# Patient Record
Sex: FEMALE | Race: WHITE | NOT HISPANIC OR LATINO | Employment: UNEMPLOYED | URBAN - METROPOLITAN AREA
[De-identification: names, ages, dates, MRNs, and addresses within clinical notes are randomized per-mention and may not be internally consistent; named-entity substitution may affect disease eponyms.]

---

## 2017-06-29 ENCOUNTER — ANESTHESIA (OUTPATIENT)
Dept: PERIOP | Facility: HOSPITAL | Age: 52
End: 2017-06-29

## 2017-06-29 ENCOUNTER — ANESTHESIA EVENT (OUTPATIENT)
Dept: PERIOP | Facility: HOSPITAL | Age: 52
End: 2017-06-29

## 2017-06-29 ENCOUNTER — HOSPITAL ENCOUNTER (OUTPATIENT)
Facility: HOSPITAL | Age: 52
Setting detail: HOSPITAL OUTPATIENT SURGERY
Discharge: NURSING, REHAB OR LONG-TERM CARE | End: 2017-06-29
Attending: SURGERY | Admitting: SURGERY

## 2017-06-29 ENCOUNTER — APPOINTMENT (OUTPATIENT)
Dept: GENERAL RADIOLOGY | Facility: HOSPITAL | Age: 52
End: 2017-06-29

## 2017-06-29 VITALS
OXYGEN SATURATION: 98 % | DIASTOLIC BLOOD PRESSURE: 59 MMHG | WEIGHT: 261 LBS | HEART RATE: 65 BPM | BODY MASS INDEX: 43.49 KG/M2 | TEMPERATURE: 97.9 F | HEIGHT: 65 IN | SYSTOLIC BLOOD PRESSURE: 143 MMHG | RESPIRATION RATE: 20 BRPM

## 2017-06-29 LAB
ALBUMIN SERPL-MCNC: 3.6 G/DL (ref 3.5–5.2)
ALBUMIN/GLOB SERPL: 0.9 G/DL
ALP SERPL-CCNC: 102 U/L (ref 39–117)
ALT SERPL W P-5'-P-CCNC: 11 U/L (ref 1–33)
ANION GAP SERPL CALCULATED.3IONS-SCNC: 20.6 MMOL/L
APTT PPP: 24.9 SECONDS (ref 22.7–35.4)
AST SERPL-CCNC: 18 U/L (ref 1–32)
BASOPHILS # BLD AUTO: 0.06 10*3/MM3 (ref 0–0.2)
BASOPHILS NFR BLD AUTO: 0.7 % (ref 0–1.5)
BILIRUB SERPL-MCNC: 0.2 MG/DL (ref 0.1–1.2)
BUN BLD-MCNC: 67 MG/DL (ref 6–20)
BUN/CREAT SERPL: 18 (ref 7–25)
CALCIUM SPEC-SCNC: 9.5 MG/DL (ref 8.6–10.5)
CHLORIDE SERPL-SCNC: 102 MMOL/L (ref 98–107)
CO2 SERPL-SCNC: 17.4 MMOL/L (ref 22–29)
CREAT BLD-MCNC: 3.72 MG/DL (ref 0.57–1)
DEPRECATED RDW RBC AUTO: 47.1 FL (ref 37–54)
EOSINOPHIL # BLD AUTO: 0.27 10*3/MM3 (ref 0–0.7)
EOSINOPHIL NFR BLD AUTO: 3.3 % (ref 0.3–6.2)
ERYTHROCYTE [DISTWIDTH] IN BLOOD BY AUTOMATED COUNT: 14.1 % (ref 11.7–13)
GFR SERPL CREATININE-BSD FRML MDRD: 13 ML/MIN/1.73
GLOBULIN UR ELPH-MCNC: 4.2 GM/DL
GLUCOSE BLD-MCNC: 219 MG/DL (ref 65–99)
GLUCOSE BLDC GLUCOMTR-MCNC: 216 MG/DL (ref 70–130)
GLUCOSE BLDC GLUCOMTR-MCNC: 256 MG/DL (ref 70–130)
HCG SERPL QL: NEGATIVE
HCT VFR BLD AUTO: 26.7 % (ref 35.6–45.5)
HGB BLD-MCNC: 9.2 G/DL (ref 11.9–15.5)
IMM GRANULOCYTES # BLD: 0.04 10*3/MM3 (ref 0–0.03)
IMM GRANULOCYTES NFR BLD: 0.5 % (ref 0–0.5)
INR PPP: 1.08 (ref 0.9–1.1)
LYMPHOCYTES # BLD AUTO: 2.25 10*3/MM3 (ref 0.9–4.8)
LYMPHOCYTES NFR BLD AUTO: 27.9 % (ref 19.6–45.3)
MCH RBC QN AUTO: 31.7 PG (ref 26.9–32)
MCHC RBC AUTO-ENTMCNC: 34.5 G/DL (ref 32.4–36.3)
MCV RBC AUTO: 92.1 FL (ref 80.5–98.2)
MONOCYTES # BLD AUTO: 0.78 10*3/MM3 (ref 0.2–1.2)
MONOCYTES NFR BLD AUTO: 9.7 % (ref 5–12)
NEUTROPHILS # BLD AUTO: 4.66 10*3/MM3 (ref 1.9–8.1)
NEUTROPHILS NFR BLD AUTO: 57.9 % (ref 42.7–76)
PLATELET # BLD AUTO: 313 10*3/MM3 (ref 140–500)
PMV BLD AUTO: 9.8 FL (ref 6–12)
POTASSIUM BLD-SCNC: 5.4 MMOL/L (ref 3.5–5.2)
PROT SERPL-MCNC: 7.8 G/DL (ref 6–8.5)
PROTHROMBIN TIME: 13.6 SECONDS (ref 11.7–14.2)
RBC # BLD AUTO: 2.9 10*6/MM3 (ref 3.9–5.2)
SODIUM BLD-SCNC: 140 MMOL/L (ref 136–145)
WBC NRBC COR # BLD: 8.06 10*3/MM3 (ref 4.5–10.7)

## 2017-06-29 PROCEDURE — 93010 ELECTROCARDIOGRAM REPORT: CPT | Performed by: INTERNAL MEDICINE

## 2017-06-29 PROCEDURE — 85730 THROMBOPLASTIN TIME PARTIAL: CPT | Performed by: SURGERY

## 2017-06-29 PROCEDURE — 71010 HC CHEST PA OR AP: CPT

## 2017-06-29 PROCEDURE — 84703 CHORIONIC GONADOTROPIN ASSAY: CPT | Performed by: SURGERY

## 2017-06-29 PROCEDURE — 85025 COMPLETE CBC W/AUTO DIFF WBC: CPT | Performed by: SURGERY

## 2017-06-29 PROCEDURE — 93005 ELECTROCARDIOGRAM TRACING: CPT | Performed by: SURGERY

## 2017-06-29 PROCEDURE — 82962 GLUCOSE BLOOD TEST: CPT

## 2017-06-29 PROCEDURE — 85610 PROTHROMBIN TIME: CPT | Performed by: SURGERY

## 2017-06-29 PROCEDURE — 80053 COMPREHEN METABOLIC PANEL: CPT | Performed by: SURGERY

## 2017-06-29 RX ORDER — ACETAMINOPHEN 325 MG/1
650 TABLET ORAL EVERY 4 HOURS PRN
COMMUNITY
End: 2018-01-31 | Stop reason: HOSPADM

## 2017-06-29 RX ORDER — MIDAZOLAM HYDROCHLORIDE 1 MG/ML
2 INJECTION INTRAMUSCULAR; INTRAVENOUS
Status: DISCONTINUED | OUTPATIENT
Start: 2017-06-29 | End: 2017-06-29 | Stop reason: HOSPADM

## 2017-06-29 RX ORDER — ESCITALOPRAM OXALATE 10 MG/1
10 TABLET ORAL DAILY
COMMUNITY

## 2017-06-29 RX ORDER — QUETIAPINE FUMARATE 25 MG/1
25 TABLET, FILM COATED ORAL NIGHTLY
COMMUNITY

## 2017-06-29 RX ORDER — GEMFIBROZIL 600 MG/1
600 TABLET, FILM COATED ORAL
COMMUNITY
End: 2018-01-31 | Stop reason: HOSPADM

## 2017-06-29 RX ORDER — MIDAZOLAM HYDROCHLORIDE 1 MG/ML
1 INJECTION INTRAMUSCULAR; INTRAVENOUS
Status: DISCONTINUED | OUTPATIENT
Start: 2017-06-29 | End: 2017-06-29 | Stop reason: HOSPADM

## 2017-06-29 RX ORDER — POLYETHYLENE GLYCOL 3350 17 G/17G
17 POWDER, FOR SOLUTION ORAL DAILY
COMMUNITY

## 2017-06-29 RX ORDER — BUMETANIDE 2 MG/1
4 TABLET ORAL 2 TIMES DAILY
COMMUNITY

## 2017-06-29 RX ORDER — FERROUS SULFATE 325(65) MG
325 TABLET ORAL
COMMUNITY

## 2017-06-29 RX ORDER — ONDANSETRON 4 MG/1
4 TABLET, FILM COATED ORAL EVERY 6 HOURS PRN
COMMUNITY
End: 2018-01-31 | Stop reason: HOSPADM

## 2017-06-29 RX ORDER — NICOTINE 21 MG/24HR
1 PATCH, TRANSDERMAL 24 HOURS TRANSDERMAL EVERY 24 HOURS
COMMUNITY
End: 2018-01-31 | Stop reason: HOSPADM

## 2017-06-29 RX ORDER — FENTANYL CITRATE 50 UG/ML
50 INJECTION, SOLUTION INTRAMUSCULAR; INTRAVENOUS
Status: DISCONTINUED | OUTPATIENT
Start: 2017-06-29 | End: 2017-06-29 | Stop reason: HOSPADM

## 2017-06-29 RX ORDER — PROMETHAZINE HYDROCHLORIDE 25 MG/1
25 TABLET ORAL EVERY 6 HOURS PRN
COMMUNITY
End: 2018-01-31 | Stop reason: HOSPADM

## 2017-06-29 RX ORDER — FAMOTIDINE 10 MG/ML
20 INJECTION, SOLUTION INTRAVENOUS ONCE
Status: DISCONTINUED | OUTPATIENT
Start: 2017-06-29 | End: 2017-06-29 | Stop reason: HOSPADM

## 2017-06-29 RX ORDER — LEVOTHYROXINE SODIUM 0.05 MG/1
50 TABLET ORAL
COMMUNITY
End: 2018-01-31 | Stop reason: HOSPADM

## 2017-06-29 RX ORDER — SODIUM CHLORIDE, SODIUM LACTATE, POTASSIUM CHLORIDE, CALCIUM CHLORIDE 600; 310; 30; 20 MG/100ML; MG/100ML; MG/100ML; MG/100ML
9 INJECTION, SOLUTION INTRAVENOUS CONTINUOUS
Status: DISCONTINUED | OUTPATIENT
Start: 2017-06-29 | End: 2017-06-29 | Stop reason: HOSPADM

## 2017-06-29 RX ORDER — SODIUM CHLORIDE 0.9 % (FLUSH) 0.9 %
1-10 SYRINGE (ML) INJECTION AS NEEDED
Status: DISCONTINUED | OUTPATIENT
Start: 2017-06-29 | End: 2017-06-29 | Stop reason: HOSPADM

## 2017-06-29 RX ORDER — ATORVASTATIN CALCIUM 20 MG/1
20 TABLET, FILM COATED ORAL DAILY
COMMUNITY
End: 2018-01-31 | Stop reason: HOSPADM

## 2017-06-29 RX ORDER — SILVER
1 GEL, EXTENDED RELEASE (ML) TOPICAL DAILY
COMMUNITY
End: 2018-03-21

## 2017-06-29 RX ORDER — CEFAZOLIN SODIUM 2 G/100ML
2 INJECTION, SOLUTION INTRAVENOUS ONCE
Status: DISCONTINUED | OUTPATIENT
Start: 2017-06-29 | End: 2017-06-29 | Stop reason: HOSPADM

## 2017-12-09 PROCEDURE — 85025 COMPLETE CBC W/AUTO DIFF WBC: CPT

## 2017-12-09 PROCEDURE — 83880 ASSAY OF NATRIURETIC PEPTIDE: CPT

## 2017-12-09 PROCEDURE — 80048 BASIC METABOLIC PNL TOTAL CA: CPT

## 2017-12-10 ENCOUNTER — LAB REQUISITION (OUTPATIENT)
Dept: LAB | Facility: HOSPITAL | Age: 52
End: 2017-12-10

## 2017-12-10 DIAGNOSIS — Z00.00 ROUTINE GENERAL MEDICAL EXAMINATION AT A HEALTH CARE FACILITY: ICD-10-CM

## 2017-12-10 LAB
ANION GAP SERPL CALCULATED.3IONS-SCNC: 18.8 MMOL/L
BASOPHILS # BLD AUTO: 0.03 10*3/MM3 (ref 0–0.2)
BASOPHILS NFR BLD AUTO: 0.5 % (ref 0–1.5)
BUN BLD-MCNC: 62 MG/DL (ref 6–20)
BUN/CREAT SERPL: 13.6 (ref 7–25)
CALCIUM SPEC-SCNC: 8.2 MG/DL (ref 8.6–10.5)
CHLORIDE SERPL-SCNC: 106 MMOL/L (ref 98–107)
CO2 SERPL-SCNC: 14.2 MMOL/L (ref 22–29)
CREAT BLD-MCNC: 4.56 MG/DL (ref 0.57–1)
DEPRECATED RDW RBC AUTO: 56 FL (ref 37–54)
EOSINOPHIL # BLD AUTO: 0.22 10*3/MM3 (ref 0–0.7)
EOSINOPHIL NFR BLD AUTO: 3.9 % (ref 0.3–6.2)
ERYTHROCYTE [DISTWIDTH] IN BLOOD BY AUTOMATED COUNT: 15.1 % (ref 11.7–13)
GFR SERPL CREATININE-BSD FRML MDRD: 10 ML/MIN/1.73
GLUCOSE BLD-MCNC: 233 MG/DL (ref 65–99)
HCT VFR BLD AUTO: 27.7 % (ref 35.6–45.5)
HGB BLD-MCNC: 8.4 G/DL (ref 11.9–15.5)
IMM GRANULOCYTES # BLD: 0 10*3/MM3 (ref 0–0.03)
IMM GRANULOCYTES NFR BLD: 0 % (ref 0–0.5)
LYMPHOCYTES # BLD AUTO: 1.05 10*3/MM3 (ref 0.9–4.8)
LYMPHOCYTES NFR BLD AUTO: 18.7 % (ref 19.6–45.3)
MCH RBC QN AUTO: 31.2 PG (ref 26.9–32)
MCHC RBC AUTO-ENTMCNC: 30.3 G/DL (ref 32.4–36.3)
MCV RBC AUTO: 103 FL (ref 80.5–98.2)
MONOCYTES # BLD AUTO: 0.42 10*3/MM3 (ref 0.2–1.2)
MONOCYTES NFR BLD AUTO: 7.5 % (ref 5–12)
NEUTROPHILS # BLD AUTO: 3.91 10*3/MM3 (ref 1.9–8.1)
NEUTROPHILS NFR BLD AUTO: 69.4 % (ref 42.7–76)
NT-PROBNP SERPL-MCNC: ABNORMAL PG/ML (ref 5–900)
PLATELET # BLD AUTO: 275 10*3/MM3 (ref 140–500)
PMV BLD AUTO: 10.3 FL (ref 6–12)
POTASSIUM BLD-SCNC: 4.4 MMOL/L (ref 3.5–5.2)
RBC # BLD AUTO: 2.69 10*6/MM3 (ref 3.9–5.2)
SODIUM BLD-SCNC: 139 MMOL/L (ref 136–145)
WBC NRBC COR # BLD: 5.63 10*3/MM3 (ref 4.5–10.7)

## 2018-01-10 ENCOUNTER — OUTSIDE FACILITY SERVICE (OUTPATIENT)
Dept: INTERNAL MEDICINE | Facility: CLINIC | Age: 53
End: 2018-01-10

## 2018-01-10 PROCEDURE — 99310 SBSQ NF CARE HIGH MDM 45: CPT | Performed by: NURSE PRACTITIONER

## 2018-01-23 ENCOUNTER — HOSPITAL ENCOUNTER (INPATIENT)
Facility: HOSPITAL | Age: 53
LOS: 8 days | Discharge: NURSING, REHAB OR LONG-TERM CARE | End: 2018-01-31
Attending: HOSPITALIST | Admitting: HOSPITALIST

## 2018-01-23 DIAGNOSIS — M62.81 MUSCLE WEAKNESS (GENERALIZED): Primary | ICD-10-CM

## 2018-01-23 LAB — GLUCOSE BLDC GLUCOMTR-MCNC: 150 MG/DL (ref 70–130)

## 2018-01-23 PROCEDURE — 94799 UNLISTED PULMONARY SVC/PX: CPT

## 2018-01-23 PROCEDURE — 63710000001 INSULIN ASPART PER 5 UNITS: Performed by: HOSPITALIST

## 2018-01-23 PROCEDURE — 82962 GLUCOSE BLOOD TEST: CPT

## 2018-01-23 RX ORDER — ONDANSETRON HYDROCHLORIDE 8 MG/1
8 TABLET, FILM COATED ORAL EVERY 6 HOURS PRN
Status: DISCONTINUED | OUTPATIENT
Start: 2018-01-23 | End: 2018-01-31

## 2018-01-23 RX ORDER — ACETAMINOPHEN 160 MG/5ML
650 SOLUTION ORAL EVERY 6 HOURS PRN
Status: DISCONTINUED | OUTPATIENT
Start: 2018-01-23 | End: 2018-01-31

## 2018-01-23 RX ORDER — FAMOTIDINE 20 MG/1
20 TABLET, FILM COATED ORAL DAILY
Status: DISCONTINUED | OUTPATIENT
Start: 2018-01-23 | End: 2018-01-24

## 2018-01-23 RX ORDER — ATORVASTATIN CALCIUM 20 MG/1
20 TABLET, FILM COATED ORAL NIGHTLY
Status: DISCONTINUED | OUTPATIENT
Start: 2018-01-23 | End: 2018-01-24

## 2018-01-23 RX ORDER — BUMETANIDE 2 MG/1
2 TABLET ORAL 3 TIMES DAILY
Status: DISCONTINUED | OUTPATIENT
Start: 2018-01-23 | End: 2018-01-29

## 2018-01-23 RX ORDER — ESCITALOPRAM OXALATE 10 MG/1
10 TABLET ORAL DAILY
Status: DISCONTINUED | OUTPATIENT
Start: 2018-01-23 | End: 2018-01-31 | Stop reason: HOSPADM

## 2018-01-23 RX ORDER — IPRATROPIUM BROMIDE AND ALBUTEROL SULFATE 2.5; .5 MG/3ML; MG/3ML
3 SOLUTION RESPIRATORY (INHALATION)
Status: DISCONTINUED | OUTPATIENT
Start: 2018-01-23 | End: 2018-01-31 | Stop reason: HOSPADM

## 2018-01-23 RX ORDER — FERROUS SULFATE 325(65) MG
325 TABLET ORAL
Status: DISCONTINUED | OUTPATIENT
Start: 2018-01-24 | End: 2018-01-29

## 2018-01-23 RX ORDER — HYDROCODONE BITARTRATE AND ACETAMINOPHEN 5; 325 MG/1; MG/1
1 TABLET ORAL EVERY 6 HOURS PRN
Status: DISCONTINUED | OUTPATIENT
Start: 2018-01-23 | End: 2018-01-31

## 2018-01-23 RX ORDER — ASPIRIN 81 MG/1
81 TABLET, CHEWABLE ORAL DAILY
Status: DISCONTINUED | OUTPATIENT
Start: 2018-01-23 | End: 2018-01-31 | Stop reason: HOSPADM

## 2018-01-23 RX ORDER — DEXTROSE MONOHYDRATE 25 G/50ML
25 INJECTION, SOLUTION INTRAVENOUS
Status: DISCONTINUED | OUTPATIENT
Start: 2018-01-23 | End: 2018-01-31

## 2018-01-23 RX ORDER — PROMETHAZINE HYDROCHLORIDE 25 MG/1
25 TABLET ORAL EVERY 6 HOURS PRN
Status: DISCONTINUED | OUTPATIENT
Start: 2018-01-23 | End: 2018-01-31

## 2018-01-23 RX ORDER — DIPHENOXYLATE HYDROCHLORIDE AND ATROPINE SULFATE 2.5; .025 MG/1; MG/1
1 TABLET ORAL DAILY
Status: DISCONTINUED | OUTPATIENT
Start: 2018-01-23 | End: 2018-01-31 | Stop reason: HOSPADM

## 2018-01-23 RX ORDER — NICOTINE POLACRILEX 4 MG
15 LOZENGE BUCCAL
Status: DISCONTINUED | OUTPATIENT
Start: 2018-01-23 | End: 2018-01-31

## 2018-01-23 RX ORDER — LEVOTHYROXINE SODIUM 0.07 MG/1
75 TABLET ORAL
Status: DISCONTINUED | OUTPATIENT
Start: 2018-01-24 | End: 2018-01-31 | Stop reason: HOSPADM

## 2018-01-23 RX ORDER — LANOLIN ALCOHOL/MO/W.PET/CERES
CREAM (GRAM) TOPICAL DAILY
Status: DISCONTINUED | OUTPATIENT
Start: 2018-01-23 | End: 2018-01-31 | Stop reason: HOSPADM

## 2018-01-23 RX ORDER — QUETIAPINE FUMARATE 25 MG/1
25 TABLET, FILM COATED ORAL DAILY
Status: DISCONTINUED | OUTPATIENT
Start: 2018-01-23 | End: 2018-01-31 | Stop reason: HOSPADM

## 2018-01-23 RX ADMIN — ATORVASTATIN CALCIUM 20 MG: 20 TABLET, FILM COATED ORAL at 20:57

## 2018-01-23 RX ADMIN — INSULIN ASPART 2 UNITS: 100 INJECTION, SOLUTION INTRAVENOUS; SUBCUTANEOUS at 21:05

## 2018-01-23 RX ADMIN — QUETIAPINE FUMARATE 25 MG: 25 TABLET, FILM COATED ORAL at 20:57

## 2018-01-23 RX ADMIN — ESCITALOPRAM 10 MG: 10 TABLET, FILM COATED ORAL at 20:57

## 2018-01-23 RX ADMIN — METOPROLOL TARTRATE 25 MG: 25 TABLET ORAL at 20:57

## 2018-01-23 RX ADMIN — BUMETANIDE 2 MG: 2 TABLET ORAL at 20:57

## 2018-01-23 RX ADMIN — Medication: at 22:27

## 2018-01-23 RX ADMIN — IPRATROPIUM BROMIDE AND ALBUTEROL SULFATE 3 ML: .5; 3 SOLUTION RESPIRATORY (INHALATION) at 20:31

## 2018-01-23 RX ADMIN — CALCIUM ACETATE 667 MG: 667 CAPSULE ORAL at 20:57

## 2018-01-23 RX ADMIN — FAMOTIDINE 20 MG: 20 TABLET, FILM COATED ORAL at 20:57

## 2018-01-23 RX ADMIN — Medication 1 TABLET: at 20:57

## 2018-01-24 ENCOUNTER — ANESTHESIA (OUTPATIENT)
Dept: PERIOP | Facility: HOSPITAL | Age: 53
End: 2018-01-24

## 2018-01-24 ENCOUNTER — ANESTHESIA EVENT (OUTPATIENT)
Dept: PERIOP | Facility: HOSPITAL | Age: 53
End: 2018-01-24

## 2018-01-24 ENCOUNTER — APPOINTMENT (OUTPATIENT)
Dept: GENERAL RADIOLOGY | Facility: HOSPITAL | Age: 53
End: 2018-01-24

## 2018-01-24 ENCOUNTER — APPOINTMENT (OUTPATIENT)
Dept: GENERAL RADIOLOGY | Facility: HOSPITAL | Age: 53
End: 2018-01-24
Attending: SURGERY

## 2018-01-24 PROBLEM — N18.6 END STAGE RENAL DISEASE (HCC): Status: ACTIVE | Noted: 2018-01-24

## 2018-01-24 PROBLEM — N18.6 END STAGE KIDNEY DISEASE (HCC): Status: ACTIVE | Noted: 2018-01-24

## 2018-01-24 LAB
ALBUMIN SERPL-MCNC: 2.8 G/DL (ref 3.5–5.2)
ANION GAP SERPL CALCULATED.3IONS-SCNC: 19.5 MMOL/L
BASOPHILS # BLD AUTO: 0.03 10*3/MM3 (ref 0–0.2)
BASOPHILS NFR BLD AUTO: 0.5 % (ref 0–1.5)
BUN BLD-MCNC: 71 MG/DL (ref 6–20)
BUN/CREAT SERPL: 14.6 (ref 7–25)
CALCIUM SPEC-SCNC: 8.7 MG/DL (ref 8.6–10.5)
CHLORIDE SERPL-SCNC: 107 MMOL/L (ref 98–107)
CO2 SERPL-SCNC: 15.5 MMOL/L (ref 22–29)
CREAT BLD-MCNC: 4.86 MG/DL (ref 0.57–1)
DEPRECATED RDW RBC AUTO: 53.7 FL (ref 37–54)
EOSINOPHIL # BLD AUTO: 0.19 10*3/MM3 (ref 0–0.7)
EOSINOPHIL NFR BLD AUTO: 3 % (ref 0.3–6.2)
ERYTHROCYTE [DISTWIDTH] IN BLOOD BY AUTOMATED COUNT: 14.8 % (ref 11.7–13)
GFR SERPL CREATININE-BSD FRML MDRD: 9 ML/MIN/1.73
GLUCOSE BLD-MCNC: 105 MG/DL (ref 65–99)
GLUCOSE BLDC GLUCOMTR-MCNC: 111 MG/DL (ref 70–130)
GLUCOSE BLDC GLUCOMTR-MCNC: 116 MG/DL (ref 70–130)
GLUCOSE BLDC GLUCOMTR-MCNC: 139 MG/DL (ref 70–130)
HCT VFR BLD AUTO: 30.5 % (ref 35.6–45.5)
HGB BLD-MCNC: 9.2 G/DL (ref 11.9–15.5)
IMM GRANULOCYTES # BLD: 0 10*3/MM3 (ref 0–0.03)
IMM GRANULOCYTES NFR BLD: 0 % (ref 0–0.5)
LYMPHOCYTES # BLD AUTO: 2.02 10*3/MM3 (ref 0.9–4.8)
LYMPHOCYTES NFR BLD AUTO: 31.8 % (ref 19.6–45.3)
MCH RBC QN AUTO: 30.2 PG (ref 26.9–32)
MCHC RBC AUTO-ENTMCNC: 30.2 G/DL (ref 32.4–36.3)
MCV RBC AUTO: 100 FL (ref 80.5–98.2)
MONOCYTES # BLD AUTO: 0.5 10*3/MM3 (ref 0.2–1.2)
MONOCYTES NFR BLD AUTO: 7.9 % (ref 5–12)
NEUTROPHILS # BLD AUTO: 3.61 10*3/MM3 (ref 1.9–8.1)
NEUTROPHILS NFR BLD AUTO: 56.8 % (ref 42.7–76)
PHOSPHATE SERPL-MCNC: 6.3 MG/DL (ref 2.5–4.5)
PLATELET # BLD AUTO: 273 10*3/MM3 (ref 140–500)
PMV BLD AUTO: 9.7 FL (ref 6–12)
POTASSIUM BLD-SCNC: 5 MMOL/L (ref 3.5–5.2)
RBC # BLD AUTO: 3.05 10*6/MM3 (ref 3.9–5.2)
SODIUM BLD-SCNC: 142 MMOL/L (ref 136–145)
WBC NRBC COR # BLD: 6.35 10*3/MM3 (ref 4.5–10.7)

## 2018-01-24 PROCEDURE — 25010000002 MIDAZOLAM PER 1 MG: Performed by: ANESTHESIOLOGY

## 2018-01-24 PROCEDURE — 02HV33Z INSERTION OF INFUSION DEVICE INTO SUPERIOR VENA CAVA, PERCUTANEOUS APPROACH: ICD-10-PCS | Performed by: SURGERY

## 2018-01-24 PROCEDURE — 94799 UNLISTED PULMONARY SVC/PX: CPT

## 2018-01-24 PROCEDURE — C1750 CATH, HEMODIALYSIS,LONG-TERM: HCPCS | Performed by: SURGERY

## 2018-01-24 PROCEDURE — 77001 FLUOROGUIDE FOR VEIN DEVICE: CPT

## 2018-01-24 PROCEDURE — 25010000002 PROPOFOL 10 MG/ML EMULSION: Performed by: NURSE ANESTHETIST, CERTIFIED REGISTERED

## 2018-01-24 PROCEDURE — 82962 GLUCOSE BLOOD TEST: CPT

## 2018-01-24 PROCEDURE — 25010000002 HEPARIN (PORCINE) PER 1000 UNITS: Performed by: SURGERY

## 2018-01-24 PROCEDURE — 85025 COMPLETE CBC W/AUTO DIFF WBC: CPT | Performed by: INTERNAL MEDICINE

## 2018-01-24 PROCEDURE — 71045 X-RAY EXAM CHEST 1 VIEW: CPT

## 2018-01-24 PROCEDURE — 80069 RENAL FUNCTION PANEL: CPT | Performed by: INTERNAL MEDICINE

## 2018-01-24 PROCEDURE — B548ZZA ULTRASONOGRAPHY OF SUPERIOR VENA CAVA, GUIDANCE: ICD-10-PCS | Performed by: SURGERY

## 2018-01-24 RX ORDER — SODIUM CHLORIDE, SODIUM LACTATE, POTASSIUM CHLORIDE, CALCIUM CHLORIDE 600; 310; 30; 20 MG/100ML; MG/100ML; MG/100ML; MG/100ML
9 INJECTION, SOLUTION INTRAVENOUS CONTINUOUS
Status: DISCONTINUED | OUTPATIENT
Start: 2018-01-24 | End: 2018-01-24

## 2018-01-24 RX ORDER — PROMETHAZINE HYDROCHLORIDE 25 MG/ML
12.5 INJECTION, SOLUTION INTRAMUSCULAR; INTRAVENOUS ONCE AS NEEDED
Status: DISCONTINUED | OUTPATIENT
Start: 2018-01-24 | End: 2018-01-24 | Stop reason: HOSPADM

## 2018-01-24 RX ORDER — HYDRALAZINE HYDROCHLORIDE 20 MG/ML
5 INJECTION INTRAMUSCULAR; INTRAVENOUS
Status: DISCONTINUED | OUTPATIENT
Start: 2018-01-24 | End: 2018-01-24 | Stop reason: HOSPADM

## 2018-01-24 RX ORDER — NALOXONE HCL 0.4 MG/ML
0.2 VIAL (ML) INJECTION AS NEEDED
Status: DISCONTINUED | OUTPATIENT
Start: 2018-01-24 | End: 2018-01-24 | Stop reason: HOSPADM

## 2018-01-24 RX ORDER — CLINDAMYCIN PHOSPHATE 600 MG/50ML
INJECTION INTRAVENOUS AS NEEDED
Status: DISCONTINUED | OUTPATIENT
Start: 2018-01-24 | End: 2018-01-24 | Stop reason: SURG

## 2018-01-24 RX ORDER — HEPARIN SODIUM 1000 [USP'U]/ML
INJECTION, SOLUTION INTRAVENOUS; SUBCUTANEOUS AS NEEDED
Status: DISCONTINUED | OUTPATIENT
Start: 2018-01-24 | End: 2018-01-24 | Stop reason: HOSPADM

## 2018-01-24 RX ORDER — ATORVASTATIN CALCIUM 10 MG/1
10 TABLET, FILM COATED ORAL NIGHTLY
Status: DISCONTINUED | OUTPATIENT
Start: 2018-01-24 | End: 2018-01-31 | Stop reason: HOSPADM

## 2018-01-24 RX ORDER — DIPHENHYDRAMINE HYDROCHLORIDE 50 MG/ML
12.5 INJECTION INTRAMUSCULAR; INTRAVENOUS
Status: DISCONTINUED | OUTPATIENT
Start: 2018-01-24 | End: 2018-01-24 | Stop reason: HOSPADM

## 2018-01-24 RX ORDER — LIDOCAINE HYDROCHLORIDE 20 MG/ML
INJECTION, SOLUTION INFILTRATION; PERINEURAL AS NEEDED
Status: DISCONTINUED | OUTPATIENT
Start: 2018-01-24 | End: 2018-01-24 | Stop reason: SURG

## 2018-01-24 RX ORDER — PROMETHAZINE HYDROCHLORIDE 25 MG/1
12.5 TABLET ORAL ONCE AS NEEDED
Status: DISCONTINUED | OUTPATIENT
Start: 2018-01-24 | End: 2018-01-24 | Stop reason: HOSPADM

## 2018-01-24 RX ORDER — PROMETHAZINE HYDROCHLORIDE 25 MG/1
25 SUPPOSITORY RECTAL ONCE AS NEEDED
Status: DISCONTINUED | OUTPATIENT
Start: 2018-01-24 | End: 2018-01-24 | Stop reason: HOSPADM

## 2018-01-24 RX ORDER — EPHEDRINE SULFATE 50 MG/ML
5 INJECTION, SOLUTION INTRAVENOUS ONCE AS NEEDED
Status: DISCONTINUED | OUTPATIENT
Start: 2018-01-24 | End: 2018-01-24 | Stop reason: HOSPADM

## 2018-01-24 RX ORDER — PROPOFOL 10 MG/ML
VIAL (ML) INTRAVENOUS AS NEEDED
Status: DISCONTINUED | OUTPATIENT
Start: 2018-01-24 | End: 2018-01-24 | Stop reason: SURG

## 2018-01-24 RX ORDER — FAMOTIDINE 10 MG/ML
20 INJECTION, SOLUTION INTRAVENOUS ONCE
Status: DISCONTINUED | OUTPATIENT
Start: 2018-01-24 | End: 2018-01-24

## 2018-01-24 RX ORDER — PROPOFOL 10 MG/ML
VIAL (ML) INTRAVENOUS CONTINUOUS PRN
Status: DISCONTINUED | OUTPATIENT
Start: 2018-01-24 | End: 2018-01-24 | Stop reason: SURG

## 2018-01-24 RX ORDER — LIDOCAINE HYDROCHLORIDE 10 MG/ML
0.5 INJECTION, SOLUTION EPIDURAL; INFILTRATION; INTRACAUDAL; PERINEURAL ONCE AS NEEDED
Status: DISCONTINUED | OUTPATIENT
Start: 2018-01-24 | End: 2018-01-24 | Stop reason: HOSPADM

## 2018-01-24 RX ORDER — CLINDAMYCIN PHOSPHATE 600 MG/50ML
600 INJECTION INTRAVENOUS ONCE
Status: DISCONTINUED | OUTPATIENT
Start: 2018-01-24 | End: 2018-01-24

## 2018-01-24 RX ORDER — FENTANYL CITRATE 50 UG/ML
50 INJECTION, SOLUTION INTRAMUSCULAR; INTRAVENOUS
Status: DISCONTINUED | OUTPATIENT
Start: 2018-01-24 | End: 2018-01-24 | Stop reason: HOSPADM

## 2018-01-24 RX ORDER — PROMETHAZINE HYDROCHLORIDE 25 MG/1
25 TABLET ORAL ONCE AS NEEDED
Status: DISCONTINUED | OUTPATIENT
Start: 2018-01-24 | End: 2018-01-24 | Stop reason: HOSPADM

## 2018-01-24 RX ORDER — PANTOPRAZOLE SODIUM 40 MG/1
40 TABLET, DELAYED RELEASE ORAL
Status: DISCONTINUED | OUTPATIENT
Start: 2018-01-25 | End: 2018-01-31 | Stop reason: HOSPADM

## 2018-01-24 RX ORDER — ONDANSETRON 2 MG/ML
4 INJECTION INTRAMUSCULAR; INTRAVENOUS ONCE AS NEEDED
Status: DISCONTINUED | OUTPATIENT
Start: 2018-01-24 | End: 2018-01-24 | Stop reason: HOSPADM

## 2018-01-24 RX ORDER — FLUMAZENIL 0.1 MG/ML
0.2 INJECTION INTRAVENOUS AS NEEDED
Status: DISCONTINUED | OUTPATIENT
Start: 2018-01-24 | End: 2018-01-24 | Stop reason: HOSPADM

## 2018-01-24 RX ORDER — OXYCODONE HYDROCHLORIDE AND ACETAMINOPHEN 5; 325 MG/1; MG/1
2 TABLET ORAL EVERY 6 HOURS PRN
Status: DISCONTINUED | OUTPATIENT
Start: 2018-01-24 | End: 2018-01-31

## 2018-01-24 RX ORDER — MIDAZOLAM HYDROCHLORIDE 1 MG/ML
1 INJECTION INTRAMUSCULAR; INTRAVENOUS
Status: DISCONTINUED | OUTPATIENT
Start: 2018-01-24 | End: 2018-01-24 | Stop reason: HOSPADM

## 2018-01-24 RX ORDER — HYDROMORPHONE HCL 110MG/55ML
0.5 PATIENT CONTROLLED ANALGESIA SYRINGE INTRAVENOUS
Status: DISCONTINUED | OUTPATIENT
Start: 2018-01-24 | End: 2018-01-24 | Stop reason: HOSPADM

## 2018-01-24 RX ORDER — FAMOTIDINE 10 MG/ML
20 INJECTION, SOLUTION INTRAVENOUS ONCE
Status: COMPLETED | OUTPATIENT
Start: 2018-01-24 | End: 2018-01-24

## 2018-01-24 RX ORDER — LABETALOL HYDROCHLORIDE 5 MG/ML
5 INJECTION, SOLUTION INTRAVENOUS
Status: DISCONTINUED | OUTPATIENT
Start: 2018-01-24 | End: 2018-01-24 | Stop reason: HOSPADM

## 2018-01-24 RX ORDER — SODIUM CHLORIDE 0.9 % (FLUSH) 0.9 %
1-10 SYRINGE (ML) INJECTION AS NEEDED
Status: DISCONTINUED | OUTPATIENT
Start: 2018-01-24 | End: 2018-01-24 | Stop reason: HOSPADM

## 2018-01-24 RX ORDER — MIDAZOLAM HYDROCHLORIDE 1 MG/ML
2 INJECTION INTRAMUSCULAR; INTRAVENOUS
Status: DISCONTINUED | OUTPATIENT
Start: 2018-01-24 | End: 2018-01-24 | Stop reason: HOSPADM

## 2018-01-24 RX ORDER — SODIUM CHLORIDE 9 MG/ML
INJECTION, SOLUTION INTRAVENOUS CONTINUOUS PRN
Status: DISCONTINUED | OUTPATIENT
Start: 2018-01-24 | End: 2018-01-24 | Stop reason: SURG

## 2018-01-24 RX ADMIN — IPRATROPIUM BROMIDE AND ALBUTEROL SULFATE 3 ML: .5; 3 SOLUTION RESPIRATORY (INHALATION) at 08:17

## 2018-01-24 RX ADMIN — BUMETANIDE 2 MG: 2 TABLET ORAL at 20:54

## 2018-01-24 RX ADMIN — METOPROLOL TARTRATE 25 MG: 25 TABLET ORAL at 20:54

## 2018-01-24 RX ADMIN — IPRATROPIUM BROMIDE AND ALBUTEROL SULFATE 3 ML: .5; 3 SOLUTION RESPIRATORY (INHALATION) at 20:09

## 2018-01-24 RX ADMIN — ESCITALOPRAM 10 MG: 10 TABLET, FILM COATED ORAL at 08:15

## 2018-01-24 RX ADMIN — CLINDAMYCIN PHOSPHATE 600 MG: 12 INJECTION, SOLUTION INTRAVENOUS at 12:52

## 2018-01-24 RX ADMIN — METOPROLOL TARTRATE 25 MG: 25 TABLET ORAL at 08:14

## 2018-01-24 RX ADMIN — FERROUS SULFATE TAB 325 MG (65 MG ELEMENTAL FE) 325 MG: 325 (65 FE) TAB at 08:15

## 2018-01-24 RX ADMIN — PROPOFOL 50 MG: 10 INJECTION, EMULSION INTRAVENOUS at 12:51

## 2018-01-24 RX ADMIN — FAMOTIDINE 20 MG: 10 INJECTION INTRAVENOUS at 10:20

## 2018-01-24 RX ADMIN — FAMOTIDINE 20 MG: 20 TABLET, FILM COATED ORAL at 08:14

## 2018-01-24 RX ADMIN — ASPIRIN 81 MG: 81 TABLET, CHEWABLE ORAL at 08:15

## 2018-01-24 RX ADMIN — PROPOFOL 75 MCG/KG/MIN: 10 INJECTION, EMULSION INTRAVENOUS at 12:51

## 2018-01-24 RX ADMIN — Medication 1 TABLET: at 08:14

## 2018-01-24 RX ADMIN — QUETIAPINE FUMARATE 25 MG: 25 TABLET, FILM COATED ORAL at 08:14

## 2018-01-24 RX ADMIN — SODIUM CHLORIDE: 9 INJECTION, SOLUTION INTRAVENOUS at 12:33

## 2018-01-24 RX ADMIN — LIDOCAINE HYDROCHLORIDE 60 MG: 20 INJECTION, SOLUTION INFILTRATION; PERINEURAL at 12:51

## 2018-01-24 RX ADMIN — ATORVASTATIN CALCIUM 10 MG: 10 TABLET, FILM COATED ORAL at 20:54

## 2018-01-24 RX ADMIN — BUMETANIDE 2 MG: 2 TABLET ORAL at 08:15

## 2018-01-24 RX ADMIN — CALCIUM ACETATE 667 MG: 667 CAPSULE ORAL at 08:15

## 2018-01-24 NOTE — ANESTHESIA PREPROCEDURE EVALUATION
Anesthesia Evaluation     Patient summary reviewed and Nursing notes reviewed          Airway   Mallampati: II  TM distance: <3 FB  Neck ROM: full  possible difficult intubation  Dental    (+) poor dentition and edentulous    Comment: Edent upper/poor dent lower (none loose)    Pulmonary    (+) a smoker Former, rales,     PE comment: Course rales bilaterally probably due to volume overload/needs dialysis  Cardiovascular - normal exam    (+) hypertension, CAD, cardiac stents more than 12 months ago PVD,       Neuro/Psych  (+) numbness, psychiatric history Depression,       ROS Comment: Idiopathic neuropathy  GI/Hepatic/Renal/Endo    (+) obesity, morbid obesity, renal disease ESRD, diabetes mellitus type 2 using insulin,     Musculoskeletal (-) negative ROS    Abdominal  - normal exam  (+) obese,    Substance History - negative use     OB/GYN negative ob/gyn ROS         Other                                              Anesthesia Plan    ASA 3     MAC and general   (Light GA with LMA vs MAC with IV propofol drip)  intravenous induction   Anesthetic plan and risks discussed with patient.

## 2018-01-24 NOTE — ANESTHESIA POSTPROCEDURE EVALUATION
Patient: Melia Almeida    Procedure Summary     Date Anesthesia Start Anesthesia Stop Room / Location    01/24/18 3957 1610  [facility] / [facility]       Procedure Diagnosis Surgeon Provider    TUNNEL DIALYSIS CATHETER INSERTION (N/A ) ESRD (end stage renal disease) on dialysis MD Myles Fermin MD          Anesthesia Type: MAC  Last vitals  BP   139/77 (01/24/18 1530)   Temp   36.8 °C (98.2 °F) (01/24/18 1530)   Pulse   67 (01/24/18 1530)   Resp   16 (01/24/18 1530)     SpO2   99 % (01/24/18 1530)     Post Anesthesia Care and Evaluation    Patient location during evaluation: PACU  Patient participation: complete - patient participated  Level of consciousness: awake and alert  Pain management: adequate  Airway patency: patent  Anesthetic complications: No anesthetic complications    Cardiovascular status: acceptable  Respiratory status: acceptable  Hydration status: acceptable    Comments: ---------------------------               01/24/18 1530         ---------------------------   BP:          139/77         Pulse:         67           Resp:          16           Temp:   36.8 °C (98.2 °F)   SpO2:          99%         ---------------------------

## 2018-01-25 ENCOUNTER — APPOINTMENT (OUTPATIENT)
Dept: CARDIOLOGY | Facility: HOSPITAL | Age: 53
End: 2018-01-25
Attending: SURGERY

## 2018-01-25 LAB
ALBUMIN SERPL-MCNC: 2.7 G/DL (ref 3.5–5.2)
ALBUMIN/GLOB SERPL: 0.7 G/DL
ALP SERPL-CCNC: 68 U/L (ref 39–117)
ALT SERPL W P-5'-P-CCNC: 8 U/L (ref 1–33)
ANION GAP SERPL CALCULATED.3IONS-SCNC: 17.6 MMOL/L
AST SERPL-CCNC: 9 U/L (ref 1–32)
BASOPHILS # BLD AUTO: 0.03 10*3/MM3 (ref 0–0.2)
BASOPHILS NFR BLD AUTO: 0.5 % (ref 0–1.5)
BH CV ECHO MEAS - ACS: 1.6 CM
BH CV ECHO MEAS - AO MEAN PG (FULL): 2 MMHG
BH CV ECHO MEAS - AO MEAN PG: 5 MMHG
BH CV ECHO MEAS - AO ROOT AREA (BSA CORRECTED): 1.3
BH CV ECHO MEAS - AO ROOT AREA: 6.6 CM^2
BH CV ECHO MEAS - AO ROOT DIAM: 2.9 CM
BH CV ECHO MEAS - AO V2 MAX: 143 CM/SEC
BH CV ECHO MEAS - AO V2 MEAN: 101 CM/SEC
BH CV ECHO MEAS - AO V2 VTI: 25.8 CM
BH CV ECHO MEAS - ASC AORTA: 2.9 CM
BH CV ECHO MEAS - AVA(I,A): 2.8 CM^2
BH CV ECHO MEAS - AVA(I,D): 2.8 CM^2
BH CV ECHO MEAS - BSA(HAYCOCK): 2.4 M^2
BH CV ECHO MEAS - BSA: 2.2 M^2
BH CV ECHO MEAS - BZI_BMI: 44.8 KILOGRAMS/M^2
BH CV ECHO MEAS - BZI_METRIC_HEIGHT: 165 CM
BH CV ECHO MEAS - BZI_METRIC_WEIGHT: 122 KG
BH CV ECHO MEAS - CONTRAST EF (2CH): 35.5 ML/M^2
BH CV ECHO MEAS - CONTRAST EF 4CH: 34.7 ML/M^2
BH CV ECHO MEAS - EDV(CUBED): 227 ML
BH CV ECHO MEAS - EDV(MOD-SP2): 200 ML
BH CV ECHO MEAS - EDV(MOD-SP4): 245 ML
BH CV ECHO MEAS - EDV(TEICH): 186.9 ML
BH CV ECHO MEAS - EF(CUBED): 34.4 %
BH CV ECHO MEAS - EF(MOD-SP2): 35.5 %
BH CV ECHO MEAS - EF(MOD-SP4): 34.7 %
BH CV ECHO MEAS - EF(TEICH): 27.6 %
BH CV ECHO MEAS - ESV(CUBED): 148.9 ML
BH CV ECHO MEAS - ESV(MOD-SP2): 129 ML
BH CV ECHO MEAS - ESV(MOD-SP4): 160 ML
BH CV ECHO MEAS - ESV(TEICH): 135.3 ML
BH CV ECHO MEAS - FS: 13.1 %
BH CV ECHO MEAS - IVS/LVPW: 1
BH CV ECHO MEAS - IVSD: 1 CM
BH CV ECHO MEAS - LAT PEAK E' VEL: 5 CM/SEC
BH CV ECHO MEAS - LV DIASTOLIC VOL/BSA (35-75): 109.3 ML/M^2
BH CV ECHO MEAS - LV MASS(C)D: 253.9 GRAMS
BH CV ECHO MEAS - LV MASS(C)DI: 113.2 GRAMS/M^2
BH CV ECHO MEAS - LV MEAN PG: 3 MMHG
BH CV ECHO MEAS - LV SYSTOLIC VOL/BSA (12-30): 71.3 ML/M^2
BH CV ECHO MEAS - LV V1 MAX: 106 CM/SEC
BH CV ECHO MEAS - LV V1 MEAN: 77.5 CM/SEC
BH CV ECHO MEAS - LV V1 VTI: 20.8 CM
BH CV ECHO MEAS - LVIDD: 6.1 CM
BH CV ECHO MEAS - LVIDS: 5.3 CM
BH CV ECHO MEAS - LVLD AP2: 9.2 CM
BH CV ECHO MEAS - LVLD AP4: 10.3 CM
BH CV ECHO MEAS - LVLS AP2: 8.8 CM
BH CV ECHO MEAS - LVLS AP4: 9.1 CM
BH CV ECHO MEAS - LVOT AREA (M): 3.5 CM^2
BH CV ECHO MEAS - LVOT AREA: 3.5 CM^2
BH CV ECHO MEAS - LVOT DIAM: 2.1 CM
BH CV ECHO MEAS - LVPWD: 1 CM
BH CV ECHO MEAS - MED PEAK E' VEL: 4 CM/SEC
BH CV ECHO MEAS - MV A DUR: 116 SEC
BH CV ECHO MEAS - MV A MAX VEL: 47.9 CM/SEC
BH CV ECHO MEAS - MV DEC SLOPE: 646 CM/SEC^2
BH CV ECHO MEAS - MV DEC TIME: 123 SEC
BH CV ECHO MEAS - MV E MAX VEL: 85.4 CM/SEC
BH CV ECHO MEAS - MV E/A: 1.8
BH CV ECHO MEAS - MV MEAN PG: 3 MMHG
BH CV ECHO MEAS - MV P1/2T MAX VEL: 125 CM/SEC
BH CV ECHO MEAS - MV P1/2T: 56.7 MSEC
BH CV ECHO MEAS - MV V2 MEAN: 75.9 CM/SEC
BH CV ECHO MEAS - MV V2 VTI: 23 CM
BH CV ECHO MEAS - MVA P1/2T LCG: 1.8 CM^2
BH CV ECHO MEAS - MVA(P1/2T): 3.9 CM^2
BH CV ECHO MEAS - MVA(VTI): 3.1 CM^2
BH CV ECHO MEAS - PA ACC SLOPE: 53.2 CM/SEC^2
BH CV ECHO MEAS - PA ACC TIME: 0.1 SEC
BH CV ECHO MEAS - PA MAX PG: 4.8 MMHG
BH CV ECHO MEAS - PA PR(ACCEL): 33.1 MMHG
BH CV ECHO MEAS - PA V2 MAX: 110 CM/SEC
BH CV ECHO MEAS - PULM DIAS VEL: 93.3 CM/SEC
BH CV ECHO MEAS - PULM S/D: 0.49
BH CV ECHO MEAS - PULM SYS VEL: 45.5 CM/SEC
BH CV ECHO MEAS - QP/QS: 0.88
BH CV ECHO MEAS - RAP SYSTOLE: 8 MMHG
BH CV ECHO MEAS - RV MEAN PG: 1 MMHG
BH CV ECHO MEAS - RV V1 MEAN: 53.8 CM/SEC
BH CV ECHO MEAS - RV V1 VTI: 14 CM
BH CV ECHO MEAS - RVOT AREA: 4.5 CM^2
BH CV ECHO MEAS - RVOT DIAM: 2.4 CM
BH CV ECHO MEAS - RVSP: 65.8 MMHG
BH CV ECHO MEAS - SI(AO): 76 ML/M^2
BH CV ECHO MEAS - SI(CUBED): 34.8 ML/M^2
BH CV ECHO MEAS - SI(LVOT): 32.1 ML/M^2
BH CV ECHO MEAS - SI(MOD-SP2): 31.7 ML/M^2
BH CV ECHO MEAS - SI(MOD-SP4): 37.9 ML/M^2
BH CV ECHO MEAS - SI(TEICH): 23 ML/M^2
BH CV ECHO MEAS - SV(AO): 170.4 ML
BH CV ECHO MEAS - SV(CUBED): 78.1 ML
BH CV ECHO MEAS - SV(LVOT): 72 ML
BH CV ECHO MEAS - SV(MOD-SP2): 71 ML
BH CV ECHO MEAS - SV(MOD-SP4): 85 ML
BH CV ECHO MEAS - SV(RVOT): 63.3 ML
BH CV ECHO MEAS - SV(TEICH): 51.6 ML
BH CV ECHO MEAS - TAPSE (>1.6): 0.9 CM2
BH CV ECHO MEAS - TR MAX VEL: 380 CM/SEC
BH CV VAS BP RIGHT ARM: NORMAL MMHG
BH CV XLRA - RV BASE: 4.4 CM
BH CV XLRA - TDI S': 57 CM/SEC
BILIRUB SERPL-MCNC: 0.2 MG/DL (ref 0.1–1.2)
BUN BLD-MCNC: 76 MG/DL (ref 6–20)
BUN/CREAT SERPL: 14.6 (ref 7–25)
CALCIUM SPEC-SCNC: 8.7 MG/DL (ref 8.6–10.5)
CHLORIDE SERPL-SCNC: 105 MMOL/L (ref 98–107)
CHOLEST SERPL-MCNC: 117 MG/DL (ref 0–200)
CO2 SERPL-SCNC: 14.4 MMOL/L (ref 22–29)
CREAT BLD-MCNC: 5.19 MG/DL (ref 0.57–1)
DEPRECATED RDW RBC AUTO: 55.1 FL (ref 37–54)
E/E' RATIO: 17.5
EOSINOPHIL # BLD AUTO: 0.09 10*3/MM3 (ref 0–0.7)
EOSINOPHIL NFR BLD AUTO: 1.6 % (ref 0.3–6.2)
ERYTHROCYTE [DISTWIDTH] IN BLOOD BY AUTOMATED COUNT: 14.9 % (ref 11.7–13)
GFR SERPL CREATININE-BSD FRML MDRD: 9 ML/MIN/1.73
GLOBULIN UR ELPH-MCNC: 4.1 GM/DL
GLUCOSE BLD-MCNC: 108 MG/DL (ref 65–99)
GLUCOSE BLDC GLUCOMTR-MCNC: 124 MG/DL (ref 70–130)
GLUCOSE BLDC GLUCOMTR-MCNC: 196 MG/DL (ref 70–130)
HBA1C MFR BLD: 6.76 % (ref 4.8–5.6)
HBV SURFACE AG SERPL QL IA: NORMAL
HCT VFR BLD AUTO: 31.9 % (ref 35.6–45.5)
HDLC SERPL-MCNC: 27 MG/DL (ref 40–60)
HGB BLD-MCNC: 9.5 G/DL (ref 11.9–15.5)
IMM GRANULOCYTES # BLD: 0 10*3/MM3 (ref 0–0.03)
IMM GRANULOCYTES NFR BLD: 0 % (ref 0–0.5)
LDLC SERPL CALC-MCNC: 64 MG/DL (ref 0–100)
LDLC/HDLC SERPL: 2.37 {RATIO}
LEFT ATRIUM VOLUME INDEX: 34.4 ML/M2
LV EF 2D ECHO EST: 34 %
LYMPHOCYTES # BLD AUTO: 1.42 10*3/MM3 (ref 0.9–4.8)
LYMPHOCYTES NFR BLD AUTO: 25 % (ref 19.6–45.3)
MAXIMAL PREDICTED HEART RATE: 166 BPM
MCH RBC QN AUTO: 30.5 PG (ref 26.9–32)
MCHC RBC AUTO-ENTMCNC: 29.8 G/DL (ref 32.4–36.3)
MCV RBC AUTO: 102.6 FL (ref 80.5–98.2)
MONOCYTES # BLD AUTO: 0.56 10*3/MM3 (ref 0.2–1.2)
MONOCYTES NFR BLD AUTO: 9.9 % (ref 5–12)
NEUTROPHILS # BLD AUTO: 3.57 10*3/MM3 (ref 1.9–8.1)
NEUTROPHILS NFR BLD AUTO: 63 % (ref 42.7–76)
NT-PROBNP SERPL-MCNC: ABNORMAL PG/ML (ref 5–900)
PLATELET # BLD AUTO: 262 10*3/MM3 (ref 140–500)
PMV BLD AUTO: 9.9 FL (ref 6–12)
POTASSIUM BLD-SCNC: 5.4 MMOL/L (ref 3.5–5.2)
PROT SERPL-MCNC: 6.8 G/DL (ref 6–8.5)
RBC # BLD AUTO: 3.11 10*6/MM3 (ref 3.9–5.2)
SODIUM BLD-SCNC: 137 MMOL/L (ref 136–145)
STRESS TARGET HR: 141 BPM
TRIGL SERPL-MCNC: 130 MG/DL (ref 0–150)
TSH SERPL DL<=0.05 MIU/L-ACNC: 3.73 MIU/ML (ref 0.27–4.2)
VLDLC SERPL-MCNC: 26 MG/DL (ref 5–40)
WBC NRBC COR # BLD: 5.67 10*3/MM3 (ref 4.5–10.7)

## 2018-01-25 PROCEDURE — 0399T ADULT TRANSTHORACIC ECHO COMPLETE W/ CONT IF NECESSARY PER PROTOCOL: CPT | Performed by: INTERNAL MEDICINE

## 2018-01-25 PROCEDURE — 5A1D70Z PERFORMANCE OF URINARY FILTRATION, INTERMITTENT, LESS THAN 6 HOURS PER DAY: ICD-10-PCS | Performed by: INTERNAL MEDICINE

## 2018-01-25 PROCEDURE — 93306 TTE W/DOPPLER COMPLETE: CPT

## 2018-01-25 PROCEDURE — 0399T HC MYOCARDL STRAIN IMAG QUAN ASSMT PER SESS: CPT

## 2018-01-25 PROCEDURE — 84443 ASSAY THYROID STIM HORMONE: CPT | Performed by: HOSPITALIST

## 2018-01-25 PROCEDURE — 82962 GLUCOSE BLOOD TEST: CPT

## 2018-01-25 PROCEDURE — 63710000001 INSULIN ASPART PER 5 UNITS: Performed by: HOSPITALIST

## 2018-01-25 PROCEDURE — 93306 TTE W/DOPPLER COMPLETE: CPT | Performed by: INTERNAL MEDICINE

## 2018-01-25 PROCEDURE — 25010000002 HEPARIN (PORCINE) PER 1000 UNITS: Performed by: INTERNAL MEDICINE

## 2018-01-25 PROCEDURE — 83036 HEMOGLOBIN GLYCOSYLATED A1C: CPT | Performed by: HOSPITALIST

## 2018-01-25 PROCEDURE — 94799 UNLISTED PULMONARY SVC/PX: CPT

## 2018-01-25 PROCEDURE — 93010 ELECTROCARDIOGRAM REPORT: CPT | Performed by: INTERNAL MEDICINE

## 2018-01-25 PROCEDURE — 85025 COMPLETE CBC W/AUTO DIFF WBC: CPT | Performed by: HOSPITALIST

## 2018-01-25 PROCEDURE — 80053 COMPREHEN METABOLIC PANEL: CPT | Performed by: HOSPITALIST

## 2018-01-25 PROCEDURE — 80061 LIPID PANEL: CPT | Performed by: HOSPITALIST

## 2018-01-25 PROCEDURE — 87340 HEPATITIS B SURFACE AG IA: CPT | Performed by: INTERNAL MEDICINE

## 2018-01-25 PROCEDURE — 25010000002 PERFLUTREN (DEFINITY) 8.476 MG IN SODIUM CHLORIDE 0.9 % 10 ML INJECTION: Performed by: HOSPITALIST

## 2018-01-25 PROCEDURE — 83880 ASSAY OF NATRIURETIC PEPTIDE: CPT | Performed by: HOSPITALIST

## 2018-01-25 PROCEDURE — 93005 ELECTROCARDIOGRAM TRACING: CPT | Performed by: HOSPITALIST

## 2018-01-25 RX ORDER — HEPARIN SODIUM 1000 [USP'U]/ML
3000 INJECTION, SOLUTION INTRAVENOUS; SUBCUTANEOUS ONCE
Status: COMPLETED | OUTPATIENT
Start: 2018-01-25 | End: 2018-01-25

## 2018-01-25 RX ORDER — SODIUM BICARBONATE 650 MG/1
650 TABLET ORAL 3 TIMES DAILY
Status: DISCONTINUED | OUTPATIENT
Start: 2018-01-25 | End: 2018-01-29

## 2018-01-25 RX ADMIN — PANTOPRAZOLE SODIUM 40 MG: 40 TABLET, DELAYED RELEASE ORAL at 06:09

## 2018-01-25 RX ADMIN — CALCIUM ACETATE 667 MG: 667 CAPSULE ORAL at 11:56

## 2018-01-25 RX ADMIN — QUETIAPINE FUMARATE 25 MG: 25 TABLET, FILM COATED ORAL at 08:57

## 2018-01-25 RX ADMIN — Medication 1 TABLET: at 08:58

## 2018-01-25 RX ADMIN — IPRATROPIUM BROMIDE AND ALBUTEROL SULFATE 3 ML: .5; 3 SOLUTION RESPIRATORY (INHALATION) at 15:48

## 2018-01-25 RX ADMIN — SODIUM BICARBONATE 650 MG: 650 TABLET ORAL at 21:17

## 2018-01-25 RX ADMIN — INSULIN ASPART 2 UNITS: 100 INJECTION, SOLUTION INTRAVENOUS; SUBCUTANEOUS at 11:56

## 2018-01-25 RX ADMIN — FERROUS SULFATE TAB 325 MG (65 MG ELEMENTAL FE) 325 MG: 325 (65 FE) TAB at 08:57

## 2018-01-25 RX ADMIN — BUMETANIDE 2 MG: 2 TABLET ORAL at 08:58

## 2018-01-25 RX ADMIN — IPRATROPIUM BROMIDE AND ALBUTEROL SULFATE 3 ML: .5; 3 SOLUTION RESPIRATORY (INHALATION) at 12:23

## 2018-01-25 RX ADMIN — BUMETANIDE 2 MG: 2 TABLET ORAL at 21:17

## 2018-01-25 RX ADMIN — METOPROLOL TARTRATE 25 MG: 25 TABLET ORAL at 08:58

## 2018-01-25 RX ADMIN — LEVOTHYROXINE SODIUM 75 MCG: 75 TABLET ORAL at 06:09

## 2018-01-25 RX ADMIN — ESCITALOPRAM 10 MG: 10 TABLET, FILM COATED ORAL at 08:58

## 2018-01-25 RX ADMIN — CALCIUM ACETATE 667 MG: 667 CAPSULE ORAL at 08:58

## 2018-01-25 RX ADMIN — METOPROLOL TARTRATE 25 MG: 25 TABLET ORAL at 21:18

## 2018-01-25 RX ADMIN — Medication: at 09:08

## 2018-01-25 RX ADMIN — ATORVASTATIN CALCIUM 10 MG: 10 TABLET, FILM COATED ORAL at 21:17

## 2018-01-25 RX ADMIN — ASPIRIN 81 MG: 81 TABLET, CHEWABLE ORAL at 08:58

## 2018-01-25 RX ADMIN — PERFLUTREN 3 ML: 6.52 INJECTION, SUSPENSION INTRAVENOUS at 08:00

## 2018-01-25 RX ADMIN — HEPARIN SODIUM 3000 UNITS: 1000 INJECTION, SOLUTION INTRAVENOUS; SUBCUTANEOUS at 19:25

## 2018-01-26 LAB
ALBUMIN SERPL-MCNC: 2.6 G/DL (ref 3.5–5.2)
ALBUMIN/GLOB SERPL: 0.6 G/DL
ALP SERPL-CCNC: 66 U/L (ref 39–117)
ALT SERPL W P-5'-P-CCNC: 9 U/L (ref 1–33)
ANION GAP SERPL CALCULATED.3IONS-SCNC: 14.5 MMOL/L
AST SERPL-CCNC: 9 U/L (ref 1–32)
BACTERIA UR QL AUTO: ABNORMAL /HPF
BASOPHILS # BLD AUTO: 0.03 10*3/MM3 (ref 0–0.2)
BASOPHILS NFR BLD AUTO: 0.6 % (ref 0–1.5)
BILIRUB SERPL-MCNC: 0.2 MG/DL (ref 0.1–1.2)
BILIRUB UR QL STRIP: NEGATIVE
BUN BLD-MCNC: 38 MG/DL (ref 6–20)
BUN/CREAT SERPL: 11 (ref 7–25)
CALCIUM SPEC-SCNC: 8.5 MG/DL (ref 8.6–10.5)
CHLORIDE SERPL-SCNC: 103 MMOL/L (ref 98–107)
CLARITY UR: ABNORMAL
CO2 SERPL-SCNC: 22.5 MMOL/L (ref 22–29)
COLOR UR: YELLOW
CREAT BLD-MCNC: 3.46 MG/DL (ref 0.57–1)
DEPRECATED RDW RBC AUTO: 53.1 FL (ref 37–54)
EOSINOPHIL # BLD AUTO: 0.18 10*3/MM3 (ref 0–0.7)
EOSINOPHIL NFR BLD AUTO: 3.5 % (ref 0.3–6.2)
ERYTHROCYTE [DISTWIDTH] IN BLOOD BY AUTOMATED COUNT: 14.7 % (ref 11.7–13)
FERRITIN SERPL-MCNC: 128.8 NG/ML (ref 13–150)
GFR SERPL CREATININE-BSD FRML MDRD: 14 ML/MIN/1.73
GLOBULIN UR ELPH-MCNC: 4.2 GM/DL
GLUCOSE BLD-MCNC: 117 MG/DL (ref 65–99)
GLUCOSE BLDC GLUCOMTR-MCNC: 114 MG/DL (ref 70–130)
GLUCOSE BLDC GLUCOMTR-MCNC: 129 MG/DL (ref 70–130)
GLUCOSE BLDC GLUCOMTR-MCNC: 175 MG/DL (ref 70–130)
GLUCOSE UR STRIP-MCNC: ABNORMAL MG/DL
HCT VFR BLD AUTO: 30.4 % (ref 35.6–45.5)
HGB BLD-MCNC: 9.4 G/DL (ref 11.9–15.5)
HGB UR QL STRIP.AUTO: ABNORMAL
HYALINE CASTS UR QL AUTO: ABNORMAL /LPF
IMM GRANULOCYTES # BLD: 0 10*3/MM3 (ref 0–0.03)
IMM GRANULOCYTES NFR BLD: 0 % (ref 0–0.5)
IRON 24H UR-MRATE: 28 MCG/DL (ref 37–145)
IRON SATN MFR SERPL: 10 % (ref 20–50)
KETONES UR QL STRIP: NEGATIVE
LEUKOCYTE ESTERASE UR QL STRIP.AUTO: ABNORMAL
LYMPHOCYTES # BLD AUTO: 1.3 10*3/MM3 (ref 0.9–4.8)
LYMPHOCYTES NFR BLD AUTO: 25 % (ref 19.6–45.3)
MCH RBC QN AUTO: 30.7 PG (ref 26.9–32)
MCHC RBC AUTO-ENTMCNC: 30.9 G/DL (ref 32.4–36.3)
MCV RBC AUTO: 99.3 FL (ref 80.5–98.2)
MONOCYTES # BLD AUTO: 0.67 10*3/MM3 (ref 0.2–1.2)
MONOCYTES NFR BLD AUTO: 12.9 % (ref 5–12)
NEUTROPHILS # BLD AUTO: 3.03 10*3/MM3 (ref 1.9–8.1)
NEUTROPHILS NFR BLD AUTO: 58 % (ref 42.7–76)
NITRITE UR QL STRIP: NEGATIVE
PH UR STRIP.AUTO: 6.5 [PH] (ref 5–8)
PLATELET # BLD AUTO: 243 10*3/MM3 (ref 140–500)
PMV BLD AUTO: 9.8 FL (ref 6–12)
POTASSIUM BLD-SCNC: 4 MMOL/L (ref 3.5–5.2)
PROT SERPL-MCNC: 6.8 G/DL (ref 6–8.5)
PROT UR QL STRIP: ABNORMAL
RBC # BLD AUTO: 3.06 10*6/MM3 (ref 3.9–5.2)
RBC # UR: ABNORMAL /HPF
REF LAB TEST METHOD: ABNORMAL
SODIUM BLD-SCNC: 140 MMOL/L (ref 136–145)
SP GR UR STRIP: 1.01 (ref 1–1.03)
SQUAMOUS #/AREA URNS HPF: ABNORMAL /HPF
TIBC SERPL-MCNC: 271 MCG/DL (ref 298–536)
TRANSFERRIN SERPL-MCNC: 182 MG/DL (ref 200–360)
UROBILINOGEN UR QL STRIP: ABNORMAL
WBC NRBC COR # BLD: 5.21 10*3/MM3 (ref 4.5–10.7)
WBC UR QL AUTO: ABNORMAL /HPF

## 2018-01-26 PROCEDURE — 25010000002 IRON SUCROSE PER 1 MG: Performed by: INTERNAL MEDICINE

## 2018-01-26 PROCEDURE — 84466 ASSAY OF TRANSFERRIN: CPT | Performed by: INTERNAL MEDICINE

## 2018-01-26 PROCEDURE — 80053 COMPREHEN METABOLIC PANEL: CPT | Performed by: INTERNAL MEDICINE

## 2018-01-26 PROCEDURE — 82962 GLUCOSE BLOOD TEST: CPT

## 2018-01-26 PROCEDURE — 94799 UNLISTED PULMONARY SVC/PX: CPT

## 2018-01-26 PROCEDURE — 82728 ASSAY OF FERRITIN: CPT | Performed by: INTERNAL MEDICINE

## 2018-01-26 PROCEDURE — 85025 COMPLETE CBC W/AUTO DIFF WBC: CPT | Performed by: INTERNAL MEDICINE

## 2018-01-26 PROCEDURE — 97110 THERAPEUTIC EXERCISES: CPT

## 2018-01-26 PROCEDURE — 81001 URINALYSIS AUTO W/SCOPE: CPT | Performed by: INTERNAL MEDICINE

## 2018-01-26 PROCEDURE — 25010000002 HEPARIN (PORCINE) PER 1000 UNITS: Performed by: INTERNAL MEDICINE

## 2018-01-26 PROCEDURE — 5A1D70Z PERFORMANCE OF URINARY FILTRATION, INTERMITTENT, LESS THAN 6 HOURS PER DAY: ICD-10-PCS | Performed by: INTERNAL MEDICINE

## 2018-01-26 PROCEDURE — 63710000001 INSULIN ASPART PER 5 UNITS: Performed by: HOSPITALIST

## 2018-01-26 PROCEDURE — 97162 PT EVAL MOD COMPLEX 30 MIN: CPT

## 2018-01-26 PROCEDURE — 83540 ASSAY OF IRON: CPT | Performed by: INTERNAL MEDICINE

## 2018-01-26 RX ORDER — ALBUMIN (HUMAN) 12.5 G/50ML
12.5 SOLUTION INTRAVENOUS AS NEEDED
Status: ACTIVE | OUTPATIENT
Start: 2018-01-26 | End: 2018-01-27

## 2018-01-26 RX ORDER — HEPARIN SODIUM 1000 [USP'U]/ML
3800 INJECTION, SOLUTION INTRAVENOUS; SUBCUTANEOUS ONCE
Status: COMPLETED | OUTPATIENT
Start: 2018-01-26 | End: 2018-01-26

## 2018-01-26 RX ADMIN — FERROUS SULFATE TAB 325 MG (65 MG ELEMENTAL FE) 325 MG: 325 (65 FE) TAB at 15:02

## 2018-01-26 RX ADMIN — ESCITALOPRAM 10 MG: 10 TABLET, FILM COATED ORAL at 15:03

## 2018-01-26 RX ADMIN — HEPARIN SODIUM 3800 UNITS: 1000 INJECTION, SOLUTION INTRAVENOUS; SUBCUTANEOUS at 13:23

## 2018-01-26 RX ADMIN — ASPIRIN 81 MG: 81 TABLET, CHEWABLE ORAL at 15:02

## 2018-01-26 RX ADMIN — IRON SUCROSE 200 MG: 20 INJECTION, SOLUTION INTRAVENOUS at 23:28

## 2018-01-26 RX ADMIN — IPRATROPIUM BROMIDE AND ALBUTEROL SULFATE 3 ML: .5; 3 SOLUTION RESPIRATORY (INHALATION) at 08:14

## 2018-01-26 RX ADMIN — SODIUM BICARBONATE 650 MG: 650 TABLET ORAL at 17:05

## 2018-01-26 RX ADMIN — LEVOTHYROXINE SODIUM 75 MCG: 75 TABLET ORAL at 06:42

## 2018-01-26 RX ADMIN — METOPROLOL TARTRATE 25 MG: 25 TABLET ORAL at 22:05

## 2018-01-26 RX ADMIN — CALCIUM ACETATE 667 MG: 667 CAPSULE ORAL at 14:56

## 2018-01-26 RX ADMIN — IPRATROPIUM BROMIDE AND ALBUTEROL SULFATE 3 ML: .5; 3 SOLUTION RESPIRATORY (INHALATION) at 15:59

## 2018-01-26 RX ADMIN — Medication: at 15:04

## 2018-01-26 RX ADMIN — CALCIUM ACETATE 667 MG: 667 CAPSULE ORAL at 17:07

## 2018-01-26 RX ADMIN — METOPROLOL TARTRATE 25 MG: 25 TABLET ORAL at 15:02

## 2018-01-26 RX ADMIN — SODIUM BICARBONATE 650 MG: 650 TABLET ORAL at 22:05

## 2018-01-26 RX ADMIN — BUMETANIDE 2 MG: 2 TABLET ORAL at 17:05

## 2018-01-26 RX ADMIN — BUMETANIDE 2 MG: 2 TABLET ORAL at 22:05

## 2018-01-26 RX ADMIN — PANTOPRAZOLE SODIUM 40 MG: 40 TABLET, DELAYED RELEASE ORAL at 06:42

## 2018-01-26 RX ADMIN — Medication: at 06:42

## 2018-01-26 RX ADMIN — INSULIN ASPART 2 UNITS: 100 INJECTION, SOLUTION INTRAVENOUS; SUBCUTANEOUS at 17:07

## 2018-01-26 RX ADMIN — QUETIAPINE FUMARATE 25 MG: 25 TABLET, FILM COATED ORAL at 15:02

## 2018-01-26 RX ADMIN — ATORVASTATIN CALCIUM 10 MG: 10 TABLET, FILM COATED ORAL at 22:05

## 2018-01-27 LAB
ANION GAP SERPL CALCULATED.3IONS-SCNC: 14.5 MMOL/L
BUN BLD-MCNC: 23 MG/DL (ref 6–20)
BUN/CREAT SERPL: 7.5 (ref 7–25)
CALCIUM SPEC-SCNC: 8.4 MG/DL (ref 8.6–10.5)
CHLORIDE SERPL-SCNC: 93 MMOL/L (ref 98–107)
CO2 SERPL-SCNC: 24.5 MMOL/L (ref 22–29)
CREAT BLD-MCNC: 3.05 MG/DL (ref 0.57–1)
GFR SERPL CREATININE-BSD FRML MDRD: 16 ML/MIN/1.73
GLUCOSE BLD-MCNC: 97 MG/DL (ref 65–99)
GLUCOSE BLDC GLUCOMTR-MCNC: 104 MG/DL (ref 70–130)
GLUCOSE BLDC GLUCOMTR-MCNC: 130 MG/DL (ref 70–130)
GLUCOSE BLDC GLUCOMTR-MCNC: 164 MG/DL (ref 70–130)
GLUCOSE BLDC GLUCOMTR-MCNC: 191 MG/DL (ref 70–130)
POTASSIUM BLD-SCNC: 3.5 MMOL/L (ref 3.5–5.2)
SODIUM BLD-SCNC: 132 MMOL/L (ref 136–145)

## 2018-01-27 PROCEDURE — 80048 BASIC METABOLIC PNL TOTAL CA: CPT | Performed by: HOSPITALIST

## 2018-01-27 PROCEDURE — 94799 UNLISTED PULMONARY SVC/PX: CPT

## 2018-01-27 PROCEDURE — 82962 GLUCOSE BLOOD TEST: CPT

## 2018-01-27 PROCEDURE — 63710000001 INSULIN ASPART PER 5 UNITS: Performed by: HOSPITALIST

## 2018-01-27 RX ADMIN — Medication 1 APPLICATION: at 08:57

## 2018-01-27 RX ADMIN — IPRATROPIUM BROMIDE AND ALBUTEROL SULFATE 3 ML: .5; 3 SOLUTION RESPIRATORY (INHALATION) at 17:02

## 2018-01-27 RX ADMIN — ASPIRIN 81 MG: 81 TABLET, CHEWABLE ORAL at 08:56

## 2018-01-27 RX ADMIN — SODIUM BICARBONATE 650 MG: 650 TABLET ORAL at 16:53

## 2018-01-27 RX ADMIN — SODIUM BICARBONATE 650 MG: 650 TABLET ORAL at 20:33

## 2018-01-27 RX ADMIN — IPRATROPIUM BROMIDE AND ALBUTEROL SULFATE 3 ML: .5; 3 SOLUTION RESPIRATORY (INHALATION) at 12:30

## 2018-01-27 RX ADMIN — BUMETANIDE 2 MG: 2 TABLET ORAL at 20:33

## 2018-01-27 RX ADMIN — BUMETANIDE 2 MG: 2 TABLET ORAL at 16:53

## 2018-01-27 RX ADMIN — INSULIN ASPART 2 UNITS: 100 INJECTION, SOLUTION INTRAVENOUS; SUBCUTANEOUS at 17:43

## 2018-01-27 RX ADMIN — METOPROLOL TARTRATE 25 MG: 25 TABLET ORAL at 08:56

## 2018-01-27 RX ADMIN — IPRATROPIUM BROMIDE AND ALBUTEROL SULFATE 3 ML: .5; 3 SOLUTION RESPIRATORY (INHALATION) at 00:21

## 2018-01-27 RX ADMIN — CALCIUM ACETATE 667 MG: 667 CAPSULE ORAL at 08:56

## 2018-01-27 RX ADMIN — ESCITALOPRAM 10 MG: 10 TABLET, FILM COATED ORAL at 08:56

## 2018-01-27 RX ADMIN — CALCIUM ACETATE 667 MG: 667 CAPSULE ORAL at 17:43

## 2018-01-27 RX ADMIN — IPRATROPIUM BROMIDE AND ALBUTEROL SULFATE 3 ML: .5; 3 SOLUTION RESPIRATORY (INHALATION) at 08:19

## 2018-01-27 RX ADMIN — FERROUS SULFATE TAB 325 MG (65 MG ELEMENTAL FE) 325 MG: 325 (65 FE) TAB at 08:56

## 2018-01-27 RX ADMIN — IPRATROPIUM BROMIDE AND ALBUTEROL SULFATE 3 ML: .5; 3 SOLUTION RESPIRATORY (INHALATION) at 19:46

## 2018-01-27 RX ADMIN — PANTOPRAZOLE SODIUM 40 MG: 40 TABLET, DELAYED RELEASE ORAL at 06:20

## 2018-01-27 RX ADMIN — SODIUM BICARBONATE 650 MG: 650 TABLET ORAL at 08:56

## 2018-01-27 RX ADMIN — METOPROLOL TARTRATE 25 MG: 25 TABLET ORAL at 20:33

## 2018-01-27 RX ADMIN — Medication 1 APPLICATION: at 08:58

## 2018-01-27 RX ADMIN — ATORVASTATIN CALCIUM 10 MG: 10 TABLET, FILM COATED ORAL at 20:34

## 2018-01-27 RX ADMIN — Medication 1 TABLET: at 08:56

## 2018-01-27 RX ADMIN — QUETIAPINE FUMARATE 25 MG: 25 TABLET, FILM COATED ORAL at 08:56

## 2018-01-27 RX ADMIN — BUMETANIDE 2 MG: 2 TABLET ORAL at 08:56

## 2018-01-27 RX ADMIN — LEVOTHYROXINE SODIUM 75 MCG: 75 TABLET ORAL at 06:20

## 2018-01-28 LAB
ANION GAP SERPL CALCULATED.3IONS-SCNC: 15.8 MMOL/L
BUN BLD-MCNC: 31 MG/DL (ref 6–20)
BUN/CREAT SERPL: 7.2 (ref 7–25)
CALCIUM SPEC-SCNC: 8.6 MG/DL (ref 8.6–10.5)
CHLORIDE SERPL-SCNC: 95 MMOL/L (ref 98–107)
CO2 SERPL-SCNC: 24.2 MMOL/L (ref 22–29)
CREAT BLD-MCNC: 4.32 MG/DL (ref 0.57–1)
GFR SERPL CREATININE-BSD FRML MDRD: 11 ML/MIN/1.73
GLUCOSE BLD-MCNC: 131 MG/DL (ref 65–99)
GLUCOSE BLDC GLUCOMTR-MCNC: 139 MG/DL (ref 70–130)
GLUCOSE BLDC GLUCOMTR-MCNC: 151 MG/DL (ref 70–130)
GLUCOSE BLDC GLUCOMTR-MCNC: 166 MG/DL (ref 70–130)
POTASSIUM BLD-SCNC: 3.3 MMOL/L (ref 3.5–5.2)
SODIUM BLD-SCNC: 135 MMOL/L (ref 136–145)

## 2018-01-28 PROCEDURE — 63710000001 INSULIN ASPART PER 5 UNITS: Performed by: HOSPITALIST

## 2018-01-28 PROCEDURE — 97110 THERAPEUTIC EXERCISES: CPT

## 2018-01-28 PROCEDURE — 25010000002 HEPARIN (PORCINE) PER 1000 UNITS: Performed by: INTERNAL MEDICINE

## 2018-01-28 PROCEDURE — 94799 UNLISTED PULMONARY SVC/PX: CPT

## 2018-01-28 PROCEDURE — 80048 BASIC METABOLIC PNL TOTAL CA: CPT | Performed by: INTERNAL MEDICINE

## 2018-01-28 PROCEDURE — 5A1D70Z PERFORMANCE OF URINARY FILTRATION, INTERMITTENT, LESS THAN 6 HOURS PER DAY: ICD-10-PCS | Performed by: INTERNAL MEDICINE

## 2018-01-28 PROCEDURE — 25010000002 IRON SUCROSE PER 1 MG: Performed by: INTERNAL MEDICINE

## 2018-01-28 PROCEDURE — 82962 GLUCOSE BLOOD TEST: CPT

## 2018-01-28 RX ORDER — HEPARIN SODIUM 1000 [USP'U]/ML
3800 INJECTION, SOLUTION INTRAVENOUS; SUBCUTANEOUS AS NEEDED
Status: DISCONTINUED | OUTPATIENT
Start: 2018-01-28 | End: 2018-01-31

## 2018-01-28 RX ADMIN — SODIUM BICARBONATE 650 MG: 650 TABLET ORAL at 21:45

## 2018-01-28 RX ADMIN — BUMETANIDE 2 MG: 2 TABLET ORAL at 08:38

## 2018-01-28 RX ADMIN — BUMETANIDE 2 MG: 2 TABLET ORAL at 16:40

## 2018-01-28 RX ADMIN — Medication 1 APPLICATION: at 08:39

## 2018-01-28 RX ADMIN — METOPROLOL TARTRATE 25 MG: 25 TABLET ORAL at 21:44

## 2018-01-28 RX ADMIN — CALCIUM ACETATE 667 MG: 667 CAPSULE ORAL at 17:35

## 2018-01-28 RX ADMIN — Medication 1 APPLICATION: at 08:38

## 2018-01-28 RX ADMIN — INSULIN ASPART 2 UNITS: 100 INJECTION, SOLUTION INTRAVENOUS; SUBCUTANEOUS at 17:36

## 2018-01-28 RX ADMIN — Medication 1 TABLET: at 08:38

## 2018-01-28 RX ADMIN — IPRATROPIUM BROMIDE AND ALBUTEROL SULFATE 3 ML: .5; 3 SOLUTION RESPIRATORY (INHALATION) at 08:28

## 2018-01-28 RX ADMIN — ESCITALOPRAM 10 MG: 10 TABLET, FILM COATED ORAL at 08:38

## 2018-01-28 RX ADMIN — SODIUM BICARBONATE 650 MG: 650 TABLET ORAL at 08:38

## 2018-01-28 RX ADMIN — IPRATROPIUM BROMIDE AND ALBUTEROL SULFATE 3 ML: .5; 3 SOLUTION RESPIRATORY (INHALATION) at 15:53

## 2018-01-28 RX ADMIN — BUMETANIDE 2 MG: 2 TABLET ORAL at 21:44

## 2018-01-28 RX ADMIN — ATORVASTATIN CALCIUM 10 MG: 10 TABLET, FILM COATED ORAL at 21:44

## 2018-01-28 RX ADMIN — CALCIUM ACETATE 667 MG: 667 CAPSULE ORAL at 08:38

## 2018-01-28 RX ADMIN — ASPIRIN 81 MG: 81 TABLET, CHEWABLE ORAL at 08:38

## 2018-01-28 RX ADMIN — FERROUS SULFATE TAB 325 MG (65 MG ELEMENTAL FE) 325 MG: 325 (65 FE) TAB at 08:38

## 2018-01-28 RX ADMIN — SODIUM BICARBONATE 650 MG: 650 TABLET ORAL at 16:40

## 2018-01-28 RX ADMIN — IPRATROPIUM BROMIDE AND ALBUTEROL SULFATE 3 ML: .5; 3 SOLUTION RESPIRATORY (INHALATION) at 21:09

## 2018-01-28 RX ADMIN — QUETIAPINE FUMARATE 25 MG: 25 TABLET, FILM COATED ORAL at 08:38

## 2018-01-28 RX ADMIN — METOPROLOL TARTRATE 25 MG: 25 TABLET ORAL at 08:38

## 2018-01-28 RX ADMIN — PANTOPRAZOLE SODIUM 40 MG: 40 TABLET, DELAYED RELEASE ORAL at 06:12

## 2018-01-28 RX ADMIN — HEPARIN SODIUM 3800 UNITS: 1000 INJECTION, SOLUTION INTRAVENOUS; SUBCUTANEOUS at 13:16

## 2018-01-28 RX ADMIN — LEVOTHYROXINE SODIUM 75 MCG: 75 TABLET ORAL at 06:12

## 2018-01-28 RX ADMIN — IRON SUCROSE 200 MG: 20 INJECTION, SOLUTION INTRAVENOUS at 21:51

## 2018-01-29 ENCOUNTER — ANESTHESIA (OUTPATIENT)
Dept: PERIOP | Facility: HOSPITAL | Age: 53
End: 2018-01-29

## 2018-01-29 ENCOUNTER — ANESTHESIA EVENT (OUTPATIENT)
Dept: PERIOP | Facility: HOSPITAL | Age: 53
End: 2018-01-29

## 2018-01-29 LAB
ANION GAP SERPL CALCULATED.3IONS-SCNC: 14.4 MMOL/L
BUN BLD-MCNC: 24 MG/DL (ref 6–20)
BUN/CREAT SERPL: 6.8 (ref 7–25)
CALCIUM SPEC-SCNC: 8.8 MG/DL (ref 8.6–10.5)
CHLORIDE SERPL-SCNC: 96 MMOL/L (ref 98–107)
CO2 SERPL-SCNC: 26.6 MMOL/L (ref 22–29)
CREAT BLD-MCNC: 3.55 MG/DL (ref 0.57–1)
GFR SERPL CREATININE-BSD FRML MDRD: 13 ML/MIN/1.73
GLUCOSE BLD-MCNC: 140 MG/DL (ref 65–99)
GLUCOSE BLDC GLUCOMTR-MCNC: 110 MG/DL (ref 70–130)
GLUCOSE BLDC GLUCOMTR-MCNC: 132 MG/DL (ref 70–130)
GLUCOSE BLDC GLUCOMTR-MCNC: 142 MG/DL (ref 70–130)
GLUCOSE BLDC GLUCOMTR-MCNC: 231 MG/DL (ref 70–130)
POTASSIUM BLD-SCNC: 3.4 MMOL/L (ref 3.5–5.2)
SODIUM BLD-SCNC: 137 MMOL/L (ref 136–145)

## 2018-01-29 PROCEDURE — 25010000002 PHENYLEPHRINE PER 1 ML: Performed by: NURSE ANESTHETIST, CERTIFIED REGISTERED

## 2018-01-29 PROCEDURE — 80048 BASIC METABOLIC PNL TOTAL CA: CPT | Performed by: INTERNAL MEDICINE

## 2018-01-29 PROCEDURE — 25010000002 NEOSTIGMINE PER 0.5 MG: Performed by: NURSE ANESTHETIST, CERTIFIED REGISTERED

## 2018-01-29 PROCEDURE — 25010000002 ONDANSETRON PER 1 MG: Performed by: NURSE ANESTHETIST, CERTIFIED REGISTERED

## 2018-01-29 PROCEDURE — 03150ZD BYPASS RIGHT AXILLARY ARTERY TO UPPER ARM VEIN, OPEN APPROACH: ICD-10-PCS | Performed by: SURGERY

## 2018-01-29 PROCEDURE — 25010000002 MIDAZOLAM PER 1 MG: Performed by: ANESTHESIOLOGY

## 2018-01-29 PROCEDURE — 63710000001 INSULIN ASPART PER 5 UNITS: Performed by: HOSPITALIST

## 2018-01-29 PROCEDURE — 25010000002 PROPOFOL 10 MG/ML EMULSION: Performed by: NURSE ANESTHETIST, CERTIFIED REGISTERED

## 2018-01-29 PROCEDURE — 25010000002 VANCOMYCIN PER 500 MG: Performed by: SURGERY

## 2018-01-29 PROCEDURE — 94799 UNLISTED PULMONARY SVC/PX: CPT

## 2018-01-29 PROCEDURE — 25010000002 HEPARIN (PORCINE) PER 1000 UNITS: Performed by: SURGERY

## 2018-01-29 PROCEDURE — C1768 GRAFT, VASCULAR: HCPCS | Performed by: SURGERY

## 2018-01-29 PROCEDURE — 25010000002 FENTANYL CITRATE (PF) 100 MCG/2ML SOLUTION: Performed by: NURSE ANESTHETIST, CERTIFIED REGISTERED

## 2018-01-29 PROCEDURE — 25010000002 PROTAMINE SULFATE PER 10 MG: Performed by: NURSE ANESTHETIST, CERTIFIED REGISTERED

## 2018-01-29 PROCEDURE — 82962 GLUCOSE BLOOD TEST: CPT

## 2018-01-29 DEVICE — GRFT VASC PROPAT HEP IR 4/7 38 45CM: Type: IMPLANTABLE DEVICE | Site: ARM | Status: FUNCTIONAL

## 2018-01-29 RX ORDER — NALOXONE HCL 0.4 MG/ML
0.2 VIAL (ML) INJECTION AS NEEDED
Status: DISCONTINUED | OUTPATIENT
Start: 2018-01-29 | End: 2018-01-29 | Stop reason: HOSPADM

## 2018-01-29 RX ORDER — HYDROMORPHONE HCL 110MG/55ML
0.5 PATIENT CONTROLLED ANALGESIA SYRINGE INTRAVENOUS
Status: DISCONTINUED | OUTPATIENT
Start: 2018-01-29 | End: 2018-01-29 | Stop reason: HOSPADM

## 2018-01-29 RX ORDER — MAGNESIUM HYDROXIDE 1200 MG/15ML
LIQUID ORAL AS NEEDED
Status: DISCONTINUED | OUTPATIENT
Start: 2018-01-29 | End: 2018-01-29 | Stop reason: HOSPADM

## 2018-01-29 RX ORDER — PROTAMINE SULFATE 10 MG/ML
INJECTION, SOLUTION INTRAVENOUS AS NEEDED
Status: DISCONTINUED | OUTPATIENT
Start: 2018-01-29 | End: 2018-01-29 | Stop reason: SURG

## 2018-01-29 RX ORDER — LABETALOL HYDROCHLORIDE 5 MG/ML
5 INJECTION, SOLUTION INTRAVENOUS
Status: DISCONTINUED | OUTPATIENT
Start: 2018-01-29 | End: 2018-01-29 | Stop reason: HOSPADM

## 2018-01-29 RX ORDER — MIDAZOLAM HYDROCHLORIDE 1 MG/ML
1 INJECTION INTRAMUSCULAR; INTRAVENOUS
Status: DISCONTINUED | OUTPATIENT
Start: 2018-01-29 | End: 2018-01-29

## 2018-01-29 RX ORDER — HYDRALAZINE HYDROCHLORIDE 20 MG/ML
5 INJECTION INTRAMUSCULAR; INTRAVENOUS
Status: DISCONTINUED | OUTPATIENT
Start: 2018-01-29 | End: 2018-01-29 | Stop reason: HOSPADM

## 2018-01-29 RX ORDER — HYDROCODONE BITARTRATE AND ACETAMINOPHEN 5; 325 MG/1; MG/1
1 TABLET ORAL EVERY 4 HOURS PRN
Status: DISCONTINUED | OUTPATIENT
Start: 2018-01-29 | End: 2018-01-31

## 2018-01-29 RX ORDER — LIDOCAINE HYDROCHLORIDE 40 MG/ML
SOLUTION TOPICAL AS NEEDED
Status: DISCONTINUED | OUTPATIENT
Start: 2018-01-29 | End: 2018-01-29 | Stop reason: SURG

## 2018-01-29 RX ORDER — PROMETHAZINE HYDROCHLORIDE 25 MG/ML
12.5 INJECTION, SOLUTION INTRAMUSCULAR; INTRAVENOUS ONCE AS NEEDED
Status: DISCONTINUED | OUTPATIENT
Start: 2018-01-29 | End: 2018-01-29 | Stop reason: HOSPADM

## 2018-01-29 RX ORDER — FENTANYL CITRATE 50 UG/ML
50 INJECTION, SOLUTION INTRAMUSCULAR; INTRAVENOUS
Status: DISCONTINUED | OUTPATIENT
Start: 2018-01-29 | End: 2018-01-29 | Stop reason: HOSPADM

## 2018-01-29 RX ORDER — ROCURONIUM BROMIDE 10 MG/ML
INJECTION, SOLUTION INTRAVENOUS AS NEEDED
Status: DISCONTINUED | OUTPATIENT
Start: 2018-01-29 | End: 2018-01-29 | Stop reason: SURG

## 2018-01-29 RX ORDER — FENTANYL CITRATE 50 UG/ML
50 INJECTION, SOLUTION INTRAMUSCULAR; INTRAVENOUS
Status: DISCONTINUED | OUTPATIENT
Start: 2018-01-29 | End: 2018-01-29

## 2018-01-29 RX ORDER — DIPHENHYDRAMINE HYDROCHLORIDE 50 MG/ML
12.5 INJECTION INTRAMUSCULAR; INTRAVENOUS
Status: DISCONTINUED | OUTPATIENT
Start: 2018-01-29 | End: 2018-01-29 | Stop reason: HOSPADM

## 2018-01-29 RX ORDER — PROPOFOL 10 MG/ML
VIAL (ML) INTRAVENOUS AS NEEDED
Status: DISCONTINUED | OUTPATIENT
Start: 2018-01-29 | End: 2018-01-29 | Stop reason: SURG

## 2018-01-29 RX ORDER — VANCOMYCIN HYDROCHLORIDE 1 G/200ML
1000 INJECTION, SOLUTION INTRAVENOUS ONCE
Status: COMPLETED | OUTPATIENT
Start: 2018-01-29 | End: 2018-01-29

## 2018-01-29 RX ORDER — SODIUM CHLORIDE 9 MG/ML
9 INJECTION, SOLUTION INTRAVENOUS CONTINUOUS PRN
Status: DISCONTINUED | OUTPATIENT
Start: 2018-01-29 | End: 2018-01-31

## 2018-01-29 RX ORDER — PROMETHAZINE HYDROCHLORIDE 25 MG/1
25 TABLET ORAL ONCE AS NEEDED
Status: DISCONTINUED | OUTPATIENT
Start: 2018-01-29 | End: 2018-01-29 | Stop reason: HOSPADM

## 2018-01-29 RX ORDER — LIDOCAINE HYDROCHLORIDE 10 MG/ML
0.5 INJECTION, SOLUTION EPIDURAL; INFILTRATION; INTRACAUDAL; PERINEURAL ONCE AS NEEDED
Status: DISCONTINUED | OUTPATIENT
Start: 2018-01-29 | End: 2018-01-29

## 2018-01-29 RX ORDER — FAMOTIDINE 10 MG/ML
20 INJECTION, SOLUTION INTRAVENOUS ONCE
Status: COMPLETED | OUTPATIENT
Start: 2018-01-29 | End: 2018-01-29

## 2018-01-29 RX ORDER — GLYCOPYRROLATE 0.2 MG/ML
INJECTION INTRAMUSCULAR; INTRAVENOUS AS NEEDED
Status: DISCONTINUED | OUTPATIENT
Start: 2018-01-29 | End: 2018-01-29 | Stop reason: SURG

## 2018-01-29 RX ORDER — PROMETHAZINE HYDROCHLORIDE 25 MG/1
25 SUPPOSITORY RECTAL ONCE AS NEEDED
Status: DISCONTINUED | OUTPATIENT
Start: 2018-01-29 | End: 2018-01-29 | Stop reason: HOSPADM

## 2018-01-29 RX ORDER — LIDOCAINE HYDROCHLORIDE 20 MG/ML
INJECTION, SOLUTION INFILTRATION; PERINEURAL AS NEEDED
Status: DISCONTINUED | OUTPATIENT
Start: 2018-01-29 | End: 2018-01-29 | Stop reason: SURG

## 2018-01-29 RX ORDER — MIDAZOLAM HYDROCHLORIDE 1 MG/ML
2 INJECTION INTRAMUSCULAR; INTRAVENOUS
Status: DISCONTINUED | OUTPATIENT
Start: 2018-01-29 | End: 2018-01-29

## 2018-01-29 RX ORDER — SODIUM CHLORIDE 0.9 % (FLUSH) 0.9 %
1-10 SYRINGE (ML) INJECTION AS NEEDED
Status: DISCONTINUED | OUTPATIENT
Start: 2018-01-29 | End: 2018-01-29

## 2018-01-29 RX ORDER — ONDANSETRON 2 MG/ML
4 INJECTION INTRAMUSCULAR; INTRAVENOUS ONCE AS NEEDED
Status: DISCONTINUED | OUTPATIENT
Start: 2018-01-29 | End: 2018-01-29 | Stop reason: HOSPADM

## 2018-01-29 RX ORDER — EPHEDRINE SULFATE 50 MG/ML
5 INJECTION, SOLUTION INTRAVENOUS ONCE AS NEEDED
Status: DISCONTINUED | OUTPATIENT
Start: 2018-01-29 | End: 2018-01-29 | Stop reason: HOSPADM

## 2018-01-29 RX ORDER — FENTANYL CITRATE 50 UG/ML
INJECTION, SOLUTION INTRAMUSCULAR; INTRAVENOUS AS NEEDED
Status: DISCONTINUED | OUTPATIENT
Start: 2018-01-29 | End: 2018-01-29 | Stop reason: SURG

## 2018-01-29 RX ORDER — ONDANSETRON 2 MG/ML
INJECTION INTRAMUSCULAR; INTRAVENOUS AS NEEDED
Status: DISCONTINUED | OUTPATIENT
Start: 2018-01-29 | End: 2018-01-29 | Stop reason: SURG

## 2018-01-29 RX ORDER — BUMETANIDE 2 MG/1
2 TABLET ORAL
Status: DISCONTINUED | OUTPATIENT
Start: 2018-01-30 | End: 2018-01-31 | Stop reason: HOSPADM

## 2018-01-29 RX ORDER — FLUMAZENIL 0.1 MG/ML
0.2 INJECTION INTRAVENOUS AS NEEDED
Status: DISCONTINUED | OUTPATIENT
Start: 2018-01-29 | End: 2018-01-29 | Stop reason: HOSPADM

## 2018-01-29 RX ADMIN — FENTANYL CITRATE 50 MCG: 50 INJECTION INTRAMUSCULAR; INTRAVENOUS at 11:24

## 2018-01-29 RX ADMIN — BUMETANIDE 2 MG: 2 TABLET ORAL at 08:17

## 2018-01-29 RX ADMIN — PHENYLEPHRINE HYDROCHLORIDE 100 MCG: 10 INJECTION INTRAVENOUS at 11:16

## 2018-01-29 RX ADMIN — SODIUM BICARBONATE 650 MG: 650 TABLET ORAL at 16:36

## 2018-01-29 RX ADMIN — IPRATROPIUM BROMIDE AND ALBUTEROL SULFATE 3 ML: .5; 3 SOLUTION RESPIRATORY (INHALATION) at 08:10

## 2018-01-29 RX ADMIN — Medication: at 08:24

## 2018-01-29 RX ADMIN — ATORVASTATIN CALCIUM 10 MG: 10 TABLET, FILM COATED ORAL at 20:00

## 2018-01-29 RX ADMIN — LIDOCAINE HYDROCHLORIDE 1 EACH: 40 SPRAY LARYNGEAL; TRANSTRACHEAL at 10:33

## 2018-01-29 RX ADMIN — MIDAZOLAM 1 MG: 1 INJECTION INTRAMUSCULAR; INTRAVENOUS at 09:39

## 2018-01-29 RX ADMIN — GLYCOPYRROLATE 0.4 MG: 0.2 INJECTION INTRAMUSCULAR; INTRAVENOUS at 12:10

## 2018-01-29 RX ADMIN — SODIUM CHLORIDE 9 ML/HR: 9 INJECTION, SOLUTION INTRAVENOUS at 09:39

## 2018-01-29 RX ADMIN — PROPOFOL 200 MG: 10 INJECTION, EMULSION INTRAVENOUS at 10:28

## 2018-01-29 RX ADMIN — LIDOCAINE HYDROCHLORIDE 60 MG: 20 INJECTION, SOLUTION INFILTRATION; PERINEURAL at 10:28

## 2018-01-29 RX ADMIN — LEVOTHYROXINE SODIUM 75 MCG: 75 TABLET ORAL at 06:11

## 2018-01-29 RX ADMIN — Medication: at 08:18

## 2018-01-29 RX ADMIN — IPRATROPIUM BROMIDE AND ALBUTEROL SULFATE 3 ML: .5; 3 SOLUTION RESPIRATORY (INHALATION) at 21:43

## 2018-01-29 RX ADMIN — ROCURONIUM BROMIDE 40 MG: 10 INJECTION INTRAVENOUS at 10:28

## 2018-01-29 RX ADMIN — METOPROLOL TARTRATE 25 MG: 25 TABLET ORAL at 08:17

## 2018-01-29 RX ADMIN — QUETIAPINE FUMARATE 25 MG: 25 TABLET, FILM COATED ORAL at 08:17

## 2018-01-29 RX ADMIN — VANCOMYCIN HYDROCHLORIDE 1000 MG: 1 INJECTION, SOLUTION INTRAVENOUS at 09:39

## 2018-01-29 RX ADMIN — NEOSTIGMINE METHYLSULFATE 2 MG: 1 INJECTION INTRAMUSCULAR; INTRAVENOUS; SUBCUTANEOUS at 12:10

## 2018-01-29 RX ADMIN — SODIUM BICARBONATE 650 MG: 650 TABLET ORAL at 08:17

## 2018-01-29 RX ADMIN — FERROUS SULFATE TAB 325 MG (65 MG ELEMENTAL FE) 325 MG: 325 (65 FE) TAB at 08:17

## 2018-01-29 RX ADMIN — CALCIUM ACETATE 1334 MG: 667 CAPSULE ORAL at 17:24

## 2018-01-29 RX ADMIN — PHENYLEPHRINE HYDROCHLORIDE 200 MCG: 10 INJECTION INTRAVENOUS at 11:21

## 2018-01-29 RX ADMIN — BUMETANIDE 2 MG: 2 TABLET ORAL at 16:36

## 2018-01-29 RX ADMIN — PHENYLEPHRINE HYDROCHLORIDE 200 MCG: 10 INJECTION INTRAVENOUS at 11:46

## 2018-01-29 RX ADMIN — FAMOTIDINE 20 MG: 10 INJECTION, SOLUTION INTRAVENOUS at 09:39

## 2018-01-29 RX ADMIN — ONDANSETRON 4 MG: 2 INJECTION INTRAMUSCULAR; INTRAVENOUS at 12:03

## 2018-01-29 RX ADMIN — PHENYLEPHRINE HYDROCHLORIDE 100 MCG: 10 INJECTION INTRAVENOUS at 11:57

## 2018-01-29 RX ADMIN — CALCIUM ACETATE 667 MG: 667 CAPSULE ORAL at 08:19

## 2018-01-29 RX ADMIN — ESCITALOPRAM 10 MG: 10 TABLET, FILM COATED ORAL at 08:17

## 2018-01-29 RX ADMIN — PHENYLEPHRINE HYDROCHLORIDE 100 MCG: 10 INJECTION INTRAVENOUS at 10:48

## 2018-01-29 RX ADMIN — INSULIN ASPART 4 UNITS: 100 INJECTION, SOLUTION INTRAVENOUS; SUBCUTANEOUS at 21:50

## 2018-01-29 RX ADMIN — SODIUM CHLORIDE: 9 INJECTION, SOLUTION INTRAVENOUS at 11:23

## 2018-01-29 RX ADMIN — NEOSTIGMINE METHYLSULFATE 3 MG: 1 INJECTION INTRAMUSCULAR; INTRAVENOUS; SUBCUTANEOUS at 12:08

## 2018-01-29 RX ADMIN — Medication 1 TABLET: at 08:17

## 2018-01-29 RX ADMIN — PANTOPRAZOLE SODIUM 40 MG: 40 TABLET, DELAYED RELEASE ORAL at 06:11

## 2018-01-29 RX ADMIN — HYDROCODONE BITARTRATE AND ACETAMINOPHEN 1 TABLET: 5; 325 TABLET ORAL at 20:00

## 2018-01-29 RX ADMIN — PROTAMINE SULFATE 20 MG: 10 INJECTION, SOLUTION INTRAVENOUS at 11:48

## 2018-01-29 RX ADMIN — GLYCOPYRROLATE 0.4 MG: 0.2 INJECTION INTRAMUSCULAR; INTRAVENOUS at 12:08

## 2018-01-29 NOTE — ANESTHESIA PROCEDURE NOTES
Airway  Urgency: elective    Airway not difficult    General Information and Staff    Patient location during procedure: OR  Anesthesiologist: CARMEN ENG  CRNA: CAROLANN RUVALCABA    Indications and Patient Condition  Indications for airway management: airway protection    Preoxygenated: yes  MILS maintained throughout  Mask difficulty assessment: 2 - vent by mask + OA or adjuvant +/- NMBA    Final Airway Details  Final airway type: endotracheal airway      Successful airway: ETT  Cuffed: yes   Successful intubation technique: direct laryngoscopy  Endotracheal tube insertion site: oral  Blade: Arevalo  Blade size: #2  ETT size: 7.0 mm  Cormack-Lehane Classification: grade I - full view of glottis  Placement verified by: chest auscultation and capnometry   Cuff volume (mL): 8  Measured from: lips  ETT to lips (cm): 21  Number of attempts at approach: 1    Additional Comments  Pt preoxygenated, SIVI, bag mask vent, ATETI, dentition as before (very poor dentition)

## 2018-01-29 NOTE — ANESTHESIA PREPROCEDURE EVALUATION
Anesthesia Evaluation     Patient summary reviewed and Nursing notes reviewed   no history of anesthetic complications:  NPO Solid Status: > 8 hours  NPO Liquid Status: > 2 hours     Airway   Mallampati: II  TM distance: >3 FB  Neck ROM: full  Dental    (+) poor dentition    Pulmonary - normal exam   (+) a smoker Current,   Cardiovascular - normal exam    (+) hypertension, CAD, cardiac stents PVD,       Neuro/Psych  (+) numbness, psychiatric history Depression,     GI/Hepatic/Renal/Endo    (+) morbid obesity, renal disease ESRD and CRI, diabetes mellitus,     Musculoskeletal     Abdominal    Substance History      OB/GYN          Other                                              Anesthesia Plan    ASA 4     general     Anesthetic plan and risks discussed with patient.

## 2018-01-29 NOTE — ANESTHESIA POSTPROCEDURE EVALUATION
Patient: Melia Almeida    Procedure Summary     Date Anesthesia Start Anesthesia Stop Room / Location    01/29/18 1018 1223  [facility] / [facility]       Procedure Diagnosis Surgeon Provider    UPPER EXTREMITY ARTERIOVENOUS SHUNT GRAFT (Right Head) No diagnosis on file. MD Delia Alvarez Jr., MD          Anesthesia Type: general  Last vitals  BP   102/61 (01/29/18 1350)   Temp   36.5 °C (97.7 °F) (01/29/18 1220)   Pulse   70 (01/29/18 1350)   Resp   16 (01/29/18 1350)     SpO2   95 % (01/29/18 1350)     Post Anesthesia Care and Evaluation    Patient location during evaluation: PACU  Patient participation: complete - patient participated  Level of consciousness: awake  Pain score: 1  Pain management: adequate  Airway patency: patent  Anesthetic complications: No anesthetic complications  PONV Status: none  Cardiovascular status: acceptable  Respiratory status: acceptable  Hydration status: acceptable

## 2018-01-30 LAB
ALBUMIN SERPL-MCNC: 3 G/DL (ref 3.5–5.2)
ANION GAP SERPL CALCULATED.3IONS-SCNC: 17.8 MMOL/L
BASOPHILS # BLD AUTO: 0.03 10*3/MM3 (ref 0–0.2)
BASOPHILS NFR BLD AUTO: 0.3 % (ref 0–1.5)
BUN BLD-MCNC: 32 MG/DL (ref 6–20)
BUN/CREAT SERPL: 7.2 (ref 7–25)
CALCIUM SPEC-SCNC: 8.4 MG/DL (ref 8.6–10.5)
CHLORIDE SERPL-SCNC: 94 MMOL/L (ref 98–107)
CO2 SERPL-SCNC: 23.2 MMOL/L (ref 22–29)
CREAT BLD-MCNC: 4.47 MG/DL (ref 0.57–1)
DEPRECATED RDW RBC AUTO: 54 FL (ref 37–54)
EOSINOPHIL # BLD AUTO: 0.22 10*3/MM3 (ref 0–0.7)
EOSINOPHIL NFR BLD AUTO: 2.4 % (ref 0.3–6.2)
ERYTHROCYTE [DISTWIDTH] IN BLOOD BY AUTOMATED COUNT: 14.7 % (ref 11.7–13)
GFR SERPL CREATININE-BSD FRML MDRD: 10 ML/MIN/1.73
GLUCOSE BLD-MCNC: 152 MG/DL (ref 65–99)
GLUCOSE BLDC GLUCOMTR-MCNC: 157 MG/DL (ref 70–130)
GLUCOSE BLDC GLUCOMTR-MCNC: 158 MG/DL (ref 70–130)
GLUCOSE BLDC GLUCOMTR-MCNC: 160 MG/DL (ref 70–130)
GLUCOSE BLDC GLUCOMTR-MCNC: 172 MG/DL (ref 70–130)
HCT VFR BLD AUTO: 35.1 % (ref 35.6–45.5)
HGB BLD-MCNC: 10.5 G/DL (ref 11.9–15.5)
IMM GRANULOCYTES # BLD: 0.05 10*3/MM3 (ref 0–0.03)
IMM GRANULOCYTES NFR BLD: 0.6 % (ref 0–0.5)
LYMPHOCYTES # BLD AUTO: 1.63 10*3/MM3 (ref 0.9–4.8)
LYMPHOCYTES NFR BLD AUTO: 18.1 % (ref 19.6–45.3)
MAGNESIUM SERPL-MCNC: 1.9 MG/DL (ref 1.6–2.6)
MCH RBC QN AUTO: 30.2 PG (ref 26.9–32)
MCHC RBC AUTO-ENTMCNC: 29.9 G/DL (ref 32.4–36.3)
MCV RBC AUTO: 100.9 FL (ref 80.5–98.2)
MONOCYTES # BLD AUTO: 0.84 10*3/MM3 (ref 0.2–1.2)
MONOCYTES NFR BLD AUTO: 9.3 % (ref 5–12)
NEUTROPHILS # BLD AUTO: 6.24 10*3/MM3 (ref 1.9–8.1)
NEUTROPHILS NFR BLD AUTO: 69.3 % (ref 42.7–76)
PHOSPHATE SERPL-MCNC: 4.3 MG/DL (ref 2.5–4.5)
PLATELET # BLD AUTO: 259 10*3/MM3 (ref 140–500)
PMV BLD AUTO: 10.1 FL (ref 6–12)
POTASSIUM BLD-SCNC: 3.7 MMOL/L (ref 3.5–5.2)
RBC # BLD AUTO: 3.48 10*6/MM3 (ref 3.9–5.2)
SODIUM BLD-SCNC: 135 MMOL/L (ref 136–145)
WBC NRBC COR # BLD: 9.01 10*3/MM3 (ref 4.5–10.7)

## 2018-01-30 PROCEDURE — 97110 THERAPEUTIC EXERCISES: CPT

## 2018-01-30 PROCEDURE — 85025 COMPLETE CBC W/AUTO DIFF WBC: CPT | Performed by: HOSPITALIST

## 2018-01-30 PROCEDURE — 63710000001 INSULIN ASPART PER 5 UNITS: Performed by: HOSPITALIST

## 2018-01-30 PROCEDURE — 94799 UNLISTED PULMONARY SVC/PX: CPT

## 2018-01-30 PROCEDURE — 25010000002 HEPARIN (PORCINE) PER 1000 UNITS: Performed by: INTERNAL MEDICINE

## 2018-01-30 PROCEDURE — 82962 GLUCOSE BLOOD TEST: CPT

## 2018-01-30 PROCEDURE — 5A1D70Z PERFORMANCE OF URINARY FILTRATION, INTERMITTENT, LESS THAN 6 HOURS PER DAY: ICD-10-PCS | Performed by: INTERNAL MEDICINE

## 2018-01-30 PROCEDURE — 25010000002 IRON SUCROSE PER 1 MG: Performed by: INTERNAL MEDICINE

## 2018-01-30 PROCEDURE — 80069 RENAL FUNCTION PANEL: CPT | Performed by: INTERNAL MEDICINE

## 2018-01-30 PROCEDURE — 83735 ASSAY OF MAGNESIUM: CPT | Performed by: INTERNAL MEDICINE

## 2018-01-30 RX ORDER — HEPARIN SODIUM 1000 [USP'U]/ML
3800 INJECTION, SOLUTION INTRAVENOUS; SUBCUTANEOUS ONCE
Status: COMPLETED | OUTPATIENT
Start: 2018-01-30 | End: 2018-01-30

## 2018-01-30 RX ORDER — ALBUMIN (HUMAN) 12.5 G/50ML
12.5 SOLUTION INTRAVENOUS AS NEEDED
Status: ACTIVE | OUTPATIENT
Start: 2018-01-30 | End: 2018-01-30

## 2018-01-30 RX ADMIN — ASPIRIN 81 MG: 81 TABLET, CHEWABLE ORAL at 13:22

## 2018-01-30 RX ADMIN — IPRATROPIUM BROMIDE AND ALBUTEROL SULFATE 3 ML: .5; 3 SOLUTION RESPIRATORY (INHALATION) at 08:04

## 2018-01-30 RX ADMIN — Medication: at 13:23

## 2018-01-30 RX ADMIN — ESCITALOPRAM 10 MG: 10 TABLET, FILM COATED ORAL at 13:22

## 2018-01-30 RX ADMIN — IRON SUCROSE 100 MG: 20 INJECTION, SOLUTION INTRAVENOUS at 10:19

## 2018-01-30 RX ADMIN — IPRATROPIUM BROMIDE AND ALBUTEROL SULFATE 3 ML: .5; 3 SOLUTION RESPIRATORY (INHALATION) at 16:03

## 2018-01-30 RX ADMIN — HYDROCODONE BITARTRATE AND ACETAMINOPHEN 1 TABLET: 5; 325 TABLET ORAL at 01:02

## 2018-01-30 RX ADMIN — LEVOTHYROXINE SODIUM 75 MCG: 75 TABLET ORAL at 07:41

## 2018-01-30 RX ADMIN — CALCIUM ACETATE 1334 MG: 667 CAPSULE ORAL at 17:24

## 2018-01-30 RX ADMIN — Medication 1 TABLET: at 13:22

## 2018-01-30 RX ADMIN — METOPROLOL TARTRATE 25 MG: 25 TABLET ORAL at 13:22

## 2018-01-30 RX ADMIN — INSULIN ASPART 2 UNITS: 100 INJECTION, SOLUTION INTRAVENOUS; SUBCUTANEOUS at 17:24

## 2018-01-30 RX ADMIN — IPRATROPIUM BROMIDE AND ALBUTEROL SULFATE 3 ML: .5; 3 SOLUTION RESPIRATORY (INHALATION) at 20:18

## 2018-01-30 RX ADMIN — QUETIAPINE FUMARATE 25 MG: 25 TABLET, FILM COATED ORAL at 13:22

## 2018-01-30 RX ADMIN — INSULIN ASPART 2 UNITS: 100 INJECTION, SOLUTION INTRAVENOUS; SUBCUTANEOUS at 21:18

## 2018-01-30 RX ADMIN — BUMETANIDE 2 MG: 2 TABLET ORAL at 17:24

## 2018-01-30 RX ADMIN — CALCIUM ACETATE 1334 MG: 667 CAPSULE ORAL at 13:22

## 2018-01-30 RX ADMIN — ATORVASTATIN CALCIUM 10 MG: 10 TABLET, FILM COATED ORAL at 21:18

## 2018-01-30 RX ADMIN — BUMETANIDE 2 MG: 2 TABLET ORAL at 13:22

## 2018-01-30 RX ADMIN — HYDROCODONE BITARTRATE AND ACETAMINOPHEN 1 TABLET: 5; 325 TABLET ORAL at 10:17

## 2018-01-30 RX ADMIN — INSULIN ASPART 2 UNITS: 100 INJECTION, SOLUTION INTRAVENOUS; SUBCUTANEOUS at 13:28

## 2018-01-30 RX ADMIN — HEPARIN SODIUM 3800 UNITS: 1000 INJECTION, SOLUTION INTRAVENOUS; SUBCUTANEOUS at 11:40

## 2018-01-31 ENCOUNTER — APPOINTMENT (OUTPATIENT)
Dept: CARDIOLOGY | Facility: HOSPITAL | Age: 53
End: 2018-01-31
Attending: SURGERY

## 2018-01-31 VITALS
TEMPERATURE: 98.2 F | OXYGEN SATURATION: 93 % | WEIGHT: 245.37 LBS | SYSTOLIC BLOOD PRESSURE: 104 MMHG | BODY MASS INDEX: 40.88 KG/M2 | RESPIRATION RATE: 18 BRPM | HEART RATE: 68 BPM | HEIGHT: 65 IN | DIASTOLIC BLOOD PRESSURE: 57 MMHG

## 2018-01-31 LAB
ANION GAP SERPL CALCULATED.3IONS-SCNC: 14.4 MMOL/L
BH CV DIALYSIS ACCESS ARTERIAL ANASTOMOSIS DIAMETER PROXIMAL: 0.38 CM
BH CV DIALYSIS ACCESS ARTERIAL ANASTOMOSIS FLOW VOLUME PROXIMAL: 1234 ML/MIN
BH CV DIALYSIS ACCESS ARTERIAL ANASTOMOSIS VELOCITY PROXIMAL: 440 CM/SEC
BH CV DIALYSIS ACCESS GRAFT DIAMETER DISTAL: 0.62 CM
BH CV DIALYSIS ACCESS GRAFT DIAMETER MID: 0.57 CM
BH CV DIALYSIS ACCESS GRAFT DIAMETER PROXIMAL: 0.54 CM
BH CV DIALYSIS ACCESS GRAFT FLOW VOLUME DISTAL: 587 ML/MIN
BH CV DIALYSIS ACCESS GRAFT FLOW VOLUME MID DISTAL: 755 ML/MIN
BH CV DIALYSIS ACCESS GRAFT FLOW VOLUME MID: 863 ML/MIN
BH CV DIALYSIS ACCESS GRAFT FLOW VOLUME PROXIMAL MID: 1017 ML/MIN
BH CV DIALYSIS ACCESS GRAFT FLOW VOLUME PROXIMAL: 2209 ML/MIN
BH CV DIALYSIS ACCESS GRAFT VELOCITY DISTAL: 66 CM/SEC
BH CV DIALYSIS ACCESS GRAFT VELOCITY MID DISTAL: 80.1 CM/SEC
BH CV DIALYSIS ACCESS GRAFT VELOCITY MID: 91.5 CM/SEC
BH CV DIALYSIS ACCESS GRAFT VELOCITY PROXIMAL MID: 370 CM/SEC
BH CV DIALYSIS ACCESS GRAFT VELOCITY PROXIMAL: 219 CM/SEC
BH CV DIALYSIS ACCESS INFLOW DIAMETER PRE INFLOW: 0.25 CM
BH CV DIALYSIS ACCESS INFLOW FLOW VOLUME PRE INFLOW: 585 ML/MIN
BH CV DIALYSIS ACCESS INFLOW VELOCITY PRE INFLOW: 216 CM/SEC
BH CV DIALYSIS ACCESS OUTFLOW DIAMETER DISTAL: 0.66 CM
BH CV DIALYSIS ACCESS OUTFLOW VELOCITY DISTAL: 207 CM/SEC
BH CV DIALYSIS ACCESS VENOUS ANASTOMOSIS DIAMETER PROXIMAL: 0.28 CM
BH CV DIALYSIS ACCESS VENOUS ANASTOMOSIS VELOCITY PROXIMAL: 171 CM/SEC
BUN BLD-MCNC: 22 MG/DL (ref 6–20)
BUN/CREAT SERPL: 5.5 (ref 7–25)
CALCIUM SPEC-SCNC: 8.5 MG/DL (ref 8.6–10.5)
CHLORIDE SERPL-SCNC: 96 MMOL/L (ref 98–107)
CO2 SERPL-SCNC: 26.6 MMOL/L (ref 22–29)
CREAT BLD-MCNC: 3.97 MG/DL (ref 0.57–1)
GFR SERPL CREATININE-BSD FRML MDRD: 12 ML/MIN/1.73
GLUCOSE BLD-MCNC: 156 MG/DL (ref 65–99)
GLUCOSE BLDC GLUCOMTR-MCNC: 131 MG/DL (ref 70–130)
GLUCOSE BLDC GLUCOMTR-MCNC: 147 MG/DL (ref 70–130)
GLUCOSE BLDC GLUCOMTR-MCNC: 148 MG/DL (ref 70–130)
POTASSIUM BLD-SCNC: 3.8 MMOL/L (ref 3.5–5.2)
SODIUM BLD-SCNC: 137 MMOL/L (ref 136–145)

## 2018-01-31 PROCEDURE — 94799 UNLISTED PULMONARY SVC/PX: CPT

## 2018-01-31 PROCEDURE — 80048 BASIC METABOLIC PNL TOTAL CA: CPT | Performed by: HOSPITALIST

## 2018-01-31 PROCEDURE — 82962 GLUCOSE BLOOD TEST: CPT

## 2018-01-31 PROCEDURE — 93990 DOPPLER FLOW TESTING: CPT

## 2018-01-31 RX ORDER — LEVOTHYROXINE SODIUM 0.07 MG/1
75 TABLET ORAL DAILY
Qty: 30 TABLET | Refills: 0 | Status: SHIPPED | OUTPATIENT
Start: 2018-01-31 | End: 2018-03-02

## 2018-01-31 RX ORDER — ATORVASTATIN CALCIUM 10 MG/1
10 TABLET, FILM COATED ORAL NIGHTLY
Qty: 30 TABLET | Refills: 0 | Status: SHIPPED | OUTPATIENT
Start: 2018-01-31 | End: 2018-03-02

## 2018-01-31 RX ORDER — PANTOPRAZOLE SODIUM 40 MG/1
40 TABLET, DELAYED RELEASE ORAL DAILY
Qty: 30 TABLET | Refills: 0 | Status: SHIPPED | OUTPATIENT
Start: 2018-01-31 | End: 2018-03-02

## 2018-01-31 RX ADMIN — CALCIUM ACETATE 1334 MG: 667 CAPSULE ORAL at 18:23

## 2018-01-31 RX ADMIN — METOPROLOL TARTRATE 25 MG: 25 TABLET ORAL at 08:19

## 2018-01-31 RX ADMIN — ASPIRIN 81 MG: 81 TABLET, CHEWABLE ORAL at 08:20

## 2018-01-31 RX ADMIN — ESCITALOPRAM 10 MG: 10 TABLET, FILM COATED ORAL at 08:19

## 2018-01-31 RX ADMIN — BUMETANIDE 2 MG: 2 TABLET ORAL at 18:23

## 2018-01-31 RX ADMIN — CALCIUM ACETATE 1334 MG: 667 CAPSULE ORAL at 08:20

## 2018-01-31 RX ADMIN — HYDROCODONE BITARTRATE AND ACETAMINOPHEN 1 TABLET: 5; 325 TABLET ORAL at 08:19

## 2018-01-31 RX ADMIN — IPRATROPIUM BROMIDE AND ALBUTEROL SULFATE 3 ML: .5; 3 SOLUTION RESPIRATORY (INHALATION) at 11:02

## 2018-01-31 RX ADMIN — Medication: at 08:21

## 2018-01-31 RX ADMIN — LEVOTHYROXINE SODIUM 75 MCG: 75 TABLET ORAL at 08:20

## 2018-01-31 RX ADMIN — QUETIAPINE FUMARATE 25 MG: 25 TABLET, FILM COATED ORAL at 08:19

## 2018-01-31 RX ADMIN — CALCIUM ACETATE 1334 MG: 667 CAPSULE ORAL at 11:56

## 2018-01-31 RX ADMIN — PANTOPRAZOLE SODIUM 40 MG: 40 TABLET, DELAYED RELEASE ORAL at 08:30

## 2018-01-31 RX ADMIN — IPRATROPIUM BROMIDE AND ALBUTEROL SULFATE 3 ML: .5; 3 SOLUTION RESPIRATORY (INHALATION) at 06:50

## 2018-01-31 RX ADMIN — Medication 1 TABLET: at 08:19

## 2018-01-31 RX ADMIN — BUMETANIDE 2 MG: 2 TABLET ORAL at 08:19

## 2018-02-01 NOTE — PROGRESS NOTES
Case Management Discharge Note    Final Note: Discharged  1/31/18 to [facility]-  IC level    Discharge Placement     Facility/Agency Request Status Selected? Address Phone Number Fax Number    [facility]    [facility] [address] [facility] [address] [phone]        Ambulance: [facility]    Discharge Codes: 04  Discharged/transferred to intermediate care facility (ICF)

## 2018-03-21 ENCOUNTER — APPOINTMENT (OUTPATIENT)
Dept: GENERAL RADIOLOGY | Facility: HOSPITAL | Age: 53
End: 2018-03-21

## 2018-03-21 ENCOUNTER — APPOINTMENT (OUTPATIENT)
Dept: CARDIOLOGY | Facility: HOSPITAL | Age: 53
End: 2018-03-21

## 2018-03-21 ENCOUNTER — HOSPITAL ENCOUNTER (INPATIENT)
Facility: HOSPITAL | Age: 53
LOS: 4 days | Discharge: NURSING, REHAB OR LONG-TERM CARE | End: 2018-03-25
Attending: EMERGENCY MEDICINE | Admitting: INTERNAL MEDICINE

## 2018-03-21 DIAGNOSIS — N18.6 ESRD (END STAGE RENAL DISEASE) (HCC): ICD-10-CM

## 2018-03-21 DIAGNOSIS — T82.898A PROBLEM WITH DIALYSIS ACCESS, INITIAL ENCOUNTER (HCC): Primary | ICD-10-CM

## 2018-03-21 PROBLEM — Z99.2 DEPENDENCE ON RENAL DIALYSIS (HCC): Status: ACTIVE | Noted: 2018-03-21

## 2018-03-21 LAB
ALBUMIN SERPL-MCNC: 3.6 G/DL (ref 3.5–5.2)
ALBUMIN/GLOB SERPL: 0.9 G/DL
ALP SERPL-CCNC: 102 U/L (ref 39–117)
ALT SERPL W P-5'-P-CCNC: 11 U/L (ref 1–33)
ANION GAP SERPL CALCULATED.3IONS-SCNC: 17.6 MMOL/L
AST SERPL-CCNC: 14 U/L (ref 1–32)
BASOPHILS # BLD AUTO: 0.04 10*3/MM3 (ref 0–0.2)
BASOPHILS NFR BLD AUTO: 0.4 % (ref 0–1.5)
BH CV DIALYSIS ACCESS ARTERIAL ANASTOMOSIS DIAMETER PROXIMAL: 0.38 CM
BH CV DIALYSIS ACCESS ARTERIAL ANASTOMOSIS VELOCITY PROXIMAL: 206 CM/SEC
BH CV DIALYSIS ACCESS GRAFT DIAMETER DISTAL: 0.56 CM
BH CV DIALYSIS ACCESS GRAFT DIAMETER MID DISTAL: 0.52 CM
BH CV DIALYSIS ACCESS GRAFT DIAMETER MID: 0.54 CM
BH CV DIALYSIS ACCESS GRAFT DIAMETER PROXIMAL MID: 0.58 CM
BH CV DIALYSIS ACCESS GRAFT DIAMETER PROXIMAL: 0.43 CM
BH CV DIALYSIS ACCESS GRAFT FLOW VOLUME DISTAL: 1407 ML/MIN
BH CV DIALYSIS ACCESS GRAFT FLOW VOLUME MID DISTAL: 485 ML/MIN
BH CV DIALYSIS ACCESS GRAFT FLOW VOLUME MID: 718 ML/MIN
BH CV DIALYSIS ACCESS GRAFT FLOW VOLUME PROXIMAL MID: 604 ML/MIN
BH CV DIALYSIS ACCESS GRAFT FLOW VOLUME PROXIMAL: 664 ML/MIN
BH CV DIALYSIS ACCESS GRAFT VELOCITY DISTAL: 149 CM/SEC
BH CV DIALYSIS ACCESS GRAFT VELOCITY MID DISTAL: 122 CM/SEC
BH CV DIALYSIS ACCESS GRAFT VELOCITY MID: 145 CM/SEC
BH CV DIALYSIS ACCESS GRAFT VELOCITY PROXIMAL MID: 162 CM/SEC
BH CV DIALYSIS ACCESS GRAFT VELOCITY PROXIMAL: 334 CM/SEC
BH CV DIALYSIS ACCESS INFLOW DIAMETER PRE INFLOW: 0.41 CM
BH CV DIALYSIS ACCESS INFLOW DIAMETER PROXIMAL: 0.62 CM
BH CV DIALYSIS ACCESS INFLOW FLOW VOLUME PRE INFLOW: 672 ML/MIN
BH CV DIALYSIS ACCESS INFLOW VELOCITY PRE INFLOW: 263 CM/SEC
BH CV DIALYSIS ACCESS INFLOW VELOCITY PROXIMAL: 115 CM/SEC
BH CV DIALYSIS ACCESS VENOUS ANASTOMOSIS DIAMETER PROXIMAL: 0.57 CM
BH CV DIALYSIS ACCESS VENOUS ANASTOMOSIS VELOCITY PROXIMAL: 217 CM/SEC
BILIRUB SERPL-MCNC: 0.2 MG/DL (ref 0.1–1.2)
BUN BLD-MCNC: 48 MG/DL (ref 6–20)
BUN/CREAT SERPL: 10.8 (ref 7–25)
CALCIUM SPEC-SCNC: 9.3 MG/DL (ref 8.6–10.5)
CHLORIDE SERPL-SCNC: 95 MMOL/L (ref 98–107)
CO2 SERPL-SCNC: 22.4 MMOL/L (ref 22–29)
CREAT BLD-MCNC: 4.46 MG/DL (ref 0.57–1)
D-LACTATE SERPL-SCNC: 1.2 MMOL/L (ref 0.5–2)
DEPRECATED RDW RBC AUTO: 53.2 FL (ref 37–54)
EOSINOPHIL # BLD AUTO: 0.29 10*3/MM3 (ref 0–0.7)
EOSINOPHIL NFR BLD AUTO: 3.1 % (ref 0.3–6.2)
ERYTHROCYTE [DISTWIDTH] IN BLOOD BY AUTOMATED COUNT: 14.7 % (ref 11.7–13)
GFR SERPL CREATININE-BSD FRML MDRD: 10 ML/MIN/1.73
GLOBULIN UR ELPH-MCNC: 4 GM/DL
GLUCOSE BLD-MCNC: 135 MG/DL (ref 65–99)
HCT VFR BLD AUTO: 35.3 % (ref 35.6–45.5)
HGB BLD-MCNC: 10.9 G/DL (ref 11.9–15.5)
IMM GRANULOCYTES # BLD: 0.03 10*3/MM3 (ref 0–0.03)
IMM GRANULOCYTES NFR BLD: 0.3 % (ref 0–0.5)
LYMPHOCYTES # BLD AUTO: 2.7 10*3/MM3 (ref 0.9–4.8)
LYMPHOCYTES NFR BLD AUTO: 28.9 % (ref 19.6–45.3)
MCH RBC QN AUTO: 30.4 PG (ref 26.9–32)
MCHC RBC AUTO-ENTMCNC: 30.9 G/DL (ref 32.4–36.3)
MCV RBC AUTO: 98.3 FL (ref 80.5–98.2)
MONOCYTES # BLD AUTO: 0.88 10*3/MM3 (ref 0.2–1.2)
MONOCYTES NFR BLD AUTO: 9.4 % (ref 5–12)
NEUTROPHILS # BLD AUTO: 5.4 10*3/MM3 (ref 1.9–8.1)
NEUTROPHILS NFR BLD AUTO: 57.9 % (ref 42.7–76)
PLATELET # BLD AUTO: 268 10*3/MM3 (ref 140–500)
PMV BLD AUTO: 9.5 FL (ref 6–12)
POTASSIUM BLD-SCNC: 4.8 MMOL/L (ref 3.5–5.2)
PROT SERPL-MCNC: 7.6 G/DL (ref 6–8.5)
RBC # BLD AUTO: 3.59 10*6/MM3 (ref 3.9–5.2)
SODIUM BLD-SCNC: 135 MMOL/L (ref 136–145)
WBC NRBC COR # BLD: 9.34 10*3/MM3 (ref 4.5–10.7)

## 2018-03-21 PROCEDURE — 80053 COMPREHEN METABOLIC PANEL: CPT | Performed by: NURSE PRACTITIONER

## 2018-03-21 PROCEDURE — 71045 X-RAY EXAM CHEST 1 VIEW: CPT

## 2018-03-21 PROCEDURE — 99285 EMERGENCY DEPT VISIT HI MDM: CPT

## 2018-03-21 PROCEDURE — 82962 GLUCOSE BLOOD TEST: CPT

## 2018-03-21 PROCEDURE — 85025 COMPLETE CBC W/AUTO DIFF WBC: CPT | Performed by: NURSE PRACTITIONER

## 2018-03-21 PROCEDURE — 93990 DOPPLER FLOW TESTING: CPT

## 2018-03-21 PROCEDURE — 83605 ASSAY OF LACTIC ACID: CPT | Performed by: NURSE PRACTITIONER

## 2018-03-21 RX ORDER — ACETAMINOPHEN 325 MG/1
650 TABLET ORAL EVERY 4 HOURS PRN
COMMUNITY

## 2018-03-21 RX ORDER — ATORVASTATIN CALCIUM 10 MG/1
10 TABLET, FILM COATED ORAL NIGHTLY
COMMUNITY

## 2018-03-21 RX ORDER — GINSENG 100 MG
CAPSULE ORAL EVERY 8 HOURS SCHEDULED
Status: DISCONTINUED | OUTPATIENT
Start: 2018-03-21 | End: 2018-03-25 | Stop reason: HOSPADM

## 2018-03-21 RX ORDER — LEVOTHYROXINE SODIUM 0.07 MG/1
75 TABLET ORAL DAILY
COMMUNITY

## 2018-03-21 RX ORDER — PANTOPRAZOLE SODIUM 40 MG/1
40 TABLET, DELAYED RELEASE ORAL DAILY
Status: ON HOLD | COMMUNITY
End: 2019-01-01

## 2018-03-21 RX ORDER — MULTIPLE VITAMINS W/ MINERALS TAB 9MG-400MCG
1 TAB ORAL DAILY
COMMUNITY

## 2018-03-21 RX ORDER — SODIUM CHLORIDE 0.9 % (FLUSH) 0.9 %
10 SYRINGE (ML) INJECTION AS NEEDED
Status: DISCONTINUED | OUTPATIENT
Start: 2018-03-21 | End: 2018-03-25 | Stop reason: HOSPADM

## 2018-03-21 RX ADMIN — BACITRACIN: 500 OINTMENT TOPICAL at 20:44

## 2018-03-21 NOTE — PROGRESS NOTES
Clinical Pharmacy Services: Medication History    Melia Almeida is a 52 y.o. female presenting to [facility] for   Chief Complaint   Patient presents with   • Arm Pain       She  has a past medical history of Adult failure to thrive; Chronic kidney disease (CKD), stage V; Coronary artery disease; Diabetes mellitus; Disease of thyroid gland; Hypertension; Idiopathic neuropathy; Major depressive disorder, recurrent episode, severe, with psychotic behavior; and Peripheral vascular disease, unspecified.    Allergies as of 03/21/2018 - Reviewed 03/21/2018   Allergen Reaction Noted   • Ampicillin  06/29/2017   • Morphine Swelling 06/24/2015       Medication information was obtained from: Nursing home  Pharmacy and Phone Number:     Prior to Admission Medications     Prescriptions Last Dose Informant Patient Reported? Taking?    acetaminophen (TYLENOL) 325 MG tablet  Nursing Home Yes Yes    Take 650 mg by mouth Every 4 (Four) Hours As Needed for Mild Pain .    aspirin 81 MG tablet  Nursing Home Yes Yes    Take 81 mg by mouth Daily.    atorvastatin (LIPITOR) 10 MG tablet  Nursing Home Yes Yes    Take 10 mg by mouth Every Night.    bumetanide (BUMEX) 2 MG tablet  Nursing Home Yes Yes    Take 4 mg by mouth 2 (Two) Times a Day.    calcium acetate (PHOSLO) 667 MG capsule  Nursing Home Yes Yes    Take 1,334 mg by mouth 3 (Three) Times a Day With Meals.    Carboxymethylcellulose Sodium (REFRESH TEARS OP)  Nursing Home Yes Yes    Administer 1 drop to both eyes 2 (Two) Times a Day.    Dimethicone 1.5 % cream  Nursing Home Yes Yes    Apply 1 application topically Daily. To right planter foot    epoetin khadar (EPOGEN,PROCRIT) 79676 UNIT/ML injection  Nursing Home Yes Yes    Inject 20,000 Units under the skin Every 14 (Fourteen) Days. Every 2 weeks on wednesdays    escitalopram (LEXAPRO) 10 MG tablet  Nursing Home Yes Yes    Take 10 mg by mouth Daily.    ferrous sulfate 325 (65 FE) MG tablet  Nursing Home Yes Yes    Take  325 mg by mouth Daily With Breakfast.    insulin aspart (novoLOG FLEXPEN) 100 UNIT/ML solution pen-injector sc pen  Nursing Home Yes Yes    Inject  under the skin 3 (Three) Times a Day With Meals. Per sliding scale. 150-199=2, 200-249=4, 250-299=6, 300-349=8, 350-399=10, 400 or above, call MD    levothyroxine (SYNTHROID, LEVOTHROID) 75 MCG tablet  Nursing Home Yes Yes    Take 75 mcg by mouth Daily.    metoprolol tartrate (LOPRESSOR) 25 MG tablet  Nursing Home Yes Yes    Take 25 mg by mouth 2 (Two) Times a Day.    Multiple Vitamins-Minerals (MULTIVITAMIN WITH MINERALS) tablet tablet  Nursing Home Yes Yes    Take 1 tablet by mouth Daily.    pantoprazole (PROTONIX) 40 MG EC tablet  Nursing Home Yes Yes    Take 40 mg by mouth Daily.    polyethylene glycol (MIRALAX) packet  Nursing Home Yes Yes    Take 17 g by mouth Daily.    QUEtiapine (SEROquel) 25 MG tablet  Nursing Home Yes Yes    Take 25 mg by mouth Every Night.            Medication notes: None    This medication list is complete to the best of my knowledge as of 3/21/2018    Please call if questions.    Romina Aldrich, Medication History Technician  3/21/2018 7:43 PM

## 2018-03-21 NOTE — ED NOTES
LAURA Michelle at bedside for eval. Pt has bruising and what appear to be blisters to R upper arm where fistula was placed. No thrill noted to palpation of fistula. Pt reports that the site did not look like this on Monday when she had dialysis. Reassurance given; call light in reach. Pts breathing even and unlabored. Pt appears in NAD at this time.         Radha Duque RN  03/21/18 0520

## 2018-03-21 NOTE — ED PROVIDER NOTES
EMERGENCY DEPARTMENT ENCOUNTER    CHIEF COMPLAINT  Chief Complaint: upper extremity problem  History given by: patient  History limited by: N/A  Room Number: 41/41  PMD: Taiwo Shankar MD   Vascular surgeon- Dr Jackson  Nephrologist- Dr CIELO Sanchez      HPI:  Pt is a 52 y.o. female who is a resident at [facility]. She states that she has ESRD, underwent RUE AV fistula placement in 1/2018, and undergoes hemodialysis on Mondays, Wednesdays, and Fridays. She presents with RUE problem. She reports that she was last dialyzed 2 days ago. Yesterday, she noticed that she had pain, swelling, bruising, redness, and drainage to her right upper arm at the site of her AV fistula. She denies sensory loss to RUE, motor loss to RUE, N/V/D, fevers, chills, abd pain, chest pain, and dyspnea. She reports that when she went to dialysis center today, staff did not perform dialysis and referred her to ED for further evaluation. She notes that she is on 81mg ASA and no other blood thinner. Past Medical History of ESRD, diabetes, left BKA, PVD, HTN, COPD, and chronic anemia.     Duration: started yesterday  Timing: constant  Location: right upper arm at site of AV fistula  Radiation: none  Quality: pain  Intensity/Severity: moderate  Progression: worsening  Associated Symptoms: to right upper arm at site of AV fistula- pain, swelling, bruising, redness, drainage  Aggravating Factors: none mentioned  Alleviating Factors: none mentioned  Previous Episodes: none  Treatment before arrival: Pt reports that when she went to dialysis center today, staff did not perform dialysis and referred her to ED for further evaluation.    PAST MEDICAL HISTORY  Active Ambulatory Problems     Diagnosis Date Noted   • End stage renal disease 01/24/2018   • End stage kidney disease 01/24/2018     Resolved Ambulatory Problems     Diagnosis Date Noted   • No Resolved Ambulatory Problems     Past Medical History:   Diagnosis Date   • Adult failure to  thrive    • Chronic kidney disease (CKD), stage V    • Coronary artery disease    • Diabetes mellitus    • Disease of thyroid gland    • Hypertension    • Idiopathic neuropathy    • Major depressive disorder, recurrent episode, severe, with psychotic behavior    • Peripheral vascular disease, unspecified        PAST SURGICAL HISTORY  Past Surgical History:   Procedure Laterality Date   • ARTERIOVENOUS FISTULA/SHUNT SURGERY Right 1/29/2018    Procedure: UPPER EXTREMITY ARTERIOVENOUS SHUNT GRAFT;  Surgeon: Ambar Dobson Jr., MD;  Location: [facility];  Service:    • BELOW KNEE LEG AMPUTATION Left 2008   • INSERTION HEMODIALYSIS CATHETER N/A 1/24/2018    Procedure: TUNNEL DIALYSIS CATHETER INSERTION;  Surgeon: Moncho Jackson MD;  Location: [facility] OR;  Service:        FAMILY HISTORY  History reviewed. No pertinent family history.    SOCIAL HISTORY  Social History     Social History   • Marital status: Single     Spouse name: N/A   • Number of children: N/A   • Years of education: N/A     Occupational History   • Not on file.     Social History Main Topics   • Smoking status: Former Smoker   • Smokeless tobacco: Not on file   • Alcohol use No   • Drug use: No   • Sexual activity: Defer     Other Topics Concern   • Not on file     Social History Narrative   • No narrative on file         ALLERGIES  Ampicillin and Morphine    REVIEW OF SYSTEMS  Review of Systems   Constitutional: Negative for chills and fever.   HENT: Negative for sore throat.    Respiratory: Negative for cough and shortness of breath.    Cardiovascular: Negative for chest pain.   Gastrointestinal: Negative for abdominal pain, diarrhea, nausea and vomiting.   Genitourinary: Negative for flank pain.   Musculoskeletal: Negative for back pain.        To right upper arm at site of AV fistula- pain, swelling, drainage   Skin: Positive for color change (to right upper arm at site of AV fistula- bruising, redness). Negative for rash.    Neurological: Negative for dizziness, weakness and numbness.   Psychiatric/Behavioral: The patient is not nervous/anxious.        PHYSICAL EXAM  ED Triage Vitals [03/21/18 1523]   Temp Heart Rate Resp BP SpO2   97.9 °F (36.6 °C) 77 15 138/82 95 % WNL       Physical Exam   Constitutional: She is oriented to person, place, and time. No distress.   Chronically ill appearing, appears older than stated age   HENT:   Head: Normocephalic.   Mouth/Throat: Mucous membranes are normal.   Eyes: No scleral icterus.   Neck: Normal range of motion.   Cardiovascular: Normal rate, regular rhythm and normal heart sounds.    Pulses:       Radial pulses are 2+ on the right side, and 2+ on the left side.   Pulmonary/Chest: Effort normal and breath sounds normal. No respiratory distress.   Abdominal: Soft. There is no tenderness. There is no rebound and no guarding.   Musculoskeletal:   Posterior aspect of right upper arm is bruised, mildly erythematous, swollen, hard, and tender with blistering. No active drainage. No palpable thrill to AV fistula in right upper arm. Left BKA.   Neurological: She is alert and oriented to person, place, and time. She has normal sensation.   Skin: Skin is warm and dry.   Psychiatric: Mood and affect normal.   Nursing note and vitals reviewed.      LAB RESULTS  Recent Results (from the past 24 hour(s))   Duplex Hemodialysis Access CAR    Collection Time: 03/21/18  6:28 PM   Result Value Ref Range    Inflow Velocity Pre Inflow 263.00 cm/sec    Inflow Velocity Proximal 115.00 cm/sec    Inflow Diameter Pre Inflow 0.41 cm    Inflow Diameter Proximal 0.62 cm    Inflow Flow Volume Pre Inflow 672.00 mL/min    Arterial Anastomosis Velocity Proximal 206.00 cm/sec    Arterial Anastomosis Diameter Proximal 0.38 cm    Graft Velocity Proximal Mid 162.00 cm/sec    Graft Velocity Proximal 334.00 cm/sec    Graft Velocity Mid 145.00 cm/sec    Graft Velocity Distal 149.00 cm/sec    Graft Velocity Mid Distal 122.00 cm/sec     Graft Diameter Proximal Mid 0.58 cm    Graft Diameter Proximal 0.43 cm    Graft Diameter Mid 0.54 cm    Graft Diameter Distal 0.56 cm    Graft Diameter Mid Distal 0.52 cm    Graft Flow Volume Proximal Mid 604.00 mL/min    Graft Flow Volume Proximal 664.00 mL/min    Graft Flow Volume Mid 718.00 mL/min    Graft Flow Volume Distal 1,407.00 mL/min    Graft Flow Volume Mid Distal 485.00 mL/min    Venous Anastomosis Velocity Proximal 217.00 cm/sec    Venous Anastomosis Diameter Proximal 0.57 cm   Comprehensive Metabolic Panel    Collection Time: 03/21/18  6:45 PM   Result Value Ref Range    Glucose 135 (H) 65 - 99 mg/dL    BUN 48 (H) 6 - 20 mg/dL    Creatinine 4.46 (H) 0.57 - 1.00 mg/dL    Sodium 135 (L) 136 - 145 mmol/L    Potassium 4.8 3.5 - 5.2 mmol/L    Chloride 95 (L) 98 - 107 mmol/L    CO2 22.4 22.0 - 29.0 mmol/L    Calcium 9.3 8.6 - 10.5 mg/dL    Total Protein 7.6 6.0 - 8.5 g/dL    Albumin 3.60 3.50 - 5.20 g/dL    ALT (SGPT) 11 1 - 33 U/L    AST (SGOT) 14 1 - 32 U/L    Alkaline Phosphatase 102 39 - 117 U/L    Total Bilirubin 0.2 0.1 - 1.2 mg/dL    eGFR Non African Amer 10 (L) >60 mL/min/1.73    Globulin 4.0 gm/dL    A/G Ratio 0.9 g/dL    BUN/Creatinine Ratio 10.8 7.0 - 25.0    Anion Gap 17.6 mmol/L   Lactic Acid, Plasma    Collection Time: 03/21/18  6:45 PM   Result Value Ref Range    Lactate 1.2 0.5 - 2.0 mmol/L   CBC Auto Differential    Collection Time: 03/21/18  6:45 PM   Result Value Ref Range    WBC 9.34 4.50 - 10.70 10*3/mm3    RBC 3.59 (L) 3.90 - 5.20 10*6/mm3    Hemoglobin 10.9 (L) 11.9 - 15.5 g/dL    Hematocrit 35.3 (L) 35.6 - 45.5 %    MCV 98.3 (H) 80.5 - 98.2 fL    MCH 30.4 26.9 - 32.0 pg    MCHC 30.9 (L) 32.4 - 36.3 g/dL    RDW 14.7 (H) 11.7 - 13.0 %    RDW-SD 53.2 37.0 - 54.0 fl    MPV 9.5 6.0 - 12.0 fL    Platelets 268 140 - 500 10*3/mm3    Neutrophil % 57.9 42.7 - 76.0 %    Lymphocyte % 28.9 19.6 - 45.3 %    Monocyte % 9.4 5.0 - 12.0 %    Eosinophil % 3.1 0.3 - 6.2 %    Basophil % 0.4 0.0 - 1.5 %     Immature Grans % 0.3 0.0 - 0.5 %    Neutrophils, Absolute 5.40 1.90 - 8.10 10*3/mm3    Lymphocytes, Absolute 2.70 0.90 - 4.80 10*3/mm3    Monocytes, Absolute 0.88 0.20 - 1.20 10*3/mm3    Eosinophils, Absolute 0.29 0.00 - 0.70 10*3/mm3    Basophils, Absolute 0.04 0.00 - 0.20 10*3/mm3    Immature Grans, Absolute 0.03 0.00 - 0.03 10*3/mm3       RUE duplex (hemodialysis access)- significant infiltration and is currently unusable for hemodialysis (per Dr Phillips)     I ordered the above labs and reviewed the results      RADIOLOGY  XR Chest 1 View (Final result) Result time: 03/21/18 19:11:31   Procedure changed from XR Chest 2 View      Final result by Kareem Crow MD (03/21/18 19:11:31)   Narrative:   PORTABLE CHEST 3/21/2018 AT 7:04 PM     CLINICAL HISTORY: Hypertension. Right arm bruising at catheter site.  Renal failure.     The lungs are well-expanded and appear free of infiltrates. There are no  pleural effusions. The heart is moderately enlarged. The pulmonary  vasculature appears within normal limits.     IMPRESSIONS: Cardiomegaly. No evidence of acute disease within the  chest.     This report was finalized on 3/21/2018 7:11 PM by Dr. Kareem Crow MD.       I ordered the above noted radiological studies and reviewed the images on the PACS system.          MEDICAL RECORD REVIEW  Pt was admitted from 1/23/18 to 1/31/18 for ESRD, hemodialysis catheter placement, and RUE AV fistula placement.    Hospital course note from 1/23/18 to 1/31/18 admission-   52-year-old white female with very complex past medical history including chronic kidney disease stage V admitted through nephrology office to start hemodialysis.  Patient admitted her home medication continued.  Patient received supportive care.  Patient started on hemodialysis after hemodialysis catheter placed.  Patient improved after hemodialysis.  Patient was followed by nephrology.  As patient probably chronic hemodialysis patient AV fistula placed  prior to discharge to nursing home.  Patient other medications also adjusted during hospitalization.  Now patient is medically surgically stable and discharged back to rehabilitation and will continue hemodialysis 3 times a week.         PROGRESS AND CONSULTS  1541- Reviewed pt's history and workup with Dr. Tong.  At bedside evaluation, they agree with the plan of care.  1548- Ordered CXR, blood work, lactic acid, and RUE duplex (hemodialysis access) for further evaluation.   1735- Discussed case with Dr Phillips (on call vascular surgeon) for Dr Jackson (vascular surgeon).   1841- Dr Phillips has seen and evaluated pt in ED. He believes that pt's RUE AV fistula has suffered infiltration and is nonfunctional for dialysis. He recommends that pt be admitted to PMD. Dr Phillips plans to take pt to OR tomorrow for potential tunnel dialysis catheter placement.   1845- Rechecked pt. She is resting comfortably and is in no acute distress. Informed pt that we are still awaiting her lab results but that there is concern that her RUE AV fistula has been infiltrated and is nonfunctional for dialysis. Discussed pt admission for further care, vascular surgery evaluation, nephrology evaluation, and observation. Pt verbalizes understanding and agreement with plan.   1907- Sent call out to Dr Shankar (PMD) for admission.   1915- Discussed case with Dr Shankar (PMD)  Reviewed history, exam, results and treatments.  Discussed concerns and plan of care. Dr Shankar accepts pt to be admitted to telemetry.  1921- BUN is 48, creatinine is 4.46, and potassium is normal at 4.8. Sent call out to Dr CIELO Sanchez (nephrologist) for consult.   1935- Discussed case with Dr Colindres (nephrologist)  Reviewed history, exam, results and treatments.  Discussed concerns and plan of care. Dr Colindres will consult.         ADMISSION    Discussed treatment plan and reason for admission with pt/family and admitting physician.  Pt/family voiced understanding of the  "plan for admission for further testing/treatment as needed.      DIAGNOSIS  Final diagnoses:   Problem with dialysis access, initial encounter   ESRD (end stage renal disease)         COURSE & MEDICAL DECISION MAKING  Pertinent Labs and Imaging studies that were ordered and reviewed are noted above.  Results were reviewed/discussed with the patient and they were also made aware of online assess.   Pt also made aware that some labs, such as cultures, will not be resulted during ER visit and follow up with PMD is necessary.     MEDICATIONS GIVEN IN ER  Medications   sodium chloride 0.9 % flush 10 mL (not administered)   bacitracin 500 UNIT/GM ointment (not administered)       /70 (BP Location: Left arm, Patient Position: Lying)   Pulse 76   Temp 97.9 °F (36.6 °C) (Tympanic)   Resp 18   Ht 165.1 cm (65\")   Wt 113 kg (250 lb)   SpO2 98%   BMI 41.60 kg/m²       I personally reviewed the past medical history, past surgical history, social history, family history, current medications and allergies as they appear in this chart.  The scribe's note accurately reflects the work and decisions made by me.     Documentation assistance provided by treva Sena for PATY Liz on 3/21/2018 at 7:24 PM. Information recorded by the scribe was done at my direction and has been verified and validated by me.       Liu Sena  03/21/18 1935       LAURA Stephens  03/21/18 1951    "

## 2018-03-21 NOTE — CONSULTS
Date of Admission:  3/21/2018   LOS: 0 days   Patient Care Team:  Taiwo Shankar MD as PCP - General (Internal Medicine)  Chief Complaint   Patient presents with   • Arm Pain     Subjective     Inpatient Vascular Surgery Consult  Consult performed by: NANO ROSEN  Consult ordered by: PEDRO MCKINLEY        Pt is a 52 y.o. female who is a resident at [facility]. She states that she has ESRD, underwent RUE AV fistula placement in 1/2018, and undergoes hemodialysis on Mondays, Wednesdays, and Fridays. She presents with RUE problem. She reports that she was last dialyzed 2 days ago. Since yesterday, she has had pain, swelling, bruising, redness, and drainage to her right upper arm at the site of her AV fistula. She denies sensory loss to RUE, motor loss to RUE, N/V/D, fevers, chills, abd pain, chest pain, and dyspnea. She reports that when she went to dialysis center today, staff did not perform dialysis and referred her to ED for further evaluation. She is not on any blood thinners. Past Medical History of ESRD, diabetes, left BKA, PVD, HTN, COPD, and chronic anemia.      Duration: started yesterday  Timing: constant  Location: right upper arm ate site of AV fistula  Radiation: none  Quality: pain  Intensity/Severity: moderate  Progression: worsening  Associated Symptoms: to right upper arm at site of AV fistula- pain, swelling, bruising, redness, drainage  Aggravating Factors: none mentioned  Alleviating Factors: none mentioned  Previous Episodes: none  Treatment before arrival: Pt reports that when she went to dialysis center today, staff did not perform dialysis and referred her to ED for further evaluation.      Review of Systems   All other systems reviewed and are negative.      Past Medical History:   Diagnosis Date   • Adult failure to thrive    • Chronic kidney disease (CKD), stage V    • Coronary artery disease    • Diabetes mellitus    • Disease of thyroid gland     hypothyroidism   •  "Hypertension    • Idiopathic neuropathy    • Major depressive disorder, recurrent episode, severe, with psychotic behavior    • Peripheral vascular disease, unspecified      Past Surgical History:   Procedure Laterality Date   • ARTERIOVENOUS FISTULA/SHUNT SURGERY Right 1/29/2018    Procedure: UPPER EXTREMITY ARTERIOVENOUS SHUNT GRAFT;  Surgeon: Ambar Dobson Jr., MD;  Location: [facility];  Service:    • BELOW KNEE LEG AMPUTATION Left 2008   • INSERTION HEMODIALYSIS CATHETER N/A 1/24/2018    Procedure: TUNNEL DIALYSIS CATHETER INSERTION;  Surgeon: Moncho Jackson MD;  Location: [facility];  Service:      History reviewed. No pertinent family history.    Social History   Substance Use Topics   • Smoking status: Former Smoker   • Smokeless tobacco: Not on file   • Alcohol use No       (Not in a hospital admission)        •  Insert peripheral IV **AND** sodium chloride  Ampicillin and Morphine    Objective     Physical Exam:  Physical Exam   Constitutional: She is oriented to person, place, and time. She appears well-developed and well-nourished.   HENT:   Head: Normocephalic and atraumatic.   Eyes: Pupils are equal, round, and reactive to light. No scleral icterus.   Neck: Normal range of motion. Neck supple.   Cardiovascular: Normal rate and regular rhythm.    Large infiltration to the right upper extremity loop dialysis graft.   Pulmonary/Chest: Breath sounds normal.   Abdominal: Soft. Bowel sounds are normal.   Musculoskeletal: Normal range of motion. She exhibits edema and tenderness.   Neurological: She is alert and oriented to person, place, and time.   Skin: Skin is warm and dry.   Psychiatric: She has a normal mood and affect. Her behavior is normal.   Vitals reviewed.      Vital Signs and Labs:  Vital Signs   Patient Vitals for the past 24 hrs:   BP Temp Temp src Pulse Resp SpO2 Height Weight   03/21/18 1523 138/82 97.9 °F (36.6 °C) Tympanic 77 15 95 % 165.1 cm (65\") 113 kg (250 lb) "     I/O:  No intake/output data recorded.    CBC      BMP     HbA1C   Lab Results   Component Value Date    HGBA1C 6.76 (H) 01/25/2018       Active Hospital Problems (** Indicates Principal Problem)    Diagnosis Date Noted   • Dependence on renal dialysis [Z99.2] 03/21/2018      Resolved Hospital Problems    Diagnosis Date Noted Date Resolved   No resolved problems to display.     Problem Points:  4:  Patient has a new problem, with additional work-up planned  Total problem points:4 or more    Data Points:  1:  I have reviewed or order clinical lab test  1:  I have reviewed or order radiology test (except heart catheterization or echo)  2:  I have personally and independently review of image, tracing, or specimen  Total data points:4 or more  Large hematoma at the dialysis access site.      Risk Points:  Moderate:  Minor surgery with risk factors, major elective surgery without risk factors, perscription medications, or IVF with additives    MDM requires 2/3 (Problem points, Data points and Risk)  MDM Prob point Data point Risk   SF 1 1 Minimal   Low 2 2 Low   Mod 3 3 Moderate   High 4 4 High     Code requires 3/3 (MDM, History and Exam)  Code MDM History Exam Time   64917 SF/Low Detailed Detailed 30   04316 Mod Comprehensive Comprehensive 50   03489 High Comprehensive Comprehensive 70     Detailed history:  4 elements HPI or status of 3 chronic problems; 2-9 system ROS  Comprehensive:  4 elements HPI or status of 3 chronic problems;  10 system ROS    Detailed Exam:  12 findings from any organ system  Comprehensive Exam:  2 findings from each of 9 systems.   73703    Assessment/Plan     Active Problems:    Dependence on renal dialysis            52 y.o. female with a loop right arm axillary vein and axillary artery dialysis access.  This appears to have suffered a significant infiltration and is currently unusable for hemodialysis.  It will require prolonged rest prior to attempted recannulization.  There is some  overlying blisters as well.  This will simply require local wound care with bacitracin ointment.  I recommended the patient she become an inpatient under the medical service and plan a tunneled dialysis catheter placed tomorrow first thing in the morning for dialysis.  She understands the risks, benefits, and alternatives to this treatment strategy.    I discussed the patients findings and my recommendations with patient, family, nursing staff and Emergency Department provider..    Juwan Phillips MD  03/21/18  6:53 PM    Please call my office with any question: [phone]

## 2018-03-21 NOTE — ED PROVIDER NOTES
The KIM and I have discussed this patient's history, physical exam, and treatment plan. I have reviewed the documentation and personally had a face to face interaction with the patient  I affirm the documentation and agree with the treatment and plan.  The following describes my personal findings.    The patient presents complaining of R arm swelling around her AV fistula s/p dialysis 2 days ago.     Limited physical exam:  Patient is nontoxic appearing  Back/extremities: RUE dialysis access with no palpable thrill, large area of ecchymosis, induration, and tenderness to palpation with mild bullous changes to the skin in several small areas with serous drainage over upper anterior arm.    Discussed with Dr Cabrera at bedside who requests to admit to medicine with plan for tunnel catheter.    vascular study of the pt's RUE with low flows and hematoma.     Documentation assistance provided by treva Anderson for Dr. Tong.  Information recorded by the scribrosa was done at my direction and has been verified and validated by me.           Tao Anderson  03/21/18 6232       Sushma Tong MD  03/22/18 5000

## 2018-03-21 NOTE — ED NOTES
Call ahead received from [facility]. Pt has a RUE dialysis shunt that has been having increasing redness, swelling, and drainage since last week. Pt was scheduled for dialysis today with [facility] but was sent here for evaluation instead as requested by Dr. Shankar (PCP).      Halina Steiner RN  03/21/18 5269

## 2018-03-22 ENCOUNTER — APPOINTMENT (OUTPATIENT)
Dept: GENERAL RADIOLOGY | Facility: HOSPITAL | Age: 53
End: 2018-03-22

## 2018-03-22 ENCOUNTER — APPOINTMENT (OUTPATIENT)
Dept: GENERAL RADIOLOGY | Facility: HOSPITAL | Age: 53
End: 2018-03-22
Attending: SURGERY

## 2018-03-22 ENCOUNTER — ANESTHESIA EVENT (OUTPATIENT)
Dept: PERIOP | Facility: HOSPITAL | Age: 53
End: 2018-03-22

## 2018-03-22 ENCOUNTER — ANESTHESIA (OUTPATIENT)
Dept: PERIOP | Facility: HOSPITAL | Age: 53
End: 2018-03-22

## 2018-03-22 PROBLEM — E11.9 TYPE 2 DIABETES MELLITUS (HCC): Status: ACTIVE | Noted: 2018-03-22

## 2018-03-22 LAB
ANION GAP SERPL CALCULATED.3IONS-SCNC: 15.5 MMOL/L
BUN BLD-MCNC: 53 MG/DL (ref 6–20)
BUN/CREAT SERPL: 11.5 (ref 7–25)
CALCIUM SPEC-SCNC: 8.9 MG/DL (ref 8.6–10.5)
CHLORIDE SERPL-SCNC: 100 MMOL/L (ref 98–107)
CO2 SERPL-SCNC: 22.5 MMOL/L (ref 22–29)
CREAT BLD-MCNC: 4.62 MG/DL (ref 0.57–1)
DEPRECATED RDW RBC AUTO: 53.1 FL (ref 37–54)
ERYTHROCYTE [DISTWIDTH] IN BLOOD BY AUTOMATED COUNT: 14.9 % (ref 11.7–13)
GFR SERPL CREATININE-BSD FRML MDRD: 10 ML/MIN/1.73
GLUCOSE BLD-MCNC: 148 MG/DL (ref 65–99)
GLUCOSE BLDC GLUCOMTR-MCNC: 136 MG/DL (ref 70–130)
GLUCOSE BLDC GLUCOMTR-MCNC: 166 MG/DL (ref 70–130)
GLUCOSE BLDC GLUCOMTR-MCNC: 198 MG/DL (ref 70–130)
GLUCOSE BLDC GLUCOMTR-MCNC: 201 MG/DL (ref 70–130)
GLUCOSE BLDC GLUCOMTR-MCNC: 205 MG/DL (ref 70–130)
HCT VFR BLD AUTO: 29 % (ref 35.6–45.5)
HGB BLD-MCNC: 9.1 G/DL (ref 11.9–15.5)
MCH RBC QN AUTO: 30.7 PG (ref 26.9–32)
MCHC RBC AUTO-ENTMCNC: 31.4 G/DL (ref 32.4–36.3)
MCV RBC AUTO: 98 FL (ref 80.5–98.2)
PLATELET # BLD AUTO: 249 10*3/MM3 (ref 140–500)
PMV BLD AUTO: 9.8 FL (ref 6–12)
POTASSIUM BLD-SCNC: 4.9 MMOL/L (ref 3.5–5.2)
RBC # BLD AUTO: 2.96 10*6/MM3 (ref 3.9–5.2)
SODIUM BLD-SCNC: 138 MMOL/L (ref 136–145)
WBC NRBC COR # BLD: 7.1 10*3/MM3 (ref 4.5–10.7)

## 2018-03-22 PROCEDURE — 71045 X-RAY EXAM CHEST 1 VIEW: CPT

## 2018-03-22 PROCEDURE — 77001 FLUOROGUIDE FOR VEIN DEVICE: CPT

## 2018-03-22 PROCEDURE — B548ZZA ULTRASONOGRAPHY OF SUPERIOR VENA CAVA, GUIDANCE: ICD-10-PCS | Performed by: SURGERY

## 2018-03-22 PROCEDURE — C1894 INTRO/SHEATH, NON-LASER: HCPCS | Performed by: SURGERY

## 2018-03-22 PROCEDURE — 25010000002 FENTANYL CITRATE (PF) 100 MCG/2ML SOLUTION: Performed by: NURSE ANESTHETIST, CERTIFIED REGISTERED

## 2018-03-22 PROCEDURE — 25010000002 HEPARIN (PORCINE) PER 1000 UNITS: Performed by: SURGERY

## 2018-03-22 PROCEDURE — 82962 GLUCOSE BLOOD TEST: CPT

## 2018-03-22 PROCEDURE — 25010000002 MIDAZOLAM PER 1 MG: Performed by: NURSE ANESTHETIST, CERTIFIED REGISTERED

## 2018-03-22 PROCEDURE — 80048 BASIC METABOLIC PNL TOTAL CA: CPT | Performed by: INTERNAL MEDICINE

## 2018-03-22 PROCEDURE — 25010000002 PROPOFOL 10 MG/ML EMULSION: Performed by: NURSE ANESTHETIST, CERTIFIED REGISTERED

## 2018-03-22 PROCEDURE — 02HV33Z INSERTION OF INFUSION DEVICE INTO SUPERIOR VENA CAVA, PERCUTANEOUS APPROACH: ICD-10-PCS | Performed by: SURGERY

## 2018-03-22 PROCEDURE — 85027 COMPLETE CBC AUTOMATED: CPT | Performed by: INTERNAL MEDICINE

## 2018-03-22 PROCEDURE — 25010000002 VANCOMYCIN PER 500 MG: Performed by: SURGERY

## 2018-03-22 PROCEDURE — C1750 CATH, HEMODIALYSIS,LONG-TERM: HCPCS | Performed by: SURGERY

## 2018-03-22 RX ORDER — FENTANYL CITRATE 50 UG/ML
50 INJECTION, SOLUTION INTRAMUSCULAR; INTRAVENOUS
Status: DISCONTINUED | OUTPATIENT
Start: 2018-03-22 | End: 2018-03-22 | Stop reason: HOSPADM

## 2018-03-22 RX ORDER — FLUMAZENIL 0.1 MG/ML
0.2 INJECTION INTRAVENOUS AS NEEDED
Status: DISCONTINUED | OUTPATIENT
Start: 2018-03-22 | End: 2018-03-22 | Stop reason: HOSPADM

## 2018-03-22 RX ORDER — MULTIPLE VITAMINS W/ MINERALS TAB 9MG-400MCG
1 TAB ORAL DAILY
Status: DISCONTINUED | OUTPATIENT
Start: 2018-03-22 | End: 2018-03-25 | Stop reason: HOSPADM

## 2018-03-22 RX ORDER — BUMETANIDE 1 MG/1
1 TABLET ORAL DAILY
Status: DISCONTINUED | OUTPATIENT
Start: 2018-03-22 | End: 2018-03-25 | Stop reason: HOSPADM

## 2018-03-22 RX ORDER — CARBOXYMETHYLCELLULOSE SODIUM 10 MG/ML
1 GEL OPHTHALMIC DAILY PRN
Status: DISCONTINUED | OUTPATIENT
Start: 2018-03-22 | End: 2018-03-22 | Stop reason: CLARIF

## 2018-03-22 RX ORDER — DIPHENHYDRAMINE HYDROCHLORIDE 50 MG/ML
12.5 INJECTION INTRAMUSCULAR; INTRAVENOUS
Status: DISCONTINUED | OUTPATIENT
Start: 2018-03-22 | End: 2018-03-22 | Stop reason: HOSPADM

## 2018-03-22 RX ORDER — HEPARIN SODIUM 1000 [USP'U]/ML
INJECTION, SOLUTION INTRAVENOUS; SUBCUTANEOUS AS NEEDED
Status: DISCONTINUED | OUTPATIENT
Start: 2018-03-22 | End: 2018-03-22 | Stop reason: HOSPADM

## 2018-03-22 RX ORDER — PROPOFOL 10 MG/ML
VIAL (ML) INTRAVENOUS CONTINUOUS PRN
Status: DISCONTINUED | OUTPATIENT
Start: 2018-03-22 | End: 2018-03-22 | Stop reason: SURG

## 2018-03-22 RX ORDER — HYDRALAZINE HYDROCHLORIDE 20 MG/ML
5 INJECTION INTRAMUSCULAR; INTRAVENOUS
Status: DISCONTINUED | OUTPATIENT
Start: 2018-03-22 | End: 2018-03-22 | Stop reason: HOSPADM

## 2018-03-22 RX ORDER — VANCOMYCIN HYDROCHLORIDE 1 G/200ML
1000 INJECTION, SOLUTION INTRAVENOUS ONCE
Status: COMPLETED | OUTPATIENT
Start: 2018-03-22 | End: 2018-03-22

## 2018-03-22 RX ORDER — SODIUM CHLORIDE 0.9 % (FLUSH) 0.9 %
1-10 SYRINGE (ML) INJECTION AS NEEDED
Status: DISCONTINUED | OUTPATIENT
Start: 2018-03-22 | End: 2018-03-22 | Stop reason: HOSPADM

## 2018-03-22 RX ORDER — ONDANSETRON 2 MG/ML
4 INJECTION INTRAMUSCULAR; INTRAVENOUS ONCE AS NEEDED
Status: DISCONTINUED | OUTPATIENT
Start: 2018-03-22 | End: 2018-03-22 | Stop reason: HOSPADM

## 2018-03-22 RX ORDER — ATORVASTATIN CALCIUM 10 MG/1
10 TABLET, FILM COATED ORAL NIGHTLY
Status: DISCONTINUED | OUTPATIENT
Start: 2018-03-22 | End: 2018-03-25 | Stop reason: HOSPADM

## 2018-03-22 RX ORDER — EPHEDRINE SULFATE 50 MG/ML
5 INJECTION, SOLUTION INTRAVENOUS ONCE AS NEEDED
Status: DISCONTINUED | OUTPATIENT
Start: 2018-03-22 | End: 2018-03-22 | Stop reason: HOSPADM

## 2018-03-22 RX ORDER — SODIUM CHLORIDE 9 MG/ML
INJECTION, SOLUTION INTRAVENOUS CONTINUOUS PRN
Status: DISCONTINUED | OUTPATIENT
Start: 2018-03-22 | End: 2018-03-22 | Stop reason: SURG

## 2018-03-22 RX ORDER — ACETAMINOPHEN 325 MG/1
650 TABLET ORAL EVERY 4 HOURS PRN
Status: DISCONTINUED | OUTPATIENT
Start: 2018-03-22 | End: 2018-03-25 | Stop reason: HOSPADM

## 2018-03-22 RX ORDER — ASPIRIN 81 MG/1
81 TABLET, CHEWABLE ORAL DAILY
Status: DISCONTINUED | OUTPATIENT
Start: 2018-03-22 | End: 2018-03-25 | Stop reason: HOSPADM

## 2018-03-22 RX ORDER — LEVOTHYROXINE SODIUM 0.07 MG/1
75 TABLET ORAL
Status: DISCONTINUED | OUTPATIENT
Start: 2018-03-22 | End: 2018-03-25 | Stop reason: HOSPADM

## 2018-03-22 RX ORDER — MIDAZOLAM HYDROCHLORIDE 1 MG/ML
1 INJECTION INTRAMUSCULAR; INTRAVENOUS
Status: DISCONTINUED | OUTPATIENT
Start: 2018-03-22 | End: 2018-03-22 | Stop reason: HOSPADM

## 2018-03-22 RX ORDER — NALOXONE HCL 0.4 MG/ML
0.2 VIAL (ML) INJECTION AS NEEDED
Status: DISCONTINUED | OUTPATIENT
Start: 2018-03-22 | End: 2018-03-22 | Stop reason: HOSPADM

## 2018-03-22 RX ORDER — LIDOCAINE HYDROCHLORIDE 10 MG/ML
0.5 INJECTION, SOLUTION EPIDURAL; INFILTRATION; INTRACAUDAL; PERINEURAL ONCE AS NEEDED
Status: DISCONTINUED | OUTPATIENT
Start: 2018-03-22 | End: 2018-03-22 | Stop reason: HOSPADM

## 2018-03-22 RX ORDER — PANTOPRAZOLE SODIUM 40 MG/1
40 TABLET, DELAYED RELEASE ORAL EVERY MORNING
Status: DISCONTINUED | OUTPATIENT
Start: 2018-03-22 | End: 2018-03-25 | Stop reason: HOSPADM

## 2018-03-22 RX ORDER — LIDOCAINE HYDROCHLORIDE 20 MG/ML
INJECTION, SOLUTION INFILTRATION; PERINEURAL AS NEEDED
Status: DISCONTINUED | OUTPATIENT
Start: 2018-03-22 | End: 2018-03-22 | Stop reason: SURG

## 2018-03-22 RX ORDER — ESCITALOPRAM OXALATE 10 MG/1
10 TABLET ORAL DAILY
Status: DISCONTINUED | OUTPATIENT
Start: 2018-03-22 | End: 2018-03-25 | Stop reason: HOSPADM

## 2018-03-22 RX ORDER — LABETALOL HYDROCHLORIDE 5 MG/ML
5 INJECTION, SOLUTION INTRAVENOUS
Status: DISCONTINUED | OUTPATIENT
Start: 2018-03-22 | End: 2018-03-22 | Stop reason: HOSPADM

## 2018-03-22 RX ORDER — QUETIAPINE FUMARATE 25 MG/1
25 TABLET, FILM COATED ORAL NIGHTLY
Status: DISCONTINUED | OUTPATIENT
Start: 2018-03-22 | End: 2018-03-25 | Stop reason: HOSPADM

## 2018-03-22 RX ORDER — FERROUS SULFATE 325(65) MG
325 TABLET ORAL
Status: DISCONTINUED | OUTPATIENT
Start: 2018-03-22 | End: 2018-03-22

## 2018-03-22 RX ORDER — SODIUM CHLORIDE, SODIUM LACTATE, POTASSIUM CHLORIDE, CALCIUM CHLORIDE 600; 310; 30; 20 MG/100ML; MG/100ML; MG/100ML; MG/100ML
9 INJECTION, SOLUTION INTRAVENOUS CONTINUOUS
Status: DISCONTINUED | OUTPATIENT
Start: 2018-03-22 | End: 2018-03-22

## 2018-03-22 RX ORDER — FAMOTIDINE 10 MG/ML
20 INJECTION, SOLUTION INTRAVENOUS ONCE
Status: COMPLETED | OUTPATIENT
Start: 2018-03-22 | End: 2018-03-22

## 2018-03-22 RX ORDER — FENTANYL CITRATE 50 UG/ML
INJECTION, SOLUTION INTRAMUSCULAR; INTRAVENOUS AS NEEDED
Status: DISCONTINUED | OUTPATIENT
Start: 2018-03-22 | End: 2018-03-22 | Stop reason: SURG

## 2018-03-22 RX ORDER — MIDAZOLAM HYDROCHLORIDE 1 MG/ML
INJECTION INTRAMUSCULAR; INTRAVENOUS AS NEEDED
Status: DISCONTINUED | OUTPATIENT
Start: 2018-03-22 | End: 2018-03-22 | Stop reason: SURG

## 2018-03-22 RX ORDER — MIDAZOLAM HYDROCHLORIDE 1 MG/ML
2 INJECTION INTRAMUSCULAR; INTRAVENOUS
Status: DISCONTINUED | OUTPATIENT
Start: 2018-03-22 | End: 2018-03-22 | Stop reason: HOSPADM

## 2018-03-22 RX ADMIN — LEVOTHYROXINE SODIUM 75 MCG: 75 TABLET ORAL at 05:24

## 2018-03-22 RX ADMIN — FENTANYL CITRATE 50 MCG: 50 INJECTION INTRAMUSCULAR; INTRAVENOUS at 07:44

## 2018-03-22 RX ADMIN — LIDOCAINE HYDROCHLORIDE 60 MG: 20 INJECTION, SOLUTION INFILTRATION; PERINEURAL at 07:42

## 2018-03-22 RX ADMIN — VANCOMYCIN HYDROCHLORIDE 1000 MG: 1 INJECTION, SOLUTION INTRAVENOUS at 07:27

## 2018-03-22 RX ADMIN — SODIUM CHLORIDE: 9 INJECTION, SOLUTION INTRAVENOUS at 07:32

## 2018-03-22 RX ADMIN — PROPOFOL 160 MCG/KG/MIN: 10 INJECTION, EMULSION INTRAVENOUS at 07:43

## 2018-03-22 RX ADMIN — ASPIRIN 81 MG: 81 TABLET, CHEWABLE ORAL at 13:24

## 2018-03-22 RX ADMIN — METOPROLOL TARTRATE 25 MG: 25 TABLET ORAL at 05:24

## 2018-03-22 RX ADMIN — Medication 1 MG: at 07:44

## 2018-03-22 RX ADMIN — PANTOPRAZOLE SODIUM 40 MG: 40 TABLET, DELAYED RELEASE ORAL at 13:24

## 2018-03-22 RX ADMIN — BACITRACIN: 500 OINTMENT TOPICAL at 20:40

## 2018-03-22 RX ADMIN — FERROUS SULFATE TAB 325 MG (65 MG ELEMENTAL FE) 325 MG: 325 (65 FE) TAB at 13:24

## 2018-03-22 RX ADMIN — ESCITALOPRAM 10 MG: 10 TABLET, FILM COATED ORAL at 13:24

## 2018-03-22 RX ADMIN — BUMETANIDE 1 MG: 1 TABLET ORAL at 13:24

## 2018-03-22 RX ADMIN — QUETIAPINE FUMARATE 25 MG: 25 TABLET, FILM COATED ORAL at 20:39

## 2018-03-22 RX ADMIN — FAMOTIDINE 20 MG: 10 INJECTION INTRAVENOUS at 07:24

## 2018-03-22 RX ADMIN — BACITRACIN: 500 OINTMENT TOPICAL at 13:23

## 2018-03-22 RX ADMIN — MULTIPLE VITAMINS W/ MINERALS TAB 1 TABLET: TAB at 13:24

## 2018-03-22 RX ADMIN — BACITRACIN: 500 OINTMENT TOPICAL at 05:24

## 2018-03-22 RX ADMIN — ATORVASTATIN CALCIUM 10 MG: 10 TABLET, FILM COATED ORAL at 20:39

## 2018-03-22 RX ADMIN — ACETAMINOPHEN 650 MG: 325 TABLET ORAL at 20:39

## 2018-03-22 RX ADMIN — METOPROLOL TARTRATE 25 MG: 25 TABLET ORAL at 20:39

## 2018-03-22 NOTE — PERIOPERATIVE NURSING NOTE
Patient has state guardian and resides in nursing facility.  Guardian notified of surgery and consent given.

## 2018-03-22 NOTE — CONSULTS
Referring Provider: Taiwo Shankar MD  Reason for Consultation: ESRD    Subjective     Chief complaint   Chief Complaint   Patient presents with   • Arm Pain       History of present illness:     51 Y/O WF with PMH of HTN, DMII, ESRD on HD MWF who was sent from her dialysis unit due to significant infiltration of her right AV fistula.  Patient has been having this current access since January 2018.  Vascular surgery was consulted and patient underwent right tunneled dialysis catheter placement today.  We were consulted to help in management of her end-stage renal disease.  Patient denied any current shortness of air or chest pain.    Past Medical History:   Diagnosis Date   • Adult failure to thrive    • Chronic kidney disease (CKD), stage V    • Coronary artery disease    • Diabetes mellitus    • Disease of thyroid gland     hypothyroidism   • Hypertension    • Idiopathic neuropathy    • Major depressive disorder, recurrent episode, severe, with psychotic behavior    • Peripheral vascular disease, unspecified      Past Surgical History:   Procedure Laterality Date   • ARTERIOVENOUS FISTULA/SHUNT SURGERY Right 1/29/2018    Procedure: UPPER EXTREMITY ARTERIOVENOUS SHUNT GRAFT;  Surgeon: Ambar Dobson Jr., MD;  Location: [facility];  Service:    • BELOW KNEE LEG AMPUTATION Left 2008   • INSERTION HEMODIALYSIS CATHETER N/A 1/24/2018    Procedure: TUNNEL DIALYSIS CATHETER INSERTION;  Surgeon: Moncho Jackson MD;  Location: [facility];  Service:      History reviewed. No pertinent family history.  Social History   Substance Use Topics   • Smoking status: Former Smoker   • Smokeless tobacco: Not on file   • Alcohol use No     Prescriptions Prior to Admission   Medication Sig Dispense Refill Last Dose   • acetaminophen (TYLENOL) 325 MG tablet Take 650 mg by mouth Every 4 (Four) Hours As Needed for Mild Pain .      • aspirin 81 MG tablet Take 81 mg by mouth Daily.   1/23/2018 at Unknown time   •  atorvastatin (LIPITOR) 10 MG tablet Take 10 mg by mouth Every Night.      • bumetanide (BUMEX) 2 MG tablet Take 4 mg by mouth 2 (Two) Times a Day.   1/23/2018 at Unknown time   • calcium acetate (PHOSLO) 667 MG capsule Take 1,334 mg by mouth 3 (Three) Times a Day With Meals.      • Carboxymethylcellulose Sodium (REFRESH TEARS OP) Administer 1 drop to both eyes 2 (Two) Times a Day.   1/23/2018 at Unknown time   • Dimethicone 1.5 % cream Apply 1 application topically Daily. To right planter foot      • epoetin khadar (EPOGEN,PROCRIT) 05189 UNIT/ML injection Inject 20,000 Units under the skin Every 14 (Fourteen) Days. Every 2 weeks on wednesdays 1/23/2018 at Unknown time   • escitalopram (LEXAPRO) 10 MG tablet Take 10 mg by mouth Daily.   1/23/2018 at Unknown time   • ferrous sulfate 325 (65 FE) MG tablet Take 325 mg by mouth Daily With Breakfast.   1/23/2018 at Unknown time   • insulin aspart (novoLOG FLEXPEN) 100 UNIT/ML solution pen-injector sc pen Inject  under the skin 3 (Three) Times a Day With Meals. Per sliding scale. 150-199=2, 200-249=4, 250-299=6, 300-349=8, 350-399=10, 400 or above, call MD   1/23/2018 at Unknown time   • levothyroxine (SYNTHROID, LEVOTHROID) 75 MCG tablet Take 75 mcg by mouth Daily.      • metoprolol tartrate (LOPRESSOR) 25 MG tablet Take 25 mg by mouth 2 (Two) Times a Day.   3/22/2018 at 0522   • Multiple Vitamins-Minerals (MULTIVITAMIN WITH MINERALS) tablet tablet Take 1 tablet by mouth Daily.      • pantoprazole (PROTONIX) 40 MG EC tablet Take 40 mg by mouth Daily.      • polyethylene glycol (MIRALAX) packet Take 17 g by mouth Daily.   1/23/2018 at Unknown time   • QUEtiapine (SEROquel) 25 MG tablet Take 25 mg by mouth Every Night.   1/22/2018 at Unknown time     Allergies:  Morphine and Ampicillin    Review of Systems  14 points ROS were conducted and were all negative except what is per HPI    Objective     Vital Signs  Temp:  [97.9 °F (36.6 °C)-98.6 °F (37 °C)] 98.3 °F (36.8  "°C)  Heart Rate:  [61-87] 66  Resp:  [15-20] 20  BP: (115-157)/(56-82) 154/71    Flowsheet Rows    Flowsheet Row First Filed Value   Admission Height 165.1 cm (65\") Documented at 03/21/2018 1523   Admission Weight 113 kg (250 lb) Documented at 03/21/2018 1523           I/O this shift:  In: 200 [I.V.:200]  Out: -   No intake/output data recorded.    Intake/Output Summary (Last 24 hours) at 03/22/18 1418  Last data filed at 03/22/18 0825   Gross per 24 hour   Intake              200 ml   Output                0 ml   Net              200 ml       Physical Exam:     General Appearance:    Alert, cooperative, in no acute distress   Head:    Normocephalic, without obvious abnormality, atraumatic   Eyes:            Lids and lashes normal, conjunctivae and sclerae normal, no   icterus, no pallor, corneas clear, PERRLA   Ears:    Ears appear intact with no abnormalities noted   Throat:   No oral lesions, no thrush, oral mucosa moist   Neck:   No adenopathy, supple, trachea midline, no thyromegaly, no     carotid bruit, no JVD   Back:     No kyphosis present, no scoliosis present, no skin lesions,       erythema or scars   Lungs:     Clear to auscultation,respirations regular, even and                   unlabored    Heart:    Regular rhythm and normal rate, normal S1 and S2, no            murmur, no gallop, no rub, no click   Breast Exam:    Deferred   Abdomen:     Normal bowel sounds, no masses, no organomegaly, soft        non-tender, non-distended, no guarding, no rebound                 tenderness   Genitalia:    Deferred   Extremities:    L AKA. Right upper extremity with sweilling, ecchymosis and blistering to much of the deltoid area. Positive thrill and bruit.    Pulses:   Pulses palpable and equal bilaterally   Skin:   No bleeding, +  bruising . Posterior heel with scabbed area   Lymph nodes:   No palpable adenopathy           Results Review:    Results from last 7 days  Lab Units 03/22/18  0507 03/21/18  1845 "   SODIUM mmol/L 138 135*   POTASSIUM mmol/L 4.9 4.8   CHLORIDE mmol/L 100 95*   CO2 mmol/L 22.5 22.4   BUN mg/dL 53* 48*   CREATININE mg/dL 4.62* 4.46*   CALCIUM mg/dL 8.9 9.3   BILIRUBIN mg/dL  --  0.2   ALK PHOS U/L  --  102   ALT (SGPT) U/L  --  11   AST (SGOT) U/L  --  14   GLUCOSE mg/dL 148* 135*       Estimated Creatinine Clearance: 17.5 mL/min (by C-G formula based on SCr of 4.62 mg/dL (H)).            Results from last 7 days  Lab Units 03/22/18  0507 03/21/18  1845   WBC 10*3/mm3 7.10 9.34   HEMOGLOBIN g/dL 9.1* 10.9*   PLATELETS 10*3/mm3 249 268             Active Medications    aspirin 81 mg Oral Daily   atorvastatin 10 mg Oral Nightly   bacitracin  Topical Q8H   bumetanide 1 mg Oral Daily   escitalopram 10 mg Oral Daily   ferrous sulfate 325 mg Oral Daily With Breakfast   levothyroxine 75 mcg Oral Q AM   metoprolol tartrate 25 mg Oral Q12H   multivitamin with minerals 1 tablet Oral Daily   pantoprazole 40 mg Oral QAM   QUEtiapine 25 mg Oral Nightly          Assessment/Plan     1. ESRD on HD : Her last dialysis was on Monday 03/19th.  She is status post right tunneled dialysis catheter placement due to significant infiltration of her right AV fistula ( axillary vein and axillary artery).  Volume is acceptable and electrolytes are okay.  No indication for dialysis that today plan for dialysis that tomorrow and possibly discharge the patient the after dialysis.    2.  Anemia of end-stage renal disease: We'll discontinue iron supplement.  The plan for Venofer with dialysis as well as Procrit  3.  Infiltration of her right AV fistula ( malfunctioning access): Status post tunneled dialysis catheter placement 03/22  4.  Diabetes mellitus type 2  5.  Hypertension with end-stage renal disease  6.  Hypothyroidism  7.  Peripheral vascular disease supposed left BKA        Sandy Jackson MD  03/22/18  2:18 PM

## 2018-03-22 NOTE — PLAN OF CARE
Problem: Skin Injury Risk (Adult)  Goal: Identify Related Risk Factors and Signs and Symptoms  Outcome: Outcome(s) achieved Date Met: 03/22/18 03/22/18 0433   Skin Injury Risk (Adult)   Related Risk Factors (Skin Injury Risk) edema;mechanical forces;mobility impaired      03/22/18 0433   Skin Injury Risk (Adult)   Related Risk Factors (Skin Injury Risk) edema;mechanical forces;mobility impaired     Goal: Skin Health and Integrity  Outcome: Ongoing (interventions implemented as appropriate)   03/22/18 0433   Skin Injury Risk (Adult)   Skin Health and Integrity making progress toward outcome       Problem: Patient Care Overview  Goal: Plan of Care Review  Outcome: Ongoing (interventions implemented as appropriate)   03/22/18 0433   Coping/Psychosocial   Plan of Care Reviewed With patient   Plan of Care Review   Progress no change   OTHER   Outcome Summary [place] [facility] complication resulted from dialysis on Monday (3/19). Vitals stable. Plan for tunneled cath placement for dialysis access this morning. Consent was signed in ER and is in chart. NPO since midnight. Pt with no complaints of pain, resting in bed comfortably throughout the shift.

## 2018-03-22 NOTE — ANESTHESIA POSTPROCEDURE EVALUATION
"Patient: Melia Almeida    Procedure Summary     Date:  03/22/18 Room / Location:  [facility] OR 18 INV / [facility] HYBRID OR 18/19    Anesthesia Start:  0732 Anesthesia Stop:  0826    Procedure:  Tunnel Catheter Insertion (N/A ) Diagnosis:      Provider:  Juwan Phillips MD Provider:  Jose Estrada MD    Anesthesia Type:  general, MAC, regional ASA Status:  3          Anesthesia Type: general, MAC, regional  Last vitals  BP   155/72 (03/22/18 0845)   Temp   36.8 °C (98.3 °F) (03/22/18 0821)   Pulse   70 (03/22/18 0845)   Resp   20 (03/22/18 0845)     SpO2   96 % (03/22/18 0845)     Post Anesthesia Care and Evaluation    Patient location during evaluation: bedside  Patient participation: complete - patient participated  Level of consciousness: awake  Pain score: 2  Pain management: adequate  Airway patency: patent  Anesthetic complications: No anesthetic complications    Cardiovascular status: acceptable  Respiratory status: acceptable  Hydration status: acceptable    Comments: /72   Pulse 70   Temp 36.8 °C (98.3 °F) (Oral)   Resp 20   Ht 165.1 cm (65\")   Wt 114 kg (251 lb 5.2 oz)   SpO2 96%   BMI 41.82 kg/m²         "

## 2018-03-22 NOTE — H&P
HISTORY AND PHYSICAL   [facility], [facility]      3/22/2018    Patient Identification:    Name: Melia Almeida  Age: 52 y.o.  Sex: female  :  1965  MRN: [id number]                       Primary Care Physician: Taiwo Shankar MD    Chief Complaint:  Dependence on renal dialysis; AV shunt malfunction.     Chief Complaint   Patient presents with   • Arm Pain         History of Present Illness:      Ms. Melia Almeida is a 52 y.o. female who is a resident at [facility]. She has ESRD'  underwent RUE AV fistula placement in 2018, and undergoes hemodialysis on , , and . She present to ED  with RUE problem. She reported that she was last dialyzed 3 days ago. Since Tuesday , she has had pain, swelling, bruising, redness, and drainage to her right upper arm at the site of her AV fistula. She denied sensory loss to RUE, motor loss to RUE, N/V/D, fevers, chills, abd pain, chest pain, and dyspnea. She reported that when she went to dialysis center yesterday , staff did not perform dialysis and referred her to ED for further evaluation. She is not on any blood thinners. Past Medical History of ESRD, diabetes, left BKA, PVD, HTN, COPD, and chronic anemia. She was evaluated by Dr. Phillips who stated she had significant infiltrate at the R shunt site which would preclude its use for some time. She was taken to the OR this morning for placement of tunneled catheter for use until the shunt can be used again. She is in the PACU this morning, still sleepy from anestnesia. Nursing reports some potential skin issues.     Past Medical History:  Past Medical History:   Diagnosis Date   • Adult failure to thrive    • Chronic kidney disease (CKD), stage V    • Coronary artery disease    • Diabetes mellitus    • Disease of thyroid gland     hypothyroidism   • Hypertension    • Idiopathic neuropathy    • Major depressive disorder, recurrent episode, severe, with psychotic behavior    •  Peripheral vascular disease, unspecified      Past Surgical History:  Past Surgical History:   Procedure Laterality Date   • ARTERIOVENOUS FISTULA/SHUNT SURGERY Right 1/29/2018    Procedure: UPPER EXTREMITY ARTERIOVENOUS SHUNT GRAFT;  Surgeon: Ambar Dobson Jr., MD;  Location: [facility];  Service:    • BELOW KNEE LEG AMPUTATION Left 2008   • INSERTION HEMODIALYSIS CATHETER N/A 1/24/2018    Procedure: TUNNEL DIALYSIS CATHETER INSERTION;  Surgeon: Moncho Jackson MD;  Location: [facility];  Service:       Home Meds:  Prescriptions Prior to Admission   Medication Sig Dispense Refill Last Dose   • acetaminophen (TYLENOL) 325 MG tablet Take 650 mg by mouth Every 4 (Four) Hours As Needed for Mild Pain .      • aspirin 81 MG tablet Take 81 mg by mouth Daily.   1/23/2018 at Unknown time   • atorvastatin (LIPITOR) 10 MG tablet Take 10 mg by mouth Every Night.      • bumetanide (BUMEX) 2 MG tablet Take 4 mg by mouth 2 (Two) Times a Day.   1/23/2018 at Unknown time   • calcium acetate (PHOSLO) 667 MG capsule Take 1,334 mg by mouth 3 (Three) Times a Day With Meals.      • Carboxymethylcellulose Sodium (REFRESH TEARS OP) Administer 1 drop to both eyes 2 (Two) Times a Day.   1/23/2018 at Unknown time   • Dimethicone 1.5 % cream Apply 1 application topically Daily. To right planter foot      • epoetin khadar (EPOGEN,PROCRIT) 26077 UNIT/ML injection Inject 20,000 Units under the skin Every 14 (Fourteen) Days. Every 2 weeks on wednesdays 1/23/2018 at Unknown time   • escitalopram (LEXAPRO) 10 MG tablet Take 10 mg by mouth Daily.   1/23/2018 at Unknown time   • ferrous sulfate 325 (65 FE) MG tablet Take 325 mg by mouth Daily With Breakfast.   1/23/2018 at Unknown time   • insulin aspart (novoLOG FLEXPEN) 100 UNIT/ML solution pen-injector sc pen Inject  under the skin 3 (Three) Times a Day With Meals. Per sliding scale. 150-199=2, 200-249=4, 250-299=6, 300-349=8, 350-399=10, 400 or above, call MD   1/23/2018 at  "Unknown time   • levothyroxine (SYNTHROID, LEVOTHROID) 75 MCG tablet Take 75 mcg by mouth Daily.      • metoprolol tartrate (LOPRESSOR) 25 MG tablet Take 25 mg by mouth 2 (Two) Times a Day.   3/22/2018 at 0522   • Multiple Vitamins-Minerals (MULTIVITAMIN WITH MINERALS) tablet tablet Take 1 tablet by mouth Daily.      • pantoprazole (PROTONIX) 40 MG EC tablet Take 40 mg by mouth Daily.      • polyethylene glycol (MIRALAX) packet Take 17 g by mouth Daily.   2018 at Unknown time   • QUEtiapine (SEROquel) 25 MG tablet Take 25 mg by mouth Every Night.   2018 at Unknown time       Allergies:  Allergies   Allergen Reactions   • Morphine Swelling   • Ampicillin Cough     unknown     Immunizations:    There is no immunization history on file for this patient.  Social History:   Social History     Social History Narrative   • No narrative on file     Social History   Substance Use Topics   • Smoking status: Former Smoker   • Smokeless tobacco: Not on file   • Alcohol use No     Family History:  History reviewed. No pertinent family history.     Review of Systems  See history of present illness and past medical history.    Unable to complete ROS due to patient recovering from surgery and still under influence of anesthesia.     Objective:    Exam:    tMax 24 hrs: Temp (24hrs), Av.2 °F (36.8 °C), Min:97.9 °F (36.6 °C), Max:98.6 °F (37 °C)    Vitals Ranges:   Temp:  [97.9 °F (36.6 °C)-98.6 °F (37 °C)] 98.3 °F (36.8 °C)  Heart Rate:  [61-87] 76  Resp:  [16-20] 16  BP: (115-155)/(53-82) 132/53    /53 (BP Location: Left arm, Patient Position: Lying)   Pulse 76   Temp 98.3 °F (36.8 °C) (Oral)   Resp 16   Ht 165.1 cm (65\")   Wt 114 kg (251 lb 5.2 oz)   SpO2 95%   BMI 41.82 kg/m²     General: Alerts only briefly. Cooperative, no distress, appears stated age  HEENT:    Head: Normocephalic, without obvious abnormality, atraumatic  Eyes: EOM are normal. Pupils are equal, round, and reactive to light. "   Oropharynx: Mucosa and tongue normal  Neck: Supple, symmetrical, trachea midline, no adenopathy;              thyroid:  no enlargement/tenderness/nodules;              no carotid bruit or JVD  Cardiovascular: Normal rate, regular rhythm and intact distal pulses.              Exam reveals no gallop and no friction rub. No murmur heard  Chest wall: No tenderness or deformity  Pulmonary: Clear to auscultation bilaterally, respirations unlabored.               No rhonchi, wheezing or rales.   Abdominal:  Soft, non-tender, bowel sounds active all four quadrants,     no masses, no hepatomegaly, no splenomegaly.   Extremities: L AKA. Right upper extremity with sweilling, ecchymosis and blistering to much of the deltoid area. Positive thrill and bruit. R. Posterior heel with scabbed area.   Pulses: 2 + symmetric all extremities  Neurological: Patient is sedated. No focal neuro deficits seen                CNII-XII intact, normal strength, sensation intact throughout  Skin: Skin turgor normal, excoriation to buttocks.       Data Review:      Results from last 7 days  Lab Units 03/22/18  0507 03/21/18  1845   WBC 10*3/mm3 7.10 9.34   HEMOGLOBIN g/dL 9.1* 10.9*   HEMATOCRIT % 29.0* 35.3*   PLATELETS 10*3/mm3 249 268         Results from last 7 days  Lab Units 03/22/18  0507 03/21/18  1845   SODIUM mmol/L 138 135*   POTASSIUM mmol/L 4.9 4.8   CHLORIDE mmol/L 100 95*   CO2 mmol/L 22.5 22.4   BUN mg/dL 53* 48*   CREATININE mg/dL 4.62* 4.46*   CALCIUM mg/dL 8.9 9.3   BILIRUBIN mg/dL  --  0.2   ALK PHOS U/L  --  102   ALT (SGPT) U/L  --  11   AST (SGOT) U/L  --  14   GLUCOSE mg/dL 148* 135*       Estimated Creatinine Clearance: 17.5 mL/min (by C-G formula based on SCr of 4.62 mg/dL (H)).    Brief Urine Lab Results  (Last result in the past 365 days)      Color   Clarity   Blood   Leuk Est   Nitrite   Protein   CREAT   Urine HCG        01/26/18 0522 Yellow Cloudy(A) Trace(A) Small (1+)(A) Negative >=300 mg/dL (3+)(A)                      No results found for: TROPONINT     Imaging Results (all)     Procedure Component Value Units Date/Time    XR Chest 1 View [[id number]] Collected:  03/22/18 0930     Updated:  03/22/18 0934    Narrative:       PORTABLE CHEST 3/22/2018  HOURS     CLINICAL HISTORY: Evaluate central line placement.     A right internal jugular dialysis catheter has been inserted and is in  satisfactory position with its tip in the SVC. There is no pneumothorax.  The lungs are well-expanded and clear. The heart is moderately enlarged.  The pulmonary vasculature is within normal limits.     This report was finalized on 3/22/2018 9:31 AM by Dr. Kareem Crow MD.       Arteriogram (Autofinalize) [[id number]] Resulted:  03/22/18 0817     Updated:  03/22/18 0817    Narrative:       This procedure was auto-finalized with no dictation required.    XR Chest 1 View [[id number]] Collected:  03/21/18 1910     Updated:  03/21/18 1914    Narrative:       PORTABLE CHEST 3/21/2018 AT 7:04 PM     CLINICAL HISTORY: Hypertension. Right arm bruising at catheter site.  Renal failure.     The lungs are well-expanded and appear free of infiltrates. There are no  pleural effusions. The heart is moderately enlarged. The pulmonary  vasculature appears within normal limits.     IMPRESSIONS: Cardiomegaly. No evidence of acute disease within the  chest.     This report was finalized on 3/21/2018 7:11 PM by Dr. Kareem Crow MD.             Assessment:    Active Problems:    Problem with dialysis access    End stage kidney disease    Dependence on renal dialysis    Type 2 diabetes mellitus      Patient Active Problem List   Diagnosis Code   • End stage renal disease N18.6   • End stage kidney disease N18.6   • Dependence on renal dialysis Z99.2   • Problem with dialysis access T82.898A   • Type 2 diabetes mellitus E11.9       Plan:    Inpatient admission  IV fluids, gently  Wound consult  Vascular surgery consult  Renal consult for HD  Monitor and  correct electrolytes  Accucheck and SSI  Monitor mental status  Home medications  DVT/stress ulcer prophylaxis  PT consult when appropiate  Labs in am        Taiwo Shankar MD  3/22/2018  11:15 PM

## 2018-03-22 NOTE — PLAN OF CARE
Problem: Skin Injury Risk (Adult)  Goal: Skin Health and Integrity  Outcome: Ongoing (interventions implemented as appropriate)      Problem: Patient Care Overview  Goal: Plan of Care Review  Outcome: Ongoing (interventions implemented as appropriate)   03/22/18 1650   Coping/Psychosocial   Plan of Care Reviewed With patient   Plan of Care Review   Progress improving   OTHER   Outcome Summary Right side chest tunnel cath placed this morning, dialysis ordered for tomorrow and possible discharge tomorrow. HELDER fistula swollen, bruised, and blisters present. Turn q2 hours and waffle boot placed on right foot. No c/o pain or soa.      Goal: Individualization and Mutuality  Outcome: Ongoing (interventions implemented as appropriate)    Goal: Discharge Needs Assessment  Outcome: Ongoing (interventions implemented as appropriate)    Goal: Interprofessional Rounds/Family Conf  Outcome: Ongoing (interventions implemented as appropriate)      Problem: Fall Risk (Adult)  Goal: Identify Related Risk Factors and Signs and Symptoms  Outcome: Outcome(s) achieved Date Met: 03/22/18    Goal: Absence of Fall  Outcome: Ongoing (interventions implemented as appropriate)

## 2018-03-22 NOTE — ANESTHESIA PREPROCEDURE EVALUATION
Anesthesia Evaluation     Patient summary reviewed and Nursing notes reviewed   NPO Solid Status: > 8 hours  NPO Liquid Status: > 8 hours           Airway   Mallampati: III  Neck ROM: full  No difficulty expected  Dental      Comment: edentuless upper    Pulmonary     breath sounds clear to auscultation  (+) a smoker Former,   Cardiovascular     Rhythm: regular    (+) hypertension, CAD, PVD,       Neuro/Psych  (+) numbness, psychiatric history Depression,     GI/Hepatic/Renal/Endo    (+) obesity, morbid obesity,  renal disease dialysis, diabetes mellitus,     Musculoskeletal     Abdominal   (+) obese,    Substance History      OB/GYN          Other                        Anesthesia Plan    ASA 3     general, MAC and regional   (Options discussed)  intravenous induction   Anesthetic plan and risks discussed with patient.

## 2018-03-22 NOTE — NURSING NOTE
CWON consult received.  Patient with right AV shunt infiltration and significant ecchymosis and swelling to right upper arm with several, small intact and ruptured blisters.  Dr. Phillips is following and placed a tunneled catheter today and has ordered local wound care of topical bacitracin ointment.  Patient also with small scab to right heel.  Scab is very superficial and it is not clear the etilogy but could be old pressure injury that has healed or r/t to PVD and/or diabetes.  Recommend offloading with Medline heel boot and patient agreed to comply by wearing when in bed.  Consult to wound/ostomy also mentioned excoriation too buttocks/coccyx but upon assessment there is no evidence of breakdown or skin excoriation.  Skin is pink and blanchable.  She was wearing a brief and is incontinent so therefore at risk for impaired skin integrity.  Recommend using Z guard for moisture/barrier protection.  Thank you for consult and please don't hesitate to call with any questions.

## 2018-03-22 NOTE — OP NOTE
Date of Admission:  3/21/2018  03/22/18  Juwan Phillips MD    Preoperative Diagnosis: End-stage renal disease and Infiltration at hemodialysis access site.    Postoperative Diagnosis: same as above    Procedure Performed:     1)  Glidepath catheter insertion into the Right internal jugular vein  2)  Ultrasound guided vascular access  3)  Radiologic supervision of catheter tip placement    CPT 81611, 40790, 85348    Surgeon: Juwan Phillips MD    Assistant:  Mj DE LOS SANTOS    Anesthesia: MAC with local    Estimated Blood Loss:  Minimal    Findings:     PatentRight internal jugular vein vein on ultrasound.  Under real time ultrasound visualization needle accessed of the vein was performed.  Ultrasound image of access printed and stored in the medical record.    Both blue and red ports draw and flush without resistance    Successful  placement of a 23 cm Glidepath catheter in the Right internal jugular vein.        Specimen: none    Complications: none    Dispo: to PACU    Indication for procedure: 52 y.o. female with end-stage renal disease was dialyzing through a loop arm graft on the right arm.  Had a significant infiltration on dialysis her arm will be unable to be used for dialysis until the infiltration results.  For this she will require a tunneled catheter.  Risks, benefits, and alternatives discussed with the patient.     Description of procedure:     The patient was taken to the operating room. Right internal jugular vein was interrogated with an ultrasound which appeared to be adequate for Glidepath placement. Surgical sites were prepped and draped in the usual sterile fashion. A full surgical timeout was performed.  The skin overlying the vein was infiltrated with local. Under ultrasound guidance, the vein was accessed and wire was placed under fluoroscopic guidance into the cava.  11 bladed scalpel was used to make a half centimeter skin neck at the vascular insertion site.  Local  anesthetic was used to anesthetize the tunneling tract of the palindrome catheter.  Half centimeters skin neck was made at the planned exit site of the Gildpath catheter.  Serial dilation was performed under fluoroscopic guidance to vascular access site with the supplied vascular dilators.  Peel-away sheath and dilator were placed under fluoroscopic guidance over the guidewire.  Palindrome catheter was tunneled from the exit site to the vascular insertion site.  Wire and dilator were removed from the peel-away sheath.  Glidepath catheter was placed down into the peel-away sheath into the vena cava.  Glidepath catheter was withdrawn until adequate tip placement under fluoroscopic guidance.  Fluoroscopy of the apex of the catheter was performed to ensure no kinking.  Catheter was secured in place with nylon sutures.  Thre vascular access site was closed using Vicryl suture.  Sterile dressings were applied throughout.    Juwan Phillips MD  03/22/18    Active Hospital Problems (** Indicates Principal Problem)    Diagnosis Date Noted   • Dependence on renal dialysis [Z99.2] 03/21/2018   • Problem with dialysis access [T82.898A] 03/21/2018      Resolved Hospital Problems    Diagnosis Date Noted Date Resolved   No resolved problems to display.       .

## 2018-03-23 LAB
ANION GAP SERPL CALCULATED.3IONS-SCNC: 14.8 MMOL/L
BASOPHILS # BLD AUTO: 0.03 10*3/MM3 (ref 0–0.2)
BASOPHILS NFR BLD AUTO: 0.5 % (ref 0–1.5)
BUN BLD-MCNC: 62 MG/DL (ref 6–20)
BUN/CREAT SERPL: 11.7 (ref 7–25)
CALCIUM SPEC-SCNC: 8.5 MG/DL (ref 8.6–10.5)
CHLORIDE SERPL-SCNC: 99 MMOL/L (ref 98–107)
CO2 SERPL-SCNC: 21.2 MMOL/L (ref 22–29)
CREAT BLD-MCNC: 5.29 MG/DL (ref 0.57–1)
DEPRECATED RDW RBC AUTO: 53.1 FL (ref 37–54)
EOSINOPHIL # BLD AUTO: 0.2 10*3/MM3 (ref 0–0.7)
EOSINOPHIL NFR BLD AUTO: 3.3 % (ref 0.3–6.2)
ERYTHROCYTE [DISTWIDTH] IN BLOOD BY AUTOMATED COUNT: 14.8 % (ref 11.7–13)
GFR SERPL CREATININE-BSD FRML MDRD: 8 ML/MIN/1.73
GLUCOSE BLD-MCNC: 130 MG/DL (ref 65–99)
GLUCOSE BLDC GLUCOMTR-MCNC: 131 MG/DL (ref 70–130)
HBV SURFACE AG SERPL QL IA: NORMAL
HCT VFR BLD AUTO: 28.8 % (ref 35.6–45.5)
HGB BLD-MCNC: 8.8 G/DL (ref 11.9–15.5)
IMM GRANULOCYTES # BLD: 0.02 10*3/MM3 (ref 0–0.03)
IMM GRANULOCYTES NFR BLD: 0.3 % (ref 0–0.5)
LYMPHOCYTES # BLD AUTO: 2.34 10*3/MM3 (ref 0.9–4.8)
LYMPHOCYTES NFR BLD AUTO: 38.8 % (ref 19.6–45.3)
MCH RBC QN AUTO: 30.1 PG (ref 26.9–32)
MCHC RBC AUTO-ENTMCNC: 30.6 G/DL (ref 32.4–36.3)
MCV RBC AUTO: 98.6 FL (ref 80.5–98.2)
MONOCYTES # BLD AUTO: 0.55 10*3/MM3 (ref 0.2–1.2)
MONOCYTES NFR BLD AUTO: 9.1 % (ref 5–12)
NEUTROPHILS # BLD AUTO: 2.89 10*3/MM3 (ref 1.9–8.1)
NEUTROPHILS NFR BLD AUTO: 48 % (ref 42.7–76)
PLATELET # BLD AUTO: 232 10*3/MM3 (ref 140–500)
PMV BLD AUTO: 9.7 FL (ref 6–12)
POTASSIUM BLD-SCNC: 5 MMOL/L (ref 3.5–5.2)
RBC # BLD AUTO: 2.92 10*6/MM3 (ref 3.9–5.2)
SODIUM BLD-SCNC: 135 MMOL/L (ref 136–145)
WBC NRBC COR # BLD: 6.03 10*3/MM3 (ref 4.5–10.7)

## 2018-03-23 PROCEDURE — 25010000002 HEPARIN (PORCINE) PER 1000 UNITS: Performed by: INTERNAL MEDICINE

## 2018-03-23 PROCEDURE — 87340 HEPATITIS B SURFACE AG IA: CPT | Performed by: INTERNAL MEDICINE

## 2018-03-23 PROCEDURE — 80048 BASIC METABOLIC PNL TOTAL CA: CPT | Performed by: NURSE PRACTITIONER

## 2018-03-23 PROCEDURE — 82962 GLUCOSE BLOOD TEST: CPT

## 2018-03-23 PROCEDURE — 85025 COMPLETE CBC W/AUTO DIFF WBC: CPT | Performed by: NURSE PRACTITIONER

## 2018-03-23 RX ORDER — HEPARIN SODIUM 1000 [USP'U]/ML
3600 INJECTION, SOLUTION INTRAVENOUS; SUBCUTANEOUS AS NEEDED
Status: DISCONTINUED | OUTPATIENT
Start: 2018-03-23 | End: 2018-03-25 | Stop reason: HOSPADM

## 2018-03-23 RX ADMIN — PANTOPRAZOLE SODIUM 40 MG: 40 TABLET, DELAYED RELEASE ORAL at 06:20

## 2018-03-23 RX ADMIN — ASPIRIN 81 MG: 81 TABLET, CHEWABLE ORAL at 10:08

## 2018-03-23 RX ADMIN — HEPARIN SODIUM 3600 UNITS: 1000 INJECTION, SOLUTION INTRAVENOUS; SUBCUTANEOUS at 23:05

## 2018-03-23 RX ADMIN — MULTIPLE VITAMINS W/ MINERALS TAB 1 TABLET: TAB at 10:10

## 2018-03-23 RX ADMIN — BUMETANIDE 1 MG: 1 TABLET ORAL at 10:08

## 2018-03-23 RX ADMIN — METOPROLOL TARTRATE 25 MG: 25 TABLET ORAL at 10:08

## 2018-03-23 RX ADMIN — ESCITALOPRAM 10 MG: 10 TABLET, FILM COATED ORAL at 10:09

## 2018-03-23 RX ADMIN — BACITRACIN: 500 OINTMENT TOPICAL at 15:09

## 2018-03-23 RX ADMIN — BACITRACIN: 500 OINTMENT TOPICAL at 06:20

## 2018-03-23 RX ADMIN — LEVOTHYROXINE SODIUM 75 MCG: 75 TABLET ORAL at 06:20

## 2018-03-23 NOTE — PROGRESS NOTES
Date of Admission:  3/21/2018  Juwan Phillips MD     LOS: 2 days   Patient Care Team:  Taiwo Shankar MD as PCP - General (Internal Medicine)  Chief Complaint   Patient presents with   • Arm Pain     CC: Follow-up tunnel dialysis catheter placement.  Subjective     52 y.o. female denies chest pain or shortness of breath.  Appropriate tenderness at right jugular tunnel catheter insertion site.  Overall doing well.  Right arm still swollen.    Review of Systems    Objective     Scheduled Medications:     aspirin 81 mg Oral Daily   atorvastatin 10 mg Oral Nightly   bacitracin  Topical Q8H   bumetanide 1 mg Oral Daily   escitalopram 10 mg Oral Daily   levothyroxine 75 mcg Oral Q AM   metoprolol tartrate 25 mg Oral Q12H   multivitamin with minerals 1 tablet Oral Daily   pantoprazole 40 mg Oral QAM   QUEtiapine 25 mg Oral Nightly       Active Infusions:       As Needed Medications:  •  acetaminophen  •  Glycerin-Hypromellose-  •  Insert peripheral IV **AND** sodium chloride    Vital Signs  Vital Signs Patient Vitals for the past 24 hrs:   BP Temp Temp src Pulse Resp SpO2   03/23/18 0723 140/53 97.8 °F (36.6 °C) Oral 62 20 95 %   03/22/18 2356 131/57 98.4 °F (36.9 °C) Oral 62 16 95 %   03/22/18 1923 132/53 98.3 °F (36.8 °C) Oral 76 16 95 %   03/22/18 1256 154/71 98.3 °F (36.8 °C) Oral 66 20 92 %   03/22/18 1043 146/68 97.9 °F (36.6 °C) Oral 67 20 97 %   03/22/18 1030 115/57 - - 63 20 95 %   03/22/18 1018 124/59 98.6 °F (37 °C) Oral 61 20 94 %   03/22/18 1003 128/63 - - 61 20 94 %   03/22/18 0948 123/62 - - 61 20 95 %   03/22/18 0930 133/63 - - 62 20 95 %   03/22/18 0915 149/74 - - 63 20 94 %   03/22/18 0903 143/72 - - 70 20 97 %   03/22/18 0845 155/72 - - 70 20 96 %   03/22/18 0833 143/71 - - 67 20 98 %   03/22/18 0828 147/72 - - 69 18 96 %   03/22/18 0821 154/77 98.3 °F (36.8 °C) Oral 72 18 100 %     I/O:  I/O last 3 completed shifts:  In: 200 [I.V.:200]  Out: -     Physical Exam:  Physical Exam    Constitutional: She is oriented to person, place, and time. She appears well-developed and well-nourished.   HENT:   Head: Normocephalic and atraumatic.   Eyes: EOM are normal. Pupils are equal, round, and reactive to light.   Neck: Normal range of motion. Neck supple.   Cardiovascular:   Large infiltration in patient's right upper extremity was stable bruising.  No evidence of infection.   Abdominal: Soft. Bowel sounds are normal.   Neurological: She is alert and oriented to person, place, and time.   Psychiatric: She has a normal mood and affect. Her behavior is normal.       Results Review:     CBC    Results from last 7 days  Lab Units 03/23/18  0429 03/22/18  0507 03/21/18  1845   WBC 10*3/mm3 6.03 7.10 9.34   HEMOGLOBIN g/dL 8.8* 9.1* 10.9*   PLATELETS 10*3/mm3 232 249 268     BMP   Results from last 7 days  Lab Units 03/23/18  0429 03/22/18  0507 03/21/18  1845   SODIUM mmol/L 135* 138 135*   POTASSIUM mmol/L 5.0 4.9 4.8   CHLORIDE mmol/L 99 100 95*   CO2 mmol/L 21.2* 22.5 22.4   BUN mg/dL 62* 53* 48*   CREATININE mg/dL 5.29* 4.62* 4.46*   GLUCOSE mg/dL 130* 148* 135*       Assessment/Plan     Assessment & Plan    Active Problems:    End stage kidney disease    Dependence on renal dialysis    Problem with dialysis access    Type 2 diabetes mellitus      52 y.o. female status post tunnel dialysis catheter placement yesterday for serious infiltration to the right upper extremity arterial venous graft.  Continue utilizing tunnel catheter for dialysis.  Infiltration will take at least 1 month to resolve.  She should continue bacitracin ointment over the posterior areas on her arm.  She can follow-up with her primary vascular surgeon, Dr. Jackson, in one month's time with a graft ultrasound.  We'll sign off for now.  Please call if any further questions.    Juwan Phillips MD  03/23/18  7:57 AM    Please call my office with any question: [phone]    Active Hospital Problems (** Indicates Principal  Problem)    Diagnosis Date Noted   • Type 2 diabetes mellitus [E11.9] 03/22/2018   • Dependence on renal dialysis [Z99.2] 03/21/2018   • Problem with dialysis access [T82.898A] 03/21/2018   • End stage kidney disease [N18.6] 01/24/2018      Resolved Hospital Problems    Diagnosis Date Noted Date Resolved   No resolved problems to display.

## 2018-03-23 NOTE — PROGRESS NOTES
"Continued Stay Note  [facility]     Patient Name: Melia Almeida  MRN: [id number]  Today's Date: 3/23/2018    Admit Date: 3/21/2018          Discharge Plan     Row Name 03/23/18 1102       Plan    Plan Return to [facility]    Patient/Family in Agreement with Plan yes    Plan Comments No IMM (Medicaid). Noted patient has a state guardian Michelle Pulliam [phone] ext: [phone]. Called to Michelle/guardian and informed of admission and our contact number. Michelle says they have full guardianship of the patient.  Called to [facility] [phone] and left vmm to check level of care and bed status. Spoke with patient at bedside. Introduced self and role. Patient hopes to return to [facility] as she says she likes it there. Patient was just set up with HD at [facility]. [place]/[facility] returned call and she will check to be sure patient is current with same HD center and schedule. she will also let us know level of care and bed status.  Packet started and placed on the patient's chart in the chart rack.   DC plan - return to [facility] - contact Michelle Pulliam/state guardian [phone] ext: [phone] at discharge.              Discharge Codes    No documentation.           Manjinder Lu RN  Discharge Planning Assessment  [facility]     Patient Name: Melia Almeida  MRN: [id number]  Today's Date: 3/23/2018    Admit Date: 3/21/2018          Discharge Needs Assessment     Row Name 03/23/18 1057       Living Environment    Lives With facility resident    Current Living Arrangements extended care facility    Duration at Residence Patient says \"3 years\".     Primary Care Provided by other (see comments)    Provides Primary Care For no one, unable/limited ability to care for self    Family Caregiver if Needed other (see comments)    Quality of Family Relationships other (see comments)    Able to Return to Prior Arrangements yes    Living Arrangement Comments Patient is from [facility].        " Resource/Environmental Concerns    Resource/Environmental Concerns none       Transition Planning    Patient/Family Anticipates Transition to long term care facility    Patient/Family Anticipated Services at Transition ;skilled nursing    Transportation Anticipated health plan transportation       Discharge Needs Assessment    Readmission Within the Last 30 Days no previous admission in last 30 days    Concerns to be Addressed basic needs;discharge planning;legal    Concerns Comments Patient has a state guardian Michelle Pulliam [phone]: [phone]    Equipment Currently Used at Home --   Patient is from nursing home.    Anticipated Changes Related to Illness inability to care for self;inability to care for someone else;inability to work    Equipment Needed After Discharge none    Outpatient/Agency/Support Group Needs long term acute care facility   [facility]    Discharge Facility/Level of Care Needs nursing facility, skilled;nursing facility, intermediate    Offered/Gave Vendor List no    Patient's Choice of Community Agency(s) [facility] - patient says she likes it there.     Current Discharge Risk chronically ill;cognitively impaired;dependent with mobility/activities of daily living;physical impairment            Discharge Plan     Row Name 03/23/18 1102       Plan    Plan Return to [facility]    Patient/Family in Agreement with Plan yes    Plan Comments No IMM (Medicaid).         Destination     Service Request Status Selected Specialties Address Phone Number Fax Number    [facility] Pending - Request Sent N/A [address] [place] [facility] [address] [phone]        Manjinder Lu RN 3/23/2018 1105    Called to [facility] and left [facility].                 Durable Medical Equipment     No service coordination in this encounter.      Dialysis/Infusion     No service coordination in this encounter.      Home Medical Care     No service coordination in this  encounter.      Social Care     No service coordination in this encounter.                Demographic Summary     Row Name 03/23/18 1056       General Information    Admission Type inpatient    Arrived From subacute/long term acute care    Referral Source admission list    Reason for Consult discharge planning    Preferred Language English     Used During This Interaction no       Contact Information    Permission Granted to Share Info With             Functional Status     Row Name 03/23/18 1056       Functional Status    Usual Activity Tolerance fair    Current Activity Tolerance fair       Functional Status, IADL    Medications completely dependent    Meal Preparation completely dependent    Housekeeping completely dependent    Laundry completely dependent    Shopping completely dependent       Mental Status    General Appearance WDL ex       Mental Status Summary    Recent Changes in Mental Status/Cognitive Functioning decision making/judgment;memory (remote);memory (recent)       Employment/    Employment Status unemployed            Psychosocial    No documentation.           Abuse/Neglect    No documentation.           Legal    No documentation.           Substance Abuse    No documentation.           Patient Forms    No documentation.         Manjinder Lu RN

## 2018-03-23 NOTE — PROGRESS NOTES
NEPHROLOGY PROGRESS NOTE    PATIENT IDENTIFICATION:   Name:  Melia Almeida      MRN:  [id number]     52 y.o.  female             Reason for visit: ESRD    SUBJECTIVE:   Seen and examined. No SOA or CP. D/W nurse  OBJECTIVE:  Vitals:    03/22/18 1256 03/22/18 1923 03/22/18 2356 03/23/18 0723   BP: 154/71 132/53 131/57 140/53   BP Location: Left arm Left arm Left arm Left arm   Patient Position: Lying Lying Lying Lying   Pulse: 66 76 62 62   Resp: 20 16 16 20   Temp: 98.3 °F (36.8 °C) 98.3 °F (36.8 °C) 98.4 °F (36.9 °C) 97.8 °F (36.6 °C)   TempSrc: Oral Oral Oral Oral   SpO2: 92% 95% 95% 95%   Weight:       Height:               Body mass index is 41.82 kg/m².  No intake or output data in the 24 hours ending 03/23/18 1105      Exam:  GEN:  No distress, appears stated age  EYES:   Anicteric sclera  ENT:    External ears/nose normal, MM are moist  NECK:  No adenopathy, JVP none  LUNGS: Normal chest on inspection; not labored  CV:  Normal S1S2, without murmur  ABD:  Non-tender, non-distended, no hepatosplenomegaly, +BS  EXT:  No edema; no cyanosis; clubbing. Left BKA    Scheduled meds:    aspirin 81 mg Oral Daily   atorvastatin 10 mg Oral Nightly   bacitracin  Topical Q8H   bumetanide 1 mg Oral Daily   escitalopram 10 mg Oral Daily   levothyroxine 75 mcg Oral Q AM   metoprolol tartrate 25 mg Oral Q12H   multivitamin with minerals 1 tablet Oral Daily   pantoprazole 40 mg Oral QAM   QUEtiapine 25 mg Oral Nightly     IV meds:                           Data Review:      Results from last 7 days  Lab Units 03/23/18  0429 03/22/18  0507 03/21/18  1845   SODIUM mmol/L 135* 138 135*   POTASSIUM mmol/L 5.0 4.9 4.8   CHLORIDE mmol/L 99 100 95*   CO2 mmol/L 21.2* 22.5 22.4   BUN mg/dL 62* 53* 48*   CREATININE mg/dL 5.29* 4.62* 4.46*   CALCIUM mg/dL 8.5* 8.9 9.3   BILIRUBIN mg/dL  --   --  0.2   ALK PHOS U/L  --   --  102   ALT (SGPT) U/L  --   --  11   AST (SGOT) U/L  --   --  14   GLUCOSE mg/dL 130* 148* 135*       Estimated  Creatinine Clearance: 15.3 mL/min (by C-G formula based on SCr of 5.29 mg/dL (H)).            Results from last 7 days  Lab Units 03/23/18  0429 03/22/18  0507 03/21/18  1845   WBC 10*3/mm3 6.03 7.10 9.34   HEMOGLOBIN g/dL 8.8* 9.1* 10.9*   PLATELETS 10*3/mm3 232 249 268                   ASSESSMENT:   Active Problems:    End stage kidney disease    Dependence on renal dialysis    Problem with dialysis access    Type 2 diabetes mellitus    1. ESRD on HD : Her last dialysis was on Monday 03/19th.  She is status post right tunneled dialysis catheter placement due to significant infiltration of her right AV fistula ( axillary vein and axillary artery) 03/22.  Plan for hemodialysis today.  We'll continue to use a tunneled dialysis catheter for at least a month per Dr. Phillips     2.  Anemia of end-stage renal disease: We'll discontinue iron supplement.  Plan for Venofer with dialysis as well as Procrit  3.  Infiltration of her right AV fistula ( malfunctioning access): Status post tunneled dialysis catheter placement 03/22  4.  Diabetes mellitus type 2  5.  Hypertension with end-stage renal disease  6.  Hypothyroidism  7.  Peripheral vascular disease supposed left BKA        Sandy Jackson MD  3/23/2018    11:05 AM

## 2018-03-23 NOTE — PROGRESS NOTES
"DAILY PROGRESS NOTE  [facility], [facility]    3/23/2018    Patient Identification:  Name: Melia Almeida  Age: 52 y.o.  Sex: female  :  1965  MRN: [id number]           Primary Care Physician: Taiwo Shankar MD    Subjective:    Interval History:    No new incidents, Sleeping, arousable  To have HD today      ROS:     No N,V,D,C    Objective:    Scheduled Meds:    aspirin 81 mg Oral Daily   atorvastatin 10 mg Oral Nightly   bacitracin  Topical Q8H   bumetanide 1 mg Oral Daily   escitalopram 10 mg Oral Daily   levothyroxine 75 mcg Oral Q AM   metoprolol tartrate 25 mg Oral Q12H   multivitamin with minerals 1 tablet Oral Daily   pantoprazole 40 mg Oral QAM   QUEtiapine 25 mg Oral Nightly       Continuous Infusions:       PRN Meds:  •  acetaminophen  •  Glycerin-Hypromellose-  •  Insert peripheral IV **AND** sodium chloride    Intake/Output:    Intake/Output Summary (Last 24 hours) at 18 0644  Last data filed at 18 0825   Gross per 24 hour   Intake              200 ml   Output                0 ml   Net              200 ml         Exam:    tMax 24 hrs: Temp (24hrs), Av.3 °F (36.8 °C), Min:97.9 °F (36.6 °C), Max:98.6 °F (37 °C)    Vitals Ranges:   Temp:  [97.9 °F (36.6 °C)-98.6 °F (37 °C)] 98.4 °F (36.9 °C)  Heart Rate:  [61-76] 62  Resp:  [16-20] 16  BP: (115-155)/(53-82) 131/57    /57 (BP Location: Left arm, Patient Position: Lying)   Pulse 62   Temp 98.4 °F (36.9 °C) (Oral)   Resp 16   Ht 165.1 cm (65\")   Wt 114 kg (251 lb 5.2 oz)   SpO2 95%   BMI 41.82 kg/m²     General: Alerts only briefly. Cooperative, no distress, appears stated age  Oropharynx: Mucosa and tongue normal  Neck: Supple, symmetrical, trachea midline, no adenopathy;              thyroid:  no enlargement/tenderness/nodules;              no carotid bruit or JVD  Cardiovascular: Normal rate, regular rhythm and intact distal pulses.              Exam reveals no gallop " and no friction rub. No murmur heard  Pulmonary: Clear to auscultation bilaterally, respirations unlabored.               No rhonchi, wheezing or rales.   Abdominal:  Soft, non-tender, bowel sounds active all four quadrants,     no masses, no hepatomegaly, no splenomegaly.   Extremities: L AKA. Right upper extremity with sweilling, ecchymosis and blistering to much of the deltoid area. Positive thrill and bruit. R. Posterior heel with scabbed area.   Pulses: 2 + symmetric all extremities  Neurological: Patient is sedated. No focal neuro deficits seen                CNII-XII intact, normal strength, sensation intact throughout  Skin: Skin turgor normal, excoriation to buttocks.       Data Review:      Results from last 7 days  Lab Units 03/23/18  0429 03/22/18  0507 03/21/18  1845   WBC 10*3/mm3 6.03 7.10 9.34   HEMOGLOBIN g/dL 8.8* 9.1* 10.9*   HEMATOCRIT % 28.8* 29.0* 35.3*   PLATELETS 10*3/mm3 232 249 268         Results from last 7 days  Lab Units 03/23/18  0429 03/22/18  0507 03/21/18  1845   SODIUM mmol/L 135* 138 135*   POTASSIUM mmol/L 5.0 4.9 4.8   CHLORIDE mmol/L 99 100 95*   CO2 mmol/L 21.2* 22.5 22.4   BUN mg/dL 62* 53* 48*   CREATININE mg/dL 5.29* 4.62* 4.46*   CALCIUM mg/dL 8.5* 8.9 9.3   BILIRUBIN mg/dL  --   --  0.2   ALK PHOS U/L  --   --  102   ALT (SGPT) U/L  --   --  11   AST (SGOT) U/L  --   --  14   GLUCOSE mg/dL 130* 148* 135*                 No results found for: TROPONINT    Microbiology Results (last 10 days)     ** No results found for the last 240 hours. **           Imaging Results (last 7 days)     Procedure Component Value Units Date/Time    XR Chest 1 View [[id number]] Collected:  03/22/18 0930     Updated:  03/22/18 0934    Narrative:       PORTABLE CHEST 3/22/2018  HOURS     CLINICAL HISTORY: Evaluate central line placement.     A right internal jugular dialysis catheter has been inserted and is in  satisfactory position with its tip in the SVC. There is no pneumothorax.  The  lungs are well-expanded and clear. The heart is moderately enlarged.  The pulmonary vasculature is within normal limits.     This report was finalized on 3/22/2018 9:31 AM by Dr. Kareem Crow MD.       Arteriogram (Autofinalize) [[id number]] Resulted:  03/22/18 0817     Updated:  03/22/18 0817    Narrative:       This procedure was auto-finalized with no dictation required.    XR Chest 1 View [[id number]] Collected:  03/21/18 1910     Updated:  03/21/18 1914    Narrative:       PORTABLE CHEST 3/21/2018 AT 7:04 PM     CLINICAL HISTORY: Hypertension. Right arm bruising at catheter site.  Renal failure.     The lungs are well-expanded and appear free of infiltrates. There are no  pleural effusions. The heart is moderately enlarged. The pulmonary  vasculature appears within normal limits.     IMPRESSIONS: Cardiomegaly. No evidence of acute disease within the  chest.     This report was finalized on 3/21/2018 7:11 PM by Dr. Kareem Crow MD.               Assessment:      Active Problems:    Problem with dialysis access    End stage kidney disease    Dependence on renal dialysis    Type 2 diabetes mellitus      Patient Active Problem List   Diagnosis Code   • End stage renal disease N18.6   • End stage kidney disease N18.6   • Dependence on renal dialysis Z99.2   • Problem with dialysis access T82.898A   • Type 2 diabetes mellitus E11.9       Plan:    No IC fluids  Wound consult  Vascular surgery consult  Renal consult for HD  Monitor and correct electrolytes  Accucheck and SSI  Monitor mental status  Home medications  DVT/stress ulcer prophylaxis  PT consult when appropiate  Labs in am       Taiwo Shankar MD  3/23/2018  6:44 AM

## 2018-03-23 NOTE — PLAN OF CARE
Problem: Skin Injury Risk (Adult)  Goal: Skin Health and Integrity  Outcome: Ongoing (interventions implemented as appropriate)      Problem: Patient Care Overview  Goal: Plan of Care Review  Outcome: Ongoing (interventions implemented as appropriate)   03/23/18 8362   Coping/Psychosocial   Plan of Care Reviewed With patient   Plan of Care Review   Progress no change   OTHER   Outcome Summary Awaiting for HD today, anticipate DC tomorrow.     Goal: Individualization and Mutuality  Outcome: Ongoing (interventions implemented as appropriate)    Goal: Discharge Needs Assessment  Outcome: Ongoing (interventions implemented as appropriate)    Goal: Interprofessional Rounds/Family Conf  Outcome: Ongoing (interventions implemented as appropriate)      Problem: Fall Risk (Adult)  Goal: Absence of Fall  Outcome: Ongoing (interventions implemented as appropriate)

## 2018-03-23 NOTE — PLAN OF CARE
Problem: Patient Care Overview  Goal: Plan of Care Review  Outcome: Ongoing (interventions implemented as appropriate)   03/23/18 1547   Coping/Psychosocial   Plan of Care Reviewed With patient   Plan of Care Review   Progress improving   OTHER   Outcome Summary Patient complain of some discomfort to right upper arm, tunneled cath intact with some dried blood, schedule for HD today and dc back to nursing home afterwards, will continue to monitor.       Problem: Fall Risk (Adult)  Goal: Absence of Fall  Outcome: Ongoing (interventions implemented as appropriate)

## 2018-03-23 NOTE — DISCHARGE PLACEMENT REQUEST
"Melia Campbell (52 y.o. Female)     Date of Birth Social Security Number Address Home Phone MRN    1965  [address]  [facility] [address] [phone] [id number]    [employer] Marital Status          [facility] Single       Admission Date Admission Type Admitting Provider Attending Provider Department, Room/Bed    3/21/18 Emergency Taiwo Shankar MD [place], Marlon R, MD [address], 447/1    Discharge Date Discharge Disposition Discharge Destination                       Attending Provider:  Taiwo Shankar MD    Allergies:  Morphine, Ampicillin    Isolation:  None   Infection:  None   Code Status:  Prior    Ht:  165.1 cm (65\")   Wt:  114 kg (251 lb 5.2 oz)    Admission Cmt:  None   Principal Problem:  None                Active Insurance as of 3/21/2018     Primary Coverage     Payor Plan Insurance Group Employer/Plan Group    [place]      Payor Plan Address Payor Plan Phone Number Effective From Effective To    [address] [phone] 6/23/2017     [place] [id number]       Subscriber Name Subscriber Birth Date Member ID       MELIA CAMPBELL 1965 [id number]                 Emergency Contacts      (Rel.) Home Phone Work Phone Mobile Phone    Willa Zendejas (Sister) [phone] -- --    [place](Ext. [phone])Michelle (Guardian) [phone] -- --              "

## 2018-03-24 RX ADMIN — QUETIAPINE FUMARATE 25 MG: 25 TABLET, FILM COATED ORAL at 01:00

## 2018-03-24 RX ADMIN — METOPROLOL TARTRATE 25 MG: 25 TABLET ORAL at 07:58

## 2018-03-24 RX ADMIN — QUETIAPINE FUMARATE 25 MG: 25 TABLET, FILM COATED ORAL at 21:18

## 2018-03-24 RX ADMIN — LEVOTHYROXINE SODIUM 75 MCG: 75 TABLET ORAL at 06:32

## 2018-03-24 RX ADMIN — BACITRACIN: 500 OINTMENT TOPICAL at 01:01

## 2018-03-24 RX ADMIN — ATORVASTATIN CALCIUM 10 MG: 10 TABLET, FILM COATED ORAL at 01:00

## 2018-03-24 RX ADMIN — ATORVASTATIN CALCIUM 10 MG: 10 TABLET, FILM COATED ORAL at 21:18

## 2018-03-24 RX ADMIN — ASPIRIN 81 MG: 81 TABLET, CHEWABLE ORAL at 07:59

## 2018-03-24 RX ADMIN — METOPROLOL TARTRATE 25 MG: 25 TABLET ORAL at 21:18

## 2018-03-24 RX ADMIN — METOPROLOL TARTRATE 25 MG: 25 TABLET ORAL at 01:00

## 2018-03-24 RX ADMIN — BACITRACIN: 500 OINTMENT TOPICAL at 15:22

## 2018-03-24 RX ADMIN — PANTOPRAZOLE SODIUM 40 MG: 40 TABLET, DELAYED RELEASE ORAL at 06:32

## 2018-03-24 RX ADMIN — MULTIPLE VITAMINS W/ MINERALS TAB 1 TABLET: TAB at 07:58

## 2018-03-24 RX ADMIN — ESCITALOPRAM 10 MG: 10 TABLET, FILM COATED ORAL at 07:58

## 2018-03-24 RX ADMIN — BUMETANIDE 1 MG: 1 TABLET ORAL at 07:58

## 2018-03-24 RX ADMIN — BACITRACIN: 500 OINTMENT TOPICAL at 06:32

## 2018-03-24 RX ADMIN — BACITRACIN: 500 OINTMENT TOPICAL at 21:18

## 2018-03-24 NOTE — PLAN OF CARE
Problem: Skin Injury Risk (Adult)  Goal: Skin Health and Integrity  Outcome: Ongoing (interventions implemented as appropriate)      Problem: Patient Care Overview  Goal: Plan of Care Review  Outcome: Ongoing (interventions implemented as appropriate)   03/24/18 0450   Coping/Psychosocial   Plan of Care Reviewed With patient   Plan of Care Review   Progress no change   OTHER   Outcome Summary Pt received dialysis this shift. VSS. No complaints. Will continue to monitor.      Goal: Individualization and Mutuality  Outcome: Ongoing (interventions implemented as appropriate)    Goal: Discharge Needs Assessment  Outcome: Ongoing (interventions implemented as appropriate)    Goal: Interprofessional Rounds/Family Conf  Outcome: Ongoing (interventions implemented as appropriate)      Problem: Fall Risk (Adult)  Goal: Absence of Fall  Outcome: Outcome(s) achieved Date Met: 03/24/18

## 2018-03-24 NOTE — PLAN OF CARE
Problem: Skin Injury Risk (Adult)  Goal: Skin Health and Integrity  Outcome: Ongoing (interventions implemented as appropriate)      Problem: Patient Care Overview  Goal: Plan of Care Review  Outcome: Ongoing (interventions implemented as appropriate)   03/24/18 1701   Coping/Psychosocial   Plan of Care Reviewed With patient   Plan of Care Review   Progress no change   OTHER   Outcome Summary HD last night, Plan to DC tomorrow. No pain or other cpmplaints.        Problem: Fall Risk (Adult)  Goal: Absence of Fall  Outcome: Ongoing (interventions implemented as appropriate)

## 2018-03-24 NOTE — PROGRESS NOTES
"DAILY PROGRESS NOTE  [facility]    Patient Identification:  Name: Melia Almeida  Age: 52 y.o.  Sex: female  :  1965  MRN: [id number]         Primary Care Physician: Taiwo Shankar MD    Subjective: patient is sleeping; awakens easily  Interval History: follow up for esrd on hd, shunt malfunction, hypertension,  Diabetes, peripheral vascular disease    Objective:    Scheduled Meds:  aspirin 81 mg Oral Daily   atorvastatin 10 mg Oral Nightly   bacitracin  Topical Q8H   bumetanide 1 mg Oral Daily   escitalopram 10 mg Oral Daily   levothyroxine 75 mcg Oral Q AM   metoprolol tartrate 25 mg Oral Q12H   multivitamin with minerals 1 tablet Oral Daily   pantoprazole 40 mg Oral QAM   QUEtiapine 25 mg Oral Nightly     Continuous Infusions:     Vital signs in last 24 hours:  Temp:  [97.8 °F (36.6 °C)-98.2 °F (36.8 °C)] 97.9 °F (36.6 °C)  Heart Rate:  [64-78] 64  Resp:  [18] 18  BP: (118-138)/(46-67) 138/61    Intake/Output:    Intake/Output Summary (Last 24 hours) at 18 1424  Last data filed at 18 2300   Gross per 24 hour   Intake                0 ml   Output             1900 ml   Net            -1900 ml       Exam:  /61 (BP Location: Left arm, Patient Position: Lying)   Pulse 64   Temp 97.9 °F (36.6 °C) (Oral)   Resp 18   Ht 165.1 cm (65\")   Wt 114 kg (251 lb 5.2 oz)   SpO2 96%   BMI 41.82 kg/m²     General Appearance:    Alert, cooperative, no distress, AAOx3; chronically ill-appearing                          Head:    Normocephalic, without obvious abnormality, atraumatic                           Eyes:    PERRL, conjunctiva/corneas clear, EOM's intact, both eyes                         Throat:   Lips, tongue, gums normal; oral mucosa pink and moist                           Neck:   Supple, symmetrical, trachea midline, no JVD                         Lungs:    Decreased breath sounds bilaterally, respirations unlabored                 Chest Wall:    No tenderness or " deformity                          Heart:    Regular rate and rhythm, S1 and S2 normal, no murmur,no  Rub                                      or gallop                  Abdomen:     Soft, non-tender, bowel sounds active, no masses, no                                                        organomegaly                  Extremities:   Extremities normal, atraumatic, no cyanosis or edema                        Pulses:   Pulses palpable in all extremities                            Skin:   Skin is warm and dry,  no rashes or palpable lesions                     Data Review:  Lab Results   Component Value Date    WBC 6.03 03/23/2018    HGB 8.8 (L) 03/23/2018    HCT 28.8 (L) 03/23/2018     03/23/2018     Lab Results   Component Value Date     (L) 03/23/2018    K 5.0 03/23/2018    CL 99 03/23/2018    CO2 21.2 (L) 03/23/2018    BUN 62 (H) 03/23/2018    CREATININE 5.29 (H) 03/23/2018    GLUCOSE 130 (H) 03/23/2018     Lab Results   Component Value Date    CALCIUM 8.5 (L) 03/23/2018     Lab Results   Component Value Date    AST 14 03/21/2018    ALT 11 03/21/2018    ALKPHOS 102 03/21/2018     Patient Active Problem List   Diagnosis Code   • End stage renal disease N18.6   • End stage kidney disease N18.6   • Dependence on renal dialysis Z99.2   • Problem with dialysis access T82.898A   • Type 2 diabetes mellitus E11.9       Assessment:  Active Problems:    End stage kidney disease    Dependence on renal dialysis    Problem with dialysis access    Type 2 diabetes mellitus  anemia due to esrd    Plan:  Will continue to monitor  Next dialysis is planned for Monday  Will likely take place at the outpatient center  Check labs in am  Probable discharge tomorrow  hgb stable so far  Anya Haro MD  3/24/2018  2:24 PM

## 2018-03-24 NOTE — PROGRESS NOTES
NEPHROLOGY PROGRESS NOTE    PATIENT IDENTIFICATION:   Name:  Melia Almeida      MRN:  [id number]     52 y.o.  female             Reason for visit: ESRD    SUBJECTIVE:   Seen and examined. No SOA or CP.    OBJECTIVE:  Vitals:    03/23/18 1319 03/23/18 2300 03/24/18 0100 03/24/18 0717   BP: 130/57 118/46 128/67 127/54   BP Location: Left arm Left arm Left arm Left arm   Patient Position: Lying Lying Lying Lying   Pulse: 59 65 78 64   Resp: 20 18 18 18   Temp: 97.9 °F (36.6 °C) 98.2 °F (36.8 °C) 97.8 °F (36.6 °C) 98.2 °F (36.8 °C)   TempSrc: Oral Oral Oral Oral   SpO2:   95% 96%   Weight:       Height:               Body mass index is 41.82 kg/m².    Intake/Output Summary (Last 24 hours) at 03/24/18 1140  Last data filed at 03/23/18 2300   Gross per 24 hour   Intake                0 ml   Output             1900 ml   Net            -1900 ml         Exam:  GEN:  No distress, appears stated age  EYES:   Anicteric sclera  ENT:    External ears/nose normal, MM are moist  NECK:  No adenopathy, JVP none  LUNGS: Normal chest on inspection; not labored  CV:  Normal S1S2, without murmur  ABD:  Non-tender, non-distended, no hepatosplenomegaly, +BS  EXT:  No edema; no cyanosis; clubbing. Left BKA    RUE AVF, patent. Large hematoma/bruising noted. [facility] TDC in place.     Scheduled meds:      aspirin 81 mg Oral Daily   atorvastatin 10 mg Oral Nightly   bacitracin  Topical Q8H   bumetanide 1 mg Oral Daily   escitalopram 10 mg Oral Daily   levothyroxine 75 mcg Oral Q AM   metoprolol tartrate 25 mg Oral Q12H   multivitamin with minerals 1 tablet Oral Daily   pantoprazole 40 mg Oral QAM   QUEtiapine 25 mg Oral Nightly     IV meds:                           Data Review:      Results from last 7 days  Lab Units 03/23/18  0429 03/22/18  0507 03/21/18  1845   SODIUM mmol/L 135* 138 135*   POTASSIUM mmol/L 5.0 4.9 4.8   CHLORIDE mmol/L 99 100 95*   CO2 mmol/L 21.2* 22.5 22.4   BUN mg/dL 62* 53* 48*   CREATININE mg/dL 5.29* 4.62* 4.46*    CALCIUM mg/dL 8.5* 8.9 9.3   BILIRUBIN mg/dL  --   --  0.2   ALK PHOS U/L  --   --  102   ALT (SGPT) U/L  --   --  11   AST (SGOT) U/L  --   --  14   GLUCOSE mg/dL 130* 148* 135*       Estimated Creatinine Clearance: 15.3 mL/min (by C-G formula based on SCr of 5.29 mg/dL (H)).            Results from last 7 days  Lab Units 03/23/18  0429 03/22/18  0507 03/21/18  1845   WBC 10*3/mm3 6.03 7.10 9.34   HEMOGLOBIN g/dL 8.8* 9.1* 10.9*   PLATELETS 10*3/mm3 232 249 268                   ASSESSMENT:   Active Problems:    End stage kidney disease    Dependence on renal dialysis    Problem with dialysis access    Type 2 diabetes mellitus    1. ESRD on HD, TDC in place. Working well. Resting her RUE AVF. Ok to dc from renal standpoint. Can do her HD as outpt and f/u with vascular before using her AVF.    2.  Anemia of end-stage renal disease: Venofer +/- procrit with HD.   3.  Infiltration of her right AV fistula ( malfunctioning access): Status post tunneled dialysis catheter placement 03/22  4.  Diabetes mellitus type 2  5.  Hypertension with end-stage renal disease, bp is ok.   6.  Hypothyroidism  7.  Peripheral vascular disease, left BKA.    Ok to dc from renal standpoint.   Please call if questions.   [phone]    Yonathan Núñez MD  3/24/2018    11:40 AM

## 2018-03-25 VITALS
OXYGEN SATURATION: 98 % | HEART RATE: 61 BPM | HEIGHT: 65 IN | TEMPERATURE: 98.1 F | WEIGHT: 251.77 LBS | SYSTOLIC BLOOD PRESSURE: 148 MMHG | RESPIRATION RATE: 16 BRPM | DIASTOLIC BLOOD PRESSURE: 63 MMHG | BODY MASS INDEX: 41.95 KG/M2

## 2018-03-25 RX ORDER — ALBUMIN (HUMAN) 12.5 G/50ML
12.5 SOLUTION INTRAVENOUS AS NEEDED
Status: DISCONTINUED | OUTPATIENT
Start: 2018-03-25 | End: 2018-03-25 | Stop reason: HOSPADM

## 2018-03-25 RX ADMIN — BACITRACIN: 500 OINTMENT TOPICAL at 14:15

## 2018-03-25 RX ADMIN — ESCITALOPRAM 10 MG: 10 TABLET, FILM COATED ORAL at 08:03

## 2018-03-25 RX ADMIN — METOPROLOL TARTRATE 25 MG: 25 TABLET ORAL at 08:03

## 2018-03-25 RX ADMIN — MULTIPLE VITAMINS W/ MINERALS TAB 1 TABLET: TAB at 08:03

## 2018-03-25 RX ADMIN — LEVOTHYROXINE SODIUM 75 MCG: 75 TABLET ORAL at 06:54

## 2018-03-25 RX ADMIN — PANTOPRAZOLE SODIUM 40 MG: 40 TABLET, DELAYED RELEASE ORAL at 06:54

## 2018-03-25 RX ADMIN — BACITRACIN: 500 OINTMENT TOPICAL at 06:54

## 2018-03-25 RX ADMIN — BUMETANIDE 1 MG: 1 TABLET ORAL at 08:03

## 2018-03-25 RX ADMIN — ASPIRIN 81 MG: 81 TABLET, CHEWABLE ORAL at 08:03

## 2018-03-25 NOTE — PLAN OF CARE
Problem: Skin Injury Risk (Adult)  Goal: Skin Health and Integrity  Outcome: Ongoing (interventions implemented as appropriate)      Problem: Patient Care Overview  Goal: Plan of Care Review  Outcome: Ongoing (interventions implemented as appropriate)   03/25/18 0455   Coping/Psychosocial   Plan of Care Reviewed With patient   Plan of Care Review   Progress no change   OTHER   Outcome Summary No complaints. Slept well. VSS, will continue to monitor.      Goal: Individualization and Mutuality  Outcome: Ongoing (interventions implemented as appropriate)    Goal: Discharge Needs Assessment  Outcome: Ongoing (interventions implemented as appropriate)    Goal: Interprofessional Rounds/Family Conf  Outcome: Ongoing (interventions implemented as appropriate)      Problem: Fall Risk (Adult)  Goal: Absence of Fall  Outcome: Ongoing (interventions implemented as appropriate)

## 2018-03-25 NOTE — NURSING NOTE
Attempted to call patient guardian Michelle Pulliam twice with no answer and no option to leave a message. Informed nurse at [facility] that I was not able to notify guardian of transfer.

## 2018-03-25 NOTE — DISCHARGE SUMMARY
PHYSICIAN DISCHARGE SUMMARY                                                                        [facility]    Patient Identification:  Name: Melia Almeida  Age: 52 y.o.  Sex: female  :  1965  MRN: [id number]  Primary Care Physician: Taiwo Shankar MD    Admit date: 3/21/2018  Discharge date and time:18    Discharged Condition: good    Discharge Diagnoses:Active Problems:    End stage kidney disease    Dependence on renal dialysis    Problem with dialysis access    Type 2 diabetes mellitus     Patient Active Problem List   Diagnosis Code   • End stage renal disease N18.6   • End stage kidney disease N18.6   • Dependence on renal dialysis Z99.2   • Problem with dialysis access T82.898A   • Type 2 diabetes mellitus E11.9       PMHX:   Past Medical History:   Diagnosis Date   • Adult failure to thrive    • Chronic kidney disease (CKD), stage V    • Coronary artery disease    • Diabetes mellitus    • Disease of thyroid gland     hypothyroidism   • Hypertension    • Idiopathic neuropathy    • Major depressive disorder, recurrent episode, severe, with psychotic behavior    • Peripheral vascular disease, unspecified      PSHX:   Past Surgical History:   Procedure Laterality Date   • ARTERIOVENOUS FISTULA/SHUNT SURGERY Right 2018    Procedure: UPPER EXTREMITY ARTERIOVENOUS SHUNT GRAFT;  Surgeon: Ambar Dobson Jr., MD;  Location: [facility];  Service:    • BELOW KNEE LEG AMPUTATION Left    • INSERTION HEMODIALYSIS CATHETER N/A 2018    Procedure: TUNNEL DIALYSIS CATHETER INSERTION;  Surgeon: Moncho Jackson MD;  Location: [facility];  Service:    • INSERTION HEMODIALYSIS CATHETER N/A 3/22/2018    Procedure: Tunnel Catheter Insertion;  Surgeon: Juwan Phillips MD;  Location: [facility] ;  Service: Vascular       Hospital Course: Melia Almeida  Was admitted with an av shunt  malfunction; she was seen by vascular and nephrology; a tunneled catheter was placed and the av fistula cannot be used for a while per vascular; she will be returning to [facility] and continued with her outpatient dialysis    Consults:     Consults     Date and Time Order Name Status Description    3/22/2018 0105 Inpatient Nephrology Consult      3/22/2018 0105 Inpatient Vascular Surgery Consult Completed     3/21/2018 2155 Inpatient Nephrology Consult      3/21/2018 1921 Inpatient Nephrology Consult Completed     3/21/2018 1907 Inpatient Internal Medicine Consult Completed     3/21/2018 1715 Inpatient Vascular Surgery Consult Completed           Results from last 7 days  Lab Units 03/23/18  0429   WBC 10*3/mm3 6.03   HEMOGLOBIN g/dL 8.8*   HEMATOCRIT % 28.8*   PLATELETS 10*3/mm3 232       Results from last 7 days  Lab Units 03/23/18  0429   SODIUM mmol/L 135*   POTASSIUM mmol/L 5.0   CHLORIDE mmol/L 99   CO2 mmol/L 21.2*   BUN mg/dL 62*   CREATININE mg/dL 5.29*   GLUCOSE mg/dL 130*   CALCIUM mg/dL 8.5*     Significant Diagnostic Studies:   Lab Results   Component Value Date    WBC 6.03 03/23/2018    HGB 8.8 (L) 03/23/2018    HCT 28.8 (L) 03/23/2018     03/23/2018     Lab Results   Component Value Date     (L) 03/23/2018    K 5.0 03/23/2018    CL 99 03/23/2018    CO2 21.2 (L) 03/23/2018    BUN 62 (H) 03/23/2018    CREATININE 5.29 (H) 03/23/2018    GLUCOSE 130 (H) 03/23/2018     Lab Results   Component Value Date    CALCIUM 8.5 (L) 03/23/2018     No results found for: AST, ALT, ALKPHOS  Imaging Results (all)     Procedure Component Value Units Date/Time    XR Chest 1 View [[id number]] Collected:  03/22/18 0930     Updated:  03/22/18 0934    Narrative:       PORTABLE CHEST 3/22/2018  HOURS     CLINICAL HISTORY: Evaluate central line placement.     A right internal jugular dialysis catheter has been inserted and is in  satisfactory position with its tip in the SVC. There is no pneumothorax.  The  "lungs are well-expanded and clear. The heart is moderately enlarged.  The pulmonary vasculature is within normal limits.     This report was finalized on 3/22/2018 9:31 AM by Dr. Kareem Crow MD.       Arteriogram (Autofinalize) [[id number]] Resulted:  03/22/18 0817     Updated:  03/22/18 0817    Narrative:       This procedure was auto-finalized with no dictation required.    XR Chest 1 View [[id number]] Collected:  03/21/18 1910     Updated:  03/21/18 1914    Narrative:       PORTABLE CHEST 3/21/2018 AT 7:04 PM     CLINICAL HISTORY: Hypertension. Right arm bruising at catheter site.  Renal failure.     The lungs are well-expanded and appear free of infiltrates. There are no  pleural effusions. The heart is moderately enlarged. The pulmonary  vasculature appears within normal limits.     IMPRESSIONS: Cardiomegaly. No evidence of acute disease within the  chest.     This report was finalized on 3/21/2018 7:11 PM by Dr. Kareem Crow MD.           /63 (BP Location: Left arm, Patient Position: Lying)   Pulse 61   Temp 98.1 °F (36.7 °C) (Oral)   Resp 16   Ht 165.1 cm (65\")   Wt 114 kg (251 lb 12.3 oz)   SpO2 98%   BMI 41.90 kg/m²     Discharge Exam:  General Appearance:    Alert, cooperative, no distress, AAOx3                          Head:    Normocephalic, without obvious abnormality, atraumatic                          Eyes:    PERRL, conjunctiva/corneas clear, EOM's intact, both eyes                        Throat:   Lips, tongue, gums normal; oral mucosa pink and moist                          Neck:   Supple, symmetrical, trachea midline, no JVD                        Lungs:     Decreased breath sounds bilaterally, respirations unlabored                Chest Wall:    No tenderness or deformity                        Heart:    Regular rate and rhythm, S1 and S2 normal, no murmur,no  Rub                                       or gallop                  Abdomen:     Soft, non-tender, bowel sounds active, " no masses, no                                                        organomegaly                  Extremities:   Extremities normal, atraumatic, no cyanosis or edema, pulses                                           palpable in all extremities                             Skin:   Skin is warm and dry,  no rashes or palpable lesions        Disposition:  Nursing Home    Patient Instructions:    Melia Almeida   Home Medication Instructions RUTHIE:[id number]    Printed on:03/25/18 0844   Medication Information                      acetaminophen (TYLENOL) 325 MG tablet  Take 650 mg by mouth Every 4 (Four) Hours As Needed for Mild Pain .             aspirin 81 MG tablet  Take 81 mg by mouth Daily.             atorvastatin (LIPITOR) 10 MG tablet  Take 10 mg by mouth Every Night.             bumetanide (BUMEX) 2 MG tablet  Take 4 mg by mouth 2 (Two) Times a Day.             calcium acetate (PHOSLO) 667 MG capsule  Take 1,334 mg by mouth 3 (Three) Times a Day With Meals.             Carboxymethylcellulose Sodium (REFRESH TEARS OP)  Administer 1 drop to both eyes 2 (Two) Times a Day.             epoetin khadar (EPOGEN,PROCRIT) 60338 UNIT/ML injection  Inject 20,000 Units under the skin Every 14 (Fourteen) Days. Every 2 weeks on wednesdays             escitalopram (LEXAPRO) 10 MG tablet  Take 10 mg by mouth Daily.             ferrous sulfate 325 (65 FE) MG tablet  Take 325 mg by mouth Daily With Breakfast.             insulin aspart (novoLOG FLEXPEN) 100 UNIT/ML solution pen-injector sc pen  Inject  under the skin 3 (Three) Times a Day With Meals. Per sliding scale. 150-199=2, 200-249=4, 250-299=6, 300-349=8, 350-399=10, 400 or above, call MD             levothyroxine (SYNTHROID, LEVOTHROID) 75 MCG tablet  Take 75 mcg by mouth Daily.             metoprolol tartrate (LOPRESSOR) 25 MG tablet  Take 25 mg by mouth 2 (Two) Times a Day.             Multiple Vitamins-Minerals (MULTIVITAMIN WITH MINERALS) tablet tablet  Take 1 tablet by  mouth Daily.             pantoprazole (PROTONIX) 40 MG EC tablet  Take 40 mg by mouth Daily.             polyethylene glycol (MIRALAX) packet  Take 17 g by mouth Daily.             QUEtiapine (SEROquel) 25 MG tablet  Take 25 mg by mouth Every Night.                 No future appointments.  Follow-up Information     Moncho Jackson MD Follow up in 1 month(s).    Specialty:  Vascular Surgery  Why:  With right arm graft duplex.  Contact information:  [address]  [place] [id number] [phone]             Taiwo Shankar MD .    Specialties:  Internal Medicine, Hospitalist  Contact information:  [address]  [address]  [facility] [address]  [phone]                 Discharge Order     Start     Ordered    03/25/18 1331  Discharge patient  Once     Expected Discharge Date:  03/25/18    Discharge Disposition:  Long Term Care (DC - External)        03/25/18 1331             Signed:  Anya Haro MD  3/25/2018  1:32 PM

## 2018-03-25 NOTE — PROGRESS NOTES
NEPHROLOGY PROGRESS NOTE    PATIENT IDENTIFICATION:   Name:  Melia Almeida      MRN:  [id number]     52 y.o.  female             Reason for visit: ESRD    SUBJECTIVE:   Seen and examined. No SOA or CP. Feeling well.     OBJECTIVE:  Vitals:    03/24/18 2118 03/25/18 0006 03/25/18 0700 03/25/18 0900   BP: 162/65 139/64 130/64    BP Location:  Left arm Left arm    Patient Position:  Lying Lying    Pulse: 73  60    Resp:  18 18    Temp:  98.5 °F (36.9 °C) 98.3 °F (36.8 °C)    TempSrc:  Oral Oral    SpO2:   97%    Weight:    114 kg (251 lb 12.3 oz)   Height:               Body mass index is 41.9 kg/m².  No intake or output data in the 24 hours ending 03/25/18 1106      Exam:  GEN:  No distress, appears stated age  EYES:   Anicteric sclera  ENT:    External ears/nose normal, MM are moist  NECK:  No adenopathy, JVP none  LUNGS: Normal chest on inspection; not labored  CV:  Normal S1S2, without murmur  ABD:  Non-tender, non-distended, no hepatosplenomegaly, +BS  EXT:  No edema; no cyanosis; clubbing. Left BKA    RUE AVF, patent. Large hematoma/bruising noted. [facility] TDC in place.     Scheduled meds:      aspirin 81 mg Oral Daily   atorvastatin 10 mg Oral Nightly   bacitracin  Topical Q8H   bumetanide 1 mg Oral Daily   escitalopram 10 mg Oral Daily   levothyroxine 75 mcg Oral Q AM   metoprolol tartrate 25 mg Oral Q12H   multivitamin with minerals 1 tablet Oral Daily   pantoprazole 40 mg Oral QAM   QUEtiapine 25 mg Oral Nightly     IV meds:                           Data Review:      Results from last 7 days  Lab Units 03/23/18  0429 03/22/18  0507 03/21/18  1845   SODIUM mmol/L 135* 138 135*   POTASSIUM mmol/L 5.0 4.9 4.8   CHLORIDE mmol/L 99 100 95*   CO2 mmol/L 21.2* 22.5 22.4   BUN mg/dL 62* 53* 48*   CREATININE mg/dL 5.29* 4.62* 4.46*   CALCIUM mg/dL 8.5* 8.9 9.3   BILIRUBIN mg/dL  --   --  0.2   ALK PHOS U/L  --   --  102   ALT (SGPT) U/L  --   --  11   AST (SGOT) U/L  --   --  14   GLUCOSE mg/dL 130* 148* 135*        Estimated Creatinine Clearance: 15.3 mL/min (by C-G formula based on SCr of 5.29 mg/dL (H)).            Results from last 7 days  Lab Units 03/23/18  0429 03/22/18  0507 03/21/18  1845   WBC 10*3/mm3 6.03 7.10 9.34   HEMOGLOBIN g/dL 8.8* 9.1* 10.9*   PLATELETS 10*3/mm3 232 249 268                   ASSESSMENT:   Active Problems:    End stage kidney disease    Dependence on renal dialysis    Problem with dialysis access    Type 2 diabetes mellitus    1. ESRD on HD, TDC in place. Working well. Resting RUE AVF due to large hematoma. Ok to dc from renal standpoint. Can do her HD as outpt and f/u with vascular before using her AVF.  will order HD for am if still here.   2.  Anemia of end-stage renal disease: Venofer +/- procrit with HD.   3.  Infiltration of her right AV fistula ( malfunctioning access): Status post tunneled dialysis catheter placement 03/22. Appreciate vascular help.  4.  Diabetes mellitus type 2  5.  Hypertension with end-stage renal disease, bp is ok.   6.  Hypothyroidism  7.  Peripheral vascular disease, left BKA.  Ok to dc from renal standpoint.   Please call if questions.   [phone]    Yonathan Núñez MD  3/25/2018    11:06 AM

## 2018-03-26 NOTE — PROGRESS NOTES
Case Management Discharge Note    Final Note: Pt return to LTC at [facility]. Not noted transportation    Destination     Service Request Status Selected Specialties Address Phone Number Fax Number    [facility] Pending - Request Sent N/A [address] [facility] [phone] [phone]        Manjinder Lu RN 3/23/2018 1105    Called to [facility] and left [facility].                 Durable Medical Equipment     No service coordination in this encounter.      Dialysis/Infusion     No service coordination in this encounter.      Home Medical Care     No service coordination in this encounter.      Social Care     No service coordination in this encounter.             Final Discharge Disposition Code: 04 - intermediate care facility

## 2019-01-01 ENCOUNTER — HOSPITAL ENCOUNTER (INPATIENT)
Facility: HOSPITAL | Age: 54
LOS: 1 days | End: 2019-10-23
Attending: INTERNAL MEDICINE | Admitting: INTERNAL MEDICINE

## 2019-01-01 ENCOUNTER — APPOINTMENT (OUTPATIENT)
Dept: GENERAL RADIOLOGY | Facility: HOSPITAL | Age: 54
End: 2019-01-01

## 2019-01-01 ENCOUNTER — ANESTHESIA EVENT (OUTPATIENT)
Dept: PERIOP | Facility: HOSPITAL | Age: 54
End: 2019-01-01

## 2019-01-01 ENCOUNTER — TELEPHONE (OUTPATIENT)
Dept: SOCIAL WORK | Facility: HOSPITAL | Age: 54
End: 2019-01-01

## 2019-01-01 ENCOUNTER — HOSPITAL ENCOUNTER (EMERGENCY)
Facility: HOSPITAL | Age: 54
Discharge: NURSING, REHAB OR LONG-TERM CARE | End: 2019-04-09
Attending: EMERGENCY MEDICINE | Admitting: NURSE PRACTITIONER

## 2019-01-01 ENCOUNTER — ANESTHESIA (OUTPATIENT)
Dept: PERIOP | Facility: HOSPITAL | Age: 54
End: 2019-01-01

## 2019-01-01 ENCOUNTER — HOSPITAL ENCOUNTER (INPATIENT)
Facility: HOSPITAL | Age: 54
LOS: 37 days | End: 2019-10-22
Attending: EMERGENCY MEDICINE | Admitting: INTERNAL MEDICINE

## 2019-01-01 ENCOUNTER — RADIATION ONCOLOGY WEEKLY ASSESSMENT (OUTPATIENT)
Dept: RADIATION ONCOLOGY | Facility: HOSPITAL | Age: 54
End: 2019-01-01

## 2019-01-01 ENCOUNTER — APPOINTMENT (OUTPATIENT)
Dept: NUCLEAR MEDICINE | Facility: HOSPITAL | Age: 54
End: 2019-01-01

## 2019-01-01 ENCOUNTER — ANESTHESIA EVENT (OUTPATIENT)
Dept: GASTROENTEROLOGY | Facility: HOSPITAL | Age: 54
End: 2019-01-01

## 2019-01-01 ENCOUNTER — ANESTHESIA (OUTPATIENT)
Dept: GASTROENTEROLOGY | Facility: HOSPITAL | Age: 54
End: 2019-01-01

## 2019-01-01 ENCOUNTER — DOCUMENTATION (OUTPATIENT)
Dept: RADIATION ONCOLOGY | Facility: HOSPITAL | Age: 54
End: 2019-01-01

## 2019-01-01 ENCOUNTER — APPOINTMENT (OUTPATIENT)
Dept: CT IMAGING | Facility: HOSPITAL | Age: 54
End: 2019-01-01

## 2019-01-01 VITALS
HEIGHT: 65 IN | TEMPERATURE: 98.4 F | SYSTOLIC BLOOD PRESSURE: 125 MMHG | WEIGHT: 241.5 LBS | OXYGEN SATURATION: 92 % | HEART RATE: 80 BPM | RESPIRATION RATE: 16 BRPM | BODY MASS INDEX: 40.24 KG/M2 | DIASTOLIC BLOOD PRESSURE: 63 MMHG

## 2019-01-01 VITALS
RESPIRATION RATE: 16 BRPM | OXYGEN SATURATION: 92 % | TEMPERATURE: 98.4 F | DIASTOLIC BLOOD PRESSURE: 40 MMHG | SYSTOLIC BLOOD PRESSURE: 61 MMHG | BODY MASS INDEX: 29.75 KG/M2 | HEART RATE: 68 BPM | HEIGHT: 65 IN | WEIGHT: 178.57 LBS

## 2019-01-01 VITALS — TEMPERATURE: 100 F

## 2019-01-01 DIAGNOSIS — J18.9 PNEUMONIA OF RIGHT LUNG DUE TO INFECTIOUS ORGANISM, UNSPECIFIED PART OF LUNG: ICD-10-CM

## 2019-01-01 DIAGNOSIS — M84.452A PATHOLOGICAL FRACTURE, LEFT FEMUR, INITIAL ENCOUNTER FOR FRACTURE (HCC): ICD-10-CM

## 2019-01-01 DIAGNOSIS — R56.9 WITNESSED SEIZURE-LIKE ACTIVITY (HCC): Primary | ICD-10-CM

## 2019-01-01 DIAGNOSIS — R06.02 SHORTNESS OF BREATH: Primary | ICD-10-CM

## 2019-01-01 DIAGNOSIS — R91.8 LUNG MASS: Primary | ICD-10-CM

## 2019-01-01 DIAGNOSIS — N18.6 END STAGE KIDNEY DISEASE (HCC): ICD-10-CM

## 2019-01-01 LAB
ALBUMIN SERPL-MCNC: 2.9 G/DL (ref 3.5–5.2)
ALBUMIN SERPL-MCNC: 3.2 G/DL (ref 3.5–5.2)
ALBUMIN SERPL-MCNC: 3.4 G/DL (ref 3.5–5.2)
ALBUMIN SERPL-MCNC: 3.4 G/DL (ref 3.5–5.2)
ALBUMIN SERPL-MCNC: 3.5 G/DL (ref 3.5–5.2)
ALBUMIN SERPL-MCNC: 3.5 G/DL (ref 3.5–5.2)
ALBUMIN SERPL-MCNC: 3.6 G/DL (ref 3.5–5.2)
ALBUMIN SERPL-MCNC: 3.6 G/DL (ref 3.5–5.2)
ALBUMIN SERPL-MCNC: 3.7 G/DL (ref 3.5–5.2)
ALBUMIN SERPL-MCNC: 3.7 G/DL (ref 3.5–5.2)
ALBUMIN SERPL-MCNC: 3.8 G/DL (ref 3.5–5.2)
ALBUMIN SERPL-MCNC: 3.9 G/DL (ref 3.5–5.2)
ALBUMIN SERPL-MCNC: 3.9 G/DL (ref 3.5–5.2)
ALBUMIN SERPL-MCNC: 4 G/DL (ref 3.5–5.2)
ALBUMIN SERPL-MCNC: 4.2 G/DL (ref 3.5–5.2)
ALBUMIN/GLOB SERPL: 0.9 G/DL
ALBUMIN/GLOB SERPL: 1 G/DL
ALBUMIN/GLOB SERPL: 1.1 G/DL
ALP SERPL-CCNC: 153 U/L (ref 39–117)
ALP SERPL-CCNC: 154 U/L (ref 39–117)
ALP SERPL-CCNC: 216 U/L (ref 39–117)
ALP SERPL-CCNC: 251 U/L (ref 39–117)
ALP SERPL-CCNC: 293 U/L (ref 39–117)
ALP SERPL-CCNC: 331 U/L (ref 39–117)
ALT SERPL W P-5'-P-CCNC: 16 U/L (ref 1–33)
ALT SERPL W P-5'-P-CCNC: 16 U/L (ref 1–33)
ALT SERPL W P-5'-P-CCNC: 18 U/L (ref 1–33)
ALT SERPL W P-5'-P-CCNC: 21 U/L (ref 1–33)
ALT SERPL W P-5'-P-CCNC: 40 U/L (ref 1–33)
ALT SERPL W P-5'-P-CCNC: 92 U/L (ref 1–33)
ANION GAP SERPL CALCULATED.3IONS-SCNC: 14 MMOL/L (ref 5–15)
ANION GAP SERPL CALCULATED.3IONS-SCNC: 14.1 MMOL/L (ref 5–15)
ANION GAP SERPL CALCULATED.3IONS-SCNC: 15.3 MMOL/L (ref 5–15)
ANION GAP SERPL CALCULATED.3IONS-SCNC: 15.7 MMOL/L (ref 5–15)
ANION GAP SERPL CALCULATED.3IONS-SCNC: 16.5 MMOL/L (ref 5–15)
ANION GAP SERPL CALCULATED.3IONS-SCNC: 17.1 MMOL/L (ref 5–15)
ANION GAP SERPL CALCULATED.3IONS-SCNC: 17.4 MMOL/L (ref 5–15)
ANION GAP SERPL CALCULATED.3IONS-SCNC: 17.8 MMOL/L (ref 5–15)
ANION GAP SERPL CALCULATED.3IONS-SCNC: 17.8 MMOL/L (ref 5–15)
ANION GAP SERPL CALCULATED.3IONS-SCNC: 17.9 MMOL/L (ref 5–15)
ANION GAP SERPL CALCULATED.3IONS-SCNC: 18.4 MMOL/L (ref 5–15)
ANION GAP SERPL CALCULATED.3IONS-SCNC: 18.4 MMOL/L (ref 5–15)
ANION GAP SERPL CALCULATED.3IONS-SCNC: 18.6 MMOL/L (ref 5–15)
ANION GAP SERPL CALCULATED.3IONS-SCNC: 18.7 MMOL/L (ref 5–15)
ANION GAP SERPL CALCULATED.3IONS-SCNC: 19.2 MMOL/L (ref 5–15)
ANION GAP SERPL CALCULATED.3IONS-SCNC: 19.3 MMOL/L (ref 5–15)
ANION GAP SERPL CALCULATED.3IONS-SCNC: 19.8 MMOL/L (ref 5–15)
ANION GAP SERPL CALCULATED.3IONS-SCNC: 20.2 MMOL/L (ref 5–15)
ANION GAP SERPL CALCULATED.3IONS-SCNC: 20.7 MMOL/L (ref 5–15)
ANION GAP SERPL CALCULATED.3IONS-SCNC: 21 MMOL/L (ref 5–15)
ANION GAP SERPL CALCULATED.3IONS-SCNC: 21.6 MMOL/L (ref 5–15)
ANION GAP SERPL CALCULATED.3IONS-SCNC: 22.2 MMOL/L (ref 5–15)
ANION GAP SERPL CALCULATED.3IONS-SCNC: 22.3 MMOL/L (ref 5–15)
ANION GAP SERPL CALCULATED.3IONS-SCNC: 22.6 MMOL/L (ref 5–15)
ANION GAP SERPL CALCULATED.3IONS-SCNC: 22.6 MMOL/L (ref 5–15)
ANION GAP SERPL CALCULATED.3IONS-SCNC: 23.2 MMOL/L (ref 5–15)
ANION GAP SERPL CALCULATED.3IONS-SCNC: 23.2 MMOL/L (ref 5–15)
ANION GAP SERPL CALCULATED.3IONS-SCNC: 24 MMOL/L (ref 5–15)
ANION GAP SERPL CALCULATED.3IONS-SCNC: 24.1 MMOL/L (ref 5–15)
ANION GAP SERPL CALCULATED.3IONS-SCNC: 24.4 MMOL/L (ref 5–15)
ANION GAP SERPL CALCULATED.3IONS-SCNC: 24.9 MMOL/L (ref 5–15)
ANION GAP SERPL CALCULATED.3IONS-SCNC: 25.3 MMOL/L (ref 5–15)
ANION GAP SERPL CALCULATED.3IONS-SCNC: 25.3 MMOL/L (ref 5–15)
APTT PPP: 117.7 SECONDS (ref 22.7–35.4)
APTT PPP: 127.2 SECONDS (ref 22.7–35.4)
APTT PPP: 134.5 SECONDS (ref 22.7–35.4)
APTT PPP: 137.7 SECONDS (ref 22.7–35.4)
APTT PPP: 28.4 SECONDS (ref 22.7–35.4)
APTT PPP: 33.2 SECONDS (ref 22.7–35.4)
APTT PPP: 56.9 SECONDS (ref 22.7–35.4)
APTT PPP: 61.8 SECONDS (ref 22.7–35.4)
APTT PPP: 62.6 SECONDS (ref 22.7–35.4)
APTT PPP: 64.2 SECONDS (ref 22.7–35.4)
APTT PPP: 65.2 SECONDS (ref 22.7–35.4)
APTT PPP: 72.8 SECONDS (ref 22.7–35.4)
APTT PPP: 74.9 SECONDS (ref 22.7–35.4)
APTT PPP: 79.5 SECONDS (ref 22.7–35.4)
APTT PPP: 85.2 SECONDS (ref 22.7–35.4)
APTT PPP: 87.4 SECONDS (ref 22.7–35.4)
APTT PPP: 88 SECONDS (ref 22.7–35.4)
AST SERPL-CCNC: 24 U/L (ref 1–32)
AST SERPL-CCNC: 25 U/L (ref 1–32)
AST SERPL-CCNC: 26 U/L (ref 1–32)
AST SERPL-CCNC: 28 U/L (ref 1–32)
AST SERPL-CCNC: 50 U/L (ref 1–32)
AST SERPL-CCNC: 57 U/L (ref 1–32)
BACTERIA SPEC AEROBE CULT: NORMAL
BACTERIA SPEC RESP CULT: NORMAL
BASOPHILS # BLD AUTO: 0 10*3/MM3 (ref 0–0.2)
BASOPHILS # BLD AUTO: 0.01 10*3/MM3 (ref 0–0.2)
BASOPHILS # BLD AUTO: 0.02 10*3/MM3 (ref 0–0.2)
BASOPHILS # BLD AUTO: 0.04 10*3/MM3 (ref 0–0.2)
BASOPHILS # BLD AUTO: 0.04 10*3/MM3 (ref 0–0.2)
BASOPHILS # BLD AUTO: 0.05 10*3/MM3 (ref 0–0.2)
BASOPHILS # BLD AUTO: 0.05 10*3/MM3 (ref 0–0.2)
BASOPHILS NFR BLD AUTO: 0 % (ref 0–1.5)
BASOPHILS NFR BLD AUTO: 0.1 % (ref 0–1.5)
BASOPHILS NFR BLD AUTO: 0.2 % (ref 0–1.5)
BASOPHILS NFR BLD AUTO: 0.3 % (ref 0–1.5)
BASOPHILS NFR BLD AUTO: 0.3 % (ref 0–1.5)
BASOPHILS NFR BLD AUTO: 0.5 % (ref 0–1.5)
BASOPHILS NFR BLD AUTO: 0.6 % (ref 0–1.5)
BASOPHILS NFR BLD AUTO: 0.6 % (ref 0–1.5)
BILIRUB SERPL-MCNC: 0.3 MG/DL (ref 0.2–1.2)
BILIRUB SERPL-MCNC: 0.4 MG/DL (ref 0.2–1.2)
BUN BLD-MCNC: 22 MG/DL (ref 6–20)
BUN BLD-MCNC: 24 MG/DL (ref 6–20)
BUN BLD-MCNC: 25 MG/DL (ref 6–20)
BUN BLD-MCNC: 26 MG/DL (ref 6–20)
BUN BLD-MCNC: 32 MG/DL (ref 6–20)
BUN BLD-MCNC: 34 MG/DL (ref 6–20)
BUN BLD-MCNC: 35 MG/DL (ref 6–20)
BUN BLD-MCNC: 38 MG/DL (ref 6–20)
BUN BLD-MCNC: 38 MG/DL (ref 6–20)
BUN BLD-MCNC: 42 MG/DL (ref 6–20)
BUN BLD-MCNC: 44 MG/DL (ref 6–20)
BUN BLD-MCNC: 45 MG/DL (ref 6–20)
BUN BLD-MCNC: 51 MG/DL (ref 6–20)
BUN BLD-MCNC: 51 MG/DL (ref 6–20)
BUN BLD-MCNC: 54 MG/DL (ref 6–20)
BUN BLD-MCNC: 55 MG/DL (ref 6–20)
BUN BLD-MCNC: 55 MG/DL (ref 6–20)
BUN BLD-MCNC: 60 MG/DL (ref 6–20)
BUN BLD-MCNC: 60 MG/DL (ref 6–20)
BUN BLD-MCNC: 62 MG/DL (ref 6–20)
BUN BLD-MCNC: 65 MG/DL (ref 6–20)
BUN BLD-MCNC: 66 MG/DL (ref 6–20)
BUN BLD-MCNC: 67 MG/DL (ref 6–20)
BUN BLD-MCNC: 68 MG/DL (ref 6–20)
BUN BLD-MCNC: 68 MG/DL (ref 6–20)
BUN BLD-MCNC: 74 MG/DL (ref 6–20)
BUN BLD-MCNC: 76 MG/DL (ref 6–20)
BUN BLD-MCNC: 78 MG/DL (ref 6–20)
BUN BLD-MCNC: 80 MG/DL (ref 6–20)
BUN BLD-MCNC: 85 MG/DL (ref 6–20)
BUN BLD-MCNC: 85 MG/DL (ref 6–20)
BUN BLD-MCNC: 91 MG/DL (ref 6–20)
BUN BLD-MCNC: 97 MG/DL (ref 6–20)
BUN/CREAT SERPL: 11.2 (ref 7–25)
BUN/CREAT SERPL: 12.8 (ref 7–25)
BUN/CREAT SERPL: 13.1 (ref 7–25)
BUN/CREAT SERPL: 13.2 (ref 7–25)
BUN/CREAT SERPL: 13.2 (ref 7–25)
BUN/CREAT SERPL: 13.8 (ref 7–25)
BUN/CREAT SERPL: 14.2 (ref 7–25)
BUN/CREAT SERPL: 14.4 (ref 7–25)
BUN/CREAT SERPL: 15.1 (ref 7–25)
BUN/CREAT SERPL: 15.4 (ref 7–25)
BUN/CREAT SERPL: 15.4 (ref 7–25)
BUN/CREAT SERPL: 15.5 (ref 7–25)
BUN/CREAT SERPL: 16.3 (ref 7–25)
BUN/CREAT SERPL: 16.5 (ref 7–25)
BUN/CREAT SERPL: 16.6 (ref 7–25)
BUN/CREAT SERPL: 17.2 (ref 7–25)
BUN/CREAT SERPL: 17.6 (ref 7–25)
BUN/CREAT SERPL: 18.3 (ref 7–25)
BUN/CREAT SERPL: 20.4 (ref 7–25)
BUN/CREAT SERPL: 20.9 (ref 7–25)
BUN/CREAT SERPL: 22.4 (ref 7–25)
BUN/CREAT SERPL: 23.2 (ref 7–25)
BUN/CREAT SERPL: 24.3 (ref 7–25)
BUN/CREAT SERPL: 6.4 (ref 7–25)
BUN/CREAT SERPL: 6.6 (ref 7–25)
BUN/CREAT SERPL: 6.6 (ref 7–25)
BUN/CREAT SERPL: 6.8 (ref 7–25)
BUN/CREAT SERPL: 7.2 (ref 7–25)
BUN/CREAT SERPL: 7.5 (ref 7–25)
BUN/CREAT SERPL: 7.6 (ref 7–25)
BUN/CREAT SERPL: 7.9 (ref 7–25)
CALCIUM SPEC-SCNC: 10 MG/DL (ref 8.6–10.5)
CALCIUM SPEC-SCNC: 10.1 MG/DL (ref 8.6–10.5)
CALCIUM SPEC-SCNC: 10.2 MG/DL (ref 8.6–10.5)
CALCIUM SPEC-SCNC: 10.2 MG/DL (ref 8.6–10.5)
CALCIUM SPEC-SCNC: 10.3 MG/DL (ref 8.6–10.5)
CALCIUM SPEC-SCNC: 10.9 MG/DL (ref 8.6–10.5)
CALCIUM SPEC-SCNC: 8.3 MG/DL (ref 8.6–10.5)
CALCIUM SPEC-SCNC: 9.1 MG/DL (ref 8.6–10.5)
CALCIUM SPEC-SCNC: 9.1 MG/DL (ref 8.6–10.5)
CALCIUM SPEC-SCNC: 9.3 MG/DL (ref 8.6–10.5)
CALCIUM SPEC-SCNC: 9.3 MG/DL (ref 8.6–10.5)
CALCIUM SPEC-SCNC: 9.4 MG/DL (ref 8.6–10.5)
CALCIUM SPEC-SCNC: 9.4 MG/DL (ref 8.6–10.5)
CALCIUM SPEC-SCNC: 9.5 MG/DL (ref 8.6–10.5)
CALCIUM SPEC-SCNC: 9.6 MG/DL (ref 8.6–10.5)
CALCIUM SPEC-SCNC: 9.6 MG/DL (ref 8.6–10.5)
CALCIUM SPEC-SCNC: 9.7 MG/DL (ref 8.6–10.5)
CALCIUM SPEC-SCNC: 9.8 MG/DL (ref 8.6–10.5)
CALCIUM SPEC-SCNC: 9.9 MG/DL (ref 8.6–10.5)
CHLORIDE SERPL-SCNC: 100 MMOL/L (ref 98–107)
CHLORIDE SERPL-SCNC: 80 MMOL/L (ref 98–107)
CHLORIDE SERPL-SCNC: 81 MMOL/L (ref 98–107)
CHLORIDE SERPL-SCNC: 83 MMOL/L (ref 98–107)
CHLORIDE SERPL-SCNC: 83 MMOL/L (ref 98–107)
CHLORIDE SERPL-SCNC: 84 MMOL/L (ref 98–107)
CHLORIDE SERPL-SCNC: 84 MMOL/L (ref 98–107)
CHLORIDE SERPL-SCNC: 85 MMOL/L (ref 98–107)
CHLORIDE SERPL-SCNC: 86 MMOL/L (ref 98–107)
CHLORIDE SERPL-SCNC: 87 MMOL/L (ref 98–107)
CHLORIDE SERPL-SCNC: 88 MMOL/L (ref 98–107)
CHLORIDE SERPL-SCNC: 89 MMOL/L (ref 98–107)
CHLORIDE SERPL-SCNC: 90 MMOL/L (ref 98–107)
CHLORIDE SERPL-SCNC: 90 MMOL/L (ref 98–107)
CHLORIDE SERPL-SCNC: 93 MMOL/L (ref 98–107)
CHLORIDE SERPL-SCNC: 94 MMOL/L (ref 98–107)
CHLORIDE SERPL-SCNC: 95 MMOL/L (ref 98–107)
CHLORIDE SERPL-SCNC: 96 MMOL/L (ref 98–107)
CHLORIDE SERPL-SCNC: 98 MMOL/L (ref 98–107)
CO2 SERPL-SCNC: 18.7 MMOL/L (ref 22–29)
CO2 SERPL-SCNC: 18.7 MMOL/L (ref 22–29)
CO2 SERPL-SCNC: 19.8 MMOL/L (ref 22–29)
CO2 SERPL-SCNC: 20 MMOL/L (ref 22–29)
CO2 SERPL-SCNC: 20.9 MMOL/L (ref 22–29)
CO2 SERPL-SCNC: 21.4 MMOL/L (ref 22–29)
CO2 SERPL-SCNC: 21.6 MMOL/L (ref 22–29)
CO2 SERPL-SCNC: 21.8 MMOL/L (ref 22–29)
CO2 SERPL-SCNC: 21.8 MMOL/L (ref 22–29)
CO2 SERPL-SCNC: 22 MMOL/L (ref 22–29)
CO2 SERPL-SCNC: 22.1 MMOL/L (ref 22–29)
CO2 SERPL-SCNC: 22.4 MMOL/L (ref 22–29)
CO2 SERPL-SCNC: 22.4 MMOL/L (ref 22–29)
CO2 SERPL-SCNC: 22.6 MMOL/L (ref 22–29)
CO2 SERPL-SCNC: 22.8 MMOL/L (ref 22–29)
CO2 SERPL-SCNC: 23.1 MMOL/L (ref 22–29)
CO2 SERPL-SCNC: 23.3 MMOL/L (ref 22–29)
CO2 SERPL-SCNC: 23.6 MMOL/L (ref 22–29)
CO2 SERPL-SCNC: 23.7 MMOL/L (ref 22–29)
CO2 SERPL-SCNC: 24.2 MMOL/L (ref 22–29)
CO2 SERPL-SCNC: 24.7 MMOL/L (ref 22–29)
CO2 SERPL-SCNC: 24.8 MMOL/L (ref 22–29)
CO2 SERPL-SCNC: 24.9 MMOL/L (ref 22–29)
CO2 SERPL-SCNC: 25.2 MMOL/L (ref 22–29)
CO2 SERPL-SCNC: 25.2 MMOL/L (ref 22–29)
CO2 SERPL-SCNC: 25.3 MMOL/L (ref 22–29)
CO2 SERPL-SCNC: 25.3 MMOL/L (ref 22–29)
CO2 SERPL-SCNC: 25.4 MMOL/L (ref 22–29)
CO2 SERPL-SCNC: 26 MMOL/L (ref 22–29)
CO2 SERPL-SCNC: 26.5 MMOL/L (ref 22–29)
CO2 SERPL-SCNC: 26.7 MMOL/L (ref 22–29)
CO2 SERPL-SCNC: 27.6 MMOL/L (ref 22–29)
CO2 SERPL-SCNC: 28.9 MMOL/L (ref 22–29)
CREAT BLD-MCNC: 2.25 MG/DL (ref 0.57–1)
CREAT BLD-MCNC: 2.66 MG/DL (ref 0.57–1)
CREAT BLD-MCNC: 2.87 MG/DL (ref 0.57–1)
CREAT BLD-MCNC: 2.95 MG/DL (ref 0.57–1)
CREAT BLD-MCNC: 3.01 MG/DL (ref 0.57–1)
CREAT BLD-MCNC: 3.14 MG/DL (ref 0.57–1)
CREAT BLD-MCNC: 3.3 MG/DL (ref 0.57–1)
CREAT BLD-MCNC: 3.34 MG/DL (ref 0.57–1)
CREAT BLD-MCNC: 3.43 MG/DL (ref 0.57–1)
CREAT BLD-MCNC: 3.52 MG/DL (ref 0.57–1)
CREAT BLD-MCNC: 3.63 MG/DL (ref 0.57–1)
CREAT BLD-MCNC: 3.66 MG/DL (ref 0.57–1)
CREAT BLD-MCNC: 3.73 MG/DL (ref 0.57–1)
CREAT BLD-MCNC: 3.82 MG/DL (ref 0.57–1)
CREAT BLD-MCNC: 3.85 MG/DL (ref 0.57–1)
CREAT BLD-MCNC: 3.91 MG/DL (ref 0.57–1)
CREAT BLD-MCNC: 3.92 MG/DL (ref 0.57–1)
CREAT BLD-MCNC: 3.92 MG/DL (ref 0.57–1)
CREAT BLD-MCNC: 3.99 MG/DL (ref 0.57–1)
CREAT BLD-MCNC: 4.35 MG/DL (ref 0.57–1)
CREAT BLD-MCNC: 4.51 MG/DL (ref 0.57–1)
CREAT BLD-MCNC: 4.54 MG/DL (ref 0.57–1)
CREAT BLD-MCNC: 4.68 MG/DL (ref 0.57–1)
CREAT BLD-MCNC: 4.7 MG/DL (ref 0.57–1)
CREAT BLD-MCNC: 4.73 MG/DL (ref 0.57–1)
CREAT BLD-MCNC: 4.8 MG/DL (ref 0.57–1)
CREAT BLD-MCNC: 4.84 MG/DL (ref 0.57–1)
CREAT BLD-MCNC: 4.92 MG/DL (ref 0.57–1)
CREAT BLD-MCNC: 5.01 MG/DL (ref 0.57–1)
CREAT BLD-MCNC: 5.25 MG/DL (ref 0.57–1)
CREAT BLD-MCNC: 5.35 MG/DL (ref 0.57–1)
CREAT BLD-MCNC: 5.51 MG/DL (ref 0.57–1)
CREAT BLD-MCNC: 5.55 MG/DL (ref 0.57–1)
CYTO UR: NORMAL
CYTO UR: NORMAL
D-LACTATE SERPL-SCNC: 1.3 MMOL/L (ref 0.5–2)
DEPRECATED RDW RBC AUTO: 52.6 FL (ref 37–54)
DEPRECATED RDW RBC AUTO: 53.5 FL (ref 37–54)
DEPRECATED RDW RBC AUTO: 54 FL (ref 37–54)
DEPRECATED RDW RBC AUTO: 54 FL (ref 37–54)
DEPRECATED RDW RBC AUTO: 54.2 FL (ref 37–54)
DEPRECATED RDW RBC AUTO: 54.3 FL (ref 37–54)
DEPRECATED RDW RBC AUTO: 54.6 FL (ref 37–54)
DEPRECATED RDW RBC AUTO: 54.8 FL (ref 37–54)
DEPRECATED RDW RBC AUTO: 54.8 FL (ref 37–54)
DEPRECATED RDW RBC AUTO: 55.1 FL (ref 37–54)
DEPRECATED RDW RBC AUTO: 55.1 FL (ref 37–54)
DEPRECATED RDW RBC AUTO: 55.2 FL (ref 37–54)
DEPRECATED RDW RBC AUTO: 55.4 FL (ref 37–54)
DEPRECATED RDW RBC AUTO: 55.5 FL (ref 37–54)
DEPRECATED RDW RBC AUTO: 55.6 FL (ref 37–54)
DEPRECATED RDW RBC AUTO: 55.6 FL (ref 37–54)
DEPRECATED RDW RBC AUTO: 55.8 FL (ref 37–54)
DEPRECATED RDW RBC AUTO: 56.1 FL (ref 37–54)
DEPRECATED RDW RBC AUTO: 56.6 FL (ref 37–54)
DEPRECATED RDW RBC AUTO: 56.7 FL (ref 37–54)
DEPRECATED RDW RBC AUTO: 57.3 FL (ref 37–54)
DEPRECATED RDW RBC AUTO: 57.5 FL (ref 37–54)
DEPRECATED RDW RBC AUTO: 58.1 FL (ref 37–54)
DEPRECATED RDW RBC AUTO: 58.2 FL (ref 37–54)
DEPRECATED RDW RBC AUTO: 58.7 FL (ref 37–54)
DEPRECATED RDW RBC AUTO: 58.8 FL (ref 37–54)
DEPRECATED RDW RBC AUTO: 59.3 FL (ref 37–54)
DEPRECATED RDW RBC AUTO: 59.8 FL (ref 37–54)
DEPRECATED RDW RBC AUTO: 60.4 FL (ref 37–54)
DEPRECATED RDW RBC AUTO: 61 FL (ref 37–54)
DEPRECATED RDW RBC AUTO: 61.5 FL (ref 37–54)
DEPRECATED RDW RBC AUTO: 65.7 FL (ref 37–54)
DEPRECATED RDW RBC AUTO: 66.9 FL (ref 37–54)
DEPRECATED RDW RBC AUTO: 67.3 FL (ref 37–54)
EOSINOPHIL # BLD AUTO: 0 10*3/MM3 (ref 0–0.4)
EOSINOPHIL # BLD AUTO: 0.02 10*3/MM3 (ref 0–0.4)
EOSINOPHIL # BLD AUTO: 0.02 10*3/MM3 (ref 0–0.4)
EOSINOPHIL # BLD AUTO: 0.03 10*3/MM3 (ref 0–0.4)
EOSINOPHIL # BLD AUTO: 0.03 10*3/MM3 (ref 0–0.4)
EOSINOPHIL # BLD AUTO: 0.04 10*3/MM3 (ref 0–0.4)
EOSINOPHIL # BLD AUTO: 0.06 10*3/MM3 (ref 0–0.4)
EOSINOPHIL # BLD AUTO: 0.13 10*3/MM3 (ref 0–0.4)
EOSINOPHIL # BLD AUTO: 0.21 10*3/MM3 (ref 0–0.4)
EOSINOPHIL # BLD AUTO: 0.23 10*3/MM3 (ref 0–0.4)
EOSINOPHIL NFR BLD AUTO: 0 % (ref 0.3–6.2)
EOSINOPHIL NFR BLD AUTO: 0.2 % (ref 0.3–6.2)
EOSINOPHIL NFR BLD AUTO: 0.2 % (ref 0.3–6.2)
EOSINOPHIL NFR BLD AUTO: 0.3 % (ref 0.3–6.2)
EOSINOPHIL NFR BLD AUTO: 0.4 % (ref 0.3–6.2)
EOSINOPHIL NFR BLD AUTO: 0.4 % (ref 0.3–6.2)
EOSINOPHIL NFR BLD AUTO: 0.9 % (ref 0.3–6.2)
EOSINOPHIL NFR BLD AUTO: 1.4 % (ref 0.3–6.2)
EOSINOPHIL NFR BLD AUTO: 2.4 % (ref 0.3–6.2)
EOSINOPHIL NFR BLD AUTO: 3.4 % (ref 0.3–6.2)
ERYTHROCYTE [DISTWIDTH] IN BLOOD BY AUTOMATED COUNT: 16.8 % (ref 12.3–15.4)
ERYTHROCYTE [DISTWIDTH] IN BLOOD BY AUTOMATED COUNT: 16.8 % (ref 12.3–15.4)
ERYTHROCYTE [DISTWIDTH] IN BLOOD BY AUTOMATED COUNT: 17 % (ref 12.3–15.4)
ERYTHROCYTE [DISTWIDTH] IN BLOOD BY AUTOMATED COUNT: 17 % (ref 12.3–15.4)
ERYTHROCYTE [DISTWIDTH] IN BLOOD BY AUTOMATED COUNT: 17.1 % (ref 12.3–15.4)
ERYTHROCYTE [DISTWIDTH] IN BLOOD BY AUTOMATED COUNT: 17.2 % (ref 12.3–15.4)
ERYTHROCYTE [DISTWIDTH] IN BLOOD BY AUTOMATED COUNT: 17.3 % (ref 12.3–15.4)
ERYTHROCYTE [DISTWIDTH] IN BLOOD BY AUTOMATED COUNT: 17.5 % (ref 12.3–15.4)
ERYTHROCYTE [DISTWIDTH] IN BLOOD BY AUTOMATED COUNT: 17.6 % (ref 12.3–15.4)
ERYTHROCYTE [DISTWIDTH] IN BLOOD BY AUTOMATED COUNT: 17.7 % (ref 12.3–15.4)
ERYTHROCYTE [DISTWIDTH] IN BLOOD BY AUTOMATED COUNT: 17.8 % (ref 12.3–15.4)
ERYTHROCYTE [DISTWIDTH] IN BLOOD BY AUTOMATED COUNT: 17.8 % (ref 12.3–15.4)
ERYTHROCYTE [DISTWIDTH] IN BLOOD BY AUTOMATED COUNT: 17.9 % (ref 12.3–15.4)
ERYTHROCYTE [DISTWIDTH] IN BLOOD BY AUTOMATED COUNT: 18.1 % (ref 12.3–15.4)
ERYTHROCYTE [DISTWIDTH] IN BLOOD BY AUTOMATED COUNT: 18.2 % (ref 12.3–15.4)
ERYTHROCYTE [DISTWIDTH] IN BLOOD BY AUTOMATED COUNT: 18.3 % (ref 12.3–15.4)
ERYTHROCYTE [DISTWIDTH] IN BLOOD BY AUTOMATED COUNT: 18.5 % (ref 12.3–15.4)
ERYTHROCYTE [DISTWIDTH] IN BLOOD BY AUTOMATED COUNT: 18.5 % (ref 12.3–15.4)
ERYTHROCYTE [DISTWIDTH] IN BLOOD BY AUTOMATED COUNT: 19 % (ref 12.3–15.4)
FUNGUS WND CULT: NORMAL
GFR SERPL CREATININE-BSD FRML MDRD: 10 ML/MIN/1.73
GFR SERPL CREATININE-BSD FRML MDRD: 11 ML/MIN/1.73
GFR SERPL CREATININE-BSD FRML MDRD: 12 ML/MIN/1.73
GFR SERPL CREATININE-BSD FRML MDRD: 13 ML/MIN/1.73
GFR SERPL CREATININE-BSD FRML MDRD: 14 ML/MIN/1.73
GFR SERPL CREATININE-BSD FRML MDRD: 14 ML/MIN/1.73
GFR SERPL CREATININE-BSD FRML MDRD: 15 ML/MIN/1.73
GFR SERPL CREATININE-BSD FRML MDRD: 15 ML/MIN/1.73
GFR SERPL CREATININE-BSD FRML MDRD: 16 ML/MIN/1.73
GFR SERPL CREATININE-BSD FRML MDRD: 16 ML/MIN/1.73
GFR SERPL CREATININE-BSD FRML MDRD: 17 ML/MIN/1.73
GFR SERPL CREATININE-BSD FRML MDRD: 19 ML/MIN/1.73
GFR SERPL CREATININE-BSD FRML MDRD: 22 ML/MIN/1.73
GFR SERPL CREATININE-BSD FRML MDRD: 8 ML/MIN/1.73
GFR SERPL CREATININE-BSD FRML MDRD: 9 ML/MIN/1.73
GFR SERPL CREATININE-BSD FRML MDRD: ABNORMAL ML/MIN/{1.73_M2}
GLOBULIN UR ELPH-MCNC: 3.2 GM/DL
GLOBULIN UR ELPH-MCNC: 3.6 GM/DL
GLOBULIN UR ELPH-MCNC: 3.6 GM/DL
GLOBULIN UR ELPH-MCNC: 3.9 GM/DL
GLOBULIN UR ELPH-MCNC: 3.9 GM/DL
GLOBULIN UR ELPH-MCNC: 4 GM/DL
GLUCOSE BLD-MCNC: 101 MG/DL (ref 65–99)
GLUCOSE BLD-MCNC: 104 MG/DL (ref 65–99)
GLUCOSE BLD-MCNC: 104 MG/DL (ref 65–99)
GLUCOSE BLD-MCNC: 109 MG/DL (ref 65–99)
GLUCOSE BLD-MCNC: 113 MG/DL (ref 65–99)
GLUCOSE BLD-MCNC: 116 MG/DL (ref 65–99)
GLUCOSE BLD-MCNC: 125 MG/DL (ref 65–99)
GLUCOSE BLD-MCNC: 140 MG/DL (ref 65–99)
GLUCOSE BLD-MCNC: 144 MG/DL (ref 65–99)
GLUCOSE BLD-MCNC: 145 MG/DL (ref 65–99)
GLUCOSE BLD-MCNC: 150 MG/DL (ref 65–99)
GLUCOSE BLD-MCNC: 151 MG/DL (ref 65–99)
GLUCOSE BLD-MCNC: 153 MG/DL (ref 65–99)
GLUCOSE BLD-MCNC: 153 MG/DL (ref 65–99)
GLUCOSE BLD-MCNC: 157 MG/DL (ref 65–99)
GLUCOSE BLD-MCNC: 158 MG/DL (ref 65–99)
GLUCOSE BLD-MCNC: 169 MG/DL (ref 65–99)
GLUCOSE BLD-MCNC: 174 MG/DL (ref 65–99)
GLUCOSE BLD-MCNC: 181 MG/DL (ref 65–99)
GLUCOSE BLD-MCNC: 183 MG/DL (ref 65–99)
GLUCOSE BLD-MCNC: 187 MG/DL (ref 65–99)
GLUCOSE BLD-MCNC: 189 MG/DL (ref 65–99)
GLUCOSE BLD-MCNC: 221 MG/DL (ref 65–99)
GLUCOSE BLD-MCNC: 235 MG/DL (ref 65–99)
GLUCOSE BLD-MCNC: 249 MG/DL (ref 65–99)
GLUCOSE BLD-MCNC: 254 MG/DL (ref 65–99)
GLUCOSE BLD-MCNC: 73 MG/DL (ref 65–99)
GLUCOSE BLD-MCNC: 74 MG/DL (ref 65–99)
GLUCOSE BLD-MCNC: 77 MG/DL (ref 65–99)
GLUCOSE BLD-MCNC: 85 MG/DL (ref 65–99)
GLUCOSE BLD-MCNC: 92 MG/DL (ref 65–99)
GLUCOSE BLDC GLUCOMTR-MCNC: 103 MG/DL (ref 70–130)
GLUCOSE BLDC GLUCOMTR-MCNC: 110 MG/DL (ref 70–130)
GLUCOSE BLDC GLUCOMTR-MCNC: 113 MG/DL (ref 70–130)
GLUCOSE BLDC GLUCOMTR-MCNC: 114 MG/DL (ref 70–130)
GLUCOSE BLDC GLUCOMTR-MCNC: 116 MG/DL (ref 70–130)
GLUCOSE BLDC GLUCOMTR-MCNC: 118 MG/DL (ref 70–130)
GLUCOSE BLDC GLUCOMTR-MCNC: 118 MG/DL (ref 70–130)
GLUCOSE BLDC GLUCOMTR-MCNC: 120 MG/DL (ref 70–130)
GLUCOSE BLDC GLUCOMTR-MCNC: 122 MG/DL (ref 70–130)
GLUCOSE BLDC GLUCOMTR-MCNC: 122 MG/DL (ref 70–130)
GLUCOSE BLDC GLUCOMTR-MCNC: 124 MG/DL (ref 70–130)
GLUCOSE BLDC GLUCOMTR-MCNC: 124 MG/DL (ref 70–130)
GLUCOSE BLDC GLUCOMTR-MCNC: 125 MG/DL (ref 70–130)
GLUCOSE BLDC GLUCOMTR-MCNC: 125 MG/DL (ref 70–130)
GLUCOSE BLDC GLUCOMTR-MCNC: 128 MG/DL (ref 70–130)
GLUCOSE BLDC GLUCOMTR-MCNC: 129 MG/DL (ref 70–130)
GLUCOSE BLDC GLUCOMTR-MCNC: 129 MG/DL (ref 70–130)
GLUCOSE BLDC GLUCOMTR-MCNC: 130 MG/DL (ref 70–130)
GLUCOSE BLDC GLUCOMTR-MCNC: 131 MG/DL (ref 70–130)
GLUCOSE BLDC GLUCOMTR-MCNC: 131 MG/DL (ref 70–130)
GLUCOSE BLDC GLUCOMTR-MCNC: 132 MG/DL (ref 70–130)
GLUCOSE BLDC GLUCOMTR-MCNC: 133 MG/DL (ref 70–130)
GLUCOSE BLDC GLUCOMTR-MCNC: 133 MG/DL (ref 70–130)
GLUCOSE BLDC GLUCOMTR-MCNC: 134 MG/DL (ref 70–130)
GLUCOSE BLDC GLUCOMTR-MCNC: 135 MG/DL (ref 70–130)
GLUCOSE BLDC GLUCOMTR-MCNC: 135 MG/DL (ref 70–130)
GLUCOSE BLDC GLUCOMTR-MCNC: 137 MG/DL (ref 70–130)
GLUCOSE BLDC GLUCOMTR-MCNC: 138 MG/DL (ref 70–130)
GLUCOSE BLDC GLUCOMTR-MCNC: 138 MG/DL (ref 70–130)
GLUCOSE BLDC GLUCOMTR-MCNC: 140 MG/DL (ref 70–130)
GLUCOSE BLDC GLUCOMTR-MCNC: 141 MG/DL (ref 70–130)
GLUCOSE BLDC GLUCOMTR-MCNC: 143 MG/DL (ref 70–130)
GLUCOSE BLDC GLUCOMTR-MCNC: 144 MG/DL (ref 70–130)
GLUCOSE BLDC GLUCOMTR-MCNC: 147 MG/DL (ref 70–130)
GLUCOSE BLDC GLUCOMTR-MCNC: 147 MG/DL (ref 70–130)
GLUCOSE BLDC GLUCOMTR-MCNC: 148 MG/DL (ref 70–130)
GLUCOSE BLDC GLUCOMTR-MCNC: 148 MG/DL (ref 70–130)
GLUCOSE BLDC GLUCOMTR-MCNC: 149 MG/DL (ref 70–130)
GLUCOSE BLDC GLUCOMTR-MCNC: 150 MG/DL (ref 70–130)
GLUCOSE BLDC GLUCOMTR-MCNC: 152 MG/DL (ref 70–130)
GLUCOSE BLDC GLUCOMTR-MCNC: 152 MG/DL (ref 70–130)
GLUCOSE BLDC GLUCOMTR-MCNC: 153 MG/DL (ref 70–130)
GLUCOSE BLDC GLUCOMTR-MCNC: 153 MG/DL (ref 70–130)
GLUCOSE BLDC GLUCOMTR-MCNC: 157 MG/DL (ref 70–130)
GLUCOSE BLDC GLUCOMTR-MCNC: 159 MG/DL (ref 70–130)
GLUCOSE BLDC GLUCOMTR-MCNC: 160 MG/DL (ref 70–130)
GLUCOSE BLDC GLUCOMTR-MCNC: 160 MG/DL (ref 70–130)
GLUCOSE BLDC GLUCOMTR-MCNC: 161 MG/DL (ref 70–130)
GLUCOSE BLDC GLUCOMTR-MCNC: 161 MG/DL (ref 70–130)
GLUCOSE BLDC GLUCOMTR-MCNC: 162 MG/DL (ref 70–130)
GLUCOSE BLDC GLUCOMTR-MCNC: 162 MG/DL (ref 70–130)
GLUCOSE BLDC GLUCOMTR-MCNC: 164 MG/DL (ref 70–130)
GLUCOSE BLDC GLUCOMTR-MCNC: 165 MG/DL (ref 70–130)
GLUCOSE BLDC GLUCOMTR-MCNC: 166 MG/DL (ref 70–130)
GLUCOSE BLDC GLUCOMTR-MCNC: 168 MG/DL (ref 70–130)
GLUCOSE BLDC GLUCOMTR-MCNC: 169 MG/DL (ref 70–130)
GLUCOSE BLDC GLUCOMTR-MCNC: 170 MG/DL (ref 70–130)
GLUCOSE BLDC GLUCOMTR-MCNC: 171 MG/DL (ref 70–130)
GLUCOSE BLDC GLUCOMTR-MCNC: 171 MG/DL (ref 70–130)
GLUCOSE BLDC GLUCOMTR-MCNC: 172 MG/DL (ref 70–130)
GLUCOSE BLDC GLUCOMTR-MCNC: 173 MG/DL (ref 70–130)
GLUCOSE BLDC GLUCOMTR-MCNC: 173 MG/DL (ref 70–130)
GLUCOSE BLDC GLUCOMTR-MCNC: 174 MG/DL (ref 70–130)
GLUCOSE BLDC GLUCOMTR-MCNC: 174 MG/DL (ref 70–130)
GLUCOSE BLDC GLUCOMTR-MCNC: 175 MG/DL (ref 70–130)
GLUCOSE BLDC GLUCOMTR-MCNC: 175 MG/DL (ref 70–130)
GLUCOSE BLDC GLUCOMTR-MCNC: 177 MG/DL (ref 70–130)
GLUCOSE BLDC GLUCOMTR-MCNC: 177 MG/DL (ref 70–130)
GLUCOSE BLDC GLUCOMTR-MCNC: 178 MG/DL (ref 70–130)
GLUCOSE BLDC GLUCOMTR-MCNC: 179 MG/DL (ref 70–130)
GLUCOSE BLDC GLUCOMTR-MCNC: 180 MG/DL (ref 70–130)
GLUCOSE BLDC GLUCOMTR-MCNC: 180 MG/DL (ref 70–130)
GLUCOSE BLDC GLUCOMTR-MCNC: 182 MG/DL (ref 70–130)
GLUCOSE BLDC GLUCOMTR-MCNC: 183 MG/DL (ref 70–130)
GLUCOSE BLDC GLUCOMTR-MCNC: 185 MG/DL (ref 70–130)
GLUCOSE BLDC GLUCOMTR-MCNC: 187 MG/DL (ref 70–130)
GLUCOSE BLDC GLUCOMTR-MCNC: 189 MG/DL (ref 70–130)
GLUCOSE BLDC GLUCOMTR-MCNC: 193 MG/DL (ref 70–130)
GLUCOSE BLDC GLUCOMTR-MCNC: 193 MG/DL (ref 70–130)
GLUCOSE BLDC GLUCOMTR-MCNC: 194 MG/DL (ref 70–130)
GLUCOSE BLDC GLUCOMTR-MCNC: 200 MG/DL (ref 70–130)
GLUCOSE BLDC GLUCOMTR-MCNC: 202 MG/DL (ref 70–130)
GLUCOSE BLDC GLUCOMTR-MCNC: 206 MG/DL (ref 70–130)
GLUCOSE BLDC GLUCOMTR-MCNC: 206 MG/DL (ref 70–130)
GLUCOSE BLDC GLUCOMTR-MCNC: 208 MG/DL (ref 70–130)
GLUCOSE BLDC GLUCOMTR-MCNC: 208 MG/DL (ref 70–130)
GLUCOSE BLDC GLUCOMTR-MCNC: 210 MG/DL (ref 70–130)
GLUCOSE BLDC GLUCOMTR-MCNC: 212 MG/DL (ref 70–130)
GLUCOSE BLDC GLUCOMTR-MCNC: 215 MG/DL (ref 70–130)
GLUCOSE BLDC GLUCOMTR-MCNC: 216 MG/DL (ref 70–130)
GLUCOSE BLDC GLUCOMTR-MCNC: 218 MG/DL (ref 70–130)
GLUCOSE BLDC GLUCOMTR-MCNC: 221 MG/DL (ref 70–130)
GLUCOSE BLDC GLUCOMTR-MCNC: 223 MG/DL (ref 70–130)
GLUCOSE BLDC GLUCOMTR-MCNC: 233 MG/DL (ref 70–130)
GLUCOSE BLDC GLUCOMTR-MCNC: 235 MG/DL (ref 70–130)
GLUCOSE BLDC GLUCOMTR-MCNC: 237 MG/DL (ref 70–130)
GLUCOSE BLDC GLUCOMTR-MCNC: 242 MG/DL (ref 70–130)
GLUCOSE BLDC GLUCOMTR-MCNC: 244 MG/DL (ref 70–130)
GLUCOSE BLDC GLUCOMTR-MCNC: 244 MG/DL (ref 70–130)
GLUCOSE BLDC GLUCOMTR-MCNC: 248 MG/DL (ref 70–130)
GLUCOSE BLDC GLUCOMTR-MCNC: 251 MG/DL (ref 70–130)
GLUCOSE BLDC GLUCOMTR-MCNC: 259 MG/DL (ref 70–130)
GLUCOSE BLDC GLUCOMTR-MCNC: 266 MG/DL (ref 70–130)
GLUCOSE BLDC GLUCOMTR-MCNC: 267 MG/DL (ref 70–130)
GLUCOSE BLDC GLUCOMTR-MCNC: 274 MG/DL (ref 70–130)
GLUCOSE BLDC GLUCOMTR-MCNC: 282 MG/DL (ref 70–130)
GLUCOSE BLDC GLUCOMTR-MCNC: 284 MG/DL (ref 70–130)
GLUCOSE BLDC GLUCOMTR-MCNC: 285 MG/DL (ref 70–130)
GLUCOSE BLDC GLUCOMTR-MCNC: 289 MG/DL (ref 70–130)
GLUCOSE BLDC GLUCOMTR-MCNC: 313 MG/DL (ref 70–130)
GLUCOSE BLDC GLUCOMTR-MCNC: 368 MG/DL (ref 70–130)
GLUCOSE BLDC GLUCOMTR-MCNC: 69 MG/DL (ref 70–130)
GLUCOSE BLDC GLUCOMTR-MCNC: 83 MG/DL (ref 70–130)
GLUCOSE BLDC GLUCOMTR-MCNC: 84 MG/DL (ref 70–130)
GLUCOSE BLDC GLUCOMTR-MCNC: 90 MG/DL (ref 70–130)
GLUCOSE BLDC GLUCOMTR-MCNC: 94 MG/DL (ref 70–130)
GLUCOSE BLDC GLUCOMTR-MCNC: 96 MG/DL (ref 70–130)
GLUCOSE BLDC GLUCOMTR-MCNC: 98 MG/DL (ref 70–130)
GLUCOSE BLDC GLUCOMTR-MCNC: 98 MG/DL (ref 70–130)
GLUCOSE BLDC GLUCOMTR-MCNC: >599 MG/DL (ref 70–130)
GRAM STN SPEC: NORMAL
HBA1C MFR BLD: 5.5 % (ref 4.8–5.6)
HCT VFR BLD AUTO: 25.4 % (ref 34–46.6)
HCT VFR BLD AUTO: 25.8 % (ref 34–46.6)
HCT VFR BLD AUTO: 26.7 % (ref 34–46.6)
HCT VFR BLD AUTO: 27.2 % (ref 34–46.6)
HCT VFR BLD AUTO: 27.6 % (ref 34–46.6)
HCT VFR BLD AUTO: 27.9 % (ref 34–46.6)
HCT VFR BLD AUTO: 28.4 % (ref 34–46.6)
HCT VFR BLD AUTO: 28.4 % (ref 34–46.6)
HCT VFR BLD AUTO: 29.2 % (ref 34–46.6)
HCT VFR BLD AUTO: 29.2 % (ref 34–46.6)
HCT VFR BLD AUTO: 30.2 % (ref 34–46.6)
HCT VFR BLD AUTO: 32.4 % (ref 34–46.6)
HCT VFR BLD AUTO: 32.4 % (ref 34–46.6)
HCT VFR BLD AUTO: 34.1 % (ref 34–46.6)
HCT VFR BLD AUTO: 34.5 % (ref 34–46.6)
HCT VFR BLD AUTO: 35.4 % (ref 34–46.6)
HCT VFR BLD AUTO: 35.6 % (ref 34–46.6)
HCT VFR BLD AUTO: 36 % (ref 34–46.6)
HCT VFR BLD AUTO: 36.3 % (ref 34–46.6)
HCT VFR BLD AUTO: 36.6 % (ref 34–46.6)
HCT VFR BLD AUTO: 36.7 % (ref 34–46.6)
HCT VFR BLD AUTO: 37.4 % (ref 34–46.6)
HCT VFR BLD AUTO: 37.6 % (ref 34–46.6)
HCT VFR BLD AUTO: 38.2 % (ref 34–46.6)
HCT VFR BLD AUTO: 38.3 % (ref 34–46.6)
HCT VFR BLD AUTO: 38.7 % (ref 34–46.6)
HCT VFR BLD AUTO: 38.8 % (ref 34–46.6)
HCT VFR BLD AUTO: 38.9 % (ref 34–46.6)
HCT VFR BLD AUTO: 39 % (ref 34–46.6)
HCT VFR BLD AUTO: 39.2 % (ref 34–46.6)
HCT VFR BLD AUTO: 41.5 % (ref 34–46.6)
HCT VFR BLD AUTO: 41.9 % (ref 34–46.6)
HGB BLD-MCNC: 10.3 G/DL (ref 12–15.9)
HGB BLD-MCNC: 10.4 G/DL (ref 12–15.9)
HGB BLD-MCNC: 10.4 G/DL (ref 12–15.9)
HGB BLD-MCNC: 10.6 G/DL (ref 12–15.9)
HGB BLD-MCNC: 11 G/DL (ref 12–15.9)
HGB BLD-MCNC: 11 G/DL (ref 12–15.9)
HGB BLD-MCNC: 11.2 G/DL (ref 12–15.9)
HGB BLD-MCNC: 11.3 G/DL (ref 12–15.9)
HGB BLD-MCNC: 11.5 G/DL (ref 12–15.9)
HGB BLD-MCNC: 11.6 G/DL (ref 12–15.9)
HGB BLD-MCNC: 11.7 G/DL (ref 12–15.9)
HGB BLD-MCNC: 11.8 G/DL (ref 12–15.9)
HGB BLD-MCNC: 11.9 G/DL (ref 12–15.9)
HGB BLD-MCNC: 12.1 G/DL (ref 12–15.9)
HGB BLD-MCNC: 12.2 G/DL (ref 12–15.9)
HGB BLD-MCNC: 12.3 G/DL (ref 12–15.9)
HGB BLD-MCNC: 12.4 G/DL (ref 12–15.9)
HGB BLD-MCNC: 7.7 G/DL (ref 12–15.9)
HGB BLD-MCNC: 7.9 G/DL (ref 12–15.9)
HGB BLD-MCNC: 8.2 G/DL (ref 12–15.9)
HGB BLD-MCNC: 8.4 G/DL (ref 12–15.9)
HGB BLD-MCNC: 8.4 G/DL (ref 12–15.9)
HGB BLD-MCNC: 8.5 G/DL (ref 12–15.9)
HGB BLD-MCNC: 8.5 G/DL (ref 12–15.9)
HGB BLD-MCNC: 8.6 G/DL (ref 12–15.9)
HGB BLD-MCNC: 8.8 G/DL (ref 12–15.9)
HGB BLD-MCNC: 8.8 G/DL (ref 12–15.9)
HGB BLD-MCNC: 9 G/DL (ref 12–15.9)
HGB BLD-MCNC: 9 G/DL (ref 12–15.9)
HGB BLD-MCNC: 9.4 G/DL (ref 12–15.9)
HGB BLD-MCNC: 9.9 G/DL (ref 12–15.9)
HGB BLD-MCNC: 9.9 G/DL (ref 12–15.9)
IMM GRANULOCYTES # BLD AUTO: 0.02 10*3/MM3 (ref 0–0.05)
IMM GRANULOCYTES # BLD AUTO: 0.04 10*3/MM3 (ref 0–0.05)
IMM GRANULOCYTES # BLD AUTO: 0.04 10*3/MM3 (ref 0–0.05)
IMM GRANULOCYTES # BLD AUTO: 0.05 10*3/MM3 (ref 0–0.05)
IMM GRANULOCYTES # BLD AUTO: 0.05 10*3/MM3 (ref 0–0.05)
IMM GRANULOCYTES # BLD AUTO: 0.06 10*3/MM3 (ref 0–0.05)
IMM GRANULOCYTES # BLD AUTO: 0.06 10*3/MM3 (ref 0–0.05)
IMM GRANULOCYTES # BLD AUTO: 0.07 10*3/MM3 (ref 0–0.05)
IMM GRANULOCYTES # BLD AUTO: 0.09 10*3/MM3 (ref 0–0.05)
IMM GRANULOCYTES # BLD AUTO: 0.11 10*3/MM3 (ref 0–0.05)
IMM GRANULOCYTES # BLD AUTO: 0.13 10*3/MM3 (ref 0–0.05)
IMM GRANULOCYTES # BLD AUTO: 0.14 10*3/MM3 (ref 0–0.05)
IMM GRANULOCYTES NFR BLD AUTO: 0.3 % (ref 0–0.5)
IMM GRANULOCYTES NFR BLD AUTO: 0.4 % (ref 0–0.5)
IMM GRANULOCYTES NFR BLD AUTO: 0.6 % (ref 0–0.5)
IMM GRANULOCYTES NFR BLD AUTO: 0.6 % (ref 0–0.5)
IMM GRANULOCYTES NFR BLD AUTO: 0.8 % (ref 0–0.5)
IMM GRANULOCYTES NFR BLD AUTO: 0.9 % (ref 0–0.5)
IMM GRANULOCYTES NFR BLD AUTO: 1 % (ref 0–0.5)
IMM GRANULOCYTES NFR BLD AUTO: 1.1 % (ref 0–0.5)
IMM GRANULOCYTES NFR BLD AUTO: 1.8 % (ref 0–0.5)
IMM GRANULOCYTES NFR BLD AUTO: 2.8 % (ref 0–0.5)
INR PPP: 1.07 (ref 0.9–1.1)
INR PPP: 1.08 (ref 0.9–1.1)
INR PPP: 1.26 (ref 0.9–1.1)
INR PPP: 1.92 (ref 0.9–1.1)
INR PPP: 2.56 (ref 0.9–1.1)
INR PPP: 2.84 (ref 0.9–1.1)
INR PPP: 3.66 (ref 0.9–1.1)
INR PPP: 3.88 (ref 0.9–1.1)
INR PPP: 3.95 (ref 0.9–1.1)
INR PPP: 4.54 (ref 0.9–1.1)
INR PPP: 4.59 (ref 0.9–1.1)
INR PPP: 4.61 (ref 0.9–1.1)
INR PPP: 4.7 (ref 0.9–1.1)
INR PPP: 5.25 (ref 0.9–1.1)
LAB AP CASE REPORT: NORMAL
LAB AP CLINICAL INFORMATION: NORMAL
LAB AP DIAGNOSIS COMMENT: NORMAL
LAB AP NON-GYN SPECIMEN ADEQUACY: NORMAL
LAB AP SPECIAL STAINS: NORMAL
LYMPHOCYTES # BLD AUTO: 0.08 10*3/MM3 (ref 0.7–3.1)
LYMPHOCYTES # BLD AUTO: 0.14 10*3/MM3 (ref 0.7–3.1)
LYMPHOCYTES # BLD AUTO: 0.15 10*3/MM3 (ref 0.7–3.1)
LYMPHOCYTES # BLD AUTO: 0.18 10*3/MM3 (ref 0.7–3.1)
LYMPHOCYTES # BLD AUTO: 0.19 10*3/MM3 (ref 0.7–3.1)
LYMPHOCYTES # BLD AUTO: 0.22 10*3/MM3 (ref 0.7–3.1)
LYMPHOCYTES # BLD AUTO: 0.24 10*3/MM3 (ref 0.7–3.1)
LYMPHOCYTES # BLD AUTO: 0.3 10*3/MM3 (ref 0.7–3.1)
LYMPHOCYTES # BLD AUTO: 0.47 10*3/MM3 (ref 0.7–3.1)
LYMPHOCYTES # BLD AUTO: 0.61 10*3/MM3 (ref 0.7–3.1)
LYMPHOCYTES # BLD AUTO: 0.84 10*3/MM3 (ref 0.7–3.1)
LYMPHOCYTES # BLD AUTO: 1.27 10*3/MM3 (ref 0.7–3.1)
LYMPHOCYTES # BLD AUTO: 1.51 10*3/MM3 (ref 0.7–3.1)
LYMPHOCYTES NFR BLD AUTO: 1.4 % (ref 19.6–45.3)
LYMPHOCYTES NFR BLD AUTO: 1.8 % (ref 19.6–45.3)
LYMPHOCYTES NFR BLD AUTO: 16.7 % (ref 19.6–45.3)
LYMPHOCYTES NFR BLD AUTO: 19 % (ref 19.6–45.3)
LYMPHOCYTES NFR BLD AUTO: 2.1 % (ref 19.6–45.3)
LYMPHOCYTES NFR BLD AUTO: 2.5 % (ref 19.6–45.3)
LYMPHOCYTES NFR BLD AUTO: 2.8 % (ref 19.6–45.3)
LYMPHOCYTES NFR BLD AUTO: 3.2 % (ref 19.6–45.3)
LYMPHOCYTES NFR BLD AUTO: 3.6 % (ref 19.6–45.3)
LYMPHOCYTES NFR BLD AUTO: 3.9 % (ref 19.6–45.3)
LYMPHOCYTES NFR BLD AUTO: 6.1 % (ref 19.6–45.3)
LYMPHOCYTES NFR BLD AUTO: 8.5 % (ref 19.6–45.3)
LYMPHOCYTES NFR BLD AUTO: 9.6 % (ref 19.6–45.3)
MAGNESIUM SERPL-MCNC: 2.5 MG/DL (ref 1.6–2.6)
MCH RBC QN AUTO: 26.2 PG (ref 26.6–33)
MCH RBC QN AUTO: 26.3 PG (ref 26.6–33)
MCH RBC QN AUTO: 26.5 PG (ref 26.6–33)
MCH RBC QN AUTO: 26.6 PG (ref 26.6–33)
MCH RBC QN AUTO: 26.8 PG (ref 26.6–33)
MCH RBC QN AUTO: 26.9 PG (ref 26.6–33)
MCH RBC QN AUTO: 27 PG (ref 26.6–33)
MCH RBC QN AUTO: 27.1 PG (ref 26.6–33)
MCH RBC QN AUTO: 27.2 PG (ref 26.6–33)
MCH RBC QN AUTO: 27.2 PG (ref 26.6–33)
MCH RBC QN AUTO: 27.3 PG (ref 26.6–33)
MCH RBC QN AUTO: 27.4 PG (ref 26.6–33)
MCH RBC QN AUTO: 27.5 PG (ref 26.6–33)
MCH RBC QN AUTO: 27.5 PG (ref 26.6–33)
MCH RBC QN AUTO: 27.6 PG (ref 26.6–33)
MCH RBC QN AUTO: 27.7 PG (ref 26.6–33)
MCH RBC QN AUTO: 27.7 PG (ref 26.6–33)
MCH RBC QN AUTO: 27.8 PG (ref 26.6–33)
MCH RBC QN AUTO: 27.8 PG (ref 26.6–33)
MCH RBC QN AUTO: 27.9 PG (ref 26.6–33)
MCH RBC QN AUTO: 28.4 PG (ref 26.6–33)
MCH RBC QN AUTO: 28.4 PG (ref 26.6–33)
MCH RBC QN AUTO: 28.5 PG (ref 26.6–33)
MCHC RBC AUTO-ENTMCNC: 28.3 G/DL (ref 31.5–35.7)
MCHC RBC AUTO-ENTMCNC: 28.4 G/DL (ref 31.5–35.7)
MCHC RBC AUTO-ENTMCNC: 29.4 G/DL (ref 31.5–35.7)
MCHC RBC AUTO-ENTMCNC: 29.4 G/DL (ref 31.5–35.7)
MCHC RBC AUTO-ENTMCNC: 29.8 G/DL (ref 31.5–35.7)
MCHC RBC AUTO-ENTMCNC: 29.9 G/DL (ref 31.5–35.7)
MCHC RBC AUTO-ENTMCNC: 29.9 G/DL (ref 31.5–35.7)
MCHC RBC AUTO-ENTMCNC: 30.1 G/DL (ref 31.5–35.7)
MCHC RBC AUTO-ENTMCNC: 30.2 G/DL (ref 31.5–35.7)
MCHC RBC AUTO-ENTMCNC: 30.4 G/DL (ref 31.5–35.7)
MCHC RBC AUTO-ENTMCNC: 30.5 G/DL (ref 31.5–35.7)
MCHC RBC AUTO-ENTMCNC: 30.6 G/DL (ref 31.5–35.7)
MCHC RBC AUTO-ENTMCNC: 30.7 G/DL (ref 31.5–35.7)
MCHC RBC AUTO-ENTMCNC: 30.8 G/DL (ref 31.5–35.7)
MCHC RBC AUTO-ENTMCNC: 30.9 G/DL (ref 31.5–35.7)
MCHC RBC AUTO-ENTMCNC: 30.9 G/DL (ref 31.5–35.7)
MCHC RBC AUTO-ENTMCNC: 31 G/DL (ref 31.5–35.7)
MCHC RBC AUTO-ENTMCNC: 31 G/DL (ref 31.5–35.7)
MCHC RBC AUTO-ENTMCNC: 31.1 G/DL (ref 31.5–35.7)
MCHC RBC AUTO-ENTMCNC: 31.5 G/DL (ref 31.5–35.7)
MCHC RBC AUTO-ENTMCNC: 31.8 G/DL (ref 31.5–35.7)
MCHC RBC AUTO-ENTMCNC: 31.9 G/DL (ref 31.5–35.7)
MCHC RBC AUTO-ENTMCNC: 31.9 G/DL (ref 31.5–35.7)
MCV RBC AUTO: 85.2 FL (ref 79–97)
MCV RBC AUTO: 85.9 FL (ref 79–97)
MCV RBC AUTO: 86.6 FL (ref 79–97)
MCV RBC AUTO: 87 FL (ref 79–97)
MCV RBC AUTO: 87.2 FL (ref 79–97)
MCV RBC AUTO: 87.2 FL (ref 79–97)
MCV RBC AUTO: 87.4 FL (ref 79–97)
MCV RBC AUTO: 87.5 FL (ref 79–97)
MCV RBC AUTO: 87.6 FL (ref 79–97)
MCV RBC AUTO: 87.8 FL (ref 79–97)
MCV RBC AUTO: 88 FL (ref 79–97)
MCV RBC AUTO: 88.4 FL (ref 79–97)
MCV RBC AUTO: 88.7 FL (ref 79–97)
MCV RBC AUTO: 88.8 FL (ref 79–97)
MCV RBC AUTO: 89 FL (ref 79–97)
MCV RBC AUTO: 89.1 FL (ref 79–97)
MCV RBC AUTO: 89.6 FL (ref 79–97)
MCV RBC AUTO: 89.6 FL (ref 79–97)
MCV RBC AUTO: 90 FL (ref 79–97)
MCV RBC AUTO: 90.5 FL (ref 79–97)
MCV RBC AUTO: 91.3 FL (ref 79–97)
MCV RBC AUTO: 91.6 FL (ref 79–97)
MCV RBC AUTO: 92.4 FL (ref 79–97)
MCV RBC AUTO: 96.5 FL (ref 79–97)
MCV RBC AUTO: 97.1 FL (ref 79–97)
MCV RBC AUTO: 98.1 FL (ref 79–97)
MONOCYTES # BLD AUTO: 0.22 10*3/MM3 (ref 0.1–0.9)
MONOCYTES # BLD AUTO: 0.36 10*3/MM3 (ref 0.1–0.9)
MONOCYTES # BLD AUTO: 0.37 10*3/MM3 (ref 0.1–0.9)
MONOCYTES # BLD AUTO: 0.4 10*3/MM3 (ref 0.1–0.9)
MONOCYTES # BLD AUTO: 0.47 10*3/MM3 (ref 0.1–0.9)
MONOCYTES # BLD AUTO: 0.48 10*3/MM3 (ref 0.1–0.9)
MONOCYTES # BLD AUTO: 0.51 10*3/MM3 (ref 0.1–0.9)
MONOCYTES # BLD AUTO: 0.52 10*3/MM3 (ref 0.1–0.9)
MONOCYTES # BLD AUTO: 0.59 10*3/MM3 (ref 0.1–0.9)
MONOCYTES # BLD AUTO: 0.62 10*3/MM3 (ref 0.1–0.9)
MONOCYTES # BLD AUTO: 0.69 10*3/MM3 (ref 0.1–0.9)
MONOCYTES # BLD AUTO: 0.8 10*3/MM3 (ref 0.1–0.9)
MONOCYTES # BLD AUTO: 0.97 10*3/MM3 (ref 0.1–0.9)
MONOCYTES NFR BLD AUTO: 10.8 % (ref 5–12)
MONOCYTES NFR BLD AUTO: 12 % (ref 5–12)
MONOCYTES NFR BLD AUTO: 3.7 % (ref 5–12)
MONOCYTES NFR BLD AUTO: 4 % (ref 5–12)
MONOCYTES NFR BLD AUTO: 5.2 % (ref 5–12)
MONOCYTES NFR BLD AUTO: 6.6 % (ref 5–12)
MONOCYTES NFR BLD AUTO: 6.8 % (ref 5–12)
MONOCYTES NFR BLD AUTO: 6.9 % (ref 5–12)
MONOCYTES NFR BLD AUTO: 7.6 % (ref 5–12)
MONOCYTES NFR BLD AUTO: 7.7 % (ref 5–12)
MONOCYTES NFR BLD AUTO: 7.7 % (ref 5–12)
MONOCYTES NFR BLD AUTO: 8 % (ref 5–12)
MONOCYTES NFR BLD AUTO: 8.1 % (ref 5–12)
NEUTROPHILS # BLD AUTO: 10.83 10*3/MM3 (ref 1.7–7)
NEUTROPHILS # BLD AUTO: 12.3 10*3/MM3 (ref 1.7–7)
NEUTROPHILS # BLD AUTO: 4.02 10*3/MM3 (ref 1.7–7)
NEUTROPHILS # BLD AUTO: 4.22 10*3/MM3 (ref 1.7–7)
NEUTROPHILS # BLD AUTO: 4.24 10*3/MM3 (ref 1.7–7)
NEUTROPHILS # BLD AUTO: 4.33 10*3/MM3 (ref 1.7–7)
NEUTROPHILS # BLD AUTO: 4.82 10*3/MM3 (ref 1.7–7)
NEUTROPHILS # BLD AUTO: 5.71 10*3/MM3 (ref 1.7–7)
NEUTROPHILS # BLD AUTO: 5.93 10*3/MM3 (ref 1.7–7)
NEUTROPHILS # BLD AUTO: 6.32 10*3/MM3 (ref 1.7–7)
NEUTROPHILS # BLD AUTO: 6.4 10*3/MM3 (ref 1.7–7)
NEUTROPHILS # BLD AUTO: 7.02 10*3/MM3 (ref 1.7–7)
NEUTROPHILS # BLD AUTO: 8.61 10*3/MM3 (ref 1.7–7)
NEUTROPHILS NFR BLD AUTO: 64.7 % (ref 42.7–76)
NEUTROPHILS NFR BLD AUTO: 70.1 % (ref 42.7–76)
NEUTROPHILS NFR BLD AUTO: 80.4 % (ref 42.7–76)
NEUTROPHILS NFR BLD AUTO: 85.7 % (ref 42.7–76)
NEUTROPHILS NFR BLD AUTO: 86.1 % (ref 42.7–76)
NEUTROPHILS NFR BLD AUTO: 86.7 % (ref 42.7–76)
NEUTROPHILS NFR BLD AUTO: 86.7 % (ref 42.7–76)
NEUTROPHILS NFR BLD AUTO: 86.8 % (ref 42.7–76)
NEUTROPHILS NFR BLD AUTO: 87.7 % (ref 42.7–76)
NEUTROPHILS NFR BLD AUTO: 89.3 % (ref 42.7–76)
NEUTROPHILS NFR BLD AUTO: 90 % (ref 42.7–76)
NEUTROPHILS NFR BLD AUTO: 90.7 % (ref 42.7–76)
NEUTROPHILS NFR BLD AUTO: 93.6 % (ref 42.7–76)
NRBC BLD AUTO-RTO: 0 /100 WBC (ref 0–0.2)
NRBC BLD AUTO-RTO: 0.1 /100 WBC (ref 0–0.2)
NRBC BLD AUTO-RTO: 0.2 /100 WBC (ref 0–0.2)
PATH REPORT.ADDENDUM SPEC: NORMAL
PATH REPORT.FINAL DX SPEC: NORMAL
PATH REPORT.GROSS SPEC: NORMAL
PF4 HEPARIN CMPLX AB SER-ACNC: 0.17 OD (ref 0–0.4)
PHOSPHATE SERPL-MCNC: 3.3 MG/DL (ref 2.5–4.5)
PHOSPHATE SERPL-MCNC: 3.4 MG/DL (ref 2.5–4.5)
PHOSPHATE SERPL-MCNC: 4.2 MG/DL (ref 2.5–4.5)
PHOSPHATE SERPL-MCNC: 4.9 MG/DL (ref 2.5–4.5)
PHOSPHATE SERPL-MCNC: 5.4 MG/DL (ref 2.5–4.5)
PHOSPHATE SERPL-MCNC: 5.6 MG/DL (ref 2.5–4.5)
PHOSPHATE SERPL-MCNC: 5.7 MG/DL (ref 2.5–4.5)
PHOSPHATE SERPL-MCNC: 5.8 MG/DL (ref 2.5–4.5)
PHOSPHATE SERPL-MCNC: 6.3 MG/DL (ref 2.5–4.5)
PHOSPHATE SERPL-MCNC: 6.4 MG/DL (ref 2.5–4.5)
PHOSPHATE SERPL-MCNC: 8 MG/DL (ref 2.5–4.5)
PHOSPHATE SERPL-MCNC: 8 MG/DL (ref 2.5–4.5)
PLATELET # BLD AUTO: 110 10*3/MM3 (ref 140–450)
PLATELET # BLD AUTO: 148 10*3/MM3 (ref 140–450)
PLATELET # BLD AUTO: 152 10*3/MM3 (ref 140–450)
PLATELET # BLD AUTO: 153 10*3/MM3 (ref 140–450)
PLATELET # BLD AUTO: 163 10*3/MM3 (ref 140–450)
PLATELET # BLD AUTO: 164 10*3/MM3 (ref 140–450)
PLATELET # BLD AUTO: 165 10*3/MM3 (ref 140–450)
PLATELET # BLD AUTO: 167 10*3/MM3 (ref 140–450)
PLATELET # BLD AUTO: 169 10*3/MM3 (ref 140–450)
PLATELET # BLD AUTO: 171 10*3/MM3 (ref 140–450)
PLATELET # BLD AUTO: 174 10*3/MM3 (ref 140–450)
PLATELET # BLD AUTO: 177 10*3/MM3 (ref 140–450)
PLATELET # BLD AUTO: 179 10*3/MM3 (ref 140–450)
PLATELET # BLD AUTO: 183 10*3/MM3 (ref 140–450)
PLATELET # BLD AUTO: 190 10*3/MM3 (ref 140–450)
PLATELET # BLD AUTO: 195 10*3/MM3 (ref 140–450)
PLATELET # BLD AUTO: 213 10*3/MM3 (ref 140–450)
PLATELET # BLD AUTO: 221 10*3/MM3 (ref 140–450)
PLATELET # BLD AUTO: 228 10*3/MM3 (ref 140–450)
PLATELET # BLD AUTO: 234 10*3/MM3 (ref 140–450)
PLATELET # BLD AUTO: 284 10*3/MM3 (ref 140–450)
PLATELET # BLD AUTO: 285 10*3/MM3 (ref 140–450)
PLATELET # BLD AUTO: 319 10*3/MM3 (ref 140–450)
PLATELET # BLD AUTO: 321 10*3/MM3 (ref 140–450)
PLATELET # BLD AUTO: 336 10*3/MM3 (ref 140–450)
PLATELET # BLD AUTO: 340 10*3/MM3 (ref 140–450)
PLATELET # BLD AUTO: 350 10*3/MM3 (ref 140–450)
PLATELET # BLD AUTO: 361 10*3/MM3 (ref 140–450)
PLATELET # BLD AUTO: 364 10*3/MM3 (ref 140–450)
PLATELET # BLD AUTO: 367 10*3/MM3 (ref 140–450)
PLATELET # BLD AUTO: 393 10*3/MM3 (ref 140–450)
PLATELET # BLD AUTO: 409 10*3/MM3 (ref 140–450)
PMV BLD AUTO: 10 FL (ref 6–12)
PMV BLD AUTO: 10 FL (ref 6–12)
PMV BLD AUTO: 10.1 FL (ref 6–12)
PMV BLD AUTO: 10.2 FL (ref 6–12)
PMV BLD AUTO: 10.4 FL (ref 6–12)
PMV BLD AUTO: 10.5 FL (ref 6–12)
PMV BLD AUTO: 10.6 FL (ref 6–12)
PMV BLD AUTO: 10.8 FL (ref 6–12)
PMV BLD AUTO: 11 FL (ref 6–12)
PMV BLD AUTO: 11.2 FL (ref 6–12)
PMV BLD AUTO: 11.3 FL (ref 6–12)
PMV BLD AUTO: 11.4 FL (ref 6–12)
PMV BLD AUTO: 9.3 FL (ref 6–12)
PMV BLD AUTO: 9.3 FL (ref 6–12)
PMV BLD AUTO: 9.4 FL (ref 6–12)
PMV BLD AUTO: 9.5 FL (ref 6–12)
PMV BLD AUTO: 9.5 FL (ref 6–12)
PMV BLD AUTO: 9.7 FL (ref 6–12)
PMV BLD AUTO: 9.8 FL (ref 6–12)
PMV BLD AUTO: 9.8 FL (ref 6–12)
PMV BLD AUTO: 9.9 FL (ref 6–12)
PMV BLD AUTO: 9.9 FL (ref 6–12)
POTASSIUM BLD-SCNC: 3.6 MMOL/L (ref 3.5–5.2)
POTASSIUM BLD-SCNC: 3.7 MMOL/L (ref 3.5–5.2)
POTASSIUM BLD-SCNC: 3.9 MMOL/L (ref 3.5–5.2)
POTASSIUM BLD-SCNC: 4 MMOL/L (ref 3.5–5.2)
POTASSIUM BLD-SCNC: 4 MMOL/L (ref 3.5–5.2)
POTASSIUM BLD-SCNC: 4.1 MMOL/L (ref 3.5–5.2)
POTASSIUM BLD-SCNC: 4.2 MMOL/L (ref 3.5–5.2)
POTASSIUM BLD-SCNC: 4.3 MMOL/L (ref 3.5–5.2)
POTASSIUM BLD-SCNC: 4.3 MMOL/L (ref 3.5–5.2)
POTASSIUM BLD-SCNC: 4.4 MMOL/L (ref 3.5–5.2)
POTASSIUM BLD-SCNC: 4.5 MMOL/L (ref 3.5–5.2)
POTASSIUM BLD-SCNC: 4.6 MMOL/L (ref 3.5–5.2)
POTASSIUM BLD-SCNC: 4.7 MMOL/L (ref 3.5–5.2)
POTASSIUM BLD-SCNC: 4.8 MMOL/L (ref 3.5–5.2)
POTASSIUM BLD-SCNC: 5.1 MMOL/L (ref 3.5–5.2)
POTASSIUM BLD-SCNC: 5.1 MMOL/L (ref 3.5–5.2)
POTASSIUM BLD-SCNC: 5.2 MMOL/L (ref 3.5–5.2)
POTASSIUM BLD-SCNC: 5.4 MMOL/L (ref 3.5–5.2)
POTASSIUM BLD-SCNC: 5.6 MMOL/L (ref 3.5–5.2)
POTASSIUM BLD-SCNC: 5.6 MMOL/L (ref 3.5–5.2)
POTASSIUM BLD-SCNC: 5.7 MMOL/L (ref 3.5–5.2)
POTASSIUM BLD-SCNC: 6 MMOL/L (ref 3.5–5.2)
PROT SERPL-MCNC: 6.4 G/DL (ref 6–8.5)
PROT SERPL-MCNC: 7.1 G/DL (ref 6–8.5)
PROT SERPL-MCNC: 7.4 G/DL (ref 6–8.5)
PROT SERPL-MCNC: 7.6 G/DL (ref 6–8.5)
PROT SERPL-MCNC: 7.7 G/DL (ref 6–8.5)
PROT SERPL-MCNC: 7.7 G/DL (ref 6–8.5)
PROTHROMBIN TIME: 13.6 SECONDS (ref 11.7–14.2)
PROTHROMBIN TIME: 13.7 SECONDS (ref 11.7–14.2)
PROTHROMBIN TIME: 15.5 SECONDS (ref 11.7–14.2)
PROTHROMBIN TIME: 21.7 SECONDS (ref 11.7–14.2)
PROTHROMBIN TIME: 27.2 SECONDS (ref 11.7–14.2)
PROTHROMBIN TIME: 29.5 SECONDS (ref 11.7–14.2)
PROTHROMBIN TIME: 36.2 SECONDS (ref 11.7–14.2)
PROTHROMBIN TIME: 37.8 SECONDS (ref 11.7–14.2)
PROTHROMBIN TIME: 38.3 SECONDS (ref 11.7–14.2)
PROTHROMBIN TIME: 42.9 SECONDS (ref 11.7–14.2)
PROTHROMBIN TIME: 43.3 SECONDS (ref 11.7–14.2)
PROTHROMBIN TIME: 43.4 SECONDS (ref 11.7–14.2)
PROTHROMBIN TIME: 44.1 SECONDS (ref 11.7–14.2)
PROTHROMBIN TIME: 48.1 SECONDS (ref 11.7–14.2)
RBC # BLD AUTO: 2.85 10*6/MM3 (ref 3.77–5.28)
RBC # BLD AUTO: 2.86 10*6/MM3 (ref 3.77–5.28)
RBC # BLD AUTO: 2.98 10*6/MM3 (ref 3.77–5.28)
RBC # BLD AUTO: 3.1 10*6/MM3 (ref 3.77–5.28)
RBC # BLD AUTO: 3.11 10*6/MM3 (ref 3.77–5.28)
RBC # BLD AUTO: 3.11 10*6/MM3 (ref 3.77–5.28)
RBC # BLD AUTO: 3.16 10*6/MM3 (ref 3.77–5.28)
RBC # BLD AUTO: 3.24 10*6/MM3 (ref 3.77–5.28)
RBC # BLD AUTO: 3.35 10*6/MM3 (ref 3.77–5.28)
RBC # BLD AUTO: 3.39 10*6/MM3 (ref 3.77–5.28)
RBC # BLD AUTO: 3.64 10*6/MM3 (ref 3.77–5.28)
RBC # BLD AUTO: 3.65 10*6/MM3 (ref 3.77–5.28)
RBC # BLD AUTO: 3.7 10*6/MM3 (ref 3.77–5.28)
RBC # BLD AUTO: 3.78 10*6/MM3 (ref 3.77–5.28)
RBC # BLD AUTO: 3.84 10*6/MM3 (ref 3.77–5.28)
RBC # BLD AUTO: 3.94 10*6/MM3 (ref 3.77–5.28)
RBC # BLD AUTO: 4.03 10*6/MM3 (ref 3.77–5.28)
RBC # BLD AUTO: 4.11 10*6/MM3 (ref 3.77–5.28)
RBC # BLD AUTO: 4.13 10*6/MM3 (ref 3.77–5.28)
RBC # BLD AUTO: 4.22 10*6/MM3 (ref 3.77–5.28)
RBC # BLD AUTO: 4.26 10*6/MM3 (ref 3.77–5.28)
RBC # BLD AUTO: 4.28 10*6/MM3 (ref 3.77–5.28)
RBC # BLD AUTO: 4.3 10*6/MM3 (ref 3.77–5.28)
RBC # BLD AUTO: 4.33 10*6/MM3 (ref 3.77–5.28)
RBC # BLD AUTO: 4.35 10*6/MM3 (ref 3.77–5.28)
RBC # BLD AUTO: 4.37 10*6/MM3 (ref 3.77–5.28)
RBC # BLD AUTO: 4.38 10*6/MM3 (ref 3.77–5.28)
RBC # BLD AUTO: 4.42 10*6/MM3 (ref 3.77–5.28)
RBC # BLD AUTO: 4.46 10*6/MM3 (ref 3.77–5.28)
RBC # BLD AUTO: 4.47 10*6/MM3 (ref 3.77–5.28)
RBC # BLD AUTO: 4.7 10*6/MM3 (ref 3.77–5.28)
SODIUM BLD-SCNC: 125 MMOL/L (ref 136–145)
SODIUM BLD-SCNC: 125 MMOL/L (ref 136–145)
SODIUM BLD-SCNC: 126 MMOL/L (ref 136–145)
SODIUM BLD-SCNC: 127 MMOL/L (ref 136–145)
SODIUM BLD-SCNC: 128 MMOL/L (ref 136–145)
SODIUM BLD-SCNC: 129 MMOL/L (ref 136–145)
SODIUM BLD-SCNC: 130 MMOL/L (ref 136–145)
SODIUM BLD-SCNC: 131 MMOL/L (ref 136–145)
SODIUM BLD-SCNC: 132 MMOL/L (ref 136–145)
SODIUM BLD-SCNC: 133 MMOL/L (ref 136–145)
SODIUM BLD-SCNC: 134 MMOL/L (ref 136–145)
SODIUM BLD-SCNC: 135 MMOL/L (ref 136–145)
SODIUM BLD-SCNC: 136 MMOL/L (ref 136–145)
SODIUM BLD-SCNC: 138 MMOL/L (ref 136–145)
SODIUM BLD-SCNC: 140 MMOL/L (ref 136–145)
WBC NRBC COR # BLD: 10.04 10*3/MM3 (ref 3.4–10.8)
WBC NRBC COR # BLD: 10.09 10*3/MM3 (ref 3.4–10.8)
WBC NRBC COR # BLD: 11.22 10*3/MM3 (ref 3.4–10.8)
WBC NRBC COR # BLD: 11.94 10*3/MM3 (ref 3.4–10.8)
WBC NRBC COR # BLD: 13.13 10*3/MM3 (ref 3.4–10.8)
WBC NRBC COR # BLD: 13.37 10*3/MM3 (ref 3.4–10.8)
WBC NRBC COR # BLD: 4.5 10*3/MM3 (ref 3.4–10.8)
WBC NRBC COR # BLD: 4.87 10*3/MM3 (ref 3.4–10.8)
WBC NRBC COR # BLD: 4.93 10*3/MM3 (ref 3.4–10.8)
WBC NRBC COR # BLD: 5 10*3/MM3 (ref 3.4–10.8)
WBC NRBC COR # BLD: 5.17 10*3/MM3 (ref 3.4–10.8)
WBC NRBC COR # BLD: 5.5 10*3/MM3 (ref 3.4–10.8)
WBC NRBC COR # BLD: 5.53 10*3/MM3 (ref 3.4–10.8)
WBC NRBC COR # BLD: 5.55 10*3/MM3 (ref 3.4–10.8)
WBC NRBC COR # BLD: 5.57 10*3/MM3 (ref 3.4–10.8)
WBC NRBC COR # BLD: 5.71 10*3/MM3 (ref 3.4–10.8)
WBC NRBC COR # BLD: 6.05 10*3/MM3 (ref 3.4–10.8)
WBC NRBC COR # BLD: 6.45 10*3/MM3 (ref 3.4–10.8)
WBC NRBC COR # BLD: 6.59 10*3/MM3 (ref 3.4–10.8)
WBC NRBC COR # BLD: 6.69 10*3/MM3 (ref 3.4–10.8)
WBC NRBC COR # BLD: 6.71 10*3/MM3 (ref 3.4–10.8)
WBC NRBC COR # BLD: 6.77 10*3/MM3 (ref 3.4–10.8)
WBC NRBC COR # BLD: 6.85 10*3/MM3 (ref 3.4–10.8)
WBC NRBC COR # BLD: 6.99 10*3/MM3 (ref 3.4–10.8)
WBC NRBC COR # BLD: 7.12 10*3/MM3 (ref 3.4–10.8)
WBC NRBC COR # BLD: 7.52 10*3/MM3 (ref 3.4–10.8)
WBC NRBC COR # BLD: 7.71 10*3/MM3 (ref 3.4–10.8)
WBC NRBC COR # BLD: 8.58 10*3/MM3 (ref 3.4–10.8)
WBC NRBC COR # BLD: 8.67 10*3/MM3 (ref 3.4–10.8)
WBC NRBC COR # BLD: 8.73 10*3/MM3 (ref 3.4–10.8)
WBC NRBC COR # BLD: 8.94 10*3/MM3 (ref 3.4–10.8)
WBC NRBC COR # BLD: 9.02 10*3/MM3 (ref 3.4–10.8)
WBC NRBC COR # BLD: 9.14 10*3/MM3 (ref 3.4–10.8)
WBC NRBC COR # BLD: 9.52 10*3/MM3 (ref 3.4–10.8)

## 2019-01-01 PROCEDURE — 94799 UNLISTED PULMONARY SVC/PX: CPT

## 2019-01-01 PROCEDURE — 85610 PROTHROMBIN TIME: CPT | Performed by: NURSE PRACTITIONER

## 2019-01-01 PROCEDURE — 80048 BASIC METABOLIC PNL TOTAL CA: CPT | Performed by: EMERGENCY MEDICINE

## 2019-01-01 PROCEDURE — 0B9D8ZX DRAINAGE OF RIGHT MIDDLE LUNG LOBE, VIA NATURAL OR ARTIFICIAL OPENING ENDOSCOPIC, DIAGNOSTIC: ICD-10-PCS | Performed by: INTERNAL MEDICINE

## 2019-01-01 PROCEDURE — 25010000002 AMIODARONE IN DEXTROSE 5% 150-4.21 MG/100ML-% SOLUTION: Performed by: INTERNAL MEDICINE

## 2019-01-01 PROCEDURE — 25010000002 HEPARIN (PORCINE) PER 1000 UNITS: Performed by: INTERNAL MEDICINE

## 2019-01-01 PROCEDURE — 80053 COMPREHEN METABOLIC PANEL: CPT | Performed by: INTERNAL MEDICINE

## 2019-01-01 PROCEDURE — 80069 RENAL FUNCTION PANEL: CPT | Performed by: INTERNAL MEDICINE

## 2019-01-01 PROCEDURE — 85730 THROMBOPLASTIN TIME PARTIAL: CPT | Performed by: INTERNAL MEDICINE

## 2019-01-01 PROCEDURE — 63710000001 INSULIN GLARGINE PER 5 UNITS: Performed by: INTERNAL MEDICINE

## 2019-01-01 PROCEDURE — 77336 RADIATION PHYSICS CONSULT: CPT | Performed by: RADIOLOGY

## 2019-01-01 PROCEDURE — 25010000002 HYDROMORPHONE PER 4 MG: Performed by: INTERNAL MEDICINE

## 2019-01-01 PROCEDURE — 25010000002 ALBUMIN HUMAN 25% PER 50 ML: Performed by: INTERNAL MEDICINE

## 2019-01-01 PROCEDURE — 77412 RADIATION TX DELIVERY LVL 3: CPT | Performed by: RADIOLOGY

## 2019-01-01 PROCEDURE — 82962 GLUCOSE BLOOD TEST: CPT

## 2019-01-01 PROCEDURE — 87102 FUNGUS ISOLATION CULTURE: CPT | Performed by: INTERNAL MEDICINE

## 2019-01-01 PROCEDURE — 87206 SMEAR FLUORESCENT/ACID STAI: CPT | Performed by: INTERNAL MEDICINE

## 2019-01-01 PROCEDURE — 87116 MYCOBACTERIA CULTURE: CPT | Performed by: INTERNAL MEDICINE

## 2019-01-01 PROCEDURE — 63710000001 INSULIN LISPRO (HUMAN) PER 5 UNITS: Performed by: INTERNAL MEDICINE

## 2019-01-01 PROCEDURE — 94640 AIRWAY INHALATION TREATMENT: CPT

## 2019-01-01 PROCEDURE — C1726 CATH, BAL DIL, NON-VASCULAR: HCPCS | Performed by: INTERNAL MEDICINE

## 2019-01-01 PROCEDURE — 93005 ELECTROCARDIOGRAM TRACING: CPT | Performed by: INTERNAL MEDICINE

## 2019-01-01 PROCEDURE — 63710000001 DEXAMETHASONE PER 0.25 MG: Performed by: INTERNAL MEDICINE

## 2019-01-01 PROCEDURE — 85027 COMPLETE CBC AUTOMATED: CPT | Performed by: INTERNAL MEDICINE

## 2019-01-01 PROCEDURE — 85025 COMPLETE CBC W/AUTO DIFF WBC: CPT | Performed by: INTERNAL MEDICINE

## 2019-01-01 PROCEDURE — 94760 N-INVAS EAR/PLS OXIMETRY 1: CPT

## 2019-01-01 PROCEDURE — A9503 TC99M MEDRONATE: HCPCS | Performed by: INTERNAL MEDICINE

## 2019-01-01 PROCEDURE — P9047 ALBUMIN (HUMAN), 25%, 50ML: HCPCS | Performed by: INTERNAL MEDICINE

## 2019-01-01 PROCEDURE — 25010000002 ONDANSETRON PER 1 MG: Performed by: INTERNAL MEDICINE

## 2019-01-01 PROCEDURE — 63710000001 PREDNISONE PER 1 MG: Performed by: INTERNAL MEDICINE

## 2019-01-01 PROCEDURE — 77014: CPT | Performed by: RADIOLOGY

## 2019-01-01 PROCEDURE — 71045 X-RAY EXAM CHEST 1 VIEW: CPT

## 2019-01-01 PROCEDURE — 93010 ELECTROCARDIOGRAM REPORT: CPT | Performed by: INTERNAL MEDICINE

## 2019-01-01 PROCEDURE — 86022 PLATELET ANTIBODIES: CPT | Performed by: INTERNAL MEDICINE

## 2019-01-01 PROCEDURE — 80048 BASIC METABOLIC PNL TOTAL CA: CPT | Performed by: INTERNAL MEDICINE

## 2019-01-01 PROCEDURE — 99232 SBSQ HOSP IP/OBS MODERATE 35: CPT | Performed by: NURSE PRACTITIONER

## 2019-01-01 PROCEDURE — 73552 X-RAY EXAM OF FEMUR 2/>: CPT

## 2019-01-01 PROCEDURE — 99255 IP/OBS CONSLTJ NEW/EST HI 80: CPT | Performed by: INTERNAL MEDICINE

## 2019-01-01 PROCEDURE — 99231 SBSQ HOSP IP/OBS SF/LOW 25: CPT | Performed by: NURSE PRACTITIONER

## 2019-01-01 PROCEDURE — 25010000002 METHYLPREDNISOLONE PER 40 MG: Performed by: INTERNAL MEDICINE

## 2019-01-01 PROCEDURE — 25010000002 PROPOFOL 10 MG/ML EMULSION: Performed by: ANESTHESIOLOGY

## 2019-01-01 PROCEDURE — 83036 HEMOGLOBIN GLYCOSYLATED A1C: CPT | Performed by: INTERNAL MEDICINE

## 2019-01-01 PROCEDURE — 25010000002 LORAZEPAM PER 2 MG: Performed by: INTERNAL MEDICINE

## 2019-01-01 PROCEDURE — 87040 BLOOD CULTURE FOR BACTERIA: CPT | Performed by: EMERGENCY MEDICINE

## 2019-01-01 PROCEDURE — 5A1D70Z PERFORMANCE OF URINARY FILTRATION, INTERMITTENT, LESS THAN 6 HOURS PER DAY: ICD-10-PCS | Performed by: INTERNAL MEDICINE

## 2019-01-01 PROCEDURE — 83605 ASSAY OF LACTIC ACID: CPT | Performed by: EMERGENCY MEDICINE

## 2019-01-01 PROCEDURE — 99253 IP/OBS CNSLTJ NEW/EST LOW 45: CPT | Performed by: RADIOLOGY

## 2019-01-01 PROCEDURE — 99285 EMERGENCY DEPT VISIT HI MDM: CPT

## 2019-01-01 PROCEDURE — 73502 X-RAY EXAM HIP UNI 2-3 VIEWS: CPT

## 2019-01-01 PROCEDURE — 0 TECHNETIUM MEDRONATE KIT: Performed by: INTERNAL MEDICINE

## 2019-01-01 PROCEDURE — 99232 SBSQ HOSP IP/OBS MODERATE 35: CPT | Performed by: INTERNAL MEDICINE

## 2019-01-01 PROCEDURE — P9047 ALBUMIN (HUMAN), 25%, 50ML: HCPCS

## 2019-01-01 PROCEDURE — 87070 CULTURE OTHR SPECIMN AEROBIC: CPT | Performed by: INTERNAL MEDICINE

## 2019-01-01 PROCEDURE — 74176 CT ABD & PELVIS W/O CONTRAST: CPT

## 2019-01-01 PROCEDURE — 88341 IMHCHEM/IMCYTCHM EA ADD ANTB: CPT | Performed by: INTERNAL MEDICINE

## 2019-01-01 PROCEDURE — 99223 1ST HOSP IP/OBS HIGH 75: CPT | Performed by: INTERNAL MEDICINE

## 2019-01-01 PROCEDURE — 25010000002 AMIODARONE IN DEXTROSE 5% 360-4.14 MG/200ML-% SOLUTION: Performed by: INTERNAL MEDICINE

## 2019-01-01 PROCEDURE — 85025 COMPLETE CBC W/AUTO DIFF WBC: CPT | Performed by: EMERGENCY MEDICINE

## 2019-01-01 PROCEDURE — 25010000002 VANCOMYCIN 10 G RECONSTITUTED SOLUTION: Performed by: EMERGENCY MEDICINE

## 2019-01-01 PROCEDURE — 88305 TISSUE EXAM BY PATHOLOGIST: CPT | Performed by: INTERNAL MEDICINE

## 2019-01-01 PROCEDURE — 93005 ELECTROCARDIOGRAM TRACING: CPT | Performed by: NURSE PRACTITIONER

## 2019-01-01 PROCEDURE — 83735 ASSAY OF MAGNESIUM: CPT | Performed by: INTERNAL MEDICINE

## 2019-01-01 PROCEDURE — 84100 ASSAY OF PHOSPHORUS: CPT | Performed by: INTERNAL MEDICINE

## 2019-01-01 PROCEDURE — 07D74ZX EXTRACTION OF THORAX LYMPHATIC, PERCUTANEOUS ENDOSCOPIC APPROACH, DIAGNOSTIC: ICD-10-PCS | Performed by: INTERNAL MEDICINE

## 2019-01-01 PROCEDURE — 77334 RADIATION TREATMENT AID(S): CPT | Performed by: RADIOLOGY

## 2019-01-01 PROCEDURE — 85610 PROTHROMBIN TIME: CPT | Performed by: INTERNAL MEDICINE

## 2019-01-01 PROCEDURE — 71250 CT THORAX DX C-: CPT

## 2019-01-01 PROCEDURE — 88112 CYTOPATH CELL ENHANCE TECH: CPT | Performed by: INTERNAL MEDICINE

## 2019-01-01 PROCEDURE — 77300 RADIATION THERAPY DOSE PLAN: CPT | Performed by: RADIOLOGY

## 2019-01-01 PROCEDURE — 99284 EMERGENCY DEPT VISIT MOD MDM: CPT

## 2019-01-01 PROCEDURE — 77263 THER RADIOLOGY TX PLNG CPLX: CPT | Performed by: RADIOLOGY

## 2019-01-01 PROCEDURE — 77417 THER RADIOLOGY PORT IMAGE(S): CPT | Performed by: RADIOLOGY

## 2019-01-01 PROCEDURE — 0BDD8ZX EXTRACTION OF RIGHT MIDDLE LUNG LOBE, VIA NATURAL OR ARTIFICIAL OPENING ENDOSCOPIC, DIAGNOSTIC: ICD-10-PCS | Performed by: INTERNAL MEDICINE

## 2019-01-01 PROCEDURE — 25010000002 DIGOXIN PER 500 MCG: Performed by: NURSE PRACTITIONER

## 2019-01-01 PROCEDURE — 77280 THER RAD SIMULAJ FIELD SMPL: CPT | Performed by: RADIOLOGY

## 2019-01-01 PROCEDURE — 77307 TELETHX ISODOSE PLAN CPLX: CPT | Performed by: RADIOLOGY

## 2019-01-01 PROCEDURE — 87071 CULTURE AEROBIC QUANT OTHER: CPT | Performed by: INTERNAL MEDICINE

## 2019-01-01 PROCEDURE — 78306 BONE IMAGING WHOLE BODY: CPT

## 2019-01-01 PROCEDURE — 77295 3-D RADIOTHERAPY PLAN: CPT | Performed by: RADIOLOGY

## 2019-01-01 PROCEDURE — 25010000002 ALBUMIN HUMAN 25% PER 50 ML

## 2019-01-01 PROCEDURE — 25010000002 PHENYLEPHRINE PER 1 ML: Performed by: ANESTHESIOLOGY

## 2019-01-01 PROCEDURE — 25010000002 FUROSEMIDE PER 20 MG: Performed by: INTERNAL MEDICINE

## 2019-01-01 PROCEDURE — 88312 SPECIAL STAINS GROUP 1: CPT | Performed by: INTERNAL MEDICINE

## 2019-01-01 PROCEDURE — 84132 ASSAY OF SERUM POTASSIUM: CPT | Performed by: INTERNAL MEDICINE

## 2019-01-01 PROCEDURE — 71046 X-RAY EXAM CHEST 2 VIEWS: CPT

## 2019-01-01 PROCEDURE — 99233 SBSQ HOSP IP/OBS HIGH 50: CPT | Performed by: INTERNAL MEDICINE

## 2019-01-01 PROCEDURE — 87205 SMEAR GRAM STAIN: CPT | Performed by: INTERNAL MEDICINE

## 2019-01-01 PROCEDURE — 88342 IMHCHEM/IMCYTCHM 1ST ANTB: CPT | Performed by: INTERNAL MEDICINE

## 2019-01-01 PROCEDURE — 0 DIATRIZOATE MEGLUMINE & SODIUM PER 1 ML: Performed by: INTERNAL MEDICINE

## 2019-01-01 PROCEDURE — 77427 RADIATION TX MANAGEMENT X5: CPT | Performed by: RADIOLOGY

## 2019-01-01 PROCEDURE — 97161 PT EVAL LOW COMPLEX 20 MIN: CPT

## 2019-01-01 PROCEDURE — 0BB58ZX EXCISION OF RIGHT MIDDLE LOBE BRONCHUS, VIA NATURAL OR ARTIFICIAL OPENING ENDOSCOPIC, DIAGNOSTIC: ICD-10-PCS | Performed by: INTERNAL MEDICINE

## 2019-01-01 PROCEDURE — 99254 IP/OBS CNSLTJ NEW/EST MOD 60: CPT | Performed by: INTERNAL MEDICINE

## 2019-01-01 PROCEDURE — 88360 TUMOR IMMUNOHISTOCHEM/MANUAL: CPT

## 2019-01-01 PROCEDURE — 88173 CYTOPATH EVAL FNA REPORT: CPT | Performed by: INTERNAL MEDICINE

## 2019-01-01 PROCEDURE — 99232 SBSQ HOSP IP/OBS MODERATE 35: CPT | Performed by: PHYSICIAN ASSISTANT

## 2019-01-01 RX ORDER — HEPARIN SODIUM 10000 [USP'U]/100ML
17.5 INJECTION, SOLUTION INTRAVENOUS
Status: DISCONTINUED | OUTPATIENT
Start: 2019-01-01 | End: 2019-01-01

## 2019-01-01 RX ORDER — LIDOCAINE HYDROCHLORIDE 20 MG/ML
INJECTION, SOLUTION INFILTRATION; PERINEURAL AS NEEDED
Status: DISCONTINUED | OUTPATIENT
Start: 2019-01-01 | End: 2019-01-01 | Stop reason: SURG

## 2019-01-01 RX ORDER — PROMETHAZINE HYDROCHLORIDE 25 MG/1
25 SUPPOSITORY RECTAL ONCE AS NEEDED
Status: DISCONTINUED | OUTPATIENT
Start: 2019-01-01 | End: 2019-01-01 | Stop reason: HOSPADM

## 2019-01-01 RX ORDER — ACETAMINOPHEN 650 MG/1
650 SUPPOSITORY RECTAL EVERY 4 HOURS PRN
Status: DISCONTINUED | OUTPATIENT
Start: 2019-01-01 | End: 2019-01-01 | Stop reason: HOSPADM

## 2019-01-01 RX ORDER — LORAZEPAM 2 MG/ML
1 CONCENTRATE ORAL
Status: DISCONTINUED | OUTPATIENT
Start: 2019-01-01 | End: 2019-01-01 | Stop reason: HOSPADM

## 2019-01-01 RX ORDER — MIDODRINE HYDROCHLORIDE 5 MG/1
10 TABLET ORAL 3 TIMES WEEKLY
Status: DISCONTINUED | OUTPATIENT
Start: 2019-01-01 | End: 2019-01-01

## 2019-01-01 RX ORDER — LIDOCAINE HYDROCHLORIDE 20 MG/ML
INJECTION, SOLUTION EPIDURAL; INFILTRATION; INTRACAUDAL; PERINEURAL AS NEEDED
Status: DISCONTINUED | OUTPATIENT
Start: 2019-01-01 | End: 2019-01-01 | Stop reason: HOSPADM

## 2019-01-01 RX ORDER — MORPHINE SULFATE 4 MG/ML
4 INJECTION, SOLUTION INTRAMUSCULAR; INTRAVENOUS
Status: DISCONTINUED | OUTPATIENT
Start: 2019-01-01 | End: 2019-01-01

## 2019-01-01 RX ORDER — PROMETHAZINE HYDROCHLORIDE 25 MG/ML
12.5 INJECTION, SOLUTION INTRAMUSCULAR; INTRAVENOUS ONCE AS NEEDED
Status: DISCONTINUED | OUTPATIENT
Start: 2019-01-01 | End: 2019-01-01 | Stop reason: HOSPADM

## 2019-01-01 RX ORDER — DIPHENOXYLATE HYDROCHLORIDE AND ATROPINE SULFATE 2.5; .025 MG/1; MG/1
1 TABLET ORAL
Status: DISCONTINUED | OUTPATIENT
Start: 2019-01-01 | End: 2019-01-01 | Stop reason: HOSPADM

## 2019-01-01 RX ORDER — HYDROMORPHONE HYDROCHLORIDE 1 MG/ML
0.5 INJECTION, SOLUTION INTRAMUSCULAR; INTRAVENOUS; SUBCUTANEOUS
Status: DISCONTINUED | OUTPATIENT
Start: 2019-01-01 | End: 2019-01-01

## 2019-01-01 RX ORDER — PREDNISONE 20 MG/1
40 TABLET ORAL 2 TIMES DAILY WITH MEALS
Status: DISCONTINUED | OUTPATIENT
Start: 2019-01-01 | End: 2019-01-01

## 2019-01-01 RX ORDER — HYDROMORPHONE HYDROCHLORIDE 1 MG/ML
1.5 INJECTION, SOLUTION INTRAMUSCULAR; INTRAVENOUS; SUBCUTANEOUS
Status: DISCONTINUED | OUTPATIENT
Start: 2019-01-01 | End: 2019-01-01 | Stop reason: HOSPADM

## 2019-01-01 RX ORDER — INSULIN GLARGINE 100 [IU]/ML
10 INJECTION, SOLUTION SUBCUTANEOUS NIGHTLY
Status: DISCONTINUED | OUTPATIENT
Start: 2019-01-01 | End: 2019-01-01

## 2019-01-01 RX ORDER — PROMETHAZINE HYDROCHLORIDE 25 MG/1
12.5 TABLET ORAL EVERY 4 HOURS PRN
Status: DISCONTINUED | OUTPATIENT
Start: 2019-01-01 | End: 2019-01-01 | Stop reason: HOSPADM

## 2019-01-01 RX ORDER — MORPHINE SULFATE 20 MG/ML
5 SOLUTION ORAL
Status: DISCONTINUED | OUTPATIENT
Start: 2019-01-01 | End: 2019-01-01

## 2019-01-01 RX ORDER — CEFAZOLIN SODIUM 2 G/100ML
2 INJECTION, SOLUTION INTRAVENOUS ONCE
Status: CANCELLED | OUTPATIENT
Start: 2019-01-01 | End: 2019-01-01

## 2019-01-01 RX ORDER — LORAZEPAM 2 MG/ML
1 INJECTION INTRAMUSCULAR
Status: DISCONTINUED | OUTPATIENT
Start: 2019-01-01 | End: 2019-01-01 | Stop reason: HOSPADM

## 2019-01-01 RX ORDER — HEPARIN SODIUM 1000 [USP'U]/ML
2000 INJECTION, SOLUTION INTRAVENOUS; SUBCUTANEOUS ONCE
Status: COMPLETED | OUTPATIENT
Start: 2019-01-01 | End: 2019-01-01

## 2019-01-01 RX ORDER — GLYCOPYRROLATE 0.2 MG/ML
0.4 INJECTION INTRAMUSCULAR; INTRAVENOUS
Status: DISCONTINUED | OUTPATIENT
Start: 2019-01-01 | End: 2019-01-01 | Stop reason: HOSPADM

## 2019-01-01 RX ORDER — 0.9 % SODIUM CHLORIDE 0.9 %
10 VIAL (ML) INJECTION EVERY 12 HOURS SCHEDULED
Status: DISCONTINUED | OUTPATIENT
Start: 2019-01-01 | End: 2019-01-01 | Stop reason: HOSPADM

## 2019-01-01 RX ORDER — AMIODARONE HYDROCHLORIDE 200 MG/1
200 TABLET ORAL
Status: DISCONTINUED | OUTPATIENT
Start: 2019-01-01 | End: 2019-01-01

## 2019-01-01 RX ORDER — IPRATROPIUM BROMIDE AND ALBUTEROL SULFATE 2.5; .5 MG/3ML; MG/3ML
3 SOLUTION RESPIRATORY (INHALATION)
Status: DISCONTINUED | OUTPATIENT
Start: 2019-01-01 | End: 2019-01-01

## 2019-01-01 RX ORDER — AMIODARONE HYDROCHLORIDE 200 MG/1
400 TABLET ORAL
Status: DISCONTINUED | OUTPATIENT
Start: 2019-01-01 | End: 2019-01-01

## 2019-01-01 RX ORDER — MORPHINE SULFATE 2 MG/ML
6 INJECTION, SOLUTION INTRAMUSCULAR; INTRAVENOUS
Status: DISCONTINUED | OUTPATIENT
Start: 2019-01-01 | End: 2019-01-01

## 2019-01-01 RX ORDER — MIDODRINE HYDROCHLORIDE 5 MG/1
10 TABLET ORAL
Status: COMPLETED | OUTPATIENT
Start: 2019-01-01 | End: 2019-01-01

## 2019-01-01 RX ORDER — FUROSEMIDE 10 MG/ML
40 INJECTION INTRAMUSCULAR; INTRAVENOUS EVERY 12 HOURS
Status: DISCONTINUED | OUTPATIENT
Start: 2019-01-01 | End: 2019-01-01

## 2019-01-01 RX ORDER — MORPHINE SULFATE 2 MG/ML
2 INJECTION, SOLUTION INTRAMUSCULAR; INTRAVENOUS
Status: DISCONTINUED | OUTPATIENT
Start: 2019-01-01 | End: 2019-01-01

## 2019-01-01 RX ORDER — ALBUMIN (HUMAN) 12.5 G/50ML
SOLUTION INTRAVENOUS
Status: DISPENSED
Start: 2019-01-01 | End: 2019-01-01

## 2019-01-01 RX ORDER — ATORVASTATIN CALCIUM 10 MG/1
10 TABLET, FILM COATED ORAL NIGHTLY
Status: DISCONTINUED | OUTPATIENT
Start: 2019-01-01 | End: 2019-01-01

## 2019-01-01 RX ORDER — LORAZEPAM 2 MG/ML
0.5 CONCENTRATE ORAL
Status: DISCONTINUED | OUTPATIENT
Start: 2019-01-01 | End: 2019-01-01 | Stop reason: HOSPADM

## 2019-01-01 RX ORDER — ALBUMIN (HUMAN) 12.5 G/50ML
12.5 SOLUTION INTRAVENOUS AS NEEDED
Status: ACTIVE | OUTPATIENT
Start: 2019-01-01 | End: 2019-01-01

## 2019-01-01 RX ORDER — GLYCOPYRROLATE 0.2 MG/ML
0.2 INJECTION INTRAMUSCULAR; INTRAVENOUS
Status: DISCONTINUED | OUTPATIENT
Start: 2019-01-01 | End: 2019-01-01 | Stop reason: HOSPADM

## 2019-01-01 RX ORDER — DIVALPROEX SODIUM 125 MG/1
125 TABLET, DELAYED RELEASE ORAL NIGHTLY
Status: DISCONTINUED | OUTPATIENT
Start: 2019-01-01 | End: 2019-01-01

## 2019-01-01 RX ORDER — FENTANYL 50 UG/H
1 PATCH TRANSDERMAL
Status: DISCONTINUED | OUTPATIENT
Start: 2019-01-01 | End: 2019-01-01

## 2019-01-01 RX ORDER — ALBUMIN (HUMAN) 12.5 G/50ML
12.5 SOLUTION INTRAVENOUS AS NEEDED
Status: DISPENSED | OUTPATIENT
Start: 2019-01-01 | End: 2019-01-01

## 2019-01-01 RX ORDER — MORPHINE SULFATE 20 MG/ML
20 SOLUTION ORAL
Status: DISCONTINUED | OUTPATIENT
Start: 2019-01-01 | End: 2019-01-01

## 2019-01-01 RX ORDER — CARBOXYMETHYLCELLULOSE SODIUM 10 MG/ML
1 GEL OPHTHALMIC 3 TIMES DAILY PRN
Status: DISCONTINUED | OUTPATIENT
Start: 2019-01-01 | End: 2019-01-01

## 2019-01-01 RX ORDER — MULTIPLE VITAMINS W/ MINERALS TAB 9MG-400MCG
1 TAB ORAL DAILY
Status: DISCONTINUED | OUTPATIENT
Start: 2019-01-01 | End: 2019-01-01

## 2019-01-01 RX ORDER — PROMETHAZINE HYDROCHLORIDE 25 MG/1
12.5 SUPPOSITORY RECTAL EVERY 4 HOURS PRN
Status: DISCONTINUED | OUTPATIENT
Start: 2019-01-01 | End: 2019-01-01 | Stop reason: HOSPADM

## 2019-01-01 RX ORDER — CYCLOBENZAPRINE HCL 10 MG
5 TABLET ORAL 3 TIMES DAILY PRN
Status: DISCONTINUED | OUTPATIENT
Start: 2019-01-01 | End: 2019-01-01

## 2019-01-01 RX ORDER — DIPHENHYDRAMINE HYDROCHLORIDE 50 MG/ML
25 INJECTION INTRAMUSCULAR; INTRAVENOUS EVERY 6 HOURS PRN
Status: DISCONTINUED | OUTPATIENT
Start: 2019-01-01 | End: 2019-01-01 | Stop reason: HOSPADM

## 2019-01-01 RX ORDER — ACETYLCYSTEINE 200 MG/ML
4 SOLUTION ORAL; RESPIRATORY (INHALATION)
Status: DISCONTINUED | OUTPATIENT
Start: 2019-01-01 | End: 2019-01-01

## 2019-01-01 RX ORDER — SODIUM CHLORIDE 9 MG/ML
50 INJECTION, SOLUTION INTRAVENOUS CONTINUOUS
Status: DISCONTINUED | OUTPATIENT
Start: 2019-01-01 | End: 2019-01-01

## 2019-01-01 RX ORDER — METHYLPREDNISOLONE SODIUM SUCCINATE 40 MG/ML
40 INJECTION, POWDER, LYOPHILIZED, FOR SOLUTION INTRAMUSCULAR; INTRAVENOUS EVERY 8 HOURS
Status: DISCONTINUED | OUTPATIENT
Start: 2019-01-01 | End: 2019-01-01

## 2019-01-01 RX ORDER — ALBUMIN (HUMAN) 12.5 G/50ML
12.5 SOLUTION INTRAVENOUS AS NEEDED
Status: CANCELLED | OUTPATIENT
Start: 2019-01-01 | End: 2019-01-01

## 2019-01-01 RX ORDER — WARFARIN SODIUM 2.5 MG/1
2.5 TABLET ORAL
Status: COMPLETED | OUTPATIENT
Start: 2019-01-01 | End: 2019-01-01

## 2019-01-01 RX ORDER — DEXAMETHASONE 4 MG/1
4 TABLET ORAL EVERY 12 HOURS SCHEDULED
Status: DISCONTINUED | OUTPATIENT
Start: 2019-01-01 | End: 2019-01-01

## 2019-01-01 RX ORDER — ACETAMINOPHEN 325 MG/1
650 TABLET ORAL EVERY 4 HOURS PRN
Status: DISCONTINUED | OUTPATIENT
Start: 2019-01-01 | End: 2019-01-01 | Stop reason: HOSPADM

## 2019-01-01 RX ORDER — MORPHINE SULFATE 10 MG/ML
6 INJECTION INTRAMUSCULAR; INTRAVENOUS; SUBCUTANEOUS
Status: DISCONTINUED | OUTPATIENT
Start: 2019-01-01 | End: 2019-01-01

## 2019-01-01 RX ORDER — LORAZEPAM 2 MG/ML
2 CONCENTRATE ORAL
Status: DISCONTINUED | OUTPATIENT
Start: 2019-01-01 | End: 2019-01-01 | Stop reason: HOSPADM

## 2019-01-01 RX ORDER — MORPHINE SULFATE 2 MG/ML
4 INJECTION, SOLUTION INTRAMUSCULAR; INTRAVENOUS
Status: DISCONTINUED | OUTPATIENT
Start: 2019-01-01 | End: 2019-01-01

## 2019-01-01 RX ORDER — PROMETHAZINE HYDROCHLORIDE 6.25 MG/5ML
12.5 SYRUP ORAL EVERY 4 HOURS PRN
Status: DISCONTINUED | OUTPATIENT
Start: 2019-01-01 | End: 2019-01-01 | Stop reason: HOSPADM

## 2019-01-01 RX ORDER — TC 99M MEDRONATE 20 MG/10ML
23 INJECTION, POWDER, LYOPHILIZED, FOR SOLUTION INTRAVENOUS
Status: COMPLETED | OUTPATIENT
Start: 2019-01-01 | End: 2019-01-01

## 2019-01-01 RX ORDER — QUETIAPINE FUMARATE 25 MG/1
25 TABLET, FILM COATED ORAL NIGHTLY
Status: DISCONTINUED | OUTPATIENT
Start: 2019-01-01 | End: 2019-01-01

## 2019-01-01 RX ORDER — VANCOMYCIN HYDROCHLORIDE 1 G/200ML
15 INJECTION, SOLUTION INTRAVENOUS ONCE
Status: CANCELLED | OUTPATIENT
Start: 2019-01-01

## 2019-01-01 RX ORDER — LORAZEPAM 2 MG/ML
0.5 INJECTION INTRAMUSCULAR
Status: DISCONTINUED | OUTPATIENT
Start: 2019-01-01 | End: 2019-01-01 | Stop reason: HOSPADM

## 2019-01-01 RX ORDER — MIDODRINE HYDROCHLORIDE 5 MG/1
10 TABLET ORAL
Status: DISCONTINUED | OUTPATIENT
Start: 2019-01-01 | End: 2019-01-01

## 2019-01-01 RX ORDER — PROPOFOL 10 MG/ML
VIAL (ML) INTRAVENOUS AS NEEDED
Status: DISCONTINUED | OUTPATIENT
Start: 2019-01-01 | End: 2019-01-01 | Stop reason: SURG

## 2019-01-01 RX ORDER — NICOTINE POLACRILEX 4 MG
15 LOZENGE BUCCAL
Status: DISCONTINUED | OUTPATIENT
Start: 2019-01-01 | End: 2019-01-01

## 2019-01-01 RX ORDER — MORPHINE SULFATE 20 MG/ML
10 SOLUTION ORAL
Status: DISCONTINUED | OUTPATIENT
Start: 2019-01-01 | End: 2019-01-01

## 2019-01-01 RX ORDER — PROMETHAZINE HYDROCHLORIDE 25 MG/ML
12.5 INJECTION, SOLUTION INTRAMUSCULAR; INTRAVENOUS EVERY 4 HOURS PRN
Status: DISCONTINUED | OUTPATIENT
Start: 2019-01-01 | End: 2019-01-01 | Stop reason: HOSPADM

## 2019-01-01 RX ORDER — PROPOFOL 10 MG/ML
VIAL (ML) INTRAVENOUS CONTINUOUS PRN
Status: DISCONTINUED | OUTPATIENT
Start: 2019-01-01 | End: 2019-01-01 | Stop reason: SURG

## 2019-01-01 RX ORDER — LIDOCAINE HYDROCHLORIDE 20 MG/ML
5 SOLUTION OROPHARYNGEAL EVERY 4 HOURS PRN
Status: DISCONTINUED | OUTPATIENT
Start: 2019-01-01 | End: 2019-01-01 | Stop reason: HOSPADM

## 2019-01-01 RX ORDER — HYDROCODONE BITARTRATE AND ACETAMINOPHEN 5; 325 MG/1; MG/1
1 TABLET ORAL 2 TIMES DAILY
Status: DISCONTINUED | OUTPATIENT
Start: 2019-01-01 | End: 2019-01-01

## 2019-01-01 RX ORDER — DIPHENHYDRAMINE HCL 25 MG
25 CAPSULE ORAL EVERY 6 HOURS PRN
Status: DISCONTINUED | OUTPATIENT
Start: 2019-01-01 | End: 2019-01-01 | Stop reason: HOSPADM

## 2019-01-01 RX ORDER — HYDROMORPHONE HYDROCHLORIDE 1 MG/ML
1 INJECTION, SOLUTION INTRAMUSCULAR; INTRAVENOUS; SUBCUTANEOUS
Status: DISCONTINUED | OUTPATIENT
Start: 2019-01-01 | End: 2019-01-01 | Stop reason: HOSPADM

## 2019-01-01 RX ORDER — PROMETHAZINE HYDROCHLORIDE 25 MG/1
25 TABLET ORAL ONCE AS NEEDED
Status: DISCONTINUED | OUTPATIENT
Start: 2019-01-01 | End: 2019-01-01 | Stop reason: HOSPADM

## 2019-01-01 RX ORDER — SCOLOPAMINE TRANSDERMAL SYSTEM 1 MG/1
1 PATCH, EXTENDED RELEASE TRANSDERMAL
Status: DISCONTINUED | OUTPATIENT
Start: 2019-01-01 | End: 2019-01-01 | Stop reason: HOSPADM

## 2019-01-01 RX ORDER — ESCITALOPRAM OXALATE 10 MG/1
10 TABLET ORAL DAILY
Status: DISCONTINUED | OUTPATIENT
Start: 2019-01-01 | End: 2019-01-01

## 2019-01-01 RX ORDER — ALBUMIN (HUMAN) 12.5 G/50ML
25 SOLUTION INTRAVENOUS ONCE
Status: COMPLETED | OUTPATIENT
Start: 2019-01-01 | End: 2019-01-01

## 2019-01-01 RX ORDER — ACETAMINOPHEN 325 MG/1
650 TABLET ORAL EVERY 4 HOURS PRN
Status: DISCONTINUED | OUTPATIENT
Start: 2019-01-01 | End: 2019-01-01

## 2019-01-01 RX ORDER — AMIODARONE HYDROCHLORIDE 200 MG/1
400 TABLET ORAL EVERY 12 HOURS SCHEDULED
Status: DISCONTINUED | OUTPATIENT
Start: 2019-01-01 | End: 2019-01-01

## 2019-01-01 RX ORDER — CASTOR OIL AND BALSAM, PERU 788; 87 MG/G; MG/G
OINTMENT TOPICAL EVERY 12 HOURS SCHEDULED
Status: DISCONTINUED | OUTPATIENT
Start: 2019-01-01 | End: 2019-01-01

## 2019-01-01 RX ORDER — LIDOCAINE HYDROCHLORIDE 10 MG/ML
INJECTION, SOLUTION EPIDURAL; INFILTRATION; INTRACAUDAL; PERINEURAL AS NEEDED
Status: DISCONTINUED | OUTPATIENT
Start: 2019-01-01 | End: 2019-01-01 | Stop reason: HOSPADM

## 2019-01-01 RX ORDER — ACETAMINOPHEN 160 MG/5ML
650 SOLUTION ORAL EVERY 4 HOURS PRN
Status: DISCONTINUED | OUTPATIENT
Start: 2019-01-01 | End: 2019-01-01 | Stop reason: HOSPADM

## 2019-01-01 RX ORDER — OXYCODONE AND ACETAMINOPHEN 10; 325 MG/1; MG/1
1 TABLET ORAL EVERY 4 HOURS PRN
Status: DISCONTINUED | OUTPATIENT
Start: 2019-01-01 | End: 2019-01-01

## 2019-01-01 RX ORDER — LEVOTHYROXINE SODIUM 0.07 MG/1
75 TABLET ORAL
Status: DISCONTINUED | OUTPATIENT
Start: 2019-01-01 | End: 2019-01-01

## 2019-01-01 RX ORDER — SODIUM CHLORIDE, SODIUM LACTATE, POTASSIUM CHLORIDE, CALCIUM CHLORIDE 600; 310; 30; 20 MG/100ML; MG/100ML; MG/100ML; MG/100ML
30 INJECTION, SOLUTION INTRAVENOUS CONTINUOUS
Status: DISCONTINUED | OUTPATIENT
Start: 2019-01-01 | End: 2019-01-01

## 2019-01-01 RX ORDER — MANNITOL 250 MG/ML
12.5 INJECTION, SOLUTION INTRAVENOUS AS NEEDED
Status: ACTIVE | OUTPATIENT
Start: 2019-01-01 | End: 2019-01-01

## 2019-01-01 RX ORDER — ALBUMIN (HUMAN) 12.5 G/50ML
12.5 SOLUTION INTRAVENOUS ONCE
Status: COMPLETED | OUTPATIENT
Start: 2019-01-01 | End: 2019-01-01

## 2019-01-01 RX ORDER — LORAZEPAM 2 MG/ML
2 INJECTION INTRAMUSCULAR
Status: DISCONTINUED | OUTPATIENT
Start: 2019-01-01 | End: 2019-01-01 | Stop reason: HOSPADM

## 2019-01-01 RX ORDER — ONDANSETRON 2 MG/ML
4 INJECTION INTRAMUSCULAR; INTRAVENOUS EVERY 4 HOURS PRN
Status: DISCONTINUED | OUTPATIENT
Start: 2019-01-01 | End: 2019-01-01

## 2019-01-01 RX ORDER — PANTOPRAZOLE SODIUM 40 MG/1
40 TABLET, DELAYED RELEASE ORAL
Status: DISCONTINUED | OUTPATIENT
Start: 2019-01-01 | End: 2019-01-01

## 2019-01-01 RX ORDER — HYDROMORPHONE HYDROCHLORIDE 1 MG/ML
0.5 INJECTION, SOLUTION INTRAMUSCULAR; INTRAVENOUS; SUBCUTANEOUS
Status: DISCONTINUED | OUTPATIENT
Start: 2019-01-01 | End: 2019-01-01 | Stop reason: HOSPADM

## 2019-01-01 RX ORDER — DEXTROSE MONOHYDRATE 25 G/50ML
25 INJECTION, SOLUTION INTRAVENOUS
Status: DISCONTINUED | OUTPATIENT
Start: 2019-01-01 | End: 2019-01-01

## 2019-01-01 RX ORDER — HEPARIN SODIUM 5000 [USP'U]/ML
40-80 INJECTION, SOLUTION INTRAVENOUS; SUBCUTANEOUS EVERY 6 HOURS PRN
Status: DISCONTINUED | OUTPATIENT
Start: 2019-01-01 | End: 2019-01-01

## 2019-01-01 RX ORDER — ALBUMIN (HUMAN) 12.5 G/50ML
SOLUTION INTRAVENOUS
Status: COMPLETED
Start: 2019-01-01 | End: 2019-01-01

## 2019-01-01 RX ORDER — ALBUMIN (HUMAN) 12.5 G/50ML
25 SOLUTION INTRAVENOUS
Status: COMPLETED | OUTPATIENT
Start: 2019-01-01 | End: 2019-01-01

## 2019-01-01 RX ORDER — ASPIRIN 81 MG/1
81 TABLET ORAL DAILY
Status: DISCONTINUED | OUTPATIENT
Start: 2019-01-01 | End: 2019-01-01

## 2019-01-01 RX ORDER — METHYLPREDNISOLONE SODIUM SUCCINATE 40 MG/ML
40 INJECTION, POWDER, LYOPHILIZED, FOR SOLUTION INTRAMUSCULAR; INTRAVENOUS EVERY 12 HOURS
Status: DISCONTINUED | OUTPATIENT
Start: 2019-01-01 | End: 2019-01-01

## 2019-01-01 RX ORDER — WARFARIN SODIUM 5 MG/1
5 TABLET ORAL
Status: DISCONTINUED | OUTPATIENT
Start: 2019-01-01 | End: 2019-01-01 | Stop reason: DRUGHIGH

## 2019-01-01 RX ORDER — DIGOXIN 0.25 MG/ML
250 INJECTION INTRAMUSCULAR; INTRAVENOUS ONCE
Status: COMPLETED | OUTPATIENT
Start: 2019-01-01 | End: 2019-01-01

## 2019-01-01 RX ADMIN — AMIODARONE HYDROCHLORIDE 400 MG: 200 TABLET ORAL at 09:57

## 2019-01-01 RX ADMIN — INSULIN GLARGINE 10 UNITS: 100 INJECTION, SOLUTION SUBCUTANEOUS at 21:47

## 2019-01-01 RX ADMIN — METOPROLOL TARTRATE 12.5 MG: 25 TABLET ORAL at 08:15

## 2019-01-01 RX ADMIN — METOPROLOL TARTRATE 12.5 MG: 25 TABLET ORAL at 21:30

## 2019-01-01 RX ADMIN — INSULIN LISPRO 2 UNITS: 100 INJECTION, SOLUTION INTRAVENOUS; SUBCUTANEOUS at 21:23

## 2019-01-01 RX ADMIN — DEXAMETHASONE 4 MG: 4 TABLET ORAL at 21:13

## 2019-01-01 RX ADMIN — HYDROMORPHONE HYDROCHLORIDE 0.5 MG: 1 INJECTION, SOLUTION INTRAMUSCULAR; INTRAVENOUS; SUBCUTANEOUS at 16:39

## 2019-01-01 RX ADMIN — METOPROLOL TARTRATE 12.5 MG: 25 TABLET ORAL at 09:06

## 2019-01-01 RX ADMIN — IPRATROPIUM BROMIDE AND ALBUTEROL SULFATE 3 ML: 2.5; .5 SOLUTION RESPIRATORY (INHALATION) at 18:52

## 2019-01-01 RX ADMIN — CASTOR OIL AND BALSAM, PERU 5 G: 788; 87 OINTMENT TOPICAL at 09:57

## 2019-01-01 RX ADMIN — INSULIN LISPRO 2 UNITS: 100 INJECTION, SOLUTION INTRAVENOUS; SUBCUTANEOUS at 20:54

## 2019-01-01 RX ADMIN — ACETYLCYSTEINE 4 ML: 200 SOLUTION ORAL; RESPIRATORY (INHALATION) at 08:28

## 2019-01-01 RX ADMIN — INSULIN LISPRO 6 UNITS: 100 INJECTION, SOLUTION INTRAVENOUS; SUBCUTANEOUS at 21:01

## 2019-01-01 RX ADMIN — POLYETHYLENE GLYCOL 3350 17 G: 17 POWDER, FOR SOLUTION ORAL at 08:48

## 2019-01-01 RX ADMIN — ASPIRIN 81 MG: 81 TABLET, COATED ORAL at 09:44

## 2019-01-01 RX ADMIN — HYDROCODONE BITARTRATE AND ACETAMINOPHEN 1 TABLET: 5; 325 TABLET ORAL at 21:12

## 2019-01-01 RX ADMIN — IPRATROPIUM BROMIDE AND ALBUTEROL SULFATE 3 ML: 2.5; .5 SOLUTION RESPIRATORY (INHALATION) at 15:46

## 2019-01-01 RX ADMIN — INSULIN LISPRO 2 UNITS: 100 INJECTION, SOLUTION INTRAVENOUS; SUBCUTANEOUS at 18:27

## 2019-01-01 RX ADMIN — ACETYLCYSTEINE 3 ML: 200 SOLUTION ORAL; RESPIRATORY (INHALATION) at 23:59

## 2019-01-01 RX ADMIN — DEXAMETHASONE 4 MG: 4 TABLET ORAL at 12:23

## 2019-01-01 RX ADMIN — MIDODRINE HYDROCHLORIDE 10 MG: 5 TABLET ORAL at 08:54

## 2019-01-01 RX ADMIN — DIVALPROEX SODIUM 125 MG: 125 TABLET, DELAYED RELEASE ORAL at 21:13

## 2019-01-01 RX ADMIN — INSULIN LISPRO 2 UNITS: 100 INJECTION, SOLUTION INTRAVENOUS; SUBCUTANEOUS at 08:48

## 2019-01-01 RX ADMIN — ESCITALOPRAM 10 MG: 10 TABLET, FILM COATED ORAL at 12:35

## 2019-01-01 RX ADMIN — DIVALPROEX SODIUM 125 MG: 125 TABLET, DELAYED RELEASE ORAL at 22:00

## 2019-01-01 RX ADMIN — INSULIN LISPRO 4 UNITS: 100 INJECTION, SOLUTION INTRAVENOUS; SUBCUTANEOUS at 21:35

## 2019-01-01 RX ADMIN — IPRATROPIUM BROMIDE AND ALBUTEROL SULFATE 3 ML: 2.5; .5 SOLUTION RESPIRATORY (INHALATION) at 20:59

## 2019-01-01 RX ADMIN — ESCITALOPRAM 10 MG: 10 TABLET, FILM COATED ORAL at 08:06

## 2019-01-01 RX ADMIN — METOPROLOL TARTRATE 12.5 MG: 25 TABLET ORAL at 21:13

## 2019-01-01 RX ADMIN — POLYETHYLENE GLYCOL 3350 17 G: 17 POWDER, FOR SOLUTION ORAL at 08:05

## 2019-01-01 RX ADMIN — HEPARIN SODIUM 17.5 UNITS/KG/HR: 10000 INJECTION, SOLUTION INTRAVENOUS at 00:58

## 2019-01-01 RX ADMIN — ESCITALOPRAM 10 MG: 10 TABLET, FILM COATED ORAL at 16:00

## 2019-01-01 RX ADMIN — IPRATROPIUM BROMIDE AND ALBUTEROL SULFATE 3 ML: 2.5; .5 SOLUTION RESPIRATORY (INHALATION) at 19:43

## 2019-01-01 RX ADMIN — IPRATROPIUM BROMIDE AND ALBUTEROL SULFATE 3 ML: 2.5; .5 SOLUTION RESPIRATORY (INHALATION) at 03:34

## 2019-01-01 RX ADMIN — INSULIN GLARGINE 10 UNITS: 100 INJECTION, SOLUTION SUBCUTANEOUS at 21:50

## 2019-01-01 RX ADMIN — HYDROCODONE BITARTRATE AND ACETAMINOPHEN 1 TABLET: 5; 325 TABLET ORAL at 21:18

## 2019-01-01 RX ADMIN — AMIODARONE HYDROCHLORIDE 0.5 MG/MIN: 1.8 INJECTION, SOLUTION INTRAVENOUS at 22:04

## 2019-01-01 RX ADMIN — HYDROCODONE BITARTRATE AND ACETAMINOPHEN 1 TABLET: 5; 325 TABLET ORAL at 09:20

## 2019-01-01 RX ADMIN — ASPIRIN 81 MG: 81 TABLET, COATED ORAL at 09:36

## 2019-01-01 RX ADMIN — MULTIPLE VITAMINS W/ MINERALS TAB 1 TABLET: TAB at 12:52

## 2019-01-01 RX ADMIN — DIVALPROEX SODIUM 125 MG: 125 TABLET, DELAYED RELEASE ORAL at 21:59

## 2019-01-01 RX ADMIN — INSULIN LISPRO 4 UNITS: 100 INJECTION, SOLUTION INTRAVENOUS; SUBCUTANEOUS at 21:07

## 2019-01-01 RX ADMIN — ACETYLCYSTEINE 4 ML: 200 SOLUTION ORAL; RESPIRATORY (INHALATION) at 07:09

## 2019-01-01 RX ADMIN — ESCITALOPRAM 10 MG: 10 TABLET, FILM COATED ORAL at 08:23

## 2019-01-01 RX ADMIN — METOPROLOL TARTRATE 25 MG: 25 TABLET ORAL at 08:53

## 2019-01-01 RX ADMIN — MIDODRINE HYDROCHLORIDE 10 MG: 5 TABLET ORAL at 17:08

## 2019-01-01 RX ADMIN — LEVOTHYROXINE SODIUM 75 MCG: 75 TABLET ORAL at 06:37

## 2019-01-01 RX ADMIN — HYDROMORPHONE HYDROCHLORIDE 0.5 MG: 1 INJECTION, SOLUTION INTRAMUSCULAR; INTRAVENOUS; SUBCUTANEOUS at 23:38

## 2019-01-01 RX ADMIN — IPRATROPIUM BROMIDE AND ALBUTEROL SULFATE 3 ML: 2.5; .5 SOLUTION RESPIRATORY (INHALATION) at 23:39

## 2019-01-01 RX ADMIN — AMIODARONE HYDROCHLORIDE 400 MG: 200 TABLET ORAL at 21:18

## 2019-01-01 RX ADMIN — ACETYLCYSTEINE 4 ML: 200 SOLUTION ORAL; RESPIRATORY (INHALATION) at 15:51

## 2019-01-01 RX ADMIN — METHYLPREDNISOLONE SODIUM SUCCINATE 40 MG: 40 INJECTION, POWDER, FOR SOLUTION INTRAMUSCULAR; INTRAVENOUS at 01:30

## 2019-01-01 RX ADMIN — ACETYLCYSTEINE 4 ML: 200 SOLUTION ORAL; RESPIRATORY (INHALATION) at 07:21

## 2019-01-01 RX ADMIN — AMIODARONE HYDROCHLORIDE 400 MG: 200 TABLET ORAL at 08:55

## 2019-01-01 RX ADMIN — OXYCODONE HYDROCHLORIDE AND ACETAMINOPHEN 1 TABLET: 10; 325 TABLET ORAL at 13:04

## 2019-01-01 RX ADMIN — IPRATROPIUM BROMIDE AND ALBUTEROL SULFATE 3 ML: 2.5; .5 SOLUTION RESPIRATORY (INHALATION) at 16:31

## 2019-01-01 RX ADMIN — LEVOTHYROXINE SODIUM 75 MCG: 75 TABLET ORAL at 05:38

## 2019-01-01 RX ADMIN — SODIUM CHLORIDE 250 ML: 9 INJECTION, SOLUTION INTRAVENOUS at 08:22

## 2019-01-01 RX ADMIN — SODIUM CHLORIDE 250 ML: 9 INJECTION, SOLUTION INTRAVENOUS at 12:37

## 2019-01-01 RX ADMIN — DEXAMETHASONE 4 MG: 4 TABLET ORAL at 09:16

## 2019-01-01 RX ADMIN — DIVALPROEX SODIUM 125 MG: 125 TABLET, DELAYED RELEASE ORAL at 20:41

## 2019-01-01 RX ADMIN — OXYCODONE HYDROCHLORIDE AND ACETAMINOPHEN 1 TABLET: 10; 325 TABLET ORAL at 06:55

## 2019-01-01 RX ADMIN — ATORVASTATIN CALCIUM 10 MG: 10 TABLET, FILM COATED ORAL at 20:32

## 2019-01-01 RX ADMIN — PREDNISONE 40 MG: 20 TABLET ORAL at 17:10

## 2019-01-01 RX ADMIN — PREDNISONE 40 MG: 20 TABLET ORAL at 08:18

## 2019-01-01 RX ADMIN — METOPROLOL TARTRATE 12.5 MG: 25 TABLET ORAL at 20:23

## 2019-01-01 RX ADMIN — PREDNISONE 40 MG: 20 TABLET ORAL at 19:02

## 2019-01-01 RX ADMIN — ASPIRIN 81 MG: 81 TABLET, COATED ORAL at 08:11

## 2019-01-01 RX ADMIN — LEVOTHYROXINE SODIUM 75 MCG: 75 TABLET ORAL at 06:33

## 2019-01-01 RX ADMIN — AMIODARONE HYDROCHLORIDE 0.5 MG/MIN: 1.8 INJECTION, SOLUTION INTRAVENOUS at 08:16

## 2019-01-01 RX ADMIN — LORAZEPAM 0.5 MG: 2 INJECTION INTRAMUSCULAR; INTRAVENOUS at 04:37

## 2019-01-01 RX ADMIN — CASTOR OIL AND BALSAM, PERU 5 G: 788; 87 OINTMENT TOPICAL at 12:36

## 2019-01-01 RX ADMIN — HEPARIN SODIUM 18.5 UNITS/KG/HR: 10000 INJECTION, SOLUTION INTRAVENOUS at 21:42

## 2019-01-01 RX ADMIN — INSULIN LISPRO 2 UNITS: 100 INJECTION, SOLUTION INTRAVENOUS; SUBCUTANEOUS at 18:10

## 2019-01-01 RX ADMIN — HYDROCODONE BITARTRATE AND ACETAMINOPHEN 1 TABLET: 5; 325 TABLET ORAL at 21:07

## 2019-01-01 RX ADMIN — MULTIPLE VITAMINS W/ MINERALS TAB 1 TABLET: TAB at 13:21

## 2019-01-01 RX ADMIN — INSULIN LISPRO 3 UNITS: 100 INJECTION, SOLUTION INTRAVENOUS; SUBCUTANEOUS at 08:13

## 2019-01-01 RX ADMIN — IPRATROPIUM BROMIDE AND ALBUTEROL SULFATE 3 ML: 2.5; .5 SOLUTION RESPIRATORY (INHALATION) at 11:39

## 2019-01-01 RX ADMIN — PANTOPRAZOLE SODIUM 40 MG: 40 TABLET, DELAYED RELEASE ORAL at 06:00

## 2019-01-01 RX ADMIN — ALBUMIN HUMAN 25 G: 0.25 SOLUTION INTRAVENOUS at 09:51

## 2019-01-01 RX ADMIN — METOPROLOL TARTRATE 12.5 MG: 25 TABLET ORAL at 08:06

## 2019-01-01 RX ADMIN — MIDODRINE HYDROCHLORIDE 10 MG: 5 TABLET ORAL at 11:30

## 2019-01-01 RX ADMIN — IPRATROPIUM BROMIDE AND ALBUTEROL SULFATE 3 ML: 2.5; .5 SOLUTION RESPIRATORY (INHALATION) at 16:30

## 2019-01-01 RX ADMIN — IPRATROPIUM BROMIDE AND ALBUTEROL SULFATE 3 ML: 2.5; .5 SOLUTION RESPIRATORY (INHALATION) at 23:01

## 2019-01-01 RX ADMIN — LEVOTHYROXINE SODIUM 75 MCG: 75 TABLET ORAL at 07:00

## 2019-01-01 RX ADMIN — ACETAMINOPHEN 650 MG: 325 TABLET, FILM COATED ORAL at 17:11

## 2019-01-01 RX ADMIN — INSULIN GLARGINE 10 UNITS: 100 INJECTION, SOLUTION SUBCUTANEOUS at 21:28

## 2019-01-01 RX ADMIN — OXYCODONE HYDROCHLORIDE AND ACETAMINOPHEN 1 TABLET: 10; 325 TABLET ORAL at 02:02

## 2019-01-01 RX ADMIN — CASTOR OIL AND BALSAM, PERU 5 G: 788; 87 OINTMENT TOPICAL at 21:13

## 2019-01-01 RX ADMIN — OXYCODONE HYDROCHLORIDE AND ACETAMINOPHEN 1 TABLET: 10; 325 TABLET ORAL at 21:08

## 2019-01-01 RX ADMIN — OXYCODONE HYDROCHLORIDE AND ACETAMINOPHEN 1 TABLET: 10; 325 TABLET ORAL at 10:16

## 2019-01-01 RX ADMIN — DIVALPROEX SODIUM 125 MG: 125 TABLET, DELAYED RELEASE ORAL at 20:32

## 2019-01-01 RX ADMIN — INSULIN LISPRO 2 UNITS: 100 INJECTION, SOLUTION INTRAVENOUS; SUBCUTANEOUS at 21:22

## 2019-01-01 RX ADMIN — MULTIPLE VITAMINS W/ MINERALS TAB 1 TABLET: TAB at 09:45

## 2019-01-01 RX ADMIN — ATORVASTATIN CALCIUM 10 MG: 10 TABLET, FILM COATED ORAL at 21:09

## 2019-01-01 RX ADMIN — DIVALPROEX SODIUM 125 MG: 125 TABLET, DELAYED RELEASE ORAL at 20:47

## 2019-01-01 RX ADMIN — IPRATROPIUM BROMIDE AND ALBUTEROL SULFATE 3 ML: 2.5; .5 SOLUTION RESPIRATORY (INHALATION) at 14:52

## 2019-01-01 RX ADMIN — ALBUMIN HUMAN 25 G: 0.25 SOLUTION INTRAVENOUS at 10:15

## 2019-01-01 RX ADMIN — METOPROLOL TARTRATE 12.5 MG: 25 TABLET ORAL at 20:21

## 2019-01-01 RX ADMIN — OXYCODONE HYDROCHLORIDE AND ACETAMINOPHEN 1 TABLET: 10; 325 TABLET ORAL at 16:02

## 2019-01-01 RX ADMIN — INSULIN GLARGINE 10 UNITS: 100 INJECTION, SOLUTION SUBCUTANEOUS at 20:48

## 2019-01-01 RX ADMIN — VANCOMYCIN HYDROCHLORIDE 2250 MG: 10 INJECTION, POWDER, LYOPHILIZED, FOR SOLUTION INTRAVENOUS at 19:04

## 2019-01-01 RX ADMIN — IPRATROPIUM BROMIDE AND ALBUTEROL SULFATE 3 ML: 2.5; .5 SOLUTION RESPIRATORY (INHALATION) at 06:45

## 2019-01-01 RX ADMIN — ATORVASTATIN CALCIUM 10 MG: 10 TABLET, FILM COATED ORAL at 20:45

## 2019-01-01 RX ADMIN — HYDROCODONE BITARTRATE AND ACETAMINOPHEN 1 TABLET: 5; 325 TABLET ORAL at 20:32

## 2019-01-01 RX ADMIN — FENTANYL 1 PATCH: 50 PATCH TRANSDERMAL at 18:17

## 2019-01-01 RX ADMIN — OXYCODONE HYDROCHLORIDE AND ACETAMINOPHEN 1 TABLET: 10; 325 TABLET ORAL at 21:31

## 2019-01-01 RX ADMIN — QUETIAPINE FUMARATE 25 MG: 25 TABLET ORAL at 20:39

## 2019-01-01 RX ADMIN — CASTOR OIL AND BALSAM, PERU 5 G: 788; 87 OINTMENT TOPICAL at 20:22

## 2019-01-01 RX ADMIN — CASTOR OIL AND BALSAM, PERU 5 G: 788; 87 OINTMENT TOPICAL at 09:05

## 2019-01-01 RX ADMIN — ESCITALOPRAM 10 MG: 10 TABLET, FILM COATED ORAL at 12:23

## 2019-01-01 RX ADMIN — HYDROMORPHONE HYDROCHLORIDE 0.5 MG: 1 INJECTION, SOLUTION INTRAMUSCULAR; INTRAVENOUS; SUBCUTANEOUS at 02:17

## 2019-01-01 RX ADMIN — WARFARIN SODIUM 5 MG: 5 TABLET ORAL at 21:18

## 2019-01-01 RX ADMIN — OXYCODONE HYDROCHLORIDE AND ACETAMINOPHEN 1 TABLET: 10; 325 TABLET ORAL at 10:34

## 2019-01-01 RX ADMIN — LEVOTHYROXINE SODIUM 75 MCG: 75 TABLET ORAL at 05:24

## 2019-01-01 RX ADMIN — ESCITALOPRAM 10 MG: 10 TABLET, FILM COATED ORAL at 10:00

## 2019-01-01 RX ADMIN — CASTOR OIL AND BALSAM, PERU 5 G: 788; 87 OINTMENT TOPICAL at 10:01

## 2019-01-01 RX ADMIN — CASTOR OIL AND BALSAM, PERU 5 G: 788; 87 OINTMENT TOPICAL at 22:00

## 2019-01-01 RX ADMIN — IPRATROPIUM BROMIDE AND ALBUTEROL SULFATE 3 ML: 2.5; .5 SOLUTION RESPIRATORY (INHALATION) at 08:07

## 2019-01-01 RX ADMIN — ALBUMIN (HUMAN) 25 G: 12.5 SOLUTION INTRAVENOUS at 10:32

## 2019-01-01 RX ADMIN — HYDROCODONE BITARTRATE AND ACETAMINOPHEN 1 TABLET: 5; 325 TABLET ORAL at 12:22

## 2019-01-01 RX ADMIN — IPRATROPIUM BROMIDE AND ALBUTEROL SULFATE 3 ML: 2.5; .5 SOLUTION RESPIRATORY (INHALATION) at 11:03

## 2019-01-01 RX ADMIN — CASTOR OIL AND BALSAM, PERU 5 G: 788; 87 OINTMENT TOPICAL at 21:38

## 2019-01-01 RX ADMIN — CALCIUM ACETATE 1334 MG: 667 CAPSULE ORAL at 13:22

## 2019-01-01 RX ADMIN — MIDODRINE HYDROCHLORIDE 10 MG: 5 TABLET ORAL at 08:04

## 2019-01-01 RX ADMIN — DEXAMETHASONE 4 MG: 4 TABLET ORAL at 10:17

## 2019-01-01 RX ADMIN — ESCITALOPRAM 10 MG: 10 TABLET, FILM COATED ORAL at 12:03

## 2019-01-01 RX ADMIN — DEXAMETHASONE 4 MG: 4 TABLET ORAL at 08:19

## 2019-01-01 RX ADMIN — CASTOR OIL AND BALSAM, PERU 5 G: 788; 87 OINTMENT TOPICAL at 22:05

## 2019-01-01 RX ADMIN — INSULIN LISPRO 2 UNITS: 100 INJECTION, SOLUTION INTRAVENOUS; SUBCUTANEOUS at 09:04

## 2019-01-01 RX ADMIN — ACETYLCYSTEINE 4 ML: 200 SOLUTION ORAL; RESPIRATORY (INHALATION) at 23:11

## 2019-01-01 RX ADMIN — QUETIAPINE FUMARATE 25 MG: 25 TABLET ORAL at 20:50

## 2019-01-01 RX ADMIN — METHYLPREDNISOLONE SODIUM SUCCINATE 40 MG: 40 INJECTION, POWDER, FOR SOLUTION INTRAMUSCULAR; INTRAVENOUS at 16:00

## 2019-01-01 RX ADMIN — AMIODARONE HYDROCHLORIDE 400 MG: 200 TABLET ORAL at 21:10

## 2019-01-01 RX ADMIN — ESCITALOPRAM 10 MG: 10 TABLET, FILM COATED ORAL at 08:54

## 2019-01-01 RX ADMIN — OXYCODONE HYDROCHLORIDE AND ACETAMINOPHEN 1 TABLET: 10; 325 TABLET ORAL at 09:18

## 2019-01-01 RX ADMIN — METOPROLOL TARTRATE 12.5 MG: 25 TABLET ORAL at 08:49

## 2019-01-01 RX ADMIN — METOPROLOL TARTRATE 12.5 MG: 25 TABLET ORAL at 21:23

## 2019-01-01 RX ADMIN — IPRATROPIUM BROMIDE AND ALBUTEROL SULFATE 3 ML: 2.5; .5 SOLUTION RESPIRATORY (INHALATION) at 23:38

## 2019-01-01 RX ADMIN — CASTOR OIL AND BALSAM, PERU 5 G: 788; 87 OINTMENT TOPICAL at 09:44

## 2019-01-01 RX ADMIN — HYDROMORPHONE HYDROCHLORIDE 0.5 MG: 1 INJECTION, SOLUTION INTRAMUSCULAR; INTRAVENOUS; SUBCUTANEOUS at 06:17

## 2019-01-01 RX ADMIN — PANTOPRAZOLE SODIUM 40 MG: 40 TABLET, DELAYED RELEASE ORAL at 06:17

## 2019-01-01 RX ADMIN — POLYETHYLENE GLYCOL 3350 17 G: 17 POWDER, FOR SOLUTION ORAL at 08:25

## 2019-01-01 RX ADMIN — INSULIN LISPRO 2 UNITS: 100 INJECTION, SOLUTION INTRAVENOUS; SUBCUTANEOUS at 14:58

## 2019-01-01 RX ADMIN — ESCITALOPRAM 10 MG: 10 TABLET, FILM COATED ORAL at 12:51

## 2019-01-01 RX ADMIN — IPRATROPIUM BROMIDE AND ALBUTEROL SULFATE 3 ML: 2.5; .5 SOLUTION RESPIRATORY (INHALATION) at 23:10

## 2019-01-01 RX ADMIN — OXYCODONE HYDROCHLORIDE AND ACETAMINOPHEN 1 TABLET: 10; 325 TABLET ORAL at 13:28

## 2019-01-01 RX ADMIN — ACETYLCYSTEINE 4 ML: 200 SOLUTION ORAL; RESPIRATORY (INHALATION) at 20:56

## 2019-01-01 RX ADMIN — GLYCOPYRROLATE 0.2 MG: 0.2 INJECTION, SOLUTION INTRAMUSCULAR; INTRAVITREAL at 09:27

## 2019-01-01 RX ADMIN — CASTOR OIL AND BALSAM, PERU 5 G: 788; 87 OINTMENT TOPICAL at 20:32

## 2019-01-01 RX ADMIN — IPRATROPIUM BROMIDE AND ALBUTEROL SULFATE 3 ML: 2.5; .5 SOLUTION RESPIRATORY (INHALATION) at 07:15

## 2019-01-01 RX ADMIN — ACETYLCYSTEINE 4 ML: 200 SOLUTION ORAL; RESPIRATORY (INHALATION) at 23:12

## 2019-01-01 RX ADMIN — DEXAMETHASONE 4 MG: 4 TABLET ORAL at 08:29

## 2019-01-01 RX ADMIN — PREDNISONE 40 MG: 20 TABLET ORAL at 18:42

## 2019-01-01 RX ADMIN — DEXAMETHASONE 4 MG: 4 TABLET ORAL at 08:54

## 2019-01-01 RX ADMIN — PANTOPRAZOLE SODIUM 40 MG: 40 TABLET, DELAYED RELEASE ORAL at 05:54

## 2019-01-01 RX ADMIN — METOPROLOL TARTRATE 12.5 MG: 25 TABLET ORAL at 08:19

## 2019-01-01 RX ADMIN — WARFARIN SODIUM 5 MG: 5 TABLET ORAL at 21:31

## 2019-01-01 RX ADMIN — HYDROMORPHONE HYDROCHLORIDE 0.5 MG: 1 INJECTION, SOLUTION INTRAMUSCULAR; INTRAVENOUS; SUBCUTANEOUS at 06:45

## 2019-01-01 RX ADMIN — IPRATROPIUM BROMIDE AND ALBUTEROL SULFATE 3 ML: 2.5; .5 SOLUTION RESPIRATORY (INHALATION) at 15:37

## 2019-01-01 RX ADMIN — ACETYLCYSTEINE 4 ML: 200 SOLUTION ORAL; RESPIRATORY (INHALATION) at 07:03

## 2019-01-01 RX ADMIN — METOPROLOL TARTRATE 12.5 MG: 25 TABLET ORAL at 09:45

## 2019-01-01 RX ADMIN — ESCITALOPRAM 10 MG: 10 TABLET, FILM COATED ORAL at 13:43

## 2019-01-01 RX ADMIN — DEXAMETHASONE 4 MG: 4 TABLET ORAL at 21:30

## 2019-01-01 RX ADMIN — IPRATROPIUM BROMIDE AND ALBUTEROL SULFATE 3 ML: 2.5; .5 SOLUTION RESPIRATORY (INHALATION) at 11:54

## 2019-01-01 RX ADMIN — ACETYLCYSTEINE 4 ML: 200 SOLUTION ORAL; RESPIRATORY (INHALATION) at 15:24

## 2019-01-01 RX ADMIN — OXYCODONE HYDROCHLORIDE AND ACETAMINOPHEN 1 TABLET: 10; 325 TABLET ORAL at 17:52

## 2019-01-01 RX ADMIN — METOPROLOL TARTRATE 12.5 MG: 25 TABLET ORAL at 21:07

## 2019-01-01 RX ADMIN — ATORVASTATIN CALCIUM 10 MG: 10 TABLET, FILM COATED ORAL at 21:23

## 2019-01-01 RX ADMIN — INSULIN LISPRO 2 UNITS: 100 INJECTION, SOLUTION INTRAVENOUS; SUBCUTANEOUS at 07:50

## 2019-01-01 RX ADMIN — LEVOTHYROXINE SODIUM 75 MCG: 75 TABLET ORAL at 08:00

## 2019-01-01 RX ADMIN — HEPARIN SODIUM 15.5 UNITS/KG/HR: 10000 INJECTION, SOLUTION INTRAVENOUS at 14:44

## 2019-01-01 RX ADMIN — OXYCODONE HYDROCHLORIDE AND ACETAMINOPHEN 1 TABLET: 10; 325 TABLET ORAL at 14:47

## 2019-01-01 RX ADMIN — MIDODRINE HYDROCHLORIDE 10 MG: 5 TABLET ORAL at 12:24

## 2019-01-01 RX ADMIN — ATORVASTATIN CALCIUM 10 MG: 10 TABLET, FILM COATED ORAL at 21:14

## 2019-01-01 RX ADMIN — HEPARIN SODIUM 18.5 UNITS/KG/HR: 10000 INJECTION, SOLUTION INTRAVENOUS at 04:28

## 2019-01-01 RX ADMIN — CASTOR OIL AND BALSAM, PERU 5 G: 788; 87 OINTMENT TOPICAL at 20:46

## 2019-01-01 RX ADMIN — MIDODRINE HYDROCHLORIDE 10 MG: 5 TABLET ORAL at 11:23

## 2019-01-01 RX ADMIN — HYDROCODONE BITARTRATE AND ACETAMINOPHEN 1 TABLET: 5; 325 TABLET ORAL at 08:24

## 2019-01-01 RX ADMIN — LEVOTHYROXINE SODIUM 75 MCG: 75 TABLET ORAL at 07:13

## 2019-01-01 RX ADMIN — IPRATROPIUM BROMIDE AND ALBUTEROL SULFATE 3 ML: 2.5; .5 SOLUTION RESPIRATORY (INHALATION) at 14:03

## 2019-01-01 RX ADMIN — MIDODRINE HYDROCHLORIDE 10 MG: 5 TABLET ORAL at 18:34

## 2019-01-01 RX ADMIN — DEXAMETHASONE 4 MG: 4 TABLET ORAL at 20:47

## 2019-01-01 RX ADMIN — INSULIN LISPRO 3 UNITS: 100 INJECTION, SOLUTION INTRAVENOUS; SUBCUTANEOUS at 11:23

## 2019-01-01 RX ADMIN — CASTOR OIL AND BALSAM, PERU 5 G: 788; 87 OINTMENT TOPICAL at 16:00

## 2019-01-01 RX ADMIN — DEXAMETHASONE 4 MG: 4 TABLET ORAL at 21:32

## 2019-01-01 RX ADMIN — HYDROCODONE BITARTRATE AND ACETAMINOPHEN 1 TABLET: 5; 325 TABLET ORAL at 23:37

## 2019-01-01 RX ADMIN — PREDNISONE 40 MG: 20 TABLET ORAL at 12:52

## 2019-01-01 RX ADMIN — QUETIAPINE FUMARATE 25 MG: 25 TABLET ORAL at 20:16

## 2019-01-01 RX ADMIN — AMIODARONE HYDROCHLORIDE 400 MG: 200 TABLET ORAL at 20:47

## 2019-01-01 RX ADMIN — INSULIN LISPRO 5 UNITS: 100 INJECTION, SOLUTION INTRAVENOUS; SUBCUTANEOUS at 08:23

## 2019-01-01 RX ADMIN — CYCLOBENZAPRINE 5 MG: 10 TABLET, FILM COATED ORAL at 00:15

## 2019-01-01 RX ADMIN — CASTOR OIL AND BALSAM, PERU 5 G: 788; 87 OINTMENT TOPICAL at 20:33

## 2019-01-01 RX ADMIN — INSULIN GLARGINE 10 UNITS: 100 INJECTION, SOLUTION SUBCUTANEOUS at 21:14

## 2019-01-01 RX ADMIN — ACETYLCYSTEINE 4 ML: 200 SOLUTION ORAL; RESPIRATORY (INHALATION) at 07:17

## 2019-01-01 RX ADMIN — HYDROMORPHONE HYDROCHLORIDE 0.5 MG: 1 INJECTION, SOLUTION INTRAMUSCULAR; INTRAVENOUS; SUBCUTANEOUS at 21:13

## 2019-01-01 RX ADMIN — WARFARIN SODIUM 2.5 MG: 2.5 TABLET ORAL at 18:41

## 2019-01-01 RX ADMIN — ACETYLCYSTEINE 4 ML: 200 SOLUTION ORAL; RESPIRATORY (INHALATION) at 08:08

## 2019-01-01 RX ADMIN — POLYETHYLENE GLYCOL 3350 17 G: 17 POWDER, FOR SOLUTION ORAL at 08:15

## 2019-01-01 RX ADMIN — AMIODARONE HYDROCHLORIDE 400 MG: 200 TABLET ORAL at 13:13

## 2019-01-01 RX ADMIN — DIVALPROEX SODIUM 125 MG: 125 TABLET, DELAYED RELEASE ORAL at 21:21

## 2019-01-01 RX ADMIN — HYDROCODONE BITARTRATE AND ACETAMINOPHEN 1 TABLET: 5; 325 TABLET ORAL at 09:44

## 2019-01-01 RX ADMIN — INSULIN LISPRO 2 UNITS: 100 INJECTION, SOLUTION INTRAVENOUS; SUBCUTANEOUS at 21:48

## 2019-01-01 RX ADMIN — MIDODRINE HYDROCHLORIDE 10 MG: 5 TABLET ORAL at 12:16

## 2019-01-01 RX ADMIN — ACETAMINOPHEN 650 MG: 325 TABLET, FILM COATED ORAL at 17:30

## 2019-01-01 RX ADMIN — HYDROMORPHONE HYDROCHLORIDE 0.5 MG: 1 INJECTION, SOLUTION INTRAMUSCULAR; INTRAVENOUS; SUBCUTANEOUS at 17:23

## 2019-01-01 RX ADMIN — IPRATROPIUM BROMIDE AND ALBUTEROL SULFATE 3 ML: 2.5; .5 SOLUTION RESPIRATORY (INHALATION) at 06:37

## 2019-01-01 RX ADMIN — CASTOR OIL AND BALSAM, PERU 5 G: 788; 87 OINTMENT TOPICAL at 09:07

## 2019-01-01 RX ADMIN — DIVALPROEX SODIUM 125 MG: 125 TABLET, DELAYED RELEASE ORAL at 21:18

## 2019-01-01 RX ADMIN — AMIODARONE HYDROCHLORIDE 200 MG: 200 TABLET ORAL at 08:15

## 2019-01-01 RX ADMIN — MULTIPLE VITAMINS W/ MINERALS TAB 1 TABLET: TAB at 09:20

## 2019-01-01 RX ADMIN — ASPIRIN 81 MG: 81 TABLET, COATED ORAL at 15:27

## 2019-01-01 RX ADMIN — HYDROMORPHONE HYDROCHLORIDE 0.5 MG: 1 INJECTION, SOLUTION INTRAMUSCULAR; INTRAVENOUS; SUBCUTANEOUS at 15:11

## 2019-01-01 RX ADMIN — IPRATROPIUM BROMIDE AND ALBUTEROL SULFATE 3 ML: 2.5; .5 SOLUTION RESPIRATORY (INHALATION) at 03:40

## 2019-01-01 RX ADMIN — IPRATROPIUM BROMIDE AND ALBUTEROL SULFATE 3 ML: 2.5; .5 SOLUTION RESPIRATORY (INHALATION) at 07:04

## 2019-01-01 RX ADMIN — IPRATROPIUM BROMIDE AND ALBUTEROL SULFATE 3 ML: 2.5; .5 SOLUTION RESPIRATORY (INHALATION) at 15:55

## 2019-01-01 RX ADMIN — ACETYLCYSTEINE 4 ML: 200 SOLUTION ORAL; RESPIRATORY (INHALATION) at 23:49

## 2019-01-01 RX ADMIN — HYDROCODONE BITARTRATE AND ACETAMINOPHEN 1 TABLET: 5; 325 TABLET ORAL at 20:50

## 2019-01-01 RX ADMIN — INSULIN LISPRO 2 UNITS: 100 INJECTION, SOLUTION INTRAVENOUS; SUBCUTANEOUS at 12:24

## 2019-01-01 RX ADMIN — OXYCODONE HYDROCHLORIDE AND ACETAMINOPHEN 1 TABLET: 10; 325 TABLET ORAL at 09:07

## 2019-01-01 RX ADMIN — METOPROLOL TARTRATE 12.5 MG: 25 TABLET ORAL at 08:23

## 2019-01-01 RX ADMIN — AMIODARONE HYDROCHLORIDE 0.5 MG/MIN: 1.8 INJECTION, SOLUTION INTRAVENOUS at 09:39

## 2019-01-01 RX ADMIN — PANTOPRAZOLE SODIUM 40 MG: 40 TABLET, DELAYED RELEASE ORAL at 06:10

## 2019-01-01 RX ADMIN — DEXAMETHASONE 4 MG: 4 TABLET ORAL at 08:49

## 2019-01-01 RX ADMIN — INSULIN LISPRO 3 UNITS: 100 INJECTION, SOLUTION INTRAVENOUS; SUBCUTANEOUS at 22:36

## 2019-01-01 RX ADMIN — PREDNISONE 40 MG: 20 TABLET ORAL at 17:32

## 2019-01-01 RX ADMIN — MIDODRINE HYDROCHLORIDE 10 MG: 5 TABLET ORAL at 16:33

## 2019-01-01 RX ADMIN — QUETIAPINE FUMARATE 25 MG: 25 TABLET ORAL at 21:23

## 2019-01-01 RX ADMIN — IPRATROPIUM BROMIDE AND ALBUTEROL SULFATE 3 ML: 2.5; .5 SOLUTION RESPIRATORY (INHALATION) at 16:44

## 2019-01-01 RX ADMIN — IPRATROPIUM BROMIDE AND ALBUTEROL SULFATE 3 ML: 2.5; .5 SOLUTION RESPIRATORY (INHALATION) at 03:26

## 2019-01-01 RX ADMIN — DIVALPROEX SODIUM 125 MG: 125 TABLET, DELAYED RELEASE ORAL at 21:34

## 2019-01-01 RX ADMIN — PANTOPRAZOLE SODIUM 40 MG: 40 TABLET, DELAYED RELEASE ORAL at 05:24

## 2019-01-01 RX ADMIN — METOPROLOL TARTRATE 12.5 MG: 25 TABLET ORAL at 10:18

## 2019-01-01 RX ADMIN — HYDROMORPHONE HYDROCHLORIDE 0.5 MG: 1 INJECTION, SOLUTION INTRAMUSCULAR; INTRAVENOUS; SUBCUTANEOUS at 16:51

## 2019-01-01 RX ADMIN — CASTOR OIL AND BALSAM, PERU 5 G: 788; 87 OINTMENT TOPICAL at 08:21

## 2019-01-01 RX ADMIN — METOPROLOL TARTRATE 25 MG: 25 TABLET ORAL at 21:48

## 2019-01-01 RX ADMIN — LEVOTHYROXINE SODIUM 75 MCG: 75 TABLET ORAL at 06:53

## 2019-01-01 RX ADMIN — IPRATROPIUM BROMIDE AND ALBUTEROL SULFATE 3 ML: 2.5; .5 SOLUTION RESPIRATORY (INHALATION) at 20:43

## 2019-01-01 RX ADMIN — AMIODARONE HYDROCHLORIDE 400 MG: 200 TABLET ORAL at 22:04

## 2019-01-01 RX ADMIN — ASPIRIN 81 MG: 81 TABLET, COATED ORAL at 08:18

## 2019-01-01 RX ADMIN — ACETYLCYSTEINE 4 ML: 200 SOLUTION ORAL; RESPIRATORY (INHALATION) at 16:30

## 2019-01-01 RX ADMIN — LEVOTHYROXINE SODIUM 75 MCG: 75 TABLET ORAL at 05:54

## 2019-01-01 RX ADMIN — MIDODRINE HYDROCHLORIDE 10 MG: 5 TABLET ORAL at 08:13

## 2019-01-01 RX ADMIN — MIDODRINE HYDROCHLORIDE 10 MG: 5 TABLET ORAL at 12:04

## 2019-01-01 RX ADMIN — IPRATROPIUM BROMIDE AND ALBUTEROL SULFATE 3 ML: 2.5; .5 SOLUTION RESPIRATORY (INHALATION) at 14:21

## 2019-01-01 RX ADMIN — IPRATROPIUM BROMIDE AND ALBUTEROL SULFATE 3 ML: 2.5; .5 SOLUTION RESPIRATORY (INHALATION) at 23:47

## 2019-01-01 RX ADMIN — PANTOPRAZOLE SODIUM 40 MG: 40 TABLET, DELAYED RELEASE ORAL at 05:09

## 2019-01-01 RX ADMIN — ASPIRIN 81 MG: 81 TABLET, COATED ORAL at 09:07

## 2019-01-01 RX ADMIN — ACETYLCYSTEINE 4 ML: 200 SOLUTION ORAL; RESPIRATORY (INHALATION) at 23:06

## 2019-01-01 RX ADMIN — IPRATROPIUM BROMIDE AND ALBUTEROL SULFATE 3 ML: 2.5; .5 SOLUTION RESPIRATORY (INHALATION) at 23:12

## 2019-01-01 RX ADMIN — ESCITALOPRAM 10 MG: 10 TABLET, FILM COATED ORAL at 09:20

## 2019-01-01 RX ADMIN — AMIODARONE HYDROCHLORIDE 200 MG: 200 TABLET ORAL at 09:16

## 2019-01-01 RX ADMIN — DEXAMETHASONE 4 MG: 4 TABLET ORAL at 20:23

## 2019-01-01 RX ADMIN — AMIODARONE HYDROCHLORIDE 200 MG: 200 TABLET ORAL at 08:19

## 2019-01-01 RX ADMIN — PANTOPRAZOLE SODIUM 40 MG: 40 TABLET, DELAYED RELEASE ORAL at 13:42

## 2019-01-01 RX ADMIN — ACETYLCYSTEINE 4 ML: 200 SOLUTION ORAL; RESPIRATORY (INHALATION) at 23:44

## 2019-01-01 RX ADMIN — ATORVASTATIN CALCIUM 10 MG: 10 TABLET, FILM COATED ORAL at 20:46

## 2019-01-01 RX ADMIN — INSULIN LISPRO 2 UNITS: 100 INJECTION, SOLUTION INTRAVENOUS; SUBCUTANEOUS at 22:13

## 2019-01-01 RX ADMIN — METOPROLOL TARTRATE 12.5 MG: 25 TABLET ORAL at 20:39

## 2019-01-01 RX ADMIN — POLYETHYLENE GLYCOL 3350 17 G: 17 POWDER, FOR SOLUTION ORAL at 09:48

## 2019-01-01 RX ADMIN — IPRATROPIUM BROMIDE AND ALBUTEROL SULFATE 3 ML: 2.5; .5 SOLUTION RESPIRATORY (INHALATION) at 07:01

## 2019-01-01 RX ADMIN — ACETYLCYSTEINE 4 ML: 200 SOLUTION ORAL; RESPIRATORY (INHALATION) at 23:41

## 2019-01-01 RX ADMIN — LEVOTHYROXINE SODIUM 75 MCG: 75 TABLET ORAL at 06:18

## 2019-01-01 RX ADMIN — ACETYLCYSTEINE 4 ML: 200 SOLUTION ORAL; RESPIRATORY (INHALATION) at 08:42

## 2019-01-01 RX ADMIN — ACETYLCYSTEINE 4 ML: 200 SOLUTION ORAL; RESPIRATORY (INHALATION) at 14:52

## 2019-01-01 RX ADMIN — INSULIN LISPRO 2 UNITS: 100 INJECTION, SOLUTION INTRAVENOUS; SUBCUTANEOUS at 08:18

## 2019-01-01 RX ADMIN — CASTOR OIL AND BALSAM, PERU 5 G: 788; 87 OINTMENT TOPICAL at 08:19

## 2019-01-01 RX ADMIN — HYDROMORPHONE HYDROCHLORIDE 0.5 MG: 1 INJECTION, SOLUTION INTRAMUSCULAR; INTRAVENOUS; SUBCUTANEOUS at 14:20

## 2019-01-01 RX ADMIN — IPRATROPIUM BROMIDE AND ALBUTEROL SULFATE 3 ML: 2.5; .5 SOLUTION RESPIRATORY (INHALATION) at 15:53

## 2019-01-01 RX ADMIN — ACETYLCYSTEINE 4 ML: 200 SOLUTION ORAL; RESPIRATORY (INHALATION) at 07:01

## 2019-01-01 RX ADMIN — PANTOPRAZOLE SODIUM 40 MG: 40 TABLET, DELAYED RELEASE ORAL at 06:23

## 2019-01-01 RX ADMIN — MIDODRINE HYDROCHLORIDE 10 MG: 5 TABLET ORAL at 09:06

## 2019-01-01 RX ADMIN — CASTOR OIL AND BALSAM, PERU 5 G: 788; 87 OINTMENT TOPICAL at 08:13

## 2019-01-01 RX ADMIN — HEPARIN SODIUM 17.5 UNITS/KG/HR: 10000 INJECTION, SOLUTION INTRAVENOUS at 16:57

## 2019-01-01 RX ADMIN — ACETYLCYSTEINE 4 ML: 200 SOLUTION ORAL; RESPIRATORY (INHALATION) at 20:59

## 2019-01-01 RX ADMIN — ACETYLCYSTEINE 4 ML: 200 SOLUTION ORAL; RESPIRATORY (INHALATION) at 15:31

## 2019-01-01 RX ADMIN — DIVALPROEX SODIUM 125 MG: 125 TABLET, DELAYED RELEASE ORAL at 21:29

## 2019-01-01 RX ADMIN — CASTOR OIL AND BALSAM, PERU 5 G: 788; 87 OINTMENT TOPICAL at 21:18

## 2019-01-01 RX ADMIN — DEXAMETHASONE 4 MG: 4 TABLET ORAL at 20:32

## 2019-01-01 RX ADMIN — ATORVASTATIN CALCIUM 10 MG: 10 TABLET, FILM COATED ORAL at 20:36

## 2019-01-01 RX ADMIN — CASTOR OIL AND BALSAM, PERU 5 G: 788; 87 OINTMENT TOPICAL at 08:17

## 2019-01-01 RX ADMIN — MIDODRINE HYDROCHLORIDE 10 MG: 5 TABLET ORAL at 14:28

## 2019-01-01 RX ADMIN — INSULIN LISPRO 3 UNITS: 100 INJECTION, SOLUTION INTRAVENOUS; SUBCUTANEOUS at 19:02

## 2019-01-01 RX ADMIN — PANTOPRAZOLE SODIUM 40 MG: 40 TABLET, DELAYED RELEASE ORAL at 05:59

## 2019-01-01 RX ADMIN — ASPIRIN 81 MG: 81 TABLET, COATED ORAL at 09:59

## 2019-01-01 RX ADMIN — ALBUMIN HUMAN 12.5 G: 0.25 SOLUTION INTRAVENOUS at 11:15

## 2019-01-01 RX ADMIN — ASPIRIN 81 MG: 81 TABLET, COATED ORAL at 12:35

## 2019-01-01 RX ADMIN — CASTOR OIL AND BALSAM, PERU 5 G: 788; 87 OINTMENT TOPICAL at 20:23

## 2019-01-01 RX ADMIN — ASPIRIN 81 MG: 81 TABLET, COATED ORAL at 09:11

## 2019-01-01 RX ADMIN — INSULIN LISPRO 2 UNITS: 100 INJECTION, SOLUTION INTRAVENOUS; SUBCUTANEOUS at 21:55

## 2019-01-01 RX ADMIN — IPRATROPIUM BROMIDE AND ALBUTEROL SULFATE 3 ML: 2.5; .5 SOLUTION RESPIRATORY (INHALATION) at 15:24

## 2019-01-01 RX ADMIN — PANTOPRAZOLE SODIUM 40 MG: 40 TABLET, DELAYED RELEASE ORAL at 05:47

## 2019-01-01 RX ADMIN — PANTOPRAZOLE SODIUM 40 MG: 40 TABLET, DELAYED RELEASE ORAL at 07:17

## 2019-01-01 RX ADMIN — ACETYLCYSTEINE 4 ML: 200 SOLUTION ORAL; RESPIRATORY (INHALATION) at 14:21

## 2019-01-01 RX ADMIN — DEXAMETHASONE 4 MG: 4 TABLET ORAL at 21:18

## 2019-01-01 RX ADMIN — LEVOTHYROXINE SODIUM 75 MCG: 75 TABLET ORAL at 06:15

## 2019-01-01 RX ADMIN — MULTIPLE VITAMINS W/ MINERALS TAB 1 TABLET: TAB at 09:07

## 2019-01-01 RX ADMIN — ACETYLCYSTEINE 4 ML: 200 SOLUTION ORAL; RESPIRATORY (INHALATION) at 15:09

## 2019-01-01 RX ADMIN — ESCITALOPRAM 10 MG: 10 TABLET, FILM COATED ORAL at 09:06

## 2019-01-01 RX ADMIN — METOPROLOL TARTRATE 12.5 MG: 25 TABLET ORAL at 09:46

## 2019-01-01 RX ADMIN — ACETYLCYSTEINE 4 ML: 200 SOLUTION ORAL; RESPIRATORY (INHALATION) at 14:46

## 2019-01-01 RX ADMIN — MIDODRINE HYDROCHLORIDE 10 MG: 5 TABLET ORAL at 06:30

## 2019-01-01 RX ADMIN — ASPIRIN 81 MG: 81 TABLET, COATED ORAL at 08:17

## 2019-01-01 RX ADMIN — METOPROLOL TARTRATE 12.5 MG: 25 TABLET ORAL at 20:50

## 2019-01-01 RX ADMIN — WARFARIN SODIUM 2.5 MG: 2.5 TABLET ORAL at 16:25

## 2019-01-01 RX ADMIN — IPRATROPIUM BROMIDE AND ALBUTEROL SULFATE 3 ML: 2.5; .5 SOLUTION RESPIRATORY (INHALATION) at 20:56

## 2019-01-01 RX ADMIN — CASTOR OIL AND BALSAM, PERU 5 G: 788; 87 OINTMENT TOPICAL at 20:36

## 2019-01-01 RX ADMIN — ESCITALOPRAM 10 MG: 10 TABLET, FILM COATED ORAL at 08:15

## 2019-01-01 RX ADMIN — AMIODARONE HYDROCHLORIDE 0.5 MG/MIN: 1.8 INJECTION, SOLUTION INTRAVENOUS at 06:42

## 2019-01-01 RX ADMIN — PANTOPRAZOLE SODIUM 40 MG: 40 TABLET, DELAYED RELEASE ORAL at 06:01

## 2019-01-01 RX ADMIN — INSULIN LISPRO 2 UNITS: 100 INJECTION, SOLUTION INTRAVENOUS; SUBCUTANEOUS at 08:24

## 2019-01-01 RX ADMIN — INSULIN LISPRO 3 UNITS: 100 INJECTION, SOLUTION INTRAVENOUS; SUBCUTANEOUS at 15:34

## 2019-01-01 RX ADMIN — DEXAMETHASONE 4 MG: 4 TABLET ORAL at 08:12

## 2019-01-01 RX ADMIN — PANTOPRAZOLE SODIUM 40 MG: 40 TABLET, DELAYED RELEASE ORAL at 06:37

## 2019-01-01 RX ADMIN — ONDANSETRON 4 MG: 2 INJECTION INTRAMUSCULAR; INTRAVENOUS at 08:45

## 2019-01-01 RX ADMIN — HEPARIN SODIUM 15.5 UNITS/KG/HR: 10000 INJECTION, SOLUTION INTRAVENOUS at 02:54

## 2019-01-01 RX ADMIN — INSULIN GLARGINE 10 UNITS: 100 INJECTION, SOLUTION SUBCUTANEOUS at 21:07

## 2019-01-01 RX ADMIN — QUETIAPINE FUMARATE 25 MG: 25 TABLET ORAL at 21:06

## 2019-01-01 RX ADMIN — DEXAMETHASONE 4 MG: 4 TABLET ORAL at 21:19

## 2019-01-01 RX ADMIN — CASTOR OIL AND BALSAM, PERU 5 G: 788; 87 OINTMENT TOPICAL at 12:51

## 2019-01-01 RX ADMIN — LIDOCAINE HYDROCHLORIDE 40 MG: 20 INJECTION, SOLUTION INFILTRATION; PERINEURAL at 10:37

## 2019-01-01 RX ADMIN — GLYCOPYRROLATE 0.4 MG: 0.2 INJECTION, SOLUTION INTRAMUSCULAR; INTRAVITREAL at 04:37

## 2019-01-01 RX ADMIN — METOPROLOL TARTRATE 12.5 MG: 25 TABLET ORAL at 20:44

## 2019-01-01 RX ADMIN — INSULIN LISPRO 3 UNITS: 100 INJECTION, SOLUTION INTRAVENOUS; SUBCUTANEOUS at 18:48

## 2019-01-01 RX ADMIN — POLYETHYLENE GLYCOL 3350 17 G: 17 POWDER, FOR SOLUTION ORAL at 08:24

## 2019-01-01 RX ADMIN — METOPROLOL TARTRATE 25 MG: 25 TABLET ORAL at 20:47

## 2019-01-01 RX ADMIN — METHYLPREDNISOLONE SODIUM SUCCINATE 40 MG: 40 INJECTION, POWDER, FOR SOLUTION INTRAMUSCULAR; INTRAVENOUS at 02:44

## 2019-01-01 RX ADMIN — IPRATROPIUM BROMIDE AND ALBUTEROL SULFATE 3 ML: 2.5; .5 SOLUTION RESPIRATORY (INHALATION) at 06:51

## 2019-01-01 RX ADMIN — ACETYLCYSTEINE 4 ML: 200 SOLUTION ORAL; RESPIRATORY (INHALATION) at 23:10

## 2019-01-01 RX ADMIN — ACETYLCYSTEINE 4 ML: 200 SOLUTION ORAL; RESPIRATORY (INHALATION) at 07:59

## 2019-01-01 RX ADMIN — HYDROMORPHONE HYDROCHLORIDE 0.5 MG: 1 INJECTION, SOLUTION INTRAMUSCULAR; INTRAVENOUS; SUBCUTANEOUS at 13:07

## 2019-01-01 RX ADMIN — HYDROMORPHONE HYDROCHLORIDE 0.5 MG: 1 INJECTION, SOLUTION INTRAMUSCULAR; INTRAVENOUS; SUBCUTANEOUS at 06:37

## 2019-01-01 RX ADMIN — MIDODRINE HYDROCHLORIDE 10 MG: 5 TABLET ORAL at 21:18

## 2019-01-01 RX ADMIN — INSULIN LISPRO 4 UNITS: 100 INJECTION, SOLUTION INTRAVENOUS; SUBCUTANEOUS at 21:18

## 2019-01-01 RX ADMIN — ASPIRIN 81 MG: 81 TABLET, COATED ORAL at 08:49

## 2019-01-01 RX ADMIN — HEPARIN SODIUM 18.5 UNITS/KG/HR: 10000 INJECTION, SOLUTION INTRAVENOUS at 14:42

## 2019-01-01 RX ADMIN — INSULIN LISPRO 3 UNITS: 100 INJECTION, SOLUTION INTRAVENOUS; SUBCUTANEOUS at 21:34

## 2019-01-01 RX ADMIN — SODIUM CHLORIDE 10 ML: 9 INJECTION, SOLUTION INTRAMUSCULAR; INTRAVENOUS; SUBCUTANEOUS at 11:55

## 2019-01-01 RX ADMIN — CASTOR OIL AND BALSAM, PERU 5 G: 788; 87 OINTMENT TOPICAL at 20:53

## 2019-01-01 RX ADMIN — HYDROMORPHONE HYDROCHLORIDE 1.5 MG: 1 INJECTION, SOLUTION INTRAMUSCULAR; INTRAVENOUS; SUBCUTANEOUS at 20:16

## 2019-01-01 RX ADMIN — INSULIN LISPRO 3 UNITS: 100 INJECTION, SOLUTION INTRAVENOUS; SUBCUTANEOUS at 07:56

## 2019-01-01 RX ADMIN — ATORVASTATIN CALCIUM 10 MG: 10 TABLET, FILM COATED ORAL at 20:16

## 2019-01-01 RX ADMIN — IPRATROPIUM BROMIDE AND ALBUTEROL SULFATE 3 ML: 2.5; .5 SOLUTION RESPIRATORY (INHALATION) at 16:37

## 2019-01-01 RX ADMIN — IPRATROPIUM BROMIDE AND ALBUTEROL SULFATE 3 ML: 2.5; .5 SOLUTION RESPIRATORY (INHALATION) at 08:03

## 2019-01-01 RX ADMIN — INSULIN GLARGINE 10 UNITS: 100 INJECTION, SOLUTION SUBCUTANEOUS at 21:53

## 2019-01-01 RX ADMIN — HYDROCODONE BITARTRATE AND ACETAMINOPHEN 1 TABLET: 5; 325 TABLET ORAL at 20:41

## 2019-01-01 RX ADMIN — IPRATROPIUM BROMIDE AND ALBUTEROL SULFATE 3 ML: 2.5; .5 SOLUTION RESPIRATORY (INHALATION) at 15:31

## 2019-01-01 RX ADMIN — AMIODARONE HYDROCHLORIDE 150 MG: 1.5 INJECTION, SOLUTION INTRAVENOUS at 03:32

## 2019-01-01 RX ADMIN — CASTOR OIL AND BALSAM, PERU 5 G: 788; 87 OINTMENT TOPICAL at 08:23

## 2019-01-01 RX ADMIN — INSULIN LISPRO 2 UNITS: 100 INJECTION, SOLUTION INTRAVENOUS; SUBCUTANEOUS at 18:42

## 2019-01-01 RX ADMIN — AMIODARONE HYDROCHLORIDE 0.5 MG/MIN: 1.8 INJECTION, SOLUTION INTRAVENOUS at 18:39

## 2019-01-01 RX ADMIN — METOPROLOL TARTRATE 12.5 MG: 25 TABLET ORAL at 21:59

## 2019-01-01 RX ADMIN — LEVOTHYROXINE SODIUM 75 MCG: 75 TABLET ORAL at 07:16

## 2019-01-01 RX ADMIN — HYDROCODONE BITARTRATE AND ACETAMINOPHEN 1 TABLET: 5; 325 TABLET ORAL at 12:42

## 2019-01-01 RX ADMIN — DEXAMETHASONE 4 MG: 4 TABLET ORAL at 21:21

## 2019-01-01 RX ADMIN — LEVOTHYROXINE SODIUM 75 MCG: 75 TABLET ORAL at 06:55

## 2019-01-01 RX ADMIN — Medication 23 MILLICURIE: at 06:15

## 2019-01-01 RX ADMIN — INSULIN GLARGINE 10 UNITS: 100 INJECTION, SOLUTION SUBCUTANEOUS at 21:15

## 2019-01-01 RX ADMIN — INSULIN LISPRO 2 UNITS: 100 INJECTION, SOLUTION INTRAVENOUS; SUBCUTANEOUS at 12:06

## 2019-01-01 RX ADMIN — ACETYLCYSTEINE 4 ML: 200 SOLUTION ORAL; RESPIRATORY (INHALATION) at 07:18

## 2019-01-01 RX ADMIN — ACETYLCYSTEINE 4 ML: 200 SOLUTION ORAL; RESPIRATORY (INHALATION) at 23:28

## 2019-01-01 RX ADMIN — POLYETHYLENE GLYCOL 3350 17 G: 17 POWDER, FOR SOLUTION ORAL at 10:17

## 2019-01-01 RX ADMIN — CASTOR OIL AND BALSAM, PERU 5 G: 788; 87 OINTMENT TOPICAL at 09:42

## 2019-01-01 RX ADMIN — IPRATROPIUM BROMIDE AND ALBUTEROL SULFATE 3 ML: 2.5; .5 SOLUTION RESPIRATORY (INHALATION) at 15:08

## 2019-01-01 RX ADMIN — CASTOR OIL AND BALSAM, PERU 5 G: 788; 87 OINTMENT TOPICAL at 14:28

## 2019-01-01 RX ADMIN — ESCITALOPRAM 10 MG: 10 TABLET, FILM COATED ORAL at 08:12

## 2019-01-01 RX ADMIN — ESCITALOPRAM 10 MG: 10 TABLET, FILM COATED ORAL at 10:17

## 2019-01-01 RX ADMIN — INSULIN LISPRO 4 UNITS: 100 INJECTION, SOLUTION INTRAVENOUS; SUBCUTANEOUS at 21:30

## 2019-01-01 RX ADMIN — IPRATROPIUM BROMIDE AND ALBUTEROL SULFATE 3 ML: 2.5; .5 SOLUTION RESPIRATORY (INHALATION) at 07:41

## 2019-01-01 RX ADMIN — CASTOR OIL AND BALSAM, PERU 5 G: 788; 87 OINTMENT TOPICAL at 09:16

## 2019-01-01 RX ADMIN — HYDROCODONE BITARTRATE AND ACETAMINOPHEN 1 TABLET: 5; 325 TABLET ORAL at 09:48

## 2019-01-01 RX ADMIN — LEVOTHYROXINE SODIUM 75 MCG: 75 TABLET ORAL at 05:19

## 2019-01-01 RX ADMIN — ACETYLCYSTEINE 4 ML: 200 SOLUTION ORAL; RESPIRATORY (INHALATION) at 20:43

## 2019-01-01 RX ADMIN — LEVOTHYROXINE SODIUM 75 MCG: 75 TABLET ORAL at 06:11

## 2019-01-01 RX ADMIN — HYDROMORPHONE HYDROCHLORIDE 0.5 MG: 1 INJECTION, SOLUTION INTRAMUSCULAR; INTRAVENOUS; SUBCUTANEOUS at 05:05

## 2019-01-01 RX ADMIN — IPRATROPIUM BROMIDE AND ALBUTEROL SULFATE 3 ML: 2.5; .5 SOLUTION RESPIRATORY (INHALATION) at 07:52

## 2019-01-01 RX ADMIN — IPRATROPIUM BROMIDE AND ALBUTEROL SULFATE 3 ML: 2.5; .5 SOLUTION RESPIRATORY (INHALATION) at 15:09

## 2019-01-01 RX ADMIN — IPRATROPIUM BROMIDE AND ALBUTEROL SULFATE 3 ML: 2.5; .5 SOLUTION RESPIRATORY (INHALATION) at 07:18

## 2019-01-01 RX ADMIN — HYDROMORPHONE HYDROCHLORIDE 0.5 MG: 1 INJECTION, SOLUTION INTRAMUSCULAR; INTRAVENOUS; SUBCUTANEOUS at 00:14

## 2019-01-01 RX ADMIN — CASTOR OIL AND BALSAM, PERU 5 G: 788; 87 OINTMENT TOPICAL at 20:09

## 2019-01-01 RX ADMIN — DEXAMETHASONE 4 MG: 4 TABLET ORAL at 08:15

## 2019-01-01 RX ADMIN — ACETYLCYSTEINE 4 ML: 200 SOLUTION ORAL; RESPIRATORY (INHALATION) at 23:05

## 2019-01-01 RX ADMIN — IPRATROPIUM BROMIDE AND ALBUTEROL SULFATE 3 ML: 2.5; .5 SOLUTION RESPIRATORY (INHALATION) at 07:21

## 2019-01-01 RX ADMIN — SCOPALAMINE 1 PATCH: 1 PATCH, EXTENDED RELEASE TRANSDERMAL at 22:11

## 2019-01-01 RX ADMIN — IPRATROPIUM BROMIDE AND ALBUTEROL SULFATE 3 ML: 2.5; .5 SOLUTION RESPIRATORY (INHALATION) at 23:48

## 2019-01-01 RX ADMIN — POLYETHYLENE GLYCOL 3350 17 G: 17 POWDER, FOR SOLUTION ORAL at 09:07

## 2019-01-01 RX ADMIN — INSULIN LISPRO 3 UNITS: 100 INJECTION, SOLUTION INTRAVENOUS; SUBCUTANEOUS at 21:51

## 2019-01-01 RX ADMIN — IPRATROPIUM BROMIDE AND ALBUTEROL SULFATE 3 ML: 2.5; .5 SOLUTION RESPIRATORY (INHALATION) at 23:29

## 2019-01-01 RX ADMIN — ACETYLCYSTEINE 4 ML: 200 SOLUTION ORAL; RESPIRATORY (INHALATION) at 06:56

## 2019-01-01 RX ADMIN — HYDROCODONE BITARTRATE AND ACETAMINOPHEN 1 TABLET: 5; 325 TABLET ORAL at 21:21

## 2019-01-01 RX ADMIN — CASTOR OIL AND BALSAM, PERU 5 G: 788; 87 OINTMENT TOPICAL at 09:11

## 2019-01-01 RX ADMIN — DIVALPROEX SODIUM 125 MG: 125 TABLET, DELAYED RELEASE ORAL at 22:06

## 2019-01-01 RX ADMIN — METOPROLOL TARTRATE 12.5 MG: 25 TABLET ORAL at 12:41

## 2019-01-01 RX ADMIN — HYDROMORPHONE HYDROCHLORIDE 1.5 MG: 1 INJECTION, SOLUTION INTRAMUSCULAR; INTRAVENOUS; SUBCUTANEOUS at 04:37

## 2019-01-01 RX ADMIN — IPRATROPIUM BROMIDE AND ALBUTEROL SULFATE 3 ML: 2.5; .5 SOLUTION RESPIRATORY (INHALATION) at 23:43

## 2019-01-01 RX ADMIN — ASPIRIN 81 MG: 81 TABLET, COATED ORAL at 08:12

## 2019-01-01 RX ADMIN — DEXAMETHASONE 4 MG: 4 TABLET ORAL at 20:41

## 2019-01-01 RX ADMIN — PREDNISONE 40 MG: 20 TABLET ORAL at 09:49

## 2019-01-01 RX ADMIN — PANTOPRAZOLE SODIUM 40 MG: 40 TABLET, DELAYED RELEASE ORAL at 05:40

## 2019-01-01 RX ADMIN — LEVOTHYROXINE SODIUM 75 MCG: 75 TABLET ORAL at 06:07

## 2019-01-01 RX ADMIN — CASTOR OIL AND BALSAM, PERU 5 G: 788; 87 OINTMENT TOPICAL at 08:15

## 2019-01-01 RX ADMIN — HEPARIN SODIUM 19.5 UNITS/KG/HR: 10000 INJECTION, SOLUTION INTRAVENOUS at 09:04

## 2019-01-01 RX ADMIN — DEXAMETHASONE 4 MG: 4 TABLET ORAL at 14:28

## 2019-01-01 RX ADMIN — INSULIN LISPRO 2 UNITS: 100 INJECTION, SOLUTION INTRAVENOUS; SUBCUTANEOUS at 23:12

## 2019-01-01 RX ADMIN — METHYLPREDNISOLONE SODIUM SUCCINATE 40 MG: 40 INJECTION, POWDER, FOR SOLUTION INTRAMUSCULAR; INTRAVENOUS at 21:21

## 2019-01-01 RX ADMIN — IPRATROPIUM BROMIDE AND ALBUTEROL SULFATE 3 ML: 2.5; .5 SOLUTION RESPIRATORY (INHALATION) at 11:37

## 2019-01-01 RX ADMIN — METOPROLOL TARTRATE 12.5 MG: 25 TABLET ORAL at 09:38

## 2019-01-01 RX ADMIN — INSULIN LISPRO 3 UNITS: 100 INJECTION, SOLUTION INTRAVENOUS; SUBCUTANEOUS at 17:04

## 2019-01-01 RX ADMIN — PANTOPRAZOLE SODIUM 40 MG: 40 TABLET, DELAYED RELEASE ORAL at 06:08

## 2019-01-01 RX ADMIN — CASTOR OIL AND BALSAM, PERU 5 G: 788; 87 OINTMENT TOPICAL at 21:21

## 2019-01-01 RX ADMIN — CASTOR OIL AND BALSAM, PERU 5 G: 788; 87 OINTMENT TOPICAL at 21:47

## 2019-01-01 RX ADMIN — CASTOR OIL AND BALSAM, PERU 5 G: 788; 87 OINTMENT TOPICAL at 20:24

## 2019-01-01 RX ADMIN — CASTOR OIL AND BALSAM, PERU 5 G: 788; 87 OINTMENT TOPICAL at 20:47

## 2019-01-01 RX ADMIN — OXYCODONE HYDROCHLORIDE AND ACETAMINOPHEN 1 TABLET: 10; 325 TABLET ORAL at 06:11

## 2019-01-01 RX ADMIN — LORAZEPAM 0.5 MG: 2 INJECTION INTRAMUSCULAR; INTRAVENOUS at 23:47

## 2019-01-01 RX ADMIN — OXYCODONE HYDROCHLORIDE AND ACETAMINOPHEN 1 TABLET: 10; 325 TABLET ORAL at 03:28

## 2019-01-01 RX ADMIN — POLYETHYLENE GLYCOL 3350 17 G: 17 POWDER, FOR SOLUTION ORAL at 09:16

## 2019-01-01 RX ADMIN — IPRATROPIUM BROMIDE AND ALBUTEROL SULFATE 3 ML: 2.5; .5 SOLUTION RESPIRATORY (INHALATION) at 10:27

## 2019-01-01 RX ADMIN — CASTOR OIL AND BALSAM, PERU 5 G: 788; 87 OINTMENT TOPICAL at 08:56

## 2019-01-01 RX ADMIN — OXYCODONE HYDROCHLORIDE AND ACETAMINOPHEN 1 TABLET: 10; 325 TABLET ORAL at 17:36

## 2019-01-01 RX ADMIN — INSULIN GLARGINE 10 UNITS: 100 INJECTION, SOLUTION SUBCUTANEOUS at 22:00

## 2019-01-01 RX ADMIN — MIDODRINE HYDROCHLORIDE 10 MG: 5 TABLET ORAL at 16:39

## 2019-01-01 RX ADMIN — HYDROMORPHONE HYDROCHLORIDE 1 MG: 1 INJECTION, SOLUTION INTRAMUSCULAR; INTRAVENOUS; SUBCUTANEOUS at 12:25

## 2019-01-01 RX ADMIN — IPRATROPIUM BROMIDE AND ALBUTEROL SULFATE 3 ML: 2.5; .5 SOLUTION RESPIRATORY (INHALATION) at 16:00

## 2019-01-01 RX ADMIN — MIDODRINE HYDROCHLORIDE 10 MG: 5 TABLET ORAL at 08:10

## 2019-01-01 RX ADMIN — DEXAMETHASONE 4 MG: 4 TABLET ORAL at 20:54

## 2019-01-01 RX ADMIN — METOPROLOL TARTRATE 12.5 MG: 25 TABLET ORAL at 20:32

## 2019-01-01 RX ADMIN — CASTOR OIL AND BALSAM, PERU 5 G: 788; 87 OINTMENT TOPICAL at 20:41

## 2019-01-01 RX ADMIN — INSULIN LISPRO 2 UNITS: 100 INJECTION, SOLUTION INTRAVENOUS; SUBCUTANEOUS at 13:30

## 2019-01-01 RX ADMIN — ASPIRIN 81 MG: 81 TABLET, COATED ORAL at 12:52

## 2019-01-01 RX ADMIN — ESCITALOPRAM 10 MG: 10 TABLET, FILM COATED ORAL at 09:47

## 2019-01-01 RX ADMIN — AZTREONAM 2 G: 2 INJECTION, POWDER, LYOPHILIZED, FOR SOLUTION INTRAMUSCULAR; INTRAVENOUS at 18:16

## 2019-01-01 RX ADMIN — WARFARIN SODIUM 2.5 MG: 2.5 TABLET ORAL at 21:32

## 2019-01-01 RX ADMIN — CASTOR OIL AND BALSAM, PERU 5 G: 788; 87 OINTMENT TOPICAL at 20:40

## 2019-01-01 RX ADMIN — PANTOPRAZOLE SODIUM 40 MG: 40 TABLET, DELAYED RELEASE ORAL at 05:38

## 2019-01-01 RX ADMIN — ACETYLCYSTEINE 4 ML: 200 SOLUTION ORAL; RESPIRATORY (INHALATION) at 22:57

## 2019-01-01 RX ADMIN — IPRATROPIUM BROMIDE AND ALBUTEROL SULFATE 3 ML: 2.5; .5 SOLUTION RESPIRATORY (INHALATION) at 00:37

## 2019-01-01 RX ADMIN — LEVOTHYROXINE SODIUM 75 MCG: 75 TABLET ORAL at 07:01

## 2019-01-01 RX ADMIN — AMIODARONE HYDROCHLORIDE 0.5 MG/MIN: 1.8 INJECTION, SOLUTION INTRAVENOUS at 18:10

## 2019-01-01 RX ADMIN — ESCITALOPRAM 10 MG: 10 TABLET, FILM COATED ORAL at 08:18

## 2019-01-01 RX ADMIN — MULTIPLE VITAMINS W/ MINERALS TAB 1 TABLET: TAB at 08:18

## 2019-01-01 RX ADMIN — IPRATROPIUM BROMIDE AND ALBUTEROL SULFATE 3 ML: 2.5; .5 SOLUTION RESPIRATORY (INHALATION) at 23:04

## 2019-01-01 RX ADMIN — HEPARIN SODIUM 3316 UNITS: 5000 INJECTION INTRAVENOUS; SUBCUTANEOUS at 08:17

## 2019-01-01 RX ADMIN — INSULIN LISPRO 2 UNITS: 100 INJECTION, SOLUTION INTRAVENOUS; SUBCUTANEOUS at 16:30

## 2019-01-01 RX ADMIN — OXYCODONE HYDROCHLORIDE AND ACETAMINOPHEN 1 TABLET: 10; 325 TABLET ORAL at 22:07

## 2019-01-01 RX ADMIN — QUETIAPINE FUMARATE 25 MG: 25 TABLET ORAL at 20:22

## 2019-01-01 RX ADMIN — HYDROCODONE BITARTRATE AND ACETAMINOPHEN 1 TABLET: 5; 325 TABLET ORAL at 21:34

## 2019-01-01 RX ADMIN — ACETYLCYSTEINE 4 ML: 200 SOLUTION ORAL; RESPIRATORY (INHALATION) at 15:08

## 2019-01-01 RX ADMIN — INSULIN GLARGINE 10 UNITS: 100 INJECTION, SOLUTION SUBCUTANEOUS at 20:54

## 2019-01-01 RX ADMIN — IPRATROPIUM BROMIDE AND ALBUTEROL SULFATE 3 ML: 2.5; .5 SOLUTION RESPIRATORY (INHALATION) at 07:59

## 2019-01-01 RX ADMIN — AMIODARONE HYDROCHLORIDE 400 MG: 200 TABLET ORAL at 10:00

## 2019-01-01 RX ADMIN — ALBUMIN HUMAN 25 G: 0.25 SOLUTION INTRAVENOUS at 10:32

## 2019-01-01 RX ADMIN — ASPIRIN 81 MG: 81 TABLET, COATED ORAL at 13:21

## 2019-01-01 RX ADMIN — POLYETHYLENE GLYCOL 3350 17 G: 17 POWDER, FOR SOLUTION ORAL at 09:57

## 2019-01-01 RX ADMIN — ESCITALOPRAM 10 MG: 10 TABLET, FILM COATED ORAL at 08:17

## 2019-01-01 RX ADMIN — ACETYLCYSTEINE 4 ML: 200 SOLUTION ORAL; RESPIRATORY (INHALATION) at 15:37

## 2019-01-01 RX ADMIN — ESCITALOPRAM 10 MG: 10 TABLET, FILM COATED ORAL at 09:16

## 2019-01-01 RX ADMIN — HYDROMORPHONE HYDROCHLORIDE 0.5 MG: 1 INJECTION, SOLUTION INTRAMUSCULAR; INTRAVENOUS; SUBCUTANEOUS at 15:10

## 2019-01-01 RX ADMIN — IPRATROPIUM BROMIDE AND ALBUTEROL SULFATE 3 ML: 2.5; .5 SOLUTION RESPIRATORY (INHALATION) at 08:27

## 2019-01-01 RX ADMIN — INSULIN LISPRO 2 UNITS: 100 INJECTION, SOLUTION INTRAVENOUS; SUBCUTANEOUS at 18:04

## 2019-01-01 RX ADMIN — INSULIN LISPRO 3 UNITS: 100 INJECTION, SOLUTION INTRAVENOUS; SUBCUTANEOUS at 13:27

## 2019-01-01 RX ADMIN — MULTIPLE VITAMINS W/ MINERALS TAB 1 TABLET: TAB at 08:20

## 2019-01-01 RX ADMIN — LEVOTHYROXINE SODIUM 75 MCG: 75 TABLET ORAL at 05:09

## 2019-01-01 RX ADMIN — WARFARIN SODIUM 5 MG: 5 TABLET ORAL at 17:08

## 2019-01-01 RX ADMIN — CASTOR OIL AND BALSAM, PERU 5 G: 788; 87 OINTMENT TOPICAL at 08:12

## 2019-01-01 RX ADMIN — DEXAMETHASONE 4 MG: 4 TABLET ORAL at 09:36

## 2019-01-01 RX ADMIN — CASTOR OIL AND BALSAM, PERU 5 G: 788; 87 OINTMENT TOPICAL at 21:29

## 2019-01-01 RX ADMIN — IPRATROPIUM BROMIDE AND ALBUTEROL SULFATE 3 ML: 2.5; .5 SOLUTION RESPIRATORY (INHALATION) at 23:49

## 2019-01-01 RX ADMIN — IPRATROPIUM BROMIDE AND ALBUTEROL SULFATE 3 ML: 2.5; .5 SOLUTION RESPIRATORY (INHALATION) at 08:42

## 2019-01-01 RX ADMIN — POLYETHYLENE GLYCOL 3350 17 G: 17 POWDER, FOR SOLUTION ORAL at 08:09

## 2019-01-01 RX ADMIN — MIDODRINE HYDROCHLORIDE 10 MG: 5 TABLET ORAL at 12:41

## 2019-01-01 RX ADMIN — IPRATROPIUM BROMIDE AND ALBUTEROL SULFATE 3 ML: 2.5; .5 SOLUTION RESPIRATORY (INHALATION) at 14:46

## 2019-01-01 RX ADMIN — INSULIN LISPRO 4 UNITS: 100 INJECTION, SOLUTION INTRAVENOUS; SUBCUTANEOUS at 21:47

## 2019-01-01 RX ADMIN — ACETYLCYSTEINE 4 ML: 200 SOLUTION ORAL; RESPIRATORY (INHALATION) at 06:55

## 2019-01-01 RX ADMIN — MULTIPLE VITAMINS W/ MINERALS TAB 1 TABLET: TAB at 13:43

## 2019-01-01 RX ADMIN — OXYCODONE HYDROCHLORIDE AND ACETAMINOPHEN 1 TABLET: 10; 325 TABLET ORAL at 01:55

## 2019-01-01 RX ADMIN — ASPIRIN 81 MG: 81 TABLET, COATED ORAL at 09:42

## 2019-01-01 RX ADMIN — ASPIRIN 81 MG: 81 TABLET, COATED ORAL at 08:23

## 2019-01-01 RX ADMIN — LEVOTHYROXINE SODIUM 75 MCG: 75 TABLET ORAL at 06:08

## 2019-01-01 RX ADMIN — INSULIN LISPRO 3 UNITS: 100 INJECTION, SOLUTION INTRAVENOUS; SUBCUTANEOUS at 11:28

## 2019-01-01 RX ADMIN — METOPROLOL TARTRATE 12.5 MG: 25 TABLET ORAL at 09:20

## 2019-01-01 RX ADMIN — CASTOR OIL AND BALSAM, PERU 5 G: 788; 87 OINTMENT TOPICAL at 15:41

## 2019-01-01 RX ADMIN — ESCITALOPRAM 10 MG: 10 TABLET, FILM COATED ORAL at 09:11

## 2019-01-01 RX ADMIN — HYDROCODONE BITARTRATE AND ACETAMINOPHEN 1 TABLET: 5; 325 TABLET ORAL at 08:18

## 2019-01-01 RX ADMIN — METOPROLOL TARTRATE 12.5 MG: 25 TABLET ORAL at 13:43

## 2019-01-01 RX ADMIN — PANTOPRAZOLE SODIUM 40 MG: 40 TABLET, DELAYED RELEASE ORAL at 06:07

## 2019-01-01 RX ADMIN — HYDROCODONE BITARTRATE AND ACETAMINOPHEN 1 TABLET: 5; 325 TABLET ORAL at 13:42

## 2019-01-01 RX ADMIN — CASTOR OIL AND BALSAM, PERU 5 G: 788; 87 OINTMENT TOPICAL at 10:16

## 2019-01-01 RX ADMIN — DEXAMETHASONE 4 MG: 4 TABLET ORAL at 21:34

## 2019-01-01 RX ADMIN — ASPIRIN 81 MG: 81 TABLET, COATED ORAL at 09:08

## 2019-01-01 RX ADMIN — ACETYLCYSTEINE 4 ML: 200 SOLUTION ORAL; RESPIRATORY (INHALATION) at 10:25

## 2019-01-01 RX ADMIN — IPRATROPIUM BROMIDE AND ALBUTEROL SULFATE 3 ML: 2.5; .5 SOLUTION RESPIRATORY (INHALATION) at 19:16

## 2019-01-01 RX ADMIN — AMIODARONE HYDROCHLORIDE 200 MG: 200 TABLET ORAL at 10:18

## 2019-01-01 RX ADMIN — INSULIN LISPRO 3 UNITS: 100 INJECTION, SOLUTION INTRAVENOUS; SUBCUTANEOUS at 08:17

## 2019-01-01 RX ADMIN — ACETAMINOPHEN 650 MG: 325 TABLET, FILM COATED ORAL at 18:32

## 2019-01-01 RX ADMIN — ASPIRIN 81 MG: 81 TABLET, COATED ORAL at 08:24

## 2019-01-01 RX ADMIN — ESCITALOPRAM 10 MG: 10 TABLET, FILM COATED ORAL at 09:57

## 2019-01-01 RX ADMIN — HYDROCODONE BITARTRATE AND ACETAMINOPHEN 1 TABLET: 5; 325 TABLET ORAL at 20:18

## 2019-01-01 RX ADMIN — FUROSEMIDE 40 MG: 10 INJECTION, SOLUTION INTRAMUSCULAR; INTRAVENOUS at 21:48

## 2019-01-01 RX ADMIN — CASTOR OIL AND BALSAM, PERU 5 G: 788; 87 OINTMENT TOPICAL at 21:09

## 2019-01-01 RX ADMIN — IPRATROPIUM BROMIDE AND ALBUTEROL SULFATE 3 ML: 2.5; .5 SOLUTION RESPIRATORY (INHALATION) at 04:22

## 2019-01-01 RX ADMIN — MIDODRINE HYDROCHLORIDE 10 MG: 5 TABLET ORAL at 06:33

## 2019-01-01 RX ADMIN — HYDROMORPHONE HYDROCHLORIDE 0.5 MG: 1 INJECTION, SOLUTION INTRAMUSCULAR; INTRAVENOUS; SUBCUTANEOUS at 07:36

## 2019-01-01 RX ADMIN — ACETYLCYSTEINE 4 ML: 200 SOLUTION ORAL; RESPIRATORY (INHALATION) at 23:01

## 2019-01-01 RX ADMIN — ESCITALOPRAM 10 MG: 10 TABLET, FILM COATED ORAL at 08:49

## 2019-01-01 RX ADMIN — MULTIPLE VITAMINS W/ MINERALS TAB 1 TABLET: TAB at 08:06

## 2019-01-01 RX ADMIN — MIDODRINE HYDROCHLORIDE 10 MG: 5 TABLET ORAL at 08:29

## 2019-01-01 RX ADMIN — METOPROLOL TARTRATE 25 MG: 25 TABLET ORAL at 20:32

## 2019-01-01 RX ADMIN — PREDNISONE 40 MG: 20 TABLET ORAL at 08:11

## 2019-01-01 RX ADMIN — ESCITALOPRAM 10 MG: 10 TABLET, FILM COATED ORAL at 15:30

## 2019-01-01 RX ADMIN — AMIODARONE HYDROCHLORIDE 400 MG: 200 TABLET ORAL at 08:29

## 2019-01-01 RX ADMIN — PANTOPRAZOLE SODIUM 40 MG: 40 TABLET, DELAYED RELEASE ORAL at 06:15

## 2019-01-01 RX ADMIN — HYDROMORPHONE HYDROCHLORIDE 0.5 MG: 1 INJECTION, SOLUTION INTRAMUSCULAR; INTRAVENOUS; SUBCUTANEOUS at 09:56

## 2019-01-01 RX ADMIN — CASTOR OIL AND BALSAM, PERU 5 G: 788; 87 OINTMENT TOPICAL at 13:23

## 2019-01-01 RX ADMIN — ALBUMIN HUMAN 12.5 G: 0.25 SOLUTION INTRAVENOUS at 16:51

## 2019-01-01 RX ADMIN — DEXAMETHASONE 4 MG: 4 TABLET ORAL at 21:47

## 2019-01-01 RX ADMIN — INSULIN LISPRO 3 UNITS: 100 INJECTION, SOLUTION INTRAVENOUS; SUBCUTANEOUS at 09:18

## 2019-01-01 RX ADMIN — ESCITALOPRAM 10 MG: 10 TABLET, FILM COATED ORAL at 09:36

## 2019-01-01 RX ADMIN — PANTOPRAZOLE SODIUM 40 MG: 40 TABLET, DELAYED RELEASE ORAL at 07:00

## 2019-01-01 RX ADMIN — ASPIRIN 81 MG: 81 TABLET, COATED ORAL at 08:06

## 2019-01-01 RX ADMIN — IPRATROPIUM BROMIDE AND ALBUTEROL SULFATE 3 ML: 2.5; .5 SOLUTION RESPIRATORY (INHALATION) at 19:39

## 2019-01-01 RX ADMIN — IPRATROPIUM BROMIDE AND ALBUTEROL SULFATE 3 ML: 2.5; .5 SOLUTION RESPIRATORY (INHALATION) at 07:03

## 2019-01-01 RX ADMIN — HYDROCODONE BITARTRATE AND ACETAMINOPHEN 1 TABLET: 5; 325 TABLET ORAL at 20:39

## 2019-01-01 RX ADMIN — PANTOPRAZOLE SODIUM 40 MG: 40 TABLET, DELAYED RELEASE ORAL at 07:13

## 2019-01-01 RX ADMIN — METOPROLOL TARTRATE 12.5 MG: 25 TABLET ORAL at 21:32

## 2019-01-01 RX ADMIN — METHYLPREDNISOLONE SODIUM SUCCINATE 40 MG: 40 INJECTION, POWDER, FOR SOLUTION INTRAMUSCULAR; INTRAVENOUS at 14:05

## 2019-01-01 RX ADMIN — PANTOPRAZOLE SODIUM 40 MG: 40 TABLET, DELAYED RELEASE ORAL at 06:33

## 2019-01-01 RX ADMIN — HYDROMORPHONE HYDROCHLORIDE 0.5 MG: 1 INJECTION, SOLUTION INTRAMUSCULAR; INTRAVENOUS; SUBCUTANEOUS at 18:09

## 2019-01-01 RX ADMIN — METOPROLOL TARTRATE 12.5 MG: 25 TABLET ORAL at 08:13

## 2019-01-01 RX ADMIN — CASTOR OIL AND BALSAM, PERU 5 G: 788; 87 OINTMENT TOPICAL at 09:39

## 2019-01-01 RX ADMIN — MIDODRINE HYDROCHLORIDE 10 MG: 5 TABLET ORAL at 18:08

## 2019-01-01 RX ADMIN — ASPIRIN 81 MG: 81 TABLET, COATED ORAL at 09:20

## 2019-01-01 RX ADMIN — HYDROCODONE BITARTRATE AND ACETAMINOPHEN 1 TABLET: 5; 325 TABLET ORAL at 09:43

## 2019-01-01 RX ADMIN — CASTOR OIL AND BALSAM, PERU 5 G: 788; 87 OINTMENT TOPICAL at 21:07

## 2019-01-01 RX ADMIN — INSULIN LISPRO 2 UNITS: 100 INJECTION, SOLUTION INTRAVENOUS; SUBCUTANEOUS at 17:16

## 2019-01-01 RX ADMIN — IPRATROPIUM BROMIDE AND ALBUTEROL SULFATE 3 ML: 2.5; .5 SOLUTION RESPIRATORY (INHALATION) at 20:09

## 2019-01-01 RX ADMIN — IPRATROPIUM BROMIDE AND ALBUTEROL SULFATE 3 ML: 2.5; .5 SOLUTION RESPIRATORY (INHALATION) at 23:23

## 2019-01-01 RX ADMIN — HYDROCODONE BITARTRATE AND ACETAMINOPHEN 1 TABLET: 5; 325 TABLET ORAL at 09:50

## 2019-01-01 RX ADMIN — DEXAMETHASONE 4 MG: 4 TABLET ORAL at 21:59

## 2019-01-01 RX ADMIN — LEVOTHYROXINE SODIUM 75 MCG: 75 TABLET ORAL at 06:34

## 2019-01-01 RX ADMIN — CALCIUM ACETATE 1334 MG: 667 CAPSULE ORAL at 19:03

## 2019-01-01 RX ADMIN — MULTIPLE VITAMINS W/ MINERALS TAB 1 TABLET: TAB at 09:38

## 2019-01-01 RX ADMIN — INSULIN LISPRO 2 UNITS: 100 INJECTION, SOLUTION INTRAVENOUS; SUBCUTANEOUS at 20:47

## 2019-01-01 RX ADMIN — MIDODRINE HYDROCHLORIDE 10 MG: 5 TABLET ORAL at 21:52

## 2019-01-01 RX ADMIN — INSULIN LISPRO 2 UNITS: 100 INJECTION, SOLUTION INTRAVENOUS; SUBCUTANEOUS at 22:12

## 2019-01-01 RX ADMIN — MIDODRINE HYDROCHLORIDE 10 MG: 5 TABLET ORAL at 13:26

## 2019-01-01 RX ADMIN — POLYETHYLENE GLYCOL 3350 17 G: 17 POWDER, FOR SOLUTION ORAL at 09:37

## 2019-01-01 RX ADMIN — MIDODRINE HYDROCHLORIDE 10 MG: 5 TABLET ORAL at 21:29

## 2019-01-01 RX ADMIN — DEXAMETHASONE 4 MG: 4 TABLET ORAL at 08:17

## 2019-01-01 RX ADMIN — CASTOR OIL AND BALSAM, PERU 5 G: 788; 87 OINTMENT TOPICAL at 09:37

## 2019-01-01 RX ADMIN — INSULIN GLARGINE 10 UNITS: 100 INJECTION, SOLUTION SUBCUTANEOUS at 21:22

## 2019-01-01 RX ADMIN — ACETYLCYSTEINE 4 ML: 200 SOLUTION ORAL; RESPIRATORY (INHALATION) at 07:04

## 2019-01-01 RX ADMIN — METOPROLOL TARTRATE 12.5 MG: 25 TABLET ORAL at 10:01

## 2019-01-01 RX ADMIN — MULTIPLE VITAMINS W/ MINERALS TAB 1 TABLET: TAB at 08:11

## 2019-01-01 RX ADMIN — POLYETHYLENE GLYCOL 3350 17 G: 17 POWDER, FOR SOLUTION ORAL at 09:05

## 2019-01-01 RX ADMIN — IPRATROPIUM BROMIDE AND ALBUTEROL SULFATE 3 ML: 2.5; .5 SOLUTION RESPIRATORY (INHALATION) at 07:50

## 2019-01-01 RX ADMIN — CASTOR OIL AND BALSAM, PERU 5 G: 788; 87 OINTMENT TOPICAL at 08:49

## 2019-01-01 RX ADMIN — AMIODARONE HYDROCHLORIDE 200 MG: 200 TABLET ORAL at 12:36

## 2019-01-01 RX ADMIN — DEXAMETHASONE 4 MG: 4 TABLET ORAL at 09:07

## 2019-01-01 RX ADMIN — DIATRIZOATE MEGLUMINE AND DIATRIZOATE SODIUM 30 ML: 600; 100 SOLUTION ORAL; RECTAL at 07:59

## 2019-01-01 RX ADMIN — INSULIN GLARGINE 10 UNITS: 100 INJECTION, SOLUTION SUBCUTANEOUS at 21:34

## 2019-01-01 RX ADMIN — ESCITALOPRAM 10 MG: 10 TABLET, FILM COATED ORAL at 09:38

## 2019-01-01 RX ADMIN — CASTOR OIL AND BALSAM, PERU 5 G: 788; 87 OINTMENT TOPICAL at 08:06

## 2019-01-01 RX ADMIN — HYDROMORPHONE HYDROCHLORIDE 1 MG: 1 INJECTION, SOLUTION INTRAMUSCULAR; INTRAVENOUS; SUBCUTANEOUS at 09:27

## 2019-01-01 RX ADMIN — INSULIN LISPRO 4 UNITS: 100 INJECTION, SOLUTION INTRAVENOUS; SUBCUTANEOUS at 21:34

## 2019-01-01 RX ADMIN — METOPROLOL TARTRATE 12.5 MG: 25 TABLET ORAL at 09:11

## 2019-01-01 RX ADMIN — OXYCODONE HYDROCHLORIDE AND ACETAMINOPHEN 1 TABLET: 10; 325 TABLET ORAL at 02:47

## 2019-01-01 RX ADMIN — METOPROLOL TARTRATE 25 MG: 25 TABLET ORAL at 09:59

## 2019-01-01 RX ADMIN — QUETIAPINE FUMARATE 25 MG: 25 TABLET ORAL at 20:32

## 2019-01-01 RX ADMIN — MIDODRINE HYDROCHLORIDE 10 MG: 5 TABLET ORAL at 08:18

## 2019-01-01 RX ADMIN — OXYCODONE HYDROCHLORIDE AND ACETAMINOPHEN 1 TABLET: 10; 325 TABLET ORAL at 14:50

## 2019-01-01 RX ADMIN — PANTOPRAZOLE SODIUM 40 MG: 40 TABLET, DELAYED RELEASE ORAL at 07:01

## 2019-01-01 RX ADMIN — HEPARIN SODIUM 6800 UNITS: 5000 INJECTION INTRAVENOUS; SUBCUTANEOUS at 06:12

## 2019-01-01 RX ADMIN — LEVOTHYROXINE SODIUM 75 MCG: 75 TABLET ORAL at 06:02

## 2019-01-01 RX ADMIN — DEXAMETHASONE 4 MG: 4 TABLET ORAL at 12:35

## 2019-01-01 RX ADMIN — PANTOPRAZOLE SODIUM 40 MG: 40 TABLET, DELAYED RELEASE ORAL at 06:55

## 2019-01-01 RX ADMIN — AMIODARONE HYDROCHLORIDE 400 MG: 200 TABLET ORAL at 09:06

## 2019-01-01 RX ADMIN — CASTOR OIL AND BALSAM, PERU 5 G: 788; 87 OINTMENT TOPICAL at 22:37

## 2019-01-01 RX ADMIN — IPRATROPIUM BROMIDE AND ALBUTEROL SULFATE 3 ML: 2.5; .5 SOLUTION RESPIRATORY (INHALATION) at 14:37

## 2019-01-01 RX ADMIN — INSULIN LISPRO 2 UNITS: 100 INJECTION, SOLUTION INTRAVENOUS; SUBCUTANEOUS at 20:46

## 2019-01-01 RX ADMIN — IPRATROPIUM BROMIDE AND ALBUTEROL SULFATE 3 ML: 2.5; .5 SOLUTION RESPIRATORY (INHALATION) at 01:29

## 2019-01-01 RX ADMIN — HYDROMORPHONE HYDROCHLORIDE 0.5 MG: 1 INJECTION, SOLUTION INTRAMUSCULAR; INTRAVENOUS; SUBCUTANEOUS at 18:14

## 2019-01-01 RX ADMIN — INSULIN LISPRO 2 UNITS: 100 INJECTION, SOLUTION INTRAVENOUS; SUBCUTANEOUS at 21:18

## 2019-01-01 RX ADMIN — LEVOTHYROXINE SODIUM 75 MCG: 75 TABLET ORAL at 05:34

## 2019-01-01 RX ADMIN — METHYLPREDNISOLONE SODIUM SUCCINATE 40 MG: 40 INJECTION, POWDER, FOR SOLUTION INTRAMUSCULAR; INTRAVENOUS at 13:15

## 2019-01-01 RX ADMIN — ASPIRIN 81 MG: 81 TABLET, COATED ORAL at 15:30

## 2019-01-01 RX ADMIN — ALFENTANIL HYDROCHLORIDE 500 MCG: 500 INJECTION, SOLUTION INTRAVENOUS at 10:37

## 2019-01-01 RX ADMIN — IPRATROPIUM BROMIDE AND ALBUTEROL SULFATE 3 ML: 2.5; .5 SOLUTION RESPIRATORY (INHALATION) at 19:35

## 2019-01-01 RX ADMIN — PROPOFOL 50 MG: 10 INJECTION, EMULSION INTRAVENOUS at 10:46

## 2019-01-01 RX ADMIN — METOPROLOL TARTRATE 12.5 MG: 25 TABLET ORAL at 20:54

## 2019-01-01 RX ADMIN — ATORVASTATIN CALCIUM 10 MG: 10 TABLET, FILM COATED ORAL at 20:23

## 2019-01-01 RX ADMIN — LEVOTHYROXINE SODIUM 75 MCG: 75 TABLET ORAL at 05:43

## 2019-01-01 RX ADMIN — ASPIRIN 81 MG: 81 TABLET, COATED ORAL at 08:29

## 2019-01-01 RX ADMIN — ASPIRIN 81 MG: 81 TABLET, COATED ORAL at 14:28

## 2019-01-01 RX ADMIN — HEPARIN SODIUM 3400 UNITS: 5000 INJECTION INTRAVENOUS; SUBCUTANEOUS at 14:34

## 2019-01-01 RX ADMIN — IPRATROPIUM BROMIDE AND ALBUTEROL SULFATE 3 ML: 2.5; .5 SOLUTION RESPIRATORY (INHALATION) at 19:04

## 2019-01-01 RX ADMIN — HYDROMORPHONE HYDROCHLORIDE 0.5 MG: 1 INJECTION, SOLUTION INTRAMUSCULAR; INTRAVENOUS; SUBCUTANEOUS at 21:21

## 2019-01-01 RX ADMIN — IPRATROPIUM BROMIDE AND ALBUTEROL SULFATE 3 ML: 2.5; .5 SOLUTION RESPIRATORY (INHALATION) at 21:32

## 2019-01-01 RX ADMIN — CASTOR OIL AND BALSAM, PERU 5 G: 788; 87 OINTMENT TOPICAL at 21:19

## 2019-01-01 RX ADMIN — IPRATROPIUM BROMIDE AND ALBUTEROL SULFATE 3 ML: 2.5; .5 SOLUTION RESPIRATORY (INHALATION) at 00:56

## 2019-01-01 RX ADMIN — INSULIN LISPRO 2 UNITS: 100 INJECTION, SOLUTION INTRAVENOUS; SUBCUTANEOUS at 21:50

## 2019-01-01 RX ADMIN — METOPROLOL TARTRATE 12.5 MG: 25 TABLET ORAL at 21:47

## 2019-01-01 RX ADMIN — ACETYLCYSTEINE 4 ML: 200 SOLUTION ORAL; RESPIRATORY (INHALATION) at 15:39

## 2019-01-01 RX ADMIN — METOPROLOL TARTRATE 12.5 MG: 25 TABLET ORAL at 16:00

## 2019-01-01 RX ADMIN — PANTOPRAZOLE SODIUM 40 MG: 40 TABLET, DELAYED RELEASE ORAL at 05:43

## 2019-01-01 RX ADMIN — HEPARIN SODIUM 16.5 UNITS/KG/HR: 10000 INJECTION, SOLUTION INTRAVENOUS at 14:35

## 2019-01-01 RX ADMIN — HEPARIN SODIUM 14.5 UNITS/KG/HR: 10000 INJECTION, SOLUTION INTRAVENOUS at 06:39

## 2019-01-01 RX ADMIN — INSULIN LISPRO 4 UNITS: 100 INJECTION, SOLUTION INTRAVENOUS; SUBCUTANEOUS at 08:18

## 2019-01-01 RX ADMIN — OXYCODONE HYDROCHLORIDE AND ACETAMINOPHEN 1 TABLET: 10; 325 TABLET ORAL at 08:55

## 2019-01-01 RX ADMIN — POLYETHYLENE GLYCOL 3350 17 G: 17 POWDER, FOR SOLUTION ORAL at 08:17

## 2019-01-01 RX ADMIN — HYDROMORPHONE HYDROCHLORIDE 0.5 MG: 1 INJECTION, SOLUTION INTRAMUSCULAR; INTRAVENOUS; SUBCUTANEOUS at 10:34

## 2019-01-01 RX ADMIN — ACETYLCYSTEINE 4 ML: 200 SOLUTION ORAL; RESPIRATORY (INHALATION) at 15:35

## 2019-01-01 RX ADMIN — IPRATROPIUM BROMIDE AND ALBUTEROL SULFATE 3 ML: 2.5; .5 SOLUTION RESPIRATORY (INHALATION) at 07:44

## 2019-01-01 RX ADMIN — IPRATROPIUM BROMIDE AND ALBUTEROL SULFATE 3 ML: 2.5; .5 SOLUTION RESPIRATORY (INHALATION) at 22:57

## 2019-01-01 RX ADMIN — IPRATROPIUM BROMIDE AND ALBUTEROL SULFATE 3 ML: 2.5; .5 SOLUTION RESPIRATORY (INHALATION) at 23:28

## 2019-01-01 RX ADMIN — GLYCOPYRROLATE 0.2 MG: 0.2 INJECTION, SOLUTION INTRAMUSCULAR; INTRAVITREAL at 03:42

## 2019-01-01 RX ADMIN — PROPOFOL 300 MCG/KG/MIN: 10 INJECTION, EMULSION INTRAVENOUS at 10:37

## 2019-01-01 RX ADMIN — ACETYLCYSTEINE 4 ML: 200 SOLUTION ORAL; RESPIRATORY (INHALATION) at 14:48

## 2019-01-01 RX ADMIN — PHENYLEPHRINE HYDROCHLORIDE 100 MCG: 10 INJECTION INTRAVENOUS at 11:07

## 2019-01-01 RX ADMIN — INSULIN GLARGINE 10 UNITS: 100 INJECTION, SOLUTION SUBCUTANEOUS at 21:10

## 2019-01-01 RX ADMIN — ASPIRIN 81 MG: 81 TABLET, COATED ORAL at 09:47

## 2019-01-01 RX ADMIN — INSULIN LISPRO 3 UNITS: 100 INJECTION, SOLUTION INTRAVENOUS; SUBCUTANEOUS at 08:10

## 2019-01-01 RX ADMIN — OXYCODONE HYDROCHLORIDE AND ACETAMINOPHEN 1 TABLET: 10; 325 TABLET ORAL at 00:16

## 2019-01-01 RX ADMIN — PANTOPRAZOLE SODIUM 40 MG: 40 TABLET, DELAYED RELEASE ORAL at 06:11

## 2019-01-01 RX ADMIN — HYDROCODONE BITARTRATE AND ACETAMINOPHEN 1 TABLET: 5; 325 TABLET ORAL at 08:12

## 2019-01-01 RX ADMIN — IPRATROPIUM BROMIDE AND ALBUTEROL SULFATE 3 ML: 2.5; .5 SOLUTION RESPIRATORY (INHALATION) at 00:07

## 2019-01-01 RX ADMIN — HYDROMORPHONE HYDROCHLORIDE 0.5 MG: 1 INJECTION, SOLUTION INTRAMUSCULAR; INTRAVENOUS; SUBCUTANEOUS at 22:26

## 2019-01-01 RX ADMIN — IPRATROPIUM BROMIDE AND ALBUTEROL SULFATE 3 ML: 2.5; .5 SOLUTION RESPIRATORY (INHALATION) at 07:16

## 2019-01-01 RX ADMIN — INSULIN LISPRO 3 UNITS: 100 INJECTION, SOLUTION INTRAVENOUS; SUBCUTANEOUS at 17:49

## 2019-01-01 RX ADMIN — CASTOR OIL AND BALSAM, PERU 5 G: 788; 87 OINTMENT TOPICAL at 08:29

## 2019-01-01 RX ADMIN — METHYLPREDNISOLONE SODIUM SUCCINATE 40 MG: 40 INJECTION, POWDER, FOR SOLUTION INTRAMUSCULAR; INTRAVENOUS at 02:57

## 2019-01-01 RX ADMIN — HYDROMORPHONE HYDROCHLORIDE 0.5 MG: 1 INJECTION, SOLUTION INTRAMUSCULAR; INTRAVENOUS; SUBCUTANEOUS at 09:43

## 2019-01-01 RX ADMIN — MIDODRINE HYDROCHLORIDE 10 MG: 5 TABLET ORAL at 08:12

## 2019-01-01 RX ADMIN — OXYCODONE HYDROCHLORIDE AND ACETAMINOPHEN 1 TABLET: 10; 325 TABLET ORAL at 08:40

## 2019-01-01 RX ADMIN — ASPIRIN 81 MG: 81 TABLET, COATED ORAL at 08:15

## 2019-01-01 RX ADMIN — CASTOR OIL AND BALSAM, PERU 5 G: 788; 87 OINTMENT TOPICAL at 13:42

## 2019-01-01 RX ADMIN — CASTOR OIL AND BALSAM, PERU 5 G: 788; 87 OINTMENT TOPICAL at 08:09

## 2019-01-01 RX ADMIN — DEXAMETHASONE 4 MG: 4 TABLET ORAL at 08:04

## 2019-01-01 RX ADMIN — IPRATROPIUM BROMIDE AND ALBUTEROL SULFATE 3 ML: 2.5; .5 SOLUTION RESPIRATORY (INHALATION) at 20:21

## 2019-01-01 RX ADMIN — ACETYLCYSTEINE 4 ML: 200 SOLUTION ORAL; RESPIRATORY (INHALATION) at 14:45

## 2019-01-01 RX ADMIN — HYDROCODONE BITARTRATE AND ACETAMINOPHEN 1 TABLET: 5; 325 TABLET ORAL at 08:16

## 2019-01-01 RX ADMIN — INSULIN LISPRO 2 UNITS: 100 INJECTION, SOLUTION INTRAVENOUS; SUBCUTANEOUS at 17:02

## 2019-01-01 RX ADMIN — LORAZEPAM 0.5 MG: 2 INJECTION INTRAMUSCULAR; INTRAVENOUS at 00:33

## 2019-01-01 RX ADMIN — MULTIPLE VITAMINS W/ MINERALS TAB 1 TABLET: TAB at 15:30

## 2019-01-01 RX ADMIN — INSULIN LISPRO 2 UNITS: 100 INJECTION, SOLUTION INTRAVENOUS; SUBCUTANEOUS at 21:14

## 2019-01-01 RX ADMIN — ESCITALOPRAM 10 MG: 10 TABLET, FILM COATED ORAL at 08:24

## 2019-01-01 RX ADMIN — HEPARIN SODIUM 16.5 UNITS/KG/HR: 10000 INJECTION, SOLUTION INTRAVENOUS at 14:40

## 2019-01-01 RX ADMIN — OXYCODONE HYDROCHLORIDE AND ACETAMINOPHEN 1 TABLET: 10; 325 TABLET ORAL at 08:16

## 2019-01-01 RX ADMIN — AMIODARONE HYDROCHLORIDE 400 MG: 200 TABLET ORAL at 15:27

## 2019-01-01 RX ADMIN — ACETYLCYSTEINE 4 ML: 200 SOLUTION ORAL; RESPIRATORY (INHALATION) at 07:47

## 2019-01-01 RX ADMIN — PHENYLEPHRINE HYDROCHLORIDE 100 MCG: 10 INJECTION INTRAVENOUS at 11:00

## 2019-01-01 RX ADMIN — IPRATROPIUM BROMIDE AND ALBUTEROL SULFATE 3 ML: 2.5; .5 SOLUTION RESPIRATORY (INHALATION) at 10:50

## 2019-01-01 RX ADMIN — PANTOPRAZOLE SODIUM 40 MG: 40 TABLET, DELAYED RELEASE ORAL at 08:00

## 2019-01-01 RX ADMIN — OXYCODONE HYDROCHLORIDE AND ACETAMINOPHEN 1 TABLET: 10; 325 TABLET ORAL at 08:23

## 2019-01-01 RX ADMIN — SODIUM CHLORIDE 50 ML/HR: 9 INJECTION, SOLUTION INTRAVENOUS at 09:21

## 2019-01-01 RX ADMIN — METOPROLOL TARTRATE 12.5 MG: 25 TABLET ORAL at 09:44

## 2019-01-01 RX ADMIN — OXYCODONE HYDROCHLORIDE AND ACETAMINOPHEN 1 TABLET: 10; 325 TABLET ORAL at 20:23

## 2019-01-01 RX ADMIN — HYDROCODONE BITARTRATE AND ACETAMINOPHEN 1 TABLET: 5; 325 TABLET ORAL at 22:00

## 2019-01-01 RX ADMIN — HEPARIN SODIUM 3400 UNITS: 5000 INJECTION INTRAVENOUS; SUBCUTANEOUS at 23:08

## 2019-01-01 RX ADMIN — CASTOR OIL AND BALSAM, PERU 5 G: 788; 87 OINTMENT TOPICAL at 21:31

## 2019-01-01 RX ADMIN — INSULIN LISPRO 3 UNITS: 100 INJECTION, SOLUTION INTRAVENOUS; SUBCUTANEOUS at 17:32

## 2019-01-01 RX ADMIN — DEXAMETHASONE 4 MG: 4 TABLET ORAL at 10:00

## 2019-01-01 RX ADMIN — INSULIN LISPRO 3 UNITS: 100 INJECTION, SOLUTION INTRAVENOUS; SUBCUTANEOUS at 13:14

## 2019-01-01 RX ADMIN — CALCIUM ACETATE 1334 MG: 667 CAPSULE ORAL at 08:06

## 2019-01-01 RX ADMIN — CASTOR OIL AND BALSAM, PERU 5 G: 788; 87 OINTMENT TOPICAL at 21:23

## 2019-01-01 RX ADMIN — CASTOR OIL AND BALSAM, PERU 5 G: 788; 87 OINTMENT TOPICAL at 21:14

## 2019-01-01 RX ADMIN — ALBUMIN HUMAN 12.5 G: 0.25 SOLUTION INTRAVENOUS at 14:51

## 2019-01-01 RX ADMIN — MULTIPLE VITAMINS W/ MINERALS TAB 1 TABLET: TAB at 16:00

## 2019-01-01 RX ADMIN — AMIODARONE HYDROCHLORIDE 0.5 MG/MIN: 1.8 INJECTION, SOLUTION INTRAVENOUS at 10:54

## 2019-01-01 RX ADMIN — ASPIRIN 81 MG: 81 TABLET, COATED ORAL at 08:55

## 2019-01-01 RX ADMIN — DEXAMETHASONE 4 MG: 4 TABLET ORAL at 09:59

## 2019-01-01 RX ADMIN — PANTOPRAZOLE SODIUM 40 MG: 40 TABLET, DELAYED RELEASE ORAL at 05:34

## 2019-01-01 RX ADMIN — ATORVASTATIN CALCIUM 10 MG: 10 TABLET, FILM COATED ORAL at 20:39

## 2019-01-01 RX ADMIN — LEVOTHYROXINE SODIUM 75 MCG: 75 TABLET ORAL at 05:52

## 2019-01-01 RX ADMIN — QUETIAPINE FUMARATE 25 MG: 25 TABLET ORAL at 22:35

## 2019-01-01 RX ADMIN — IPRATROPIUM BROMIDE AND ALBUTEROL SULFATE 3 ML: 2.5; .5 SOLUTION RESPIRATORY (INHALATION) at 23:06

## 2019-01-01 RX ADMIN — IPRATROPIUM BROMIDE AND ALBUTEROL SULFATE 3 ML: 2.5; .5 SOLUTION RESPIRATORY (INHALATION) at 19:51

## 2019-01-01 RX ADMIN — ACETYLCYSTEINE 4 ML: 200 SOLUTION ORAL; RESPIRATORY (INHALATION) at 07:57

## 2019-01-01 RX ADMIN — METOPROLOL TARTRATE 25 MG: 25 TABLET ORAL at 20:09

## 2019-01-01 RX ADMIN — IPRATROPIUM BROMIDE AND ALBUTEROL SULFATE 3 ML: 2.5; .5 SOLUTION RESPIRATORY (INHALATION) at 07:14

## 2019-01-01 RX ADMIN — DEXAMETHASONE 4 MG: 4 TABLET ORAL at 21:07

## 2019-01-01 RX ADMIN — METOPROLOL TARTRATE 12.5 MG: 25 TABLET ORAL at 08:21

## 2019-01-01 RX ADMIN — IPRATROPIUM BROMIDE AND ALBUTEROL SULFATE 3 ML: 2.5; .5 SOLUTION RESPIRATORY (INHALATION) at 06:55

## 2019-01-01 RX ADMIN — LEVOTHYROXINE SODIUM 75 MCG: 75 TABLET ORAL at 06:00

## 2019-01-01 RX ADMIN — POLYETHYLENE GLYCOL 3350 17 G: 17 POWDER, FOR SOLUTION ORAL at 08:54

## 2019-01-01 RX ADMIN — PANTOPRAZOLE SODIUM 40 MG: 40 TABLET, DELAYED RELEASE ORAL at 06:53

## 2019-01-01 RX ADMIN — LORAZEPAM 0.5 MG: 2 INJECTION INTRAMUSCULAR; INTRAVENOUS at 17:05

## 2019-01-01 RX ADMIN — DIVALPROEX SODIUM 125 MG: 125 TABLET, DELAYED RELEASE ORAL at 21:14

## 2019-01-01 RX ADMIN — DIVALPROEX SODIUM 125 MG: 125 TABLET, DELAYED RELEASE ORAL at 20:09

## 2019-01-01 RX ADMIN — HYDROMORPHONE HYDROCHLORIDE 0.5 MG: 1 INJECTION, SOLUTION INTRAMUSCULAR; INTRAVENOUS; SUBCUTANEOUS at 13:57

## 2019-01-01 RX ADMIN — METOPROLOL TARTRATE 12.5 MG: 25 TABLET ORAL at 08:29

## 2019-01-01 RX ADMIN — IPRATROPIUM BROMIDE AND ALBUTEROL SULFATE 3 ML: 2.5; .5 SOLUTION RESPIRATORY (INHALATION) at 00:38

## 2019-01-01 RX ADMIN — METOPROLOL TARTRATE 12.5 MG: 25 TABLET ORAL at 22:35

## 2019-01-01 RX ADMIN — IPRATROPIUM BROMIDE AND ALBUTEROL SULFATE 3 ML: 2.5; .5 SOLUTION RESPIRATORY (INHALATION) at 04:38

## 2019-01-01 RX ADMIN — AMIODARONE HYDROCHLORIDE 400 MG: 200 TABLET ORAL at 08:17

## 2019-01-01 RX ADMIN — MULTIPLE VITAMINS W/ MINERALS TAB 1 TABLET: TAB at 09:44

## 2019-01-01 RX ADMIN — ESCITALOPRAM 10 MG: 10 TABLET, FILM COATED ORAL at 08:20

## 2019-01-01 RX ADMIN — DEXAMETHASONE 4 MG: 4 TABLET ORAL at 20:09

## 2019-01-01 RX ADMIN — AMIODARONE HYDROCHLORIDE 400 MG: 200 TABLET ORAL at 21:25

## 2019-01-01 RX ADMIN — HYDROMORPHONE HYDROCHLORIDE 1.5 MG: 1 INJECTION, SOLUTION INTRAMUSCULAR; INTRAVENOUS; SUBCUTANEOUS at 17:05

## 2019-01-01 RX ADMIN — ESCITALOPRAM 10 MG: 10 TABLET, FILM COATED ORAL at 09:43

## 2019-01-01 RX ADMIN — IPRATROPIUM BROMIDE AND ALBUTEROL SULFATE 3 ML: 2.5; .5 SOLUTION RESPIRATORY (INHALATION) at 08:01

## 2019-01-01 RX ADMIN — HYDROCODONE BITARTRATE AND ACETAMINOPHEN 1 TABLET: 5; 325 TABLET ORAL at 09:16

## 2019-01-01 RX ADMIN — HYDROMORPHONE HYDROCHLORIDE 0.5 MG: 1 INJECTION, SOLUTION INTRAMUSCULAR; INTRAVENOUS; SUBCUTANEOUS at 22:08

## 2019-01-01 RX ADMIN — ESCITALOPRAM 10 MG: 10 TABLET, FILM COATED ORAL at 14:28

## 2019-01-01 RX ADMIN — METOPROLOL TARTRATE 12.5 MG: 25 TABLET ORAL at 12:52

## 2019-01-01 RX ADMIN — WARFARIN SODIUM 5 MG: 5 TABLET ORAL at 17:16

## 2019-01-01 RX ADMIN — IPRATROPIUM BROMIDE AND ALBUTEROL SULFATE 3 ML: 2.5; .5 SOLUTION RESPIRATORY (INHALATION) at 06:56

## 2019-01-01 RX ADMIN — ASPIRIN 81 MG: 81 TABLET, COATED ORAL at 12:22

## 2019-01-01 RX ADMIN — ACETYLCYSTEINE 4 ML: 200 SOLUTION ORAL; RESPIRATORY (INHALATION) at 06:59

## 2019-01-01 RX ADMIN — IPRATROPIUM BROMIDE AND ALBUTEROL SULFATE 3 ML: 2.5; .5 SOLUTION RESPIRATORY (INHALATION) at 19:41

## 2019-01-01 RX ADMIN — METOPROLOL TARTRATE 12.5 MG: 25 TABLET ORAL at 13:22

## 2019-01-01 RX ADMIN — ACETYLCYSTEINE 4 ML: 200 SOLUTION ORAL; RESPIRATORY (INHALATION) at 00:07

## 2019-01-01 RX ADMIN — IPRATROPIUM BROMIDE AND ALBUTEROL SULFATE 3 ML: 2.5; .5 SOLUTION RESPIRATORY (INHALATION) at 11:29

## 2019-01-01 RX ADMIN — ACETYLCYSTEINE 4 ML: 200 SOLUTION ORAL; RESPIRATORY (INHALATION) at 06:51

## 2019-01-01 RX ADMIN — MULTIPLE VITAMINS W/ MINERALS TAB 1 TABLET: TAB at 09:11

## 2019-01-01 RX ADMIN — LEVOTHYROXINE SODIUM 75 MCG: 75 TABLET ORAL at 05:40

## 2019-01-01 RX ADMIN — POLYETHYLENE GLYCOL 3350 17 G: 17 POWDER, FOR SOLUTION ORAL at 15:35

## 2019-01-01 RX ADMIN — QUETIAPINE FUMARATE 25 MG: 25 TABLET ORAL at 23:22

## 2019-01-01 RX ADMIN — DIGOXIN 250 MCG: 0.25 INJECTION INTRAMUSCULAR; INTRAVENOUS at 16:21

## 2019-01-01 RX ADMIN — LEVOTHYROXINE SODIUM 75 MCG: 75 TABLET ORAL at 05:47

## 2019-01-01 RX ADMIN — ACETYLCYSTEINE 4 ML: 200 SOLUTION ORAL; RESPIRATORY (INHALATION) at 23:29

## 2019-01-01 RX ADMIN — LORAZEPAM 0.5 MG: 2 INJECTION INTRAMUSCULAR; INTRAVENOUS at 12:26

## 2019-01-01 RX ADMIN — CALCIUM ACETATE 1334 MG: 667 CAPSULE ORAL at 18:47

## 2019-01-01 RX ADMIN — IPRATROPIUM BROMIDE AND ALBUTEROL SULFATE 3 ML: 2.5; .5 SOLUTION RESPIRATORY (INHALATION) at 15:33

## 2019-01-01 RX ADMIN — CASTOR OIL AND BALSAM, PERU 5 G: 788; 87 OINTMENT TOPICAL at 09:47

## 2019-01-01 RX ADMIN — CASTOR OIL AND BALSAM, PERU 5 G: 788; 87 OINTMENT TOPICAL at 08:24

## 2019-01-01 RX ADMIN — ATORVASTATIN CALCIUM 10 MG: 10 TABLET, FILM COATED ORAL at 20:33

## 2019-01-01 RX ADMIN — AMIODARONE HYDROCHLORIDE 1 MG/MIN: 1.8 INJECTION, SOLUTION INTRAVENOUS at 03:48

## 2019-01-01 RX ADMIN — MIDODRINE HYDROCHLORIDE 10 MG: 5 TABLET ORAL at 21:12

## 2019-01-01 RX ADMIN — DEXAMETHASONE 4 MG: 4 TABLET ORAL at 08:24

## 2019-01-01 RX ADMIN — IPRATROPIUM BROMIDE AND ALBUTEROL SULFATE 3 ML: 2.5; .5 SOLUTION RESPIRATORY (INHALATION) at 07:43

## 2019-01-01 RX ADMIN — OXYCODONE HYDROCHLORIDE AND ACETAMINOPHEN 1 TABLET: 10; 325 TABLET ORAL at 11:17

## 2019-01-01 RX ADMIN — METOPROLOL TARTRATE 25 MG: 25 TABLET ORAL at 08:17

## 2019-01-01 RX ADMIN — METOPROLOL TARTRATE 12.5 MG: 25 TABLET ORAL at 20:16

## 2019-01-01 RX ADMIN — HYDROMORPHONE HYDROCHLORIDE 0.5 MG: 1 INJECTION, SOLUTION INTRAMUSCULAR; INTRAVENOUS; SUBCUTANEOUS at 12:35

## 2019-01-01 RX ADMIN — INSULIN GLARGINE 10 UNITS: 100 INJECTION, SOLUTION SUBCUTANEOUS at 22:08

## 2019-01-01 RX ADMIN — ACETYLCYSTEINE 4 ML: 200 SOLUTION ORAL; RESPIRATORY (INHALATION) at 23:48

## 2019-01-01 RX ADMIN — MULTIPLE VITAMINS W/ MINERALS TAB 1 TABLET: TAB at 09:47

## 2019-01-01 RX ADMIN — HEPARIN SODIUM 3400 UNITS: 5000 INJECTION INTRAVENOUS; SUBCUTANEOUS at 06:08

## 2019-01-01 RX ADMIN — DEXAMETHASONE 4 MG: 4 TABLET ORAL at 21:14

## 2019-01-01 RX ADMIN — ASPIRIN 81 MG: 81 TABLET, COATED ORAL at 08:20

## 2019-01-01 RX ADMIN — ASPIRIN 81 MG: 81 TABLET, COATED ORAL at 16:00

## 2019-01-01 RX ADMIN — ATORVASTATIN CALCIUM 10 MG: 10 TABLET, FILM COATED ORAL at 22:35

## 2019-01-01 RX ADMIN — PROPOFOL 200 MG: 10 INJECTION, EMULSION INTRAVENOUS at 10:37

## 2019-01-01 RX ADMIN — INSULIN LISPRO 2 UNITS: 100 INJECTION, SOLUTION INTRAVENOUS; SUBCUTANEOUS at 18:19

## 2019-01-01 RX ADMIN — INSULIN GLARGINE 10 UNITS: 100 INJECTION, SOLUTION SUBCUTANEOUS at 22:15

## 2019-01-01 RX ADMIN — METOPROLOL TARTRATE 12.5 MG: 25 TABLET ORAL at 08:10

## 2019-01-01 RX ADMIN — ACETYLCYSTEINE 4 ML: 200 SOLUTION ORAL; RESPIRATORY (INHALATION) at 06:49

## 2019-01-01 RX ADMIN — PANTOPRAZOLE SODIUM 40 MG: 40 TABLET, DELAYED RELEASE ORAL at 05:52

## 2019-01-01 RX ADMIN — LORAZEPAM 0.5 MG: 2 INJECTION INTRAMUSCULAR; INTRAVENOUS at 20:17

## 2019-01-01 RX ADMIN — MIDODRINE HYDROCHLORIDE 10 MG: 5 TABLET ORAL at 10:54

## 2019-01-01 RX ADMIN — PANTOPRAZOLE SODIUM 40 MG: 40 TABLET, DELAYED RELEASE ORAL at 05:19

## 2019-01-01 RX ADMIN — CASTOR OIL AND BALSAM, PERU 5 G: 788; 87 OINTMENT TOPICAL at 21:59

## 2019-01-01 RX ADMIN — CASTOR OIL AND BALSAM, PERU 5 G: 788; 87 OINTMENT TOPICAL at 20:50

## 2019-01-01 RX ADMIN — MIDODRINE HYDROCHLORIDE 10 MG: 5 TABLET ORAL at 12:46

## 2019-01-01 RX ADMIN — ACETYLCYSTEINE 4 ML: 200 SOLUTION ORAL; RESPIRATORY (INHALATION) at 23:39

## 2019-01-01 RX ADMIN — MIDODRINE HYDROCHLORIDE 10 MG: 5 TABLET ORAL at 08:17

## 2019-01-01 RX ADMIN — AMIODARONE HYDROCHLORIDE 0.5 MG/MIN: 1.8 INJECTION, SOLUTION INTRAVENOUS at 06:16

## 2019-01-01 RX ADMIN — HEPARIN SODIUM 19.5 UNITS/KG/HR: 10000 INJECTION, SOLUTION INTRAVENOUS at 06:14

## 2019-01-01 RX ADMIN — LEVOTHYROXINE SODIUM 75 MCG: 75 TABLET ORAL at 06:10

## 2019-01-01 RX ADMIN — MIDODRINE HYDROCHLORIDE 10 MG: 5 TABLET ORAL at 08:49

## 2019-01-01 RX ADMIN — ATORVASTATIN CALCIUM 10 MG: 10 TABLET, FILM COATED ORAL at 21:08

## 2019-01-01 RX ADMIN — PANTOPRAZOLE SODIUM 40 MG: 40 TABLET, DELAYED RELEASE ORAL at 06:34

## 2019-01-01 RX ADMIN — CASTOR OIL AND BALSAM, PERU 5 G: 788; 87 OINTMENT TOPICAL at 09:18

## 2019-01-01 RX ADMIN — HEPARIN SODIUM 2000 UNITS: 1000 INJECTION, SOLUTION INTRAVENOUS; SUBCUTANEOUS at 12:15

## 2019-01-01 RX ADMIN — ACETYLCYSTEINE 4 ML: 200 SOLUTION ORAL; RESPIRATORY (INHALATION) at 15:55

## 2019-01-01 RX ADMIN — OXYCODONE HYDROCHLORIDE AND ACETAMINOPHEN 1 TABLET: 10; 325 TABLET ORAL at 08:52

## 2019-01-01 RX ADMIN — INSULIN LISPRO 2 UNITS: 100 INJECTION, SOLUTION INTRAVENOUS; SUBCUTANEOUS at 12:03

## 2019-01-01 RX ADMIN — WARFARIN SODIUM 2.5 MG: 2.5 TABLET ORAL at 17:04

## 2019-01-01 RX ADMIN — HYDROMORPHONE HYDROCHLORIDE 1 MG: 1 INJECTION, SOLUTION INTRAMUSCULAR; INTRAVENOUS; SUBCUTANEOUS at 03:13

## 2019-01-01 RX ADMIN — POLYETHYLENE GLYCOL 3350 17 G: 17 POWDER, FOR SOLUTION ORAL at 09:21

## 2019-01-01 RX ADMIN — ONDANSETRON 4 MG: 2 INJECTION INTRAMUSCULAR; INTRAVENOUS at 16:57

## 2019-01-01 RX ADMIN — METOPROLOL TARTRATE 12.5 MG: 25 TABLET ORAL at 20:33

## 2019-01-01 RX ADMIN — METOPROLOL TARTRATE 25 MG: 25 TABLET ORAL at 22:06

## 2019-01-01 RX ADMIN — HYDROMORPHONE HYDROCHLORIDE 0.5 MG: 1 INJECTION, SOLUTION INTRAMUSCULAR; INTRAVENOUS; SUBCUTANEOUS at 22:42

## 2019-01-01 RX ADMIN — IPRATROPIUM BROMIDE AND ALBUTEROL SULFATE 3 ML: 2.5; .5 SOLUTION RESPIRATORY (INHALATION) at 19:31

## 2019-01-01 RX ADMIN — ESCITALOPRAM 10 MG: 10 TABLET, FILM COATED ORAL at 08:29

## 2019-01-01 RX ADMIN — HEPARIN SODIUM 15.5 UNITS/KG/HR: 10000 INJECTION, SOLUTION INTRAVENOUS at 07:14

## 2019-01-01 RX ADMIN — IPRATROPIUM BROMIDE AND ALBUTEROL SULFATE 3 ML: 2.5; .5 SOLUTION RESPIRATORY (INHALATION) at 14:45

## 2019-01-01 RX ADMIN — HYDROCODONE BITARTRATE AND ACETAMINOPHEN 1 TABLET: 5; 325 TABLET ORAL at 13:02

## 2019-01-01 RX ADMIN — IPRATROPIUM BROMIDE AND ALBUTEROL SULFATE 3 ML: 2.5; .5 SOLUTION RESPIRATORY (INHALATION) at 04:30

## 2019-01-01 RX ADMIN — AMIODARONE HYDROCHLORIDE 400 MG: 200 TABLET ORAL at 08:23

## 2019-01-01 RX ADMIN — IPRATROPIUM BROMIDE AND ALBUTEROL SULFATE 3 ML: 2.5; .5 SOLUTION RESPIRATORY (INHALATION) at 08:09

## 2019-01-01 RX ADMIN — LEVOTHYROXINE SODIUM 75 MCG: 75 TABLET ORAL at 06:23

## 2019-01-01 RX ADMIN — IPRATROPIUM BROMIDE AND ALBUTEROL SULFATE 3 ML: 2.5; .5 SOLUTION RESPIRATORY (INHALATION) at 19:48

## 2019-01-01 RX ADMIN — ACETAMINOPHEN 650 MG: 325 TABLET, FILM COATED ORAL at 16:30

## 2019-01-01 RX ADMIN — POLYETHYLENE GLYCOL 3350 17 G: 17 POWDER, FOR SOLUTION ORAL at 09:38

## 2019-01-01 RX ADMIN — CASTOR OIL AND BALSAM, PERU 5 G: 788; 87 OINTMENT TOPICAL at 20:44

## 2019-01-01 RX ADMIN — METOPROLOL TARTRATE 25 MG: 25 TABLET ORAL at 15:28

## 2019-01-01 RX ADMIN — ATORVASTATIN CALCIUM 10 MG: 10 TABLET, FILM COATED ORAL at 20:22

## 2019-01-01 RX ADMIN — POLYETHYLENE GLYCOL 3350 17 G: 17 POWDER, FOR SOLUTION ORAL at 12:24

## 2019-01-01 RX ADMIN — ACETAMINOPHEN 650 MG: 325 TABLET, FILM COATED ORAL at 09:18

## 2019-01-01 RX ADMIN — POLYETHYLENE GLYCOL 3350 17 G: 17 POWDER, FOR SOLUTION ORAL at 08:19

## 2019-01-01 RX ADMIN — HYDROCODONE BITARTRATE AND ACETAMINOPHEN 1 TABLET: 5; 325 TABLET ORAL at 21:19

## 2019-01-01 RX ADMIN — ESCITALOPRAM 10 MG: 10 TABLET, FILM COATED ORAL at 09:44

## 2019-01-01 RX ADMIN — CASTOR OIL AND BALSAM, PERU 5 G: 788; 87 OINTMENT TOPICAL at 08:18

## 2019-01-01 RX ADMIN — IPRATROPIUM BROMIDE AND ALBUTEROL SULFATE 3 ML: 2.5; .5 SOLUTION RESPIRATORY (INHALATION) at 23:41

## 2019-01-01 RX ADMIN — MIDODRINE HYDROCHLORIDE 10 MG: 5 TABLET ORAL at 08:24

## 2019-01-01 RX ADMIN — QUETIAPINE FUMARATE 25 MG: 25 TABLET ORAL at 21:13

## 2019-01-01 RX ADMIN — ACETYLCYSTEINE 4 ML: 200 SOLUTION ORAL; RESPIRATORY (INHALATION) at 16:31

## 2019-01-01 RX ADMIN — INSULIN LISPRO 5 UNITS: 100 INJECTION, SOLUTION INTRAVENOUS; SUBCUTANEOUS at 09:21

## 2019-01-01 RX ADMIN — WARFARIN SODIUM 5 MG: 5 TABLET ORAL at 17:04

## 2019-01-01 RX ADMIN — ASPIRIN 81 MG: 81 TABLET, COATED ORAL at 09:39

## 2019-01-01 RX ADMIN — ESCITALOPRAM 10 MG: 10 TABLET, FILM COATED ORAL at 15:28

## 2019-01-01 RX ADMIN — POLYETHYLENE GLYCOL 3350 17 G: 17 POWDER, FOR SOLUTION ORAL at 08:13

## 2019-01-01 RX ADMIN — IPRATROPIUM BROMIDE AND ALBUTEROL SULFATE 3 ML: 2.5; .5 SOLUTION RESPIRATORY (INHALATION) at 06:36

## 2019-01-01 RX ADMIN — QUETIAPINE FUMARATE 25 MG: 25 TABLET ORAL at 20:44

## 2019-01-01 RX ADMIN — ASPIRIN 81 MG: 81 TABLET, COATED ORAL at 09:16

## 2019-01-01 RX ADMIN — METOPROLOL TARTRATE 12.5 MG: 25 TABLET ORAL at 09:16

## 2019-01-01 RX ADMIN — INSULIN LISPRO 2 UNITS: 100 INJECTION, SOLUTION INTRAVENOUS; SUBCUTANEOUS at 22:02

## 2019-01-01 RX ADMIN — HYDROMORPHONE HYDROCHLORIDE 1.5 MG: 1 INJECTION, SOLUTION INTRAMUSCULAR; INTRAVENOUS; SUBCUTANEOUS at 00:33

## 2019-01-01 RX ADMIN — METOPROLOL TARTRATE 25 MG: 25 TABLET ORAL at 21:13

## 2019-01-01 RX ADMIN — METOPROLOL TARTRATE 12.5 MG: 25 TABLET ORAL at 08:18

## 2019-01-01 RX ADMIN — AMIODARONE HYDROCHLORIDE 0.5 MG/MIN: 1.8 INJECTION, SOLUTION INTRAVENOUS at 21:21

## 2019-01-01 RX ADMIN — ATORVASTATIN CALCIUM 10 MG: 10 TABLET, FILM COATED ORAL at 21:48

## 2019-01-01 RX ADMIN — IPRATROPIUM BROMIDE AND ALBUTEROL SULFATE 3 ML: 2.5; .5 SOLUTION RESPIRATORY (INHALATION) at 23:59

## 2019-01-01 RX ADMIN — DEXAMETHASONE 4 MG: 4 TABLET ORAL at 22:06

## 2019-01-01 RX ADMIN — ESCITALOPRAM 10 MG: 10 TABLET, FILM COATED ORAL at 13:22

## 2019-01-01 RX ADMIN — OXYCODONE HYDROCHLORIDE AND ACETAMINOPHEN 1 TABLET: 10; 325 TABLET ORAL at 21:52

## 2019-01-01 RX ADMIN — LEVOTHYROXINE SODIUM 75 MCG: 75 TABLET ORAL at 13:42

## 2019-01-01 RX ADMIN — CASTOR OIL AND BALSAM, PERU 5 G: 788; 87 OINTMENT TOPICAL at 20:17

## 2019-01-01 RX ADMIN — METOPROLOL TARTRATE 12.5 MG: 25 TABLET ORAL at 20:36

## 2019-01-01 RX ADMIN — IPRATROPIUM BROMIDE AND ALBUTEROL SULFATE 3 ML: 2.5; .5 SOLUTION RESPIRATORY (INHALATION) at 23:19

## 2019-01-01 RX ADMIN — ESCITALOPRAM 10 MG: 10 TABLET, FILM COATED ORAL at 08:11

## 2019-01-01 RX ADMIN — ATORVASTATIN CALCIUM 10 MG: 10 TABLET, FILM COATED ORAL at 20:50

## 2019-01-01 RX ADMIN — INSULIN LISPRO 2 UNITS: 100 INJECTION, SOLUTION INTRAVENOUS; SUBCUTANEOUS at 18:50

## 2019-01-01 RX ADMIN — IPRATROPIUM BROMIDE AND ALBUTEROL SULFATE 3 ML: 2.5; .5 SOLUTION RESPIRATORY (INHALATION) at 06:49

## 2019-01-01 RX ADMIN — HYDROMORPHONE HYDROCHLORIDE 0.5 MG: 1 INJECTION, SOLUTION INTRAMUSCULAR; INTRAVENOUS; SUBCUTANEOUS at 04:34

## 2019-01-01 RX ADMIN — IPRATROPIUM BROMIDE AND ALBUTEROL SULFATE 3 ML: 2.5; .5 SOLUTION RESPIRATORY (INHALATION) at 23:11

## 2019-01-01 RX ADMIN — DEXAMETHASONE 4 MG: 4 TABLET ORAL at 22:00

## 2019-01-01 RX ADMIN — LEVOTHYROXINE SODIUM 75 MCG: 75 TABLET ORAL at 05:59

## 2019-01-01 RX ADMIN — INSULIN LISPRO 2 UNITS: 100 INJECTION, SOLUTION INTRAVENOUS; SUBCUTANEOUS at 17:08

## 2019-01-01 RX ADMIN — ACETAMINOPHEN 650 MG: 325 TABLET, FILM COATED ORAL at 15:40

## 2019-01-01 RX ADMIN — ASPIRIN 81 MG: 81 TABLET, COATED ORAL at 13:44

## 2019-01-01 RX ADMIN — ALBUMIN HUMAN 12.5 G: 0.25 SOLUTION INTRAVENOUS at 17:19

## 2019-01-01 RX ADMIN — ASPIRIN 81 MG: 81 TABLET, COATED ORAL at 10:17

## 2019-04-09 NOTE — ED PROVIDER NOTES
MD ATTESTATION NOTE    Pt presents to the ED after the pt's NH noted that the pt had an episode of tremors earlier today. Pt states that she has a chronic RUE tremor but denies having any unusual symptoms tonight. Pt has no complaint at this time.    On exam, the pt has a resting RUE tremor but is resting comfortably. Pt has not had any seizure-like activity in the ED. I agree with the plan to discharge the pt.    The KIM and I have discussed this patient's history, physical exam, and treatment plan.  I have reviewed the documentation and personally had a face to face interaction with the patient. I affirm the documentation and agree with the treatment and plan.  The attached note describes my personal findings.    Documentation assistance provided by treva Yang for Dr. Tobar.  Information recorded by the treva was done at my direction and has been verified and validated by me.       Maribel Yang  04/08/19 [id number]       Ilan Tobar MD  04/09/19 [id number]

## 2019-04-09 NOTE — DISCHARGE INSTRUCTIONS
It is not likely you had a seizure    Return Precautions    Although you are being discharged from the ED today, I encourage you to return for worsening symptoms.  Things can, and do, change such that treatment at home with medication may not be adequate.      Specifically, return for any of the following:    Chest pain, shortness of breath, pain or nausea and vomiting not controlled by medications provided.    Please make a follow up with your Primary Care Provider for a blood pressure recheck.

## 2019-04-09 NOTE — ED PROVIDER NOTES
"EMERGENCY DEPARTMENT ENCOUNTER    Room Number:  30/30  Date seen:  4/8/2019  Time seen: 9:53 PM  PCP: Taiwo Shankar MD    HPI:  Chief complaint:Tremors  Context:Melia Almeida is a 53 y.o. female who presents to the ED by NH and is unaware of why she was sent here. Pt reports that her right hand tends to tremor chronically. Pt reports she receives dialysis (MWF) and went to dialysis today. Pt denies pain, headache, or SOA.    NH: Reports pt was sent to ER due to seizure-like activity PTA.  Pt with no LOC and seems to be able to remember events.  She tells the nurse that she had just had eye drops instilled.     Timing:constant  Duration: unknown  Quality:\"tremors\"  Intensity/Severity:moderate  Associated Symptoms:none  Aggravating Factors:none  Alleviating Factors:none  Previous Episodes:none  Treatment before arrival:none    ALLERGIES  Morphine and Ampicillin    PAST MEDICAL HISTORY  Active Ambulatory Problems     Diagnosis Date Noted   • End stage renal disease (CMS/[facility]) 01/24/2018   • End stage kidney disease (CMS/[facility]) 01/24/2018   • Dependence on renal dialysis (CMS/[facility]) 03/21/2018   • Problem with dialysis access (CMS/[facility]) 03/21/2018   • Type 2 diabetes mellitus (CMS/[facility]) 03/22/2018     Resolved Ambulatory Problems     Diagnosis Date Noted   • No Resolved Ambulatory Problems     Past Medical History:   Diagnosis Date   • Acute renal failure on dialysis (CMS/[facility])    • Adult failure to thrive    • Chronic kidney disease (CKD), stage V (CMS/[facility])    • Coronary artery disease    • Diabetes mellitus (CMS/[facility])    • Diabetes mellitus with chronic kidney disease (CMS/[facility])    • Disease of thyroid gland    • GERD (gastroesophageal reflux disease)    • History of anemia    • History of UTI    • Hyperlipidemia    • Hypertension    • Idiopathic neuropathy    • Major depressive disorder, recurrent episode, severe, with psychotic behavior (CMS/[facility])    • Metabolic encephalopathy    • Obesity    • Osteomyelitis of right foot " (CMS/[facility])    • Other symbolic dysfunctions    • Peripheral vascular disease, unspecified (CMS/[facility])    • Personal history of diabetic foot ulcer        PAST SURGICAL HISTORY  Past Surgical History:   Procedure Laterality Date   • ARTERIOVENOUS FISTULA/SHUNT SURGERY Right 1/29/2018    Procedure: UPPER EXTREMITY ARTERIOVENOUS SHUNT GRAFT;  Surgeon: Ambar Dobson Jr., MD;  Location: [facility] OR;  Service:    • BELOW KNEE LEG AMPUTATION Left 2008   • INSERTION HEMODIALYSIS CATHETER N/A 1/24/2018    Procedure: TUNNEL DIALYSIS CATHETER INSERTION;  Surgeon: Moncho Jackson MD;  Location: [facility] OR;  Service:    • INSERTION HEMODIALYSIS CATHETER N/A 3/22/2018    Procedure: Tunnel Catheter Insertion;  Surgeon: Juwan Phillips MD;  Location: [facility] OR 18/19;  Service: Vascular       FAMILY HISTORY  No family history on file.    SOCIAL HISTORY  Social History     Socioeconomic History   • Marital status: Single     Spouse name: Not on file   • Number of children: Not on file   • Years of education: Not on file   • Highest education level: Not on file   Tobacco Use   • Smoking status: Former Smoker   Substance and Sexual Activity   • Alcohol use: No   • Drug use: No   • Sexual activity: Defer       REVIEW OF SYSTEMS  Review of Systems   Constitutional: Negative for activity change, appetite change, diaphoresis and fever.   HENT: Negative for trouble swallowing.    Eyes: Negative for visual disturbance.   Respiratory: Negative for cough, chest tightness, shortness of breath and wheezing.    Cardiovascular: Negative for chest pain, palpitations and leg swelling.   Gastrointestinal: Negative for abdominal pain, diarrhea, nausea and vomiting.   Genitourinary: Negative for dysuria.   Musculoskeletal: Negative for back pain.   Skin: Negative for rash.   Neurological: Positive for tremors (per nursing home). Negative for dizziness, speech difficulty and light-headedness.       PHYSICAL EXAM  ED Triage  Vitals   Temp Heart Rate Resp BP SpO2   04/08/19 2146 04/08/19 2126 04/08/19 2126 04/08/19 2126 04/08/19 2126   98 °F (36.7 °C) 77 18 116/47 96 %      Temp src Heart Rate Source Patient Position BP Location FiO2 (%)   04/08/19 2146 04/08/19 2126 04/08/19 2126 04/08/19 2126 --   Oral Monitor Sitting Right arm      Physical Exam   Constitutional: She is oriented to person, place, and time and well-developed, well-nourished, and in no distress. She does not have a sickly appearance. No distress.   HENT:   Head: Normocephalic and atraumatic.   Mouth/Throat: Uvula is midline, oropharynx is clear and moist and mucous membranes are normal.   Eyes: EOM are normal. Pupils are equal, round, and reactive to light.   Neck: Normal range of motion. Neck supple.   Cardiovascular: Normal rate, regular rhythm, S1 normal, S2 normal and normal heart sounds. Exam reveals no gallop and no friction rub.   No murmur heard.  LUE dialysis graft in place; + thrill palpated and bruit auscultated.    Pulmonary/Chest: Effort normal and breath sounds normal. She has no decreased breath sounds. She has no wheezes. She has no rhonchi. She has no rales.   Abdominal: Soft. Normal appearance and bowel sounds are normal. There is no tenderness. There is no rebound and no guarding.   Musculoskeletal: Normal range of motion.   Neurological: She is alert and oriented to person, place, and time. She displays tremor (right hand (chronic)). GCS score is 15.   Skin: Skin is warm, dry and intact.   Psychiatric: Affect and judgment normal.   Nursing note and vitals reviewed.        MEDICATIONS GIVEN IN ER  Medications - No data to display      PROCEDURES  Procedures    COURSE & MEDICAL DECISION MAKING  Pertinent Labs and Imaging studies that were ordered and reviewed are noted above.  Results were reviewed/discussed with the patient and they were also made aware of online access.  Pt also made aware that some labs, such as cultures, will not be resulted during  "ER visit and follow up with PMD is necessary.     PROGRESS AND CONSULTS    Progress Notes:         2248  Nurse reports pt was receiving eye drops and \"jumped\" in the process, and this was the seizure like activity. Reports the pt was conscious at the time and remembers the whole process.     2256   Reviewed pt's history and workup with Dr. Tobar.  After a bedside evaluation, Dr. Tobar agrees with the plan of care.  The patient's history, physical exam, and lab findings were discussed with the physician, who also performed a face to face history and physical exam.  I discussed all results and noted any abnormalities with patient.  Discussed absoute need to recheck abnormalities with their family physician.  I answered any of the patient's questions.  Discussed plan for discharge, as there is no emergent indication for admission.  Pt is agreeable and understands need for follow up and repeat testing.  Pt is aware that discharge does not mean that nothing is wrong but it indicates no emergency is present and they must continue care with their family physician.  Pt is discharged with instructions to follow up with primary care doctor to have their blood pressure rechecked.       Disposition vitals:  /60 (BP Location: Left arm, Patient Position: Sitting)   Pulse 75   Temp 98 °F (36.7 °C) (Oral)   Resp 18   Ht 165.1 cm (65\")   Wt 110 kg (241 lb 8 oz)   LMP  (Approximate)   SpO2 94%   Breastfeeding? No   BMI 40.19 kg/m²       DIAGNOSIS  Final diagnoses:   Witnessed seizure-like activity (CMS/[facility])       FOLLOW UP   Taiwo Shankar MD  [address] [id number] [phone]    Schedule an appointment as soon as possible for a visit in 2 days  As needed      RX     Medication List      No changes were made to your prescriptions during this visit.         Documentation assistance provided by treva Pressley for LAURA Mar.  Information recorded by the treva was done at " my direction and has been verified and validated by me.              Germaine Pressley  04/08/19 5183       Mahogany Kirby APRN  04/09/19 0011

## 2019-09-15 PROBLEM — R06.02 SHORTNESS OF BREATH: Status: ACTIVE | Noted: 2019-01-01

## 2019-09-16 PROBLEM — J18.9 PNEUMONIA OF RIGHT LUNG DUE TO INFECTIOUS ORGANISM: Status: ACTIVE | Noted: 2019-01-01

## 2019-09-17 NOTE — ANESTHESIA POSTPROCEDURE EVALUATION
"Patient: Melia Almeida    Procedure Summary     Date:  09/17/19 Room / Location:   [facility]    Anesthesia Start:  1032 Anesthesia Stop:  1125    Procedure:  BRONCHOSCOPY WITH ENDOBRONCHIAL ULTRASOUND, BX, BRUSHINGS, BAL, & TBNA (Bilateral Bronchus) Diagnosis:       Pneumonia of right lung due to infectious organism, unspecified part of lung      (Pneumonia of right lung due to infectious organism, unspecified part of lung [J18.9])    Surgeon:  Du Rogers MD Provider:  Kasia Alicea MD    Anesthesia Type:  general ASA Status:  4          Anesthesia Type: general  Last vitals  BP   96/68 (09/17/19 1137)   Temp   37.2 °C (98.9 °F) (09/17/19 0730)   Pulse   104 (09/17/19 1137)   Resp   22 (09/17/19 1137)     SpO2   94 % (09/17/19 1137)     Post Anesthesia Care and Evaluation    Patient location during evaluation: bedside  Patient participation: complete - patient participated  Level of consciousness: awake and alert  Pain management: adequate  Airway patency: patent  Anesthetic complications: No anesthetic complications    Cardiovascular status: acceptable  Respiratory status: acceptable  Hydration status: acceptable    Comments: BP 96/68 (BP Location: Left arm, Patient Position: Sitting)   Pulse 104   Temp 37.2 °C (98.9 °F) (Oral)   Resp 22   Ht 165.1 cm (65\")   Wt 87.5 kg (192 lb 14.4 oz)   LMP  (LMP Unknown)   SpO2 94%   BMI 32.10 kg/m²       "

## 2019-09-17 NOTE — ANESTHESIA PREPROCEDURE EVALUATION
Anesthesia Evaluation     Patient summary reviewed and Nursing notes reviewed   NPO Solid Status: > 8 hours  NPO Liquid Status: > 2 hours           Airway   Mallampati: II  Dental    (+) edentulous    Pulmonary    (+) pneumonia , shortness of breath, rhonchi,   Cardiovascular - normal exam    ECG reviewed    (+) hypertension, CAD, PVD, hyperlipidemia,       Neuro/Psych  (+) psychiatric history Depression,     GI/Hepatic/Renal/Endo    (+) obesity,  GERD,  renal disease ESRD and dialysis, diabetes mellitus,     Musculoskeletal     Abdominal    Substance History      OB/GYN          Other            Phys Exam Other: Unequal pupils. Patient states this is normal for her              Anesthesia Plan    ASA 4     general

## 2019-09-17 NOTE — ANESTHESIA PROCEDURE NOTES
Airway  Urgency: elective    Date/Time: 9/17/2019 10:38 AM  Airway not difficult    General Information and Staff    Patient location during procedure: OR  Anesthesiologist: Kasia Alicea MD    Indications and Patient Condition  Indications for airway management: airway protection    Preoxygenated: yes  MILS maintained throughout  Mask difficulty assessment: 0 - not attempted    Final Airway Details  Final airway type: supraglottic airway      Successful airway: unique  Size 4    Number of attempts at approach: 1

## 2019-09-18 PROBLEM — R91.8 LUNG MASS: Status: ACTIVE | Noted: 2019-01-01

## 2019-09-27 NOTE — PROGRESS NOTES
Physician Weekly Management Note    Diagnosis:     Diagnosis Plan   1. Lung mass         RT Details: fx 4/10 to chest, bilateral femurs and fx 3/10 to lumbar spine  Notes on Treatment course, Films, Medical progress:  Doing ok, back pain better, no soa, still inpatient, cont on    Weekly Management:  Medication reviewed?   Yes  New medications given?   No  Problemlist reviewed?   Yes  Problem added?   No  Issues raised requiring referral to support services - task assigned to:  na    Technical aspects reviewed:  Weekly OBI approved?   Yes  Weekly port films approved?   Yes  Change requests noted on port film?   No  Patient setup and plan reviewed?   Yes    Chart Reviewed:  Continue current treatment plan?   Yes  Treatment plan change requested?   No  CBC reviewed?   Yes  Concurrent Chemo?   No    Objective     Toxicities:   none     Review of Systems   Constitutional: Negative.    HENT: Negative.    Musculoskeletal: Negative.    Psychiatric/Behavioral: Negative.           There were no vitals filed for this visit.    No flowsheet data found.    Physical Exam   Constitutional: She appears well-developed and well-nourished.   Abdominal: Soft.   Neurological: She is alert.           Problem Summary List    Diagnosis:     Diagnosis Plan   1. Lung mass       Pathology:   Squamous cell lung    Past Medical History:   Diagnosis Date   • Acute renal failure on dialysis (CMS/HCC)     M-W-F   • Adult failure to thrive    • Chronic kidney disease (CKD), stage V (CMS/HCC)    • Coronary artery disease    • Diabetes mellitus (CMS/HCC)    • Diabetes mellitus with chronic kidney disease (CMS/HCC)    • Disease of thyroid gland     hypothyroidism   • GERD (gastroesophageal reflux disease)    • History of anemia    • History of UTI    • Hyperlipidemia    • Hypertension    • Idiopathic neuropathy    • Major depressive disorder, recurrent episode, severe, with psychotic behavior (CMS/HCC)    • Metabolic encephalopathy    • Obesity    •  Osteomyelitis of right foot (CMS/HCC)     foot and ankle   • Other symbolic dysfunctions    • Peripheral vascular disease, unspecified (CMS/HCC)    • Personal history of diabetic foot ulcer          Past Surgical History:   Procedure Laterality Date   • ARTERIOVENOUS FISTULA/SHUNT SURGERY Right 1/29/2018    Procedure: UPPER EXTREMITY ARTERIOVENOUS SHUNT GRAFT;  Surgeon: Ambar Dobson Jr., MD;  Location: [facility] OR;  Service:    • BELOW KNEE LEG AMPUTATION Left 2008   • BRONCHOSCOPY Bilateral 9/17/2019    Procedure: BRONCHOSCOPY WITH ENDOBRONCHIAL ULTRASOUND, BX, BRUSHINGS, BAL, & TBNA;  Surgeon: Du Rogers MD;  Location: [facility];  Service: Pulmonary   • INSERTION HEMODIALYSIS CATHETER N/A 1/24/2018    Procedure: TUNNEL DIALYSIS CATHETER INSERTION;  Surgeon: Moncho Jackson MD;  Location: [facility] OR;  Service:    • INSERTION HEMODIALYSIS CATHETER N/A 3/22/2018    Procedure: Tunnel Catheter Insertion;  Surgeon: Juwan Phillips MD;  Location: [facility] OR 18/19;  Service: Vascular         Current Facility-Administered Medications on File Prior to Visit   Medication Dose Route Frequency Provider Last Rate Last Dose   • acetaminophen (TYLENOL) tablet 650 mg  650 mg Oral Q4H WATSONN Taiwo Shankar MD   650 mg at 09/21/19 0918   • [COMPLETED] albumin human 25 % IV SOLN 25 g  25 g Intravenous Once Ramesh Mcguire MD   25 g at 09/27/19 1032   • aspirin EC tablet 81 mg  81 mg Oral Daily Taiwo Shankar MD   81 mg at 09/27/19 1344   • atorvastatin (LIPITOR) tablet 10 mg  10 mg Oral Nightly Taiwo Shankar MD   10 mg at 09/26/19 2022   • carboxymethylcellulose sod (PF) 1 % eye gel 1 drop  1 drop Both Eyes TID WATSONN Taiwo Shankar MD       • castor oil-balsam peru (VENELEX) ointment   Topical Q12H Taiwo Shankar MD   5 g at 09/27/19 1342   • dextrose (D50W) 25 g/ 50mL Intravenous Solution 25 g  25 g Intravenous Q15 Min Taiwo Garrett MD       • dextrose (GLUTOSE)  oral gel 15 g  15 g Oral Q15 Min Taiwo Garrett MD       • escitalopram (LEXAPRO) tablet 10 mg  10 mg Oral Daily Taiwo Shankar MD   10 mg at 09/27/19 1343   • glucagon (human recombinant) (GLUCAGEN DIAGNOSTIC) injection 1 mg  1 mg Subcutaneous Q15 Min Taiwo Garrett MD       • HYDROcodone-acetaminophen (NORCO) 5-325 MG per tablet 1 tablet  1 tablet Oral BID Taiwo Shankar MD   1 tablet at 09/27/19 1342   • insulin lispro (humaLOG) injection 0-7 Units  0-7 Units Subcutaneous 4x Daily With Meals & Nightly Taiwo Shankar MD   2 Units at 09/26/19 2212   • ipratropium-albuterol (DUO-NEB) nebulizer solution 3 mL  3 mL Nebulization Q4H - RT Taiwo Shankar MD   3 mL at 09/27/19 1403   • levothyroxine (SYNTHROID, LEVOTHROID) tablet 75 mcg  75 mcg Oral Q AM Taiwo Shankar MD   75 mcg at 09/27/19 1342   • metoprolol tartrate (LOPRESSOR) tablet 12.5 mg  12.5 mg Oral Q12H Ramesh Mcguire MD   12.5 mg at 09/27/19 1343   • [START ON 9/28/2019] midodrine (PROAMATINE) tablet 10 mg  10 mg Oral Once in Dialysis Ramesh Mcguire MD       • multivitamin with minerals 1 tablet  1 tablet Oral Daily Taiwo Shankar MD   1 tablet at 09/27/19 1343   • pantoprazole (PROTONIX) EC tablet 40 mg  40 mg Oral Q AM Taiwo Shankar MD   40 mg at 09/27/19 1342   • polyethylene glycol 3350 powder (packet)  17 g Oral Daily Taiwo Shankar MD   17 g at 09/24/19 0809   • QUEtiapine (SEROquel) tablet 25 mg  25 mg Oral Nightly Taiwo Shankar MD   25 mg at 09/26/19 2022     Current Outpatient Medications on File Prior to Visit   Medication Sig Dispense Refill   • acetaminophen (TYLENOL) 325 MG tablet Take 650 mg by mouth Every 4 (Four) Hours As Needed for Mild Pain .     • aspirin 81 MG tablet Take 81 mg by mouth Daily.     • atorvastatin (LIPITOR) 10 MG tablet Take 10 mg by mouth Every Night. PT STATES SHE DOES NOT TAKE     • bumetanide (BUMEX) 2 MG tablet Take 4 mg by mouth 2 (Two) Times a Day. PT  STATES SHE DOES NOT TAKE     • calcium acetate (PHOSLO) 667 MG capsule Take 1,334 mg by mouth 3 (Three) Times a Day With Meals.     • Carboxymethylcellulose Sodium (REFRESH TEARS OP) Administer 1 drop to both eyes 2 (Two) Times a Day.     • epoetin khadar (EPOGEN,PROCRIT) 71114 UNIT/ML injection Inject 20,000 Units under the skin Every 14 (Fourteen) Days. Every 2 weeks on wednesdays     • escitalopram (LEXAPRO) 10 MG tablet Take 10 mg by mouth Daily.     • ferrous sulfate 325 (65 FE) MG tablet Take 325 mg by mouth Daily With Breakfast.     • insulin aspart (novoLOG FLEXPEN) 100 UNIT/ML solution pen-injector sc pen Inject  under the skin 3 (Three) Times a Day With Meals. Per sliding scale. 150-199=2, 200-249=4, 250-299=6, 300-349=8, 350-399=10, 400 or above, call MD     • levothyroxine (SYNTHROID, LEVOTHROID) 75 MCG tablet Take 75 mcg by mouth Daily.     • metoprolol tartrate (LOPRESSOR) 25 MG tablet Take 25 mg by mouth 2 (Two) Times a Day.     • Multiple Vitamins-Minerals (MULTIVITAMIN WITH MINERALS) tablet tablet Take 1 tablet by mouth Daily.     • polyethylene glycol (MIRALAX) packet Take 17 g by mouth Daily.     • QUEtiapine (SEROquel) 25 MG tablet Take 25 mg by mouth Every Night.         Allergies   Allergen Reactions   • Morphine Swelling   • Ampicillin Cough         Referring Provider:    No referring provider defined for this encounter.    Oncologist:  No primary care provider on file.      Seen and approved by:  Paulette Torres MD  09/27/2019

## 2019-09-28 PROBLEM — R06.02 SHORTNESS OF BREATH: Status: RESOLVED | Noted: 2019-01-01 | Resolved: 2019-01-01

## 2019-09-28 PROBLEM — C34.90 SQUAMOUS CELL LUNG CANCER (HCC): Status: ACTIVE | Noted: 2019-01-01

## 2019-09-28 PROBLEM — I25.10 CAD (CORONARY ARTERY DISEASE): Status: ACTIVE | Noted: 2019-01-01

## 2019-09-28 PROBLEM — J98.11 ATELECTASIS OF RIGHT LUNG: Status: ACTIVE | Noted: 2019-01-01

## 2019-09-28 PROBLEM — R91.8 LUNG MASS: Status: RESOLVED | Noted: 2019-01-01 | Resolved: 2019-01-01

## 2019-10-01 NOTE — PROGRESS NOTES
Continued Stay Note  [facility]     Patient Name: Melia Almeida  MRN: [id number]  Today's Date: 10/1/2019    Admit Date: 9/15/2019    Discharge Plan     Row Name 10/01/19 1556       Plan    Patient/Family in Agreement with Plan  yes    Plan Comments  CCP spoke with Dr. Shankar regarding plans, wants to cont XRT @ this time.  Will need to check with facility to see if pt can return while getting XRT.  CCP will also assist it plans turn to Palliative Care, pt has state guardian and will need approval.  Shelia Chairez RN        Discharge Codes    No documentation.             Shelia Chairez RN

## 2019-10-01 NOTE — PROGRESS NOTES
DAILY PROGRESS NOTE  [facility], [facility]    10/1/2019    Patient Identification:  Name: Melia Almeida  Age: 53 y.o.  Sex: female  :  1965  MRN: [id number]           Primary Care Physician: Taiwo Shankar MD    Subjective:    Interval History:    Respiratory status up-and-down, at times she needs 6 L of oxygen to keep saturation down to 80%, time she needs 2 L oxygen to the same.  Patient does have very shallow breathing.  She has refused using oxygen sometimes as well.  Chest x-ray this morning showed no really much changes from 2018.      ROS:     No nausea, vomiting, diarrhea, constipation.    Objective:    Scheduled Meds:    acetylcysteine 4 mL Nebulization Q8H - RT   aspirin 81 mg Oral Daily   atorvastatin 10 mg Oral Nightly   castor oil-balsam peru  Topical Q12H   escitalopram 10 mg Oral Daily   HYDROcodone-acetaminophen 1 tablet Oral BID   insulin lispro 0-7 Units Subcutaneous 4x Daily With Meals & Nightly   ipratropium-albuterol 3 mL Nebulization Q4H While Awake - RT   levothyroxine 75 mcg Oral Q AM   methylPREDNISolone sodium succinate 40 mg Intravenous Q12H   metoprolol tartrate 12.5 mg Oral Q12H   multivitamin with minerals 1 tablet Oral Daily   pantoprazole 40 mg Oral Q AM   polyethylene glycol 17 g Oral Daily   QUEtiapine 25 mg Oral Nightly       Continuous Infusions:       PRN Meds:  •  acetaminophen  •  carboxymethylcellulose sod (PF)  •  dextrose  •  dextrose  •  glucagon (human recombinant)    Intake/Output:    Intake/Output Summary (Last 24 hours) at 10/1/2019 0936  Last data filed at 10/1/2019 0010  Gross per 24 hour   Intake 220 ml   Output --   Net 220 ml         Exam:    tMax 24 hrs: Temp (24hrs), Av.6 °F (36.4 °C), Min:97.3 °F (36.3 °C), Max:97.8 °F (36.6 °C)    Vitals Ranges:   Temp:  [97.3 °F (36.3 °C)-97.8 °F (36.6 °C)] 97.3 °F (36.3 °C)  Heart Rate:  [74-82] 82  Resp:  [18-20] 18  BP: ()/(50-67) 105/50    /50 (BP  "Location: Left arm, Patient Position: Lying)   Pulse 82   Temp 97.3 °F (36.3 °C) (Oral)   Resp 18   Ht 165.1 cm (65\")   Wt 84.5 kg (186 lb 4.8 oz)   LMP  (LMP Unknown)   SpO2 (!) 85%   BMI 31.00 kg/m²     General: Alert, oriented x 2-3. Sleepy, having HD  Neck: Supple, symmetrical, trachea midline, no adenopathy;              thyroid:  no enlargement/tenderness/nodules;              no carotid bruit or JVD  Cardiovascular: Normal rate, regular rhythm and intact distal pulses.              Exam reveals no gallop and no friction rub. No murmur heard  Pulmonary:   Shallow breathing, diminished breath sounds bilaterally, few rhonchi at bases.    Abdominal: Soft. Soft, non-tender, bowel sounds active all four quadrants,     no masses, no hepatomegaly, no splenomegaly.   Extremities: Normal, atraumatic, no cyanosis. Positive edema in RLE. S/p L BKA  Pulses: 2 + symmetric all extremities  Neurological: Patient is alert and oriented to person, place                 CNII-XII intact, normal strength, sensation intact throughout  Skin: Skin color, texture, normal. Turgor is decreased. No rashes or lesions         Data Review:    Results from last 7 days   Lab Units 09/30/19 0329 09/29/19 0630 09/28/19  0333   WBC 10*3/mm3 11.22* 9.14 13.37*   HEMOGLOBIN g/dL 11.6* 11.9* 12.3   HEMATOCRIT % 37.4 39.2 41.9   PLATELETS 10*3/mm3 284 285 319       Results from last 7 days   Lab Units 09/30/19 0329 09/29/19  0630 09/28/19  0333   SODIUM mmol/L 135* 131* 132*   POTASSIUM mmol/L 4.8 5.1 5.2   CHLORIDE mmol/L 86* 86* 88*   CO2 mmol/L 23.7 21.8* 22.4   BUN mg/dL 78* 55* 51*   CREATININE mg/dL 4.80* 3.82* 3.30*   CALCIUM mg/dL 10.3 10.3 9.8   GLUCOSE mg/dL 140* 249* 254*                 No results found for: TROPONINT    Microbiology Results (last 10 days)     ** No results found for the last 240 hours. **           Imaging Results (last 7 days)     Procedure Component Value Units Date/Time    XR Chest 1 View [[id number]] " Collected:  10/01/19 0758     Updated:  10/01/19 0758    Narrative:       CLINICAL HISTORY: 53-year-old female for follow-up of pulmonary  infiltrates and atelectasis.     EXAM: Portable AP erect chest dated 10/01/2019 at 0602 hours.     FINDINGS: When compared to CT chest exam of 09/15/2019 and most recent  chest radiograph, portable AP erect projection of 09/26/2019 at 1138  hours, there is still opacification of the basilar right lung compatible  with previously demonstrated atelectasis and possible superimposed  infiltrates. Coarse markings in the perihilar and lower left lung and  some strandy opacity in the perihilar left mid lung periphery remain.  Metallic mesh vascular stents in the left upper extremity and right  subclavian distribution remain. Cardiac silhouette remains enlarged.  Sternal wire sutures and CABG markers are again demonstrated. No  pneumothorax or acute change in bony thorax is seen.     CONCLUSION: Allowing for minor technical differences, there is little if  any change from the exam of 09/26/2019 at 1138 hours, with cardiomegaly  and right basilar atelectasis or consolidative changes remaining.       XR Chest 1 View [[id number]] Collected:  09/26/19 1358     Updated:  09/26/19 1607    Narrative:       PORTABLE CHEST     HISTORY: Decreased sats.     FINDINGS: A single view of the chest demonstrates the heart to be at the  upper limits of normal in size. There is increased density at the right  lung base with consolidation and a pleural effusion similar in  appearance as compared to 06/23/2019. There is mild prominence of the  pulmonary vasculature minimally more prominent as compared to prior  examination. There is no evidence of infiltrate or effusion on the left.     The above information was called to the patient's nurse at time of  dictation. The patient's nurse is to relay the information to the  clinical service.     This report was finalized on 9/26/2019 4:03 PM by Dr. Bertrand  LYLE Kruse.               Assessment:        Squamous cell lung cancer (CMS/HCC)    End stage renal disease (CMS/[facility])    Atelectasis of right lung    CAD (coronary artery disease)  Acute respiratory failure with hypoxia  DM    Patient Active Problem List   Diagnosis Code   • End stage renal disease (CMS/[facility]) N18.6   • End stage kidney disease (CMS/[facility]) N18.6   • Dependence on renal dialysis (CMS/[facility]) Z99.2   • Problem with dialysis access (CMS/[facility]) T82.898A   • Type 2 diabetes mellitus (CMS/[facility]) E11.9   • Pneumonia of right lung due to infectious organism J18.9   • Squamous cell lung cancer (CMS/[facility]) C34.90   • Atelectasis of right lung J98.11   • CAD (coronary artery disease) I25.10       Plan:    Continue palliative XRT  Decrease sedating medication, patient with shallow breathing.  Confused.  Refusing oxygen.  Monitor and correct electrolytes  Wean down steroids as tolerated  Renew pain medication  Adjust insulin as needed  Monitor mental status  Continue home medications  DVT/stress ulcer prophylaxis  Labs in       Taiwo Shankar MD  10/1/2019  9:36 AM

## 2019-10-01 NOTE — PROGRESS NOTES
[facility]  [phone]  Alexander Schaefer MD    Subjective:  LOS: 16    She denies complaints.  No cough or phlegm or wheeze.    Objective   Vital Signs past 24hrs    Temp range: Temp (24hrs), Av.8 °F (36.6 °C), Min:97.3 °F (36.3 °C), Max:98.1 °F (36.7 °C)    BP range: BP: ()/(50-67) 116/61  Pulse range: Heart Rate:  [68-84] 82  Resp rate range: Resp:  [16-20] 18    Device (Oxygen Therapy): nasal cannulaFlow (L/min):  [2-12] 3  Oxygen range:SpO2:  [84 %-100 %] 93 %      84.5 kg (186 lb 4.8 oz); Body mass index is 31 kg/m².    Intake/Output Summary (Last 24 hours) at 10/1/2019 1905  Last data filed at 10/1/2019 1740  Gross per 24 hour   Intake 900 ml   Output --   Net 900 ml       Physical Exam   Constitutional: She appears well-developed.   Cardiovascular: Normal rate and regular rhythm.   No murmur heard.  Pulmonary/Chest: Effort normal. No respiratory distress. She has decreased breath sounds. She has no wheezes. She has rhonchi (few right lung). She has no rales.   Abdominal: Soft. Bowel sounds are normal. She exhibits no mass. There is no tenderness.   Musculoskeletal: She exhibits no edema.   Skin: No rash noted.     Results Review:    I have reviewed the laboratory and imaging data since the last note by [facility] physician.  My annotations are noted in assessment and plan.    Medication Review:  I have reviewed the current MAR.  My annotations are noted in assessment and plan.       Plan   PCCM Problems  Acute hypoxic respiratory failure, cannula  NSCLC with mets  Postobstructive atelectasis RUL  Relevant Medical Diagnoses  CAD  ESRD  DM2  HTN  DVT    Plan of Treatment  Continue treatment with radiation therapy for lung cancer.    Acute hypoxia with improvement in oxygen flow to nasal cannula.    Switch her steroids to p.o. dexamethasone.  Once she completes palliative radiation treatment this should be tapered off.    On neb treatments to help mobilize secretions. Continue same.  Chest x-ray  no change.    Prognosis is poor and note that oncology has recommended hospice care.    Alexander Schaefer MD  10/01/19  7:05 PM    Part of this note may be an electronic transcription/translation of spoken language to printed text using the Dragon Dictation System.

## 2019-10-01 NOTE — PLAN OF CARE
Problem: Patient Care Overview  Goal: Plan of Care Review  Outcome: Ongoing (interventions implemented as appropriate)   10/01/19 2053   Coping/Psychosocial   Plan of Care Reviewed With patient   Plan of Care Review   Progress no change   OTHER   Outcome Summary Pt with cancer, mets to bone. Had radiation treatment today. Denies pain       Problem: Fall Risk (Adult)  Goal: Absence of Fall  Outcome: Ongoing (interventions implemented as appropriate)      Problem: Hemodialysis (Adult)  Goal: Signs and Symptoms of Listed Potential Problems Will be Absent, Minimized or Managed (Hemodialysis)  Outcome: Ongoing (interventions implemented as appropriate)      Problem: Pneumonia (Adult)  Goal: Signs and Symptoms of Listed Potential Problems Will be Absent, Minimized or Managed (Pneumonia)  Outcome: Ongoing (interventions implemented as appropriate)      Problem: Skin Injury Risk (Adult)  Goal: Skin Health and Integrity  Outcome: Ongoing (interventions implemented as appropriate)      Problem: Confusion, Acute (Adult)  Goal: Safety  Outcome: Ongoing (interventions implemented as appropriate)

## 2019-10-01 NOTE — PROGRESS NOTES
Continued Stay Note  [facility]     Patient Name: Melia Almeida  MRN: [id number]  Today's Date: 10/1/2019    Admit Date: 9/15/2019    Discharge Plan     Row Name 10/01/19 0936       Plan    Plan  Ankur BAUER level of care        Discharge Codes    No documentation.             Shelia Chairez RN

## 2019-10-01 NOTE — PROGRESS NOTES
"   LOS: 16 days    Patient Care Team:  Taiwo Shankar MD as PCP - General (Internal Medicine)  Paulette Torres MD as Consulting Physician (Radiation Oncology)    Chief Complaint:    Chief Complaint   Patient presents with   • Shortness of Breath     Follow UP ESRD  Subjective     Interval History:   Very sleepy, but does nod to questions appropriately.  Not eating at all.   Denies pain.   Multiple bm yesterday.    Review of Systems:   See above.     Objective     Vital Signs  Temp:  [97.3 °F (36.3 °C)-97.8 °F (36.6 °C)] 97.3 °F (36.3 °C)  Heart Rate:  [76-84] 81  Resp:  [16-20] 18  BP: ()/(50-67) 105/50    Flowsheet Rows      First Filed Value   Admission Height  165.1 cm (65\") Documented at 09/15/2019 1603   Admission Weight  113 kg (250 lb) Documented at 09/15/2019 1603          I/O this shift:  In: 60 [P.O.:60]  Out: -   I/O last 3 completed shifts:  In: 470 [P.O.:470]  Out: -     Intake/Output Summary (Last 24 hours) at 10/1/2019 1206  Last data filed at 10/1/2019 1100  Gross per 24 hour   Intake 280 ml   Output --   Net 280 ml       Physical Exam:  Chronically ill. Somnolent, but does nod to questions.   Pupils unequal, Right pupil 4mm, left 2mm. Lateral deviation right eye.   Neck no jvd  Heart RRR, tachy, no s3 or rub  Lungs coarse bs, exp wheezes RML, RLL  Abd +bs soft, nontender. Obese  Ext Left BKA, No edema.  RUE AVF thrill, bruit.   Skin pale, no rash.  Chronic excoriations over RLE.     Results Review:    Results from last 7 days   Lab Units 09/30/19  0329 09/29/19  0630 09/28/19  0333   SODIUM mmol/L 135* 131* 132*   POTASSIUM mmol/L 4.8 5.1 5.2   CHLORIDE mmol/L 86* 86* 88*   CO2 mmol/L 23.7 21.8* 22.4   BUN mg/dL 78* 55* 51*   CREATININE mg/dL 4.80* 3.82* 3.30*   CALCIUM mg/dL 10.3 10.3 9.8   GLUCOSE mg/dL 140* 249* 254*       Estimated Creatinine Clearance: 14.2 mL/min (A) (by C-G formula based on SCr of 4.8 mg/dL (H)).    Results from last 7 days   Lab Units 09/28/19  0333 " 09/27/19  0351 09/25/19  0451   MAGNESIUM mg/dL 2.5  --   --    PHOSPHORUS mg/dL 5.6* 5.8* 5.4*             Results from last 7 days   Lab Units 09/30/19  0329 09/29/19  0630 09/28/19  0333 09/27/19  0351   WBC 10*3/mm3 11.22* 9.14 13.37* 8.73   HEMOGLOBIN g/dL 11.6* 11.9* 12.3 12.4   PLATELETS 10*3/mm3 284 285 319 367               Imaging Results (last 24 hours)     Procedure Component Value Units Date/Time    XR Chest 1 View [[id number]] Collected:  10/01/19 0758     Updated:  10/01/19 1055    Narrative:       CLINICAL HISTORY: 53-year-old female for follow-up of pulmonary  infiltrates and atelectasis.     EXAM: Portable AP erect chest dated 10/01/2019 at 0602 hours.     FINDINGS: When compared to CT chest exam of 09/15/2019 and most recent  chest radiograph, portable AP erect projection of 09/26/2019 at 1138  hours, there is still opacification of the basilar right lung compatible  with previously demonstrated atelectasis and possible superimposed  infiltrates. Coarse markings in the perihilar and lower left lung and  some strandy opacity in the perihilar left mid lung periphery remain.  Metallic mesh vascular stents in the left upper extremity and right  subclavian distribution remain. Cardiac silhouette remains enlarged.  Sternal wire sutures and CABG markers are again demonstrated. No  pneumothorax or acute change in bony thorax is seen.     CONCLUSION: Allowing for minor technical differences, there is little if  any change from the exam of 09/26/2019 at 1138 hours, with cardiomegaly  and right basilar atelectasis or consolidative changes remaining.     This report was finalized on 10/1/2019 10:52 AM by Dr. Sam Magallanes M.D.             acetylcysteine 4 mL Nebulization Q8H - RT   aspirin 81 mg Oral Daily   atorvastatin 10 mg Oral Nightly   castor oil-balsam peru  Topical Q12H   divalproex 125 mg Oral Nightly   escitalopram 10 mg Oral Daily   HYDROcodone-acetaminophen 1 tablet Oral BID   insulin lispro 0-7  Units Subcutaneous 4x Daily With Meals & Nightly   ipratropium-albuterol 3 mL Nebulization Q4H While Awake - RT   levothyroxine 75 mcg Oral Q AM   methylPREDNISolone sodium succinate 40 mg Intravenous Q12H   metoprolol tartrate 12.5 mg Oral Q12H   multivitamin with minerals 1 tablet Oral Daily   pantoprazole 40 mg Oral Q AM   polyethylene glycol 17 g Oral Daily          Medication Review:   Current Facility-Administered Medications   Medication Dose Route Frequency Provider Last Rate Last Dose   • acetaminophen (TYLENOL) tablet 650 mg  650 mg Oral Q4H PRN Taiwo Shankar MD   650 mg at 09/21/19 0918   • acetylcysteine (MUCOMYST) 20 % nebulizer solution 4 mL  4 mL Nebulization Q8H - RT Alexander Schaefer MD   4 mL at 10/01/19 1025   • aspirin EC tablet 81 mg  81 mg Oral Daily Taiwo Shankar MD   81 mg at 10/01/19 0947   • atorvastatin (LIPITOR) tablet 10 mg  10 mg Oral Nightly Taiwo Shankar MD   10 mg at 09/30/19 2032   • carboxymethylcellulose sod (PF) 1 % eye gel 1 drop  1 drop Both Eyes TID WATSONN Taiwo Shankar MD       • castor oil-balsam peru (VENELEX) ointment   Topical Q12H Taiwo Shankar MD   5 g at 10/01/19 0947   • dextrose (D50W) 25 g/ 50mL Intravenous Solution 25 g  25 g Intravenous Q15 Min Taiwo Garrett MD       • dextrose (GLUTOSE) oral gel 15 g  15 g Oral Q15 Min Taiwo Garrett MD       • divalproex (DEPAKOTE) DR tablet 125 mg  125 mg Oral Nightly Taiwo Shankar MD       • escitalopram (LEXAPRO) tablet 10 mg  10 mg Oral Daily Taiwo Shankar MD   10 mg at 10/01/19 0947   • glucagon (human recombinant) (GLUCAGEN DIAGNOSTIC) injection 1 mg  1 mg Subcutaneous Q15 Min Taiwo Garrett MD       • HYDROcodone-acetaminophen (NORCO) 5-325 MG per tablet 1 tablet  1 tablet Oral BID Taiwo Shankar MD   1 tablet at 10/01/19 0948   • insulin lispro (humaLOG) injection 0-7 Units  0-7 Units Subcutaneous 4x Daily With Meals & Nightly Taiwo Shankar MD   2 Units at 09/30/19 9668    • ipratropium-albuterol (DUO-NEB) nebulizer solution 3 mL  3 mL Nebulization Q4H While Awake - RT Alexander Schaefer MD   Stopped at 10/01/19 1130   • levothyroxine (SYNTHROID, LEVOTHROID) tablet 75 mcg  75 mcg Oral Q AM Taiwo Shankar MD   75 mcg at 10/01/19 0623   • methylPREDNISolone sodium succinate (SOLU-Medrol) injection 40 mg  40 mg Intravenous Q12H Alexander Schaefer MD   40 mg at 10/01/19 0257   • metoprolol tartrate (LOPRESSOR) tablet 12.5 mg  12.5 mg Oral Q12H Ramesh Mcguire MD   12.5 mg at 10/01/19 0946   • multivitamin with minerals 1 tablet  1 tablet Oral Daily Taiwo Shankar MD   1 tablet at 10/01/19 0947   • pantoprazole (PROTONIX) EC tablet 40 mg  40 mg Oral Q AM Taiwo Shankar MD   40 mg at 10/01/19 0623   • polyethylene glycol 3350 powder (packet)  17 g Oral Daily Taiwo Shankar MD   17 g at 10/01/19 0948       Assessment/Plan   1. ESRD.  Dialysis tomorrow .    2. 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 Palliative XRT.    3. DM2 bs 200's.    4. CAD  5. PAD  6. HTN. BP now low.  DC metoprolol.   7. Hypothyroidism. On Replacement.   8. Anemia CKD.  No BASSEM due to malignancy .    I think that we need to move toward palliative care, comfort measures.  I spoke to Shelia Chairez CCP and she was going to call Dr. Shankar .    Shelia Burgess MD  10/01/19  12:06 PM

## 2019-10-01 NOTE — PLAN OF CARE
Problem: Patient Care Overview  Goal: Plan of Care Review  Outcome: Ongoing (interventions implemented as appropriate)   10/01/19 0528   Coping/Psychosocial   Plan of Care Reviewed With patient   Plan of Care Review   Progress improving   OTHER   Outcome Summary VSS. WEANED TO RA WITH SATS 90-94%. RESTED WELL WITHOUT C/O'S IN BETWEEN CARE/TURNS       Problem: Fall Risk (Adult)  Goal: Absence of Fall  Outcome: Ongoing (interventions implemented as appropriate)      Problem: Hemodialysis (Adult)  Goal: Signs and Symptoms of Listed Potential Problems Will be Absent, Minimized or Managed (Hemodialysis)  Outcome: Ongoing (interventions implemented as appropriate)      Problem: Pneumonia (Adult)  Goal: Signs and Symptoms of Listed Potential Problems Will be Absent, Minimized or Managed (Pneumonia)  Outcome: Ongoing (interventions implemented as appropriate)      Problem: Skin Injury Risk (Adult)  Goal: Skin Health and Integrity  Outcome: Ongoing (interventions implemented as appropriate)      Problem: Confusion, Acute (Adult)  Goal: Safety  Outcome: Ongoing (interventions implemented as appropriate)

## 2019-10-02 NOTE — PROGRESS NOTES
"DAILY PROGRESS NOTE  [facility], [facility]    10/2/2019    Patient Identification:  Name: Melia Almeida  Age: 53 y.o.  Sex: female  :  1965  MRN: [id number]           Primary Care Physician: aTiwo Shankar MD    Subjective:    Interval History:    Patient is receiving radiation therapy for lung cancer as well as metastasis to the left femur. No complication.  Oxygen requirement varies from 3-6 L  More awake this morning  Still on dialysis 3 times a week.    ROS:     No nausea, vomiting, diarrhea, constipation.    Objective:    Scheduled Meds:    acetylcysteine 4 mL Nebulization Q8H - RT   aspirin 81 mg Oral Daily   castor oil-balsam peru  Topical Q12H   dexamethasone 4 mg Oral Q12H   divalproex 125 mg Oral Nightly   escitalopram 10 mg Oral Daily   HYDROcodone-acetaminophen 1 tablet Oral BID   insulin lispro 0-7 Units Subcutaneous 4x Daily With Meals & Nightly   ipratropium-albuterol 3 mL Nebulization Q8H - RT   levothyroxine 75 mcg Oral Q AM   pantoprazole 40 mg Oral Q AM   polyethylene glycol 17 g Oral Daily       Continuous Infusions:       PRN Meds:  •  acetaminophen  •  carboxymethylcellulose sod (PF)  •  dextrose  •  dextrose  •  glucagon (human recombinant)    Intake/Output:    Intake/Output Summary (Last 24 hours) at 10/2/2019 1843  Last data filed at 10/2/2019 1745  Gross per 24 hour   Intake 1320 ml   Output --   Net 1320 ml         Exam:    tMax 24 hrs: Temp (24hrs), Av.4 °F (36.9 °C), Min:97.8 °F (36.6 °C), Max:99 °F (37.2 °C)    Vitals Ranges:   Temp:  [97.8 °F (36.6 °C)-99 °F (37.2 °C)] 98.2 °F (36.8 °C)  Heart Rate:  [66-82] 77  Resp:  [14-18] 14  BP: (116-158)/(61-79) 125/66    /66 (BP Location: Left arm, Patient Position: Lying)   Pulse 77   Temp 98.2 °F (36.8 °C) (Oral)   Resp 14   Ht 165.1 cm (65\")   Wt 84.4 kg (186 lb 1.6 oz)   LMP  (LMP Unknown)   SpO2 100%   BMI 30.97 kg/m²     General: Alert, oriented x 2-3. Sleepy, having " HD  Neck: Supple, symmetrical, trachea midline, no adenopathy;              thyroid:  no enlargement/tenderness/nodules;              no carotid bruit or JVD  Cardiovascular: Normal rate, regular rhythm and intact distal pulses.              Exam reveals no gallop and no friction rub. No murmur heard  Pulmonary:   Shallow breathing, diminished breath sounds bilaterally, few rhonchi at bases.    Abdominal: Soft. Soft, non-tender, bowel sounds active all four quadrants,     no masses, no hepatomegaly, no splenomegaly.   Extremities: Normal, atraumatic, no cyanosis. Positive edema in RLE. S/p L BKA  Pulses: 2 + symmetric all extremities  Neurological: Patient is alert and oriented to person, place                 CNII-XII intact, normal strength, sensation intact throughout  Skin: Skin color, texture, normal. Turgor is decreased. No rashes or lesions         Data Review:    Results from last 7 days   Lab Units 10/02/19  0520 09/30/19  0329 09/29/19  0630   WBC 10*3/mm3 5.00 11.22* 9.14   HEMOGLOBIN g/dL 11.3* 11.6* 11.9*   HEMATOCRIT % 36.6 37.4 39.2   PLATELETS 10*3/mm3 234 284 285       Results from last 7 days   Lab Units 10/02/19  1557 10/02/19  0520 09/30/19  0329   SODIUM mmol/L 132* 132* 135*   POTASSIUM mmol/L 5.2 5.6* 4.8   CHLORIDE mmol/L 88* 87* 86*   CO2 mmol/L 21.8* 20.9* 23.7   BUN mg/dL 68* 91* 78*   CREATININE mg/dL 4.51* 5.51* 4.80*   CALCIUM mg/dL 10.3 10.0 10.3   GLUCOSE mg/dL 187* 169* 140*                 No results found for: TROPONINT    Microbiology Results (last 10 days)     ** No results found for the last 240 hours. **           Imaging Results (last 7 days)     Procedure Component Value Units Date/Time    XR Chest 1 View [[id number]] Collected:  10/01/19 0758     Updated:  10/01/19 1055    Narrative:       CLINICAL HISTORY: 53-year-old female for follow-up of pulmonary  infiltrates and atelectasis.     EXAM: Portable AP erect chest dated 10/01/2019 at 0602 hours.     FINDINGS: When compared  to CT chest exam of 09/15/2019 and most recent  chest radiograph, portable AP erect projection of 09/26/2019 at 1138  hours, there is still opacification of the basilar right lung compatible  with previously demonstrated atelectasis and possible superimposed  infiltrates. Coarse markings in the perihilar and lower left lung and  some strandy opacity in the perihilar left mid lung periphery remain.  Metallic mesh vascular stents in the left upper extremity and right  subclavian distribution remain. Cardiac silhouette remains enlarged.  Sternal wire sutures and CABG markers are again demonstrated. No  pneumothorax or acute change in bony thorax is seen.     CONCLUSION: Allowing for minor technical differences, there is little if  any change from the exam of 09/26/2019 at 1138 hours, with cardiomegaly  and right basilar atelectasis or consolidative changes remaining.     This report was finalized on 10/1/2019 10:52 AM by Dr. Sam Magallanes M.D.       XR Chest 1 View [[id number]] Collected:  09/26/19 1358     Updated:  09/26/19 1607    Narrative:       PORTABLE CHEST     HISTORY: Decreased sats.     FINDINGS: A single view of the chest demonstrates the heart to be at the  upper limits of normal in size. There is increased density at the right  lung base with consolidation and a pleural effusion similar in  appearance as compared to 06/23/2019. There is mild prominence of the  pulmonary vasculature minimally more prominent as compared to prior  examination. There is no evidence of infiltrate or effusion on the left.     The above information was called to the patient's nurse at time of  dictation. The patient's nurse is to relay the information to the  clinical service.     This report was finalized on 9/26/2019 4:03 PM by Dr. Jerzy Kruse M.D.               Assessment:        Squamous cell lung cancer (CMS/HCC)    End stage renal disease (CMS/HCC)    Atelectasis of right lung    CAD (coronary artery disease)  Acute  respiratory failure with hypoxia  DM  PVD  CAD  HTN  Anemia CKD  Hypothyroidism    Plan:    Continue palliative XRT  Patient states she is feeling OK. I explained to her the severity of her disease and she seems understanding up to some point. However, when I asked if she wants to continue HD, her answer was a YES.  Arrange psych medications, minimize sedation  Monitor and correct electrolytes  Wean down steroids as tolerated  Renew pain medication  Adjust insulin as needed  Monitor mental status  Continue home medications  DVT/stress ulcer prophylaxis  Labs in       Taiwo Shankar MD  10/2/2019  6:43 PM

## 2019-10-02 NOTE — PROGRESS NOTES
Continued Stay Note  [facility]     Patient Name: Melia Almeida  MRN: [id number]  Today's Date: 10/2/2019    Admit Date: 9/15/2019    Discharge Plan     Row Name 10/02/19 1422       Plan    Plan Comments  Pt could still be palliative and recieve XRT as it would be considered palliative radiation.  Shelia Chairez RN/CCP        Discharge Codes    No documentation.             Shelia Chairez, RN

## 2019-10-02 NOTE — PROGRESS NOTES
[facility]  [phone]  Alexander Schaefer MD    Subjective:  LOS: 17    She denies cough or phlegm.  She is quite comfortable laying in bed.  No wheezing reported by patient.    Objective   Vital Signs past 24hrs    Temp range: Temp (24hrs), Av.4 °F (36.9 °C), Min:97.8 °F (36.6 °C), Max:99 °F (37.2 °C)    BP range: BP: (116-158)/(61-79) 125/66  Pulse range: Heart Rate:  [66-82] 77  Resp rate range: Resp:  [14-18] 14    Device (Oxygen Therapy): nasal cannulaFlow (L/min):  [3-4] 3  Oxygen range:SpO2:  [93 %-100 %] 100 %      84.4 kg (186 lb 1.6 oz); Body mass index is 30.97 kg/m².    Intake/Output Summary (Last 24 hours) at 10/2/2019 1854  Last data filed at 10/2/2019 1745  Gross per 24 hour   Intake 1320 ml   Output --   Net 1320 ml       Physical Exam   Constitutional: She appears well-developed.   Cardiovascular: Normal rate and regular rhythm.   No murmur heard.  Pulmonary/Chest: Effort normal. No respiratory distress. She has decreased breath sounds. She has no wheezes. She has rhonchi (few right lung). She has no rales.   Abdominal: Soft. Bowel sounds are normal. She exhibits no mass. There is no tenderness.   Musculoskeletal: She exhibits no edema.   Skin: No rash noted.     Results Review:    I have reviewed the laboratory and imaging data since the last note by [facility] physician.  My annotations are noted in assessment and plan.    Medication Review:  I have reviewed the current MAR.  My annotations are noted in assessment and plan.       Plan   PCCM Problems  Acute hypoxic respiratory failure, cannula  NSCLC with mets  Postobstructive atelectasis RUL  Relevant Medical Diagnoses  CAD  ESRD  DM2  HTN  DVT    Plan of Treatment  Continue treatment with radiation therapy for lung cancer.    Acute hypoxia with improvement in oxygen flow to nasal cannula.    Switch her steroids to p.o. dexamethasone.  Once she completes palliative radiation treatment this should be tapered off.    On neb treatments to  help mobilize secretions. Continue same.  She should be discharged on nebulizer treatments.    Prognosis is poor and note that oncology has recommended hospice care.    Alexander Schaefer MD  10/02/19  6:54 PM    Part of this note may be an electronic transcription/translation of spoken language to printed text using the Dragon Dictation System.

## 2019-10-02 NOTE — PROGRESS NOTES
"   LOS: 17 days    Patient Care Team:  Taiwo Shankar MD as PCP - General (Internal Medicine)  Paulette Torres MD as Consulting Physician (Radiation Oncology)    Chief Complaint:    Chief Complaint   Patient presents with   • Shortness of Breath     Follow UP ESRD  Subjective     Interval History:   On dialysis.  More conversant, although still keeps her eye closed. Bowels moved. Ate breakfast.  QB reduced due to high venous pressure.  Now Venous need infiltrated.   Review of Systems:   See above.     Objective     Vital Signs  Temp:  [97.8 °F (36.6 °C)-99 °F (37.2 °C)] 99 °F (37.2 °C)  Heart Rate:  [66-82] 71  Resp:  [16-20] 18  BP: (116-141)/(61-73) 141/73    Flowsheet Rows      First Filed Value   Admission Height  165.1 cm (65\") Documented at 09/15/2019 1603   Admission Weight  113 kg (250 lb) Documented at 09/15/2019 1603          No intake/output data recorded.  I/O last 3 completed shifts:  In: 1500 [P.O.:1500]  Out: -     Intake/Output Summary (Last 24 hours) at 10/2/2019 1107  Last data filed at 10/2/2019 0637  Gross per 24 hour   Intake 1120 ml   Output --   Net 1120 ml       Physical Exam:  Chronically ill. More awake. Verbally answers.  On dialysis.   .    Pupils unequal, Right pupil 4mm, left 2mm. Lateral deviation right eye.   Neck no jvd  Heart RRR, tachy, no s3 or rub  Lungs coarse bs, exp wheezes RML, RLL  Abd +bs soft, nontender. Obese  Ext Left BKA, No edema.  RUE AVF thrill, bruit.  Venous needle infiltration .   Skin pale, no rash.  Chronic excoriations over RLE.     Results Review:    Results from last 7 days   Lab Units 10/02/19  0520 09/30/19  0329 09/29/19  0630   SODIUM mmol/L 132* 135* 131*   POTASSIUM mmol/L 5.6* 4.8 5.1   CHLORIDE mmol/L 87* 86* 86*   CO2 mmol/L 20.9* 23.7 21.8*   BUN mg/dL 91* 78* 55*   CREATININE mg/dL 5.51* 4.80* 3.82*   CALCIUM mg/dL 10.0 10.3 10.3   GLUCOSE mg/dL 169* 140* 249*       Estimated Creatinine Clearance: 12.4 mL/min (A) (by C-G formula based " on SCr of 5.51 mg/dL (H)).    Results from last 7 days   Lab Units 09/28/19  0333 09/27/19  0351   MAGNESIUM mg/dL 2.5  --    PHOSPHORUS mg/dL 5.6* 5.8*             Results from last 7 days   Lab Units 10/02/19  0520 09/30/19  0329 09/29/19  0630 09/28/19  0333 09/27/19  0351   WBC 10*3/mm3 5.00 11.22* 9.14 13.37* 8.73   HEMOGLOBIN g/dL 11.3* 11.6* 11.9* 12.3 12.4   PLATELETS 10*3/mm3 234 284 285 319 367               Imaging Results (last 24 hours)     ** No results found for the last 24 hours. **          acetylcysteine 4 mL Nebulization Q8H - RT   aspirin 81 mg Oral Daily   castor oil-balsam peru  Topical Q12H   dexamethasone 4 mg Oral Q12H   divalproex 125 mg Oral Nightly   escitalopram 10 mg Oral Daily   HYDROcodone-acetaminophen 1 tablet Oral BID   insulin lispro 0-7 Units Subcutaneous 4x Daily With Meals & Nightly   ipratropium-albuterol 3 mL Nebulization Q8H - RT   levothyroxine 75 mcg Oral Q AM   pantoprazole 40 mg Oral Q AM   polyethylene glycol 17 g Oral Daily          Medication Review:   Current Facility-Administered Medications   Medication Dose Route Frequency Provider Last Rate Last Dose   • acetaminophen (TYLENOL) tablet 650 mg  650 mg Oral Q4H PRN Taiwo Shanakr MD   650 mg at 09/21/19 0918   • acetylcysteine (MUCOMYST) 20 % nebulizer solution 4 mL  4 mL Nebulization Q8H - RT Alexander Schaefer MD   4 mL at 10/02/19 0757   • aspirin EC tablet 81 mg  81 mg Oral Daily Taiwo Shankar MD   81 mg at 10/01/19 0947   • carboxymethylcellulose sod (PF) 1 % eye gel 1 drop  1 drop Both Eyes TID PRN Taiwo Shankar MD       • castor oil-balsam peru (VENELEX) ointment   Topical Q12H Taiwo Shankar MD   5 g at 10/01/19 2041   • dexamethasone (DECADRON) tablet 4 mg  4 mg Oral Q12H Alexander Schaefer MD   4 mg at 10/01/19 2041   • dextrose (D50W) 25 g/ 50mL Intravenous Solution 25 g  25 g Intravenous Q15 Min Taiwo Garrett MD       • dextrose (GLUTOSE) oral gel 15 g  15 g Oral Q15 Min Taiwo Garrett  MD       • divalproex (DEPAKOTE) DR tablet 125 mg  125 mg Oral Nightly Taiwo Shankar MD   125 mg at 10/01/19 2041   • escitalopram (LEXAPRO) tablet 10 mg  10 mg Oral Daily Taiwo Shankar MD   10 mg at 10/01/19 0947   • glucagon (human recombinant) (GLUCAGEN DIAGNOSTIC) injection 1 mg  1 mg Subcutaneous Q15 Min PRN Taiwo Shankar MD       • HYDROcodone-acetaminophen (NORCO) 5-325 MG per tablet 1 tablet  1 tablet Oral BID Taiwo Shankar MD   1 tablet at 10/01/19 2041   • insulin lispro (humaLOG) injection 0-7 Units  0-7 Units Subcutaneous 4x Daily With Meals & Nightly Taiwo Shankar MD   6 Units at 10/01/19 2101   • ipratropium-albuterol (DUO-NEB) nebulizer solution 3 mL  3 mL Nebulization Q8H - RT Alexander Schaefer MD   3 mL at 10/02/19 0752   • levothyroxine (SYNTHROID, LEVOTHROID) tablet 75 mcg  75 mcg Oral Q AM Taiwo Shankar MD   75 mcg at 10/02/19 0538   • pantoprazole (PROTONIX) EC tablet 40 mg  40 mg Oral Q AM Taiwo Shankar MD   40 mg at 10/02/19 0538   • polyethylene glycol 3350 powder (packet)  17 g Oral Daily Taiwo Shankar MD   17 g at 10/01/19 0948       Assessment/Plan   1. ESRD.  Dialysis today stopped due to infiltration of venous needle and inability to cannulate again despite 2 sticks.  Blood clots returned in syringe.  K was 5.6.  Will need to recheck later today.  Would like to avoid placing shiley catheter if possible .   2. 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 Palliative XRT.    3. DM2 bs 164-368   4. CAD  5. PAD  6. HTN. BP better after  DC metoprolol.   7. Hypothyroidism. On Replacement.   8. Anemia CKD.  No BASSEM due to malignancy .    I think that we need to move toward palliative care, comfort measures.     Shelia Burgess MD  10/02/19  11:07 AM

## 2019-10-02 NOTE — PLAN OF CARE
Problem: Patient Care Overview  Goal: Plan of Care Review  Outcome: Ongoing (interventions implemented as appropriate)   10/02/19 6586   Coping/Psychosocial   Plan of Care Reviewed With patient   Plan of Care Review   Progress no change   OTHER   Outcome Summary Pt slept through the night. Denies pain. Safety maintained, VSS, will continue to monitor.

## 2019-10-03 NOTE — PLAN OF CARE
Problem: Patient Care Overview  Goal: Plan of Care Review  Outcome: Ongoing (interventions implemented as appropriate)   10/02/19 2037   Coping/Psychosocial   Plan of Care Reviewed With patient   Plan of Care Review   Progress no change   OTHER   Outcome Summary did not tolerate dialysis stopped early. good appetite. no breathing difficulties. no falls. no new skin breakdown.        Problem: Fall Risk (Adult)  Goal: Absence of Fall  Outcome: Ongoing (interventions implemented as appropriate)      Problem: Hemodialysis (Adult)  Goal: Signs and Symptoms of Listed Potential Problems Will be Absent, Minimized or Managed (Hemodialysis)  Outcome: Ongoing (interventions implemented as appropriate)      Problem: Pneumonia (Adult)  Goal: Signs and Symptoms of Listed Potential Problems Will be Absent, Minimized or Managed (Pneumonia)  Outcome: Ongoing (interventions implemented as appropriate)      Problem: Skin Injury Risk (Adult)  Goal: Skin Health and Integrity  Outcome: Ongoing (interventions implemented as appropriate)      Problem: Confusion, Acute (Adult)  Goal: Safety  Outcome: Ongoing (interventions implemented as appropriate)

## 2019-10-03 NOTE — PROGRESS NOTES
[facility]  [phone]  Alexander Schaefer MD    Subjective:  LOS: 18    She is again getting dialysis.  Looks comfortable.  Denies cough or phlegm.  Denies wheezing.    Objective   Vital Signs past 24hrs    Temp range: Temp (24hrs), Av °F (36.7 °C), Min:97.3 °F (36.3 °C), Max:98.6 °F (37 °C)    BP range: BP: (118-131)/(60-75) 131/75  Pulse range: Heart Rate:  [65-71] 69  Resp rate range: Resp:  [12-16] 14    Device (Oxygen Therapy): nasal cannulaFlow (L/min):  [2-3] 2  Oxygen range:SpO2:  [94 %-100 %] 94 %      84.4 kg (186 lb 1.6 oz); Body mass index is 30.97 kg/m².    Intake/Output Summary (Last 24 hours) at 10/3/2019 1349  Last data filed at 10/3/2019 0940  Gross per 24 hour   Intake 600 ml   Output --   Net 600 ml       Physical Exam   Constitutional: She appears well-developed.   Cardiovascular: Normal rate and regular rhythm.   No murmur heard.  Pulmonary/Chest: Effort normal. No respiratory distress. She has decreased breath sounds. She has no wheezes. She has no rhonchi. She has no rales.   Abdominal: Soft. Bowel sounds are normal. She exhibits no mass. There is no tenderness.   Musculoskeletal: She exhibits no edema.   Skin: No rash noted.     Results Review:    I have reviewed the laboratory and imaging data since the last note by [facility] physician.  My annotations are noted in assessment and plan.    Medication Review:  I have reviewed the current MAR.  My annotations are noted in assessment and plan.       Plan   PCCM Problems  Acute hypoxic respiratory failure, cannula  NSCLC with mets  Postobstructive atelectasis RUL  Relevant Medical Diagnoses  CAD  ESRD  DM2  HTN  DVT    Plan of Treatment  Continue treatment with radiation therapy for lung cancer.    Acute hypoxia with improvement in oxygen flow to nasal cannula.    Switch her steroids to p.o. dexamethasone.  Once she completes palliative radiation treatment this should be tapered off.    On neb treatments to help mobilize secretions.  Continue same.  She should be discharged on nebulizer treatments.    Prognosis is poor and note that oncology has recommended hospice care.    Will see prn.    Alexander Schaefer MD  10/03/19  1:49 PM    Part of this note may be an electronic transcription/translation of spoken language to printed text using the Dragon Dictation System.

## 2019-10-03 NOTE — PLAN OF CARE
Problem: Patient Care Overview  Goal: Plan of Care Review  Outcome: Ongoing (interventions implemented as appropriate)   10/03/19 1903   Coping/Psychosocial   Plan of Care Reviewed With patient;guardian   Plan of Care Review   Progress no change   OTHER   Outcome Summary VSS. Safety maintained. Dialysis & Radiation completed today. Possible discharge tomorrow. Will continue to monitor.     Goal: Individualization and Mutuality  Outcome: Ongoing (interventions implemented as appropriate)    Goal: Discharge Needs Assessment  Outcome: Ongoing (interventions implemented as appropriate)    Goal: Interprofessional Rounds/Family Conf  Outcome: Ongoing (interventions implemented as appropriate)      Problem: Fall Risk (Adult)  Goal: Absence of Fall  Outcome: Ongoing (interventions implemented as appropriate)      Problem: Hemodialysis (Adult)  Goal: Signs and Symptoms of Listed Potential Problems Will be Absent, Minimized or Managed (Hemodialysis)  Outcome: Ongoing (interventions implemented as appropriate)      Problem: Pneumonia (Adult)  Goal: Signs and Symptoms of Listed Potential Problems Will be Absent, Minimized or Managed (Pneumonia)  Outcome: Ongoing (interventions implemented as appropriate)      Problem: Skin Injury Risk (Adult)  Goal: Skin Health and Integrity  Outcome: Ongoing (interventions implemented as appropriate)      Problem: Confusion, Acute (Adult)  Goal: Safety  Outcome: Ongoing (interventions implemented as appropriate)

## 2019-10-03 NOTE — PLAN OF CARE
Problem: Patient Care Overview  Goal: Plan of Care Review  Outcome: Ongoing (interventions implemented as appropriate)   10/03/19 0453   Coping/Psychosocial   Plan of Care Reviewed With patient   Plan of Care Review   Progress no change   OTHER   Outcome Summary Safety maintained, VSS. pt slept well throughout night, will continue to monitor.

## 2019-10-04 NOTE — PLAN OF CARE
Problem: Patient Care Overview  Goal: Plan of Care Review  Outcome: Ongoing (interventions implemented as appropriate)   10/04/19 1805   Coping/Psychosocial   Plan of Care Reviewed With patient   Plan of Care Review   Progress declining   OTHER   Outcome Summary Safety maintained. Remains in A-fib ('s) and pressure as low as 70/40's throughout the day. Stable BP as of now--112/77. two bolus given, along with amiodarone and midodrine. no complaints of pain. Patient declined radiation treatment today. Will continue to monitor.     Goal: Individualization and Mutuality  Outcome: Ongoing (interventions implemented as appropriate)      Problem: Fall Risk (Adult)  Goal: Absence of Fall  Outcome: Ongoing (interventions implemented as appropriate)      Problem: Hemodialysis (Adult)  Goal: Signs and Symptoms of Listed Potential Problems Will be Absent, Minimized or Managed (Hemodialysis)  Outcome: Ongoing (interventions implemented as appropriate)      Problem: Pneumonia (Adult)  Goal: Signs and Symptoms of Listed Potential Problems Will be Absent, Minimized or Managed (Pneumonia)  Outcome: Ongoing (interventions implemented as appropriate)      Problem: Skin Injury Risk (Adult)  Goal: Skin Health and Integrity  Outcome: Ongoing (interventions implemented as appropriate)      Problem: Confusion, Acute (Adult)  Goal: Safety  Outcome: Ongoing (interventions implemented as appropriate)

## 2019-10-04 NOTE — PROGRESS NOTES
DAILY PROGRESS NOTE  [facility], [facility]    10/3/2019    Patient Identification:  Name: Melia Almeida  Age: 53 y.o.  Sex: female  :  1965  MRN: [id number]           Primary Care Physician: Taiwo Shankar MD    Subjective:    Interval History:    Patient is receiving radiation therapy for lung cancer as well as metastasis to the left femur. No complications.  Has no pain or SOB while on O2  BS elevated after started on decadron, will need to adjust insulin regimen  Oxygen requirement varies from 3-6 L  More awake this morning  On dialysis 3 times a week.  K 5.7, Hbg 12.1    ROS:     No nausea, vomiting, diarrhea, constipation.    Objective:    Scheduled Meds:    acetylcysteine 4 mL Nebulization Q8H - RT   aspirin 81 mg Oral Daily   castor oil-balsam peru  Topical Q12H   dexamethasone 4 mg Oral Q12H   divalproex 125 mg Oral Nightly   escitalopram 10 mg Oral Daily   HYDROcodone-acetaminophen 1 tablet Oral BID   insulin glargine 10 Units Subcutaneous Nightly   insulin lispro 0-7 Units Subcutaneous 4x Daily With Meals & Nightly   ipratropium-albuterol 3 mL Nebulization Q8H - RT   levothyroxine 75 mcg Oral Q AM   pantoprazole 40 mg Oral Q AM   polyethylene glycol 17 g Oral Daily       Continuous Infusions:       PRN Meds:  •  acetaminophen  •  carboxymethylcellulose sod (PF)  •  dextrose  •  dextrose  •  glucagon (human recombinant)    Intake/Output:    Intake/Output Summary (Last 24 hours) at 10/3/2019 2249  Last data filed at 10/3/2019 1845  Gross per 24 hour   Intake 360 ml   Output 1500 ml   Net -1140 ml         Exam:    tMax 24 hrs: Temp (24hrs), Av.1 °F (36.7 °C), Min:97.3 °F (36.3 °C), Max:98.8 °F (37.1 °C)    Vitals Ranges:   Temp:  [97.3 °F (36.3 °C)-98.8 °F (37.1 °C)] 98.8 °F (37.1 °C)  Heart Rate:  [65-78] 78  Resp:  [12-18] 18  BP: (110-131)/(56-75) 124/60    /60 (BP Location: Left arm, Patient Position: Lying)   Pulse 78   Temp 98.8 °F (37.1  "°C) (Oral)   Resp 18   Ht 165.1 cm (65\")   Wt 84.4 kg (186 lb 1.6 oz)   LMP  (LMP Unknown)   SpO2 98%   BMI 30.97 kg/m²     General: Alert, oriented x 2-3. Sleepy, having HD   Neck: Supple, symmetrical, trachea midline, no adenopathy;              thyroid:  no enlargement/tenderness/nodules;              no carotid bruit or JVD  Cardiovascular: Normal rate, regular rhythm and intact distal pulses.              Exam reveals no gallop and no friction rub. No murmur heard  Pulmonary:   Shallow breathing, diminished breath sounds bilaterally, few rhonchi at bases.    Abdominal: Soft. Soft, non-tender, bowel sounds active all four quadrants,     no masses, no hepatomegaly, no splenomegaly.   Extremities: Normal, atraumatic, no cyanosis. Positive edema in RLE. S/p L BKA  Pulses: 2 + symmetric all extremities  Neurological: Patient is alert and oriented to person, place                 CNII-XII intact, normal strength, sensation intact throughout  Skin: Skin color, texture, normal. Turgor is decreased. No rashes or lesions         Data Review:    Results from last 7 days   Lab Units 10/03/19  0456 10/02/19  0520 09/30/19  0329   WBC 10*3/mm3 6.45 5.00 11.22*   HEMOGLOBIN g/dL 12.1 11.3* 11.6*   HEMATOCRIT % 38.9 36.6 37.4   PLATELETS 10*3/mm3 221 234 284       Results from last 7 days   Lab Units 10/03/19  0456 10/02/19  1557 10/02/19  0520   SODIUM mmol/L 132* 132* 132*   POTASSIUM mmol/L 5.7* 5.2 5.6*   CHLORIDE mmol/L 88* 88* 87*   CO2 mmol/L 21.4* 21.8* 20.9*   BUN mg/dL 85* 68* 91*   CREATININE mg/dL 4.84* 4.51* 5.51*   CALCIUM mg/dL 10.0 10.3 10.0   BILIRUBIN mg/dL 0.4  --   --    ALK PHOS U/L 331*  --   --    ALT (SGPT) U/L 40*  --   --    AST (SGOT) U/L 24  --   --    GLUCOSE mg/dL 183* 187* 169*                 No results found for: TROPONINT    Microbiology Results (last 10 days)     ** No results found for the last 240 hours. **           Imaging Results (last 7 days)     Procedure Component Value Units " Date/Time    XR Chest 1 View [[id number]] Collected:  10/01/19 0758     Updated:  10/01/19 1055    Narrative:       CLINICAL HISTORY: 53-year-old female for follow-up of pulmonary  infiltrates and atelectasis.     EXAM: Portable AP erect chest dated 10/01/2019 at 0602 hours.     FINDINGS: When compared to CT chest exam of 09/15/2019 and most recent  chest radiograph, portable AP erect projection of 09/26/2019 at 1138  hours, there is still opacification of the basilar right lung compatible  with previously demonstrated atelectasis and possible superimposed  infiltrates. Coarse markings in the perihilar and lower left lung and  some strandy opacity in the perihilar left mid lung periphery remain.  Metallic mesh vascular stents in the left upper extremity and right  subclavian distribution remain. Cardiac silhouette remains enlarged.  Sternal wire sutures and CABG markers are again demonstrated. No  pneumothorax or acute change in bony thorax is seen.     CONCLUSION: Allowing for minor technical differences, there is little if  any change from the exam of 09/26/2019 at 1138 hours, with cardiomegaly  and right basilar atelectasis or consolidative changes remaining.     This report was finalized on 10/1/2019 10:52 AM by Dr. Sam Magallanes M.D.               Assessment:        Squamous cell lung cancer (CMS/HCC)    End stage renal disease (CMS/HCC)    Atelectasis of right lung    CAD (coronary artery disease)  Acute respiratory failure with hypoxia  DM  PVD  CAD  HTN  Anemia CKD  Hypothyroidism    Plan:    Adjust insulin, add Lantus  Continue palliative XRT  Patient states she is feeling OK. I explained to her the severity of her disease and she seems understanding up to some point. However, when I asked if she wants to continue HD, her answer was a YES.  Arrange psych medications, minimize sedation  Monitor and correct electrolytes  Wean down steroids as tolerated  Up to chair  Renew pain medication  Monitor mental  status  Continue home medications  DVT/stress ulcer prophylaxis  Labs in am      Taiwo Shankar MD  10/3/2019  10:49 PM

## 2019-10-04 NOTE — PROGRESS NOTES
"   LOS: 19 days    Patient Care Team:  Taiwo Shankar MD as PCP - General (Internal Medicine)  Paulette Torres MD as Consulting Physician (Radiation Oncology)    Chief Complaint:    Chief Complaint   Patient presents with   • Shortness of Breath     Follow UP ESRD   Subjective     Interval History: dialyzed yest w/o incident, removed 1.5L.  Overnight went into AF/RVR, HR up to 152, hypotensive to 78/42 just now.  Drowsy.  On amio gtt.      Objective     Vital Signs  Temp:  [97.7 °F (36.5 °C)-99.2 °F (37.3 °C)] 99.2 °F (37.3 °C)  Heart Rate:  [] 129  Resp:  [16-18] 16  BP: ()/(47-72) 77/69    Flowsheet Rows      First Filed Value   Admission Height  165.1 cm (65\") Documented at 09/15/2019 1603   Admission Weight  113 kg (250 lb) Documented at 09/15/2019 1603          No intake/output data recorded.  I/O last 3 completed shifts:  In: 720 [P.O.:720]  Out: 1500 [Other:1500]    Intake/Output Summary (Last 24 hours) at 10/4/2019 0804  Last data filed at 10/3/2019 2010  Gross per 24 hour   Intake 480 ml   Output 1500 ml   Net -1020 ml       Physical Exam:  GEN frail drowsy but arousable  Neck supple no JVD  Lungs dec BS bilat  CV tachy, irregular  abd soft NT/ND  vasc no pedal/ankle edema      Results Review:    Results from last 7 days   Lab Units 10/04/19  0601 10/03/19  0456 10/02/19  1557   SODIUM mmol/L 132* 132* 132*   POTASSIUM mmol/L 4.8 5.7* 5.2   CHLORIDE mmol/L 86* 88* 88*   CO2 mmol/L 25.3 21.4* 21.8*   BUN mg/dL 51* 85* 68*   CREATININE mg/dL 3.85* 4.84* 4.51*   CALCIUM mg/dL 9.7 10.0 10.3   BILIRUBIN mg/dL  --  0.4  --    ALK PHOS U/L  --  331*  --    ALT (SGPT) U/L  --  40*  --    AST (SGOT) U/L  --  24  --    GLUCOSE mg/dL 150* 183* 187*       Estimated Creatinine Clearance: 17.9 mL/min (A) (by C-G formula based on SCr of 3.85 mg/dL (H)).    Results from last 7 days   Lab Units 10/04/19  0601 09/28/19  0333   MAGNESIUM mg/dL  --  2.5   PHOSPHORUS mg/dL 6.3* 5.6*             Results from " last 7 days   Lab Units 10/04/19  0601 10/03/19  0456 10/02/19  0520 09/30/19  0329 09/29/19  0630   WBC 10*3/mm3 8.67 6.45 5.00 11.22* 9.14   HEMOGLOBIN g/dL 11.7* 12.1 11.3* 11.6* 11.9*   PLATELETS 10*3/mm3 228 221 234 284 285               Imaging Results (last 24 hours)     ** No results found for the last 24 hours. **          acetylcysteine 4 mL Nebulization Q8H - RT   aspirin 81 mg Oral Daily   castor oil-balsam peru  Topical Q12H   dexamethasone 4 mg Oral Q12H   divalproex 125 mg Oral Nightly   escitalopram 10 mg Oral Daily   HYDROcodone-acetaminophen 1 tablet Oral BID   insulin glargine 10 Units Subcutaneous Nightly   insulin lispro 0-7 Units Subcutaneous 4x Daily With Meals & Nightly   ipratropium-albuterol 3 mL Nebulization Q8H - RT   levothyroxine 75 mcg Oral Q AM   pantoprazole 40 mg Oral Q AM   polyethylene glycol 17 g Oral Daily       amiodarone 1 mg/min Last Rate: 1 mg/min (10/04/19 0348)   Followed by     amiodarone 0.5 mg/min        Medication Review:   Current Facility-Administered Medications   Medication Dose Route Frequency Provider Last Rate Last Dose   • acetaminophen (TYLENOL) tablet 650 mg  650 mg Oral Q4H PRN Taiwo Shankar MD   650 mg at 09/21/19 0918   • acetylcysteine (MUCOMYST) 20 % nebulizer solution 4 mL  4 mL Nebulization Q8H - RT Alexander Schaefer MD   4 mL at 10/04/19 0704   • amiodarone (NEXTERONE) 360 mg/200 mL (1.8 mg/mL) infusion  1 mg/min Intravenous Continuous Tomer Simpson MD 33.3 mL/hr at 10/04/19 0348 1 mg/min at 10/04/19 0348    Followed by   • amiodarone (NEXTERONE) 360 mg/200 mL (1.8 mg/mL) infusion  0.5 mg/min Intravenous Continuous Tomer Simpson MD       • aspirin EC tablet 81 mg  81 mg Oral Daily Taiwo Shankar MD   81 mg at 10/03/19 0824   • carboxymethylcellulose sod (PF) 1 % eye gel 1 drop  1 drop Both Eyes TID PRN Taiwo Shankar MD       • castor oil-balsam peru (VENELEX) ointment   Topical Q12H Taiwo Shankar MD   5 g at 10/03/19 2138   • dexamethasone  (DECADRON) tablet 4 mg  4 mg Oral Q12H Alexander Schaefer MD   4 mg at 10/03/19 2134   • dextrose (D50W) 25 g/ 50mL Intravenous Solution 25 g  25 g Intravenous Q15 Min Taiwo Garrett MD       • dextrose (GLUTOSE) oral gel 15 g  15 g Oral Q15 Min Taiwo Garrett MD       • divalproex (DEPAKOTE) DR tablet 125 mg  125 mg Oral Nightly Taiwo Shankar MD   125 mg at 10/03/19 2134   • escitalopram (LEXAPRO) tablet 10 mg  10 mg Oral Daily Taiwo Shankar MD   10 mg at 10/03/19 0824   • glucagon (human recombinant) (GLUCAGEN DIAGNOSTIC) injection 1 mg  1 mg Subcutaneous Q15 Min Taiwo Garrett MD       • HYDROcodone-acetaminophen (NORCO) 5-325 MG per tablet 1 tablet  1 tablet Oral BID Taiwo Shankar MD   1 tablet at 10/03/19 2134   • insulin glargine (LANTUS) injection 10 Units  10 Units Subcutaneous Nightly Taiwo Shankar MD   10 Units at 10/03/19 2134   • insulin lispro (humaLOG) injection 0-7 Units  0-7 Units Subcutaneous 4x Daily With Meals & Nightly Taiwo Shankar MD   3 Units at 10/03/19 2134   • ipratropium-albuterol (DUO-NEB) nebulizer solution 3 mL  3 mL Nebulization Q8H - RT Alexander Schaefer MD   3 mL at 10/04/19 0704   • levothyroxine (SYNTHROID, LEVOTHROID) tablet 75 mcg  75 mcg Oral Q AM Taiwo Shankar MD   75 mcg at 10/04/19 0716   • pantoprazole (PROTONIX) EC tablet 40 mg  40 mg Oral Q AM Taiwo Shankar MD   40 mg at 10/04/19 0717   • polyethylene glycol 3350 powder (packet)  17 g Oral Daily Taiwo Shankar MD   17 g at 10/03/19 0824       Assessment/Plan   1. ESRD.  Dialysis yesterday stopped WED to infiltration of AVF, done yest w/o incident.  K improved to 4.8.  Volume stable.    2. 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 Palliative XRT.    3. DM2  .  4. AFIB/RVR - HR up to 150 overnight  5. PAD  6. Hypotension - due to AF/RVR, SBP 78 mm hg just now  7. Hypothyroidism. On  Replacement.   8. Anemia CKD.  No BASSEM due to malignancy     Plan  -  cc bolus  - start midodrine 10 TID  - HD tomorrow (assuming hemodynamics improved) then resume MWF schedule next week  - prognosis already very poor and events overnight concerning; we may need to revisit goals of care  - RN to contact Dr Shankar       Squamous cell lung cancer (CMS/HCC)    End stage renal disease (CMS/HCC)    Atelectasis of right lung    CAD (coronary artery disease)              Ramesh Mcguire MD  10/04/19  8:04 AM

## 2019-10-04 NOTE — PROGRESS NOTES
"[facility]    Patient Name: Melia Almeida  :1965  53 y.o.  LOS: 19  Encounter Provider: Neil Guerrero Jr, MD      Patient Care Team:  Taiwo Shankar MD as PCP - General (Internal Medicine)  Paulette Torres MD as Consulting Physician (Radiation Oncology)    Chief Complaint: Follow-up atrial fibrillation RVR    Interval History: Patient with advanced squamous cell carcinoma previously seen by me during this hospital stay due to request for preoperative risk assessment.  Patient has had a declining clinical course over the last week.  She went to atrial fibrillation with RVR last night with acute drop in blood pressure.  Amiodarone drip was started and fair rate control overnight.  Blood pressure remains soft fluid was given back and midodrine started.    Objective   Vital Signs  Temp:  [97.7 °F (36.5 °C)-99.2 °F (37.3 °C)] 99.2 °F (37.3 °C)  Heart Rate:  [] 129  Resp:  [16-18] 16  BP: ()/(42-72) 78/42    Intake/Output Summary (Last 24 hours) at 10/4/2019 0956  Last data filed at 10/3/2019 2010  Gross per 24 hour   Intake 480 ml   Output 1500 ml   Net -1020 ml     Flowsheet Rows      First Filed Value   Admission Height  165.1 cm (65\") Documented at 09/15/2019 1603   Admission Weight  113 kg (250 lb) Documented at 09/15/2019 1603            Physical Exam    Results Review:    Results from last 7 days   Lab Units 10/04/19  0601   SODIUM mmol/L 132*   POTASSIUM mmol/L 4.8   CHLORIDE mmol/L 86*   CO2 mmol/L 25.3   BUN mg/dL 51*   CREATININE mg/dL 3.85*   GLUCOSE mg/dL 150*   CALCIUM mg/dL 9.7         Results from last 7 days   Lab Units 10/04/19  0601   WBC 10*3/mm3 8.67   HEMOGLOBIN g/dL 11.7*   HEMATOCRIT % 38.2   PLATELETS 10*3/mm3 228         Results from last 7 days   Lab Units 19  0333   MAGNESIUM mg/dL 2.5                   Medication Review:     acetylcysteine 4 mL Nebulization Q8H - RT   aspirin 81 mg Oral Daily   castor oil-balsam peru  Topical Q12H "   dexamethasone 4 mg Oral Q12H   divalproex 125 mg Oral Nightly   escitalopram 10 mg Oral Daily   HYDROcodone-acetaminophen 1 tablet Oral BID   insulin glargine 10 Units Subcutaneous Nightly   insulin lispro 0-7 Units Subcutaneous 4x Daily With Meals & Nightly   ipratropium-albuterol 3 mL Nebulization Q8H - RT   levothyroxine 75 mcg Oral Q AM   midodrine 10 mg Oral TID AC   pantoprazole 40 mg Oral Q AM   polyethylene glycol 17 g Oral Daily          amiodarone 0.5 mg/min Last Rate: 0.5 mg/min (10/04/19 0939)       Assessment/Plan   1.  Afib RVR -much better controlled on amiodarone drip will continue same.  While I was in the room heart rate was ranging between 90 and 105 bpm with a blood pressure of 90/60.  2.  CAD -recent stress test on September 12, 2019 without significant ischemia.  Echocardiogram shows somewhat improved function compared to echo in [facility] from January 2018.  Continue aspirin, not currently on beta-blocker due to hypotension or statin..    3.  Heart failure with recovered ejection fraction -continue beta-blocker and not an ideal candidate for ACE or Aldactone due to end-stage renal disease.   recommendations.  Fairly euvolemic continue dialysis per renal recommendations.  4.  Hyperlipidemia -not currently on statin  5.  Hypertension -hypotensive since yesterday on scheduled midodrine.   6.  Diabetes  7.  End-stage renal disease on hemodialysis  8.  Stage IV lung cancer  9. Pathologic bilateral femur fractures - ortho decided on non operative approach. She was undergoing palliative radiation.     Poor prognosis.  Oncology is recommending hospice care.    Neil Guerrero Jr, MD  [facility]  10/04/19  9:56 AM

## 2019-10-04 NOTE — PLAN OF CARE
Problem: Patient Care Overview  Goal: Plan of Care Review  Outcome: Ongoing (interventions implemented as appropriate)   10/04/19 0615   Coping/Psychosocial   Plan of Care Reviewed With patient   Plan of Care Review   Progress no change   OTHER   Outcome Summary Safety maintained, tolerated HD well today, Pt in AfiB 's - 150's during noc, hypotensive, cardio consulted per Dr. Shankar. Amio bolus and drip intitiated per Dr. Simpson. Tele remains afib 100's-120's, B/P 90's systolic     Goal: Individualization and Mutuality  Outcome: Ongoing (interventions implemented as appropriate)    Goal: Discharge Needs Assessment  Outcome: Ongoing (interventions implemented as appropriate)      Problem: Fall Risk (Adult)  Goal: Absence of Fall  Outcome: Ongoing (interventions implemented as appropriate)      Problem: Hemodialysis (Adult)  Goal: Signs and Symptoms of Listed Potential Problems Will be Absent, Minimized or Managed (Hemodialysis)  Outcome: Ongoing (interventions implemented as appropriate)      Problem: Pneumonia (Adult)  Goal: Signs and Symptoms of Listed Potential Problems Will be Absent, Minimized or Managed (Pneumonia)  Outcome: Ongoing (interventions implemented as appropriate)      Problem: Skin Injury Risk (Adult)  Goal: Skin Health and Integrity  Outcome: Ongoing (interventions implemented as appropriate)      Problem: Confusion, Acute (Adult)  Goal: Safety  Outcome: Ongoing (interventions implemented as appropriate)

## 2019-10-05 NOTE — PROGRESS NOTES
"   LOS: 20 days    Patient Care Team:  Taiwo Shankar MD as PCP - General (Internal Medicine)  Paulette Torres MD as Consulting Physician (Radiation Oncology)    Chief Complaint:    Chief Complaint   Patient presents with   • Shortness of Breath     Follow UP ESRD   Subjective     Interval History:   Seen and examined on HD.  She lost her peripheral IV earlier this morning and thus amiodarone drip currently off; denies shortness of breath; has some abdominal pain; marginal blood pressure so far but no cramping.  Remains confused though does readily awaken      Objective     Vital Signs  Temp:  [97.4 °F (36.3 °C)-97.9 °F (36.6 °C)] 97.4 °F (36.3 °C)  Heart Rate:  [] 87  Resp:  [16-20] 20  BP: ()/(40-77) 158/63    Flowsheet Rows      First Filed Value   Admission Height  165.1 cm (65\") Documented at 09/15/2019 1603   Admission Weight  113 kg (250 lb) Documented at 09/15/2019 1603          No intake/output data recorded.  I/O last 3 completed shifts:  In: 120 [P.O.:120]  Out: -     Intake/Output Summary (Last 24 hours) at 10/5/2019 1041  Last data filed at 10/5/2019 0930  Gross per 24 hour   Intake 0 ml   Output --   Net 0 ml       Physical Exam:  GEN frail drowsy but awakens w/o much difficulty; conversant; pleasant  Neck supple no JVD  Lungs dec BS bilat; not labored on supplemental O2  CV tachy, irregularly irregular  abd soft NT/ND, BS +  vasc no pedal/ankle edema   Left BKA  Right arm AV fistula patent     Results Review:    Results from last 7 days   Lab Units 10/05/19  0517 10/04/19  0601 10/03/19  0456   SODIUM mmol/L 128* 132* 132*   POTASSIUM mmol/L 5.1 4.8 5.7*   CHLORIDE mmol/L 84* 86* 88*   CO2 mmol/L 18.7* 25.3 21.4*   BUN mg/dL 76* 51* 85*   CREATININE mg/dL 4.92* 3.85* 4.84*   CALCIUM mg/dL 9.7 9.7 10.0   BILIRUBIN mg/dL  --   --  0.4   ALK PHOS U/L  --   --  331*   ALT (SGPT) U/L  --   --  40*   AST (SGOT) U/L  --   --  24   GLUCOSE mg/dL 151* 150* 183*       Estimated Creatinine " Clearance: 14 mL/min (A) (by C-G formula based on SCr of 4.92 mg/dL (H)).    Results from last 7 days   Lab Units 10/04/19  0601   PHOSPHORUS mg/dL 6.3*             Results from last 7 days   Lab Units 10/05/19  0517 10/04/19  0601 10/03/19  0456 10/02/19  0520 09/30/19  0329   WBC 10*3/mm3 6.59 8.67 6.45 5.00 11.22*   HEMOGLOBIN g/dL 11.8* 11.7* 12.1 11.3* 11.6*   PLATELETS 10*3/mm3 213 228 221 234 284               Imaging Results (last 24 hours)     ** No results found for the last 24 hours. **          acetylcysteine 4 mL Nebulization Q8H - RT   aspirin 81 mg Oral Daily   castor oil-balsam peru  Topical Q12H   dexamethasone 4 mg Oral Q12H   divalproex 125 mg Oral Nightly   escitalopram 10 mg Oral Daily   HYDROcodone-acetaminophen 1 tablet Oral BID   insulin glargine 10 Units Subcutaneous Nightly   insulin lispro 0-7 Units Subcutaneous 4x Daily With Meals & Nightly   ipratropium-albuterol 3 mL Nebulization Q8H - RT   levothyroxine 75 mcg Oral Q AM   midodrine 10 mg Oral TID AC   pantoprazole 40 mg Oral Q AM   polyethylene glycol 17 g Oral Daily       amiodarone 0.5 mg/min Last Rate: 0.5 mg/min (10/04/19 2204)       Medication Review:   Current Facility-Administered Medications   Medication Dose Route Frequency Provider Last Rate Last Dose   • acetaminophen (TYLENOL) tablet 650 mg  650 mg Oral Q4H PRN Taiwo Shankar MD   650 mg at 09/21/19 0918   • acetylcysteine (MUCOMYST) 20 % nebulizer solution 4 mL  4 mL Nebulization Q8H - RT Alexander Schaefer MD   4 mL at 10/05/19 0651   • amiodarone (NEXTERONE) 360 mg/200 mL (1.8 mg/mL) infusion  0.5 mg/min Intravenous Continuous Tomer Simpson MD 16.67 mL/hr at 10/04/19 2204 0.5 mg/min at 10/04/19 2204   • aspirin EC tablet 81 mg  81 mg Oral Daily Taiwo Shankar MD   81 mg at 10/04/19 0936   • carboxymethylcellulose sod (PF) 1 % eye gel 1 drop  1 drop Both Eyes TID Taiwo Garrett MD       • castor oil-balsam peru (VENELEX) ointment   Topical Q12H Taiwo Shankar  MD   5 g at 10/04/19 2200   • dexamethasone (DECADRON) tablet 4 mg  4 mg Oral Q12H Alexander Schaefer MD   4 mg at 10/04/19 2200   • dextrose (D50W) 25 g/ 50mL Intravenous Solution 25 g  25 g Intravenous Q15 Min aTiwo Garrett MD       • dextrose (GLUTOSE) oral gel 15 g  15 g Oral Q15 Min Taiwo Garrett MD       • divalproex (DEPAKOTE) DR tablet 125 mg  125 mg Oral Nightly Taiwo Shankar MD   125 mg at 10/04/19 2200   • escitalopram (LEXAPRO) tablet 10 mg  10 mg Oral Daily Taiwo Shankar MD   10 mg at 10/04/19 0936   • glucagon (human recombinant) (GLUCAGEN DIAGNOSTIC) injection 1 mg  1 mg Subcutaneous Q15 Min Taiwo Garrett MD       • HYDROcodone-acetaminophen (NORCO) 5-325 MG per tablet 1 tablet  1 tablet Oral BID Taiwo Shankar MD   1 tablet at 10/04/19 2200   • insulin glargine (LANTUS) injection 10 Units  10 Units Subcutaneous Nightly Taiwo Shankar MD   10 Units at 10/04/19 2200   • insulin lispro (humaLOG) injection 0-7 Units  0-7 Units Subcutaneous 4x Daily With Meals & Nightly Taiwo Shankar MD   3 Units at 10/05/19 0810   • ipratropium-albuterol (DUO-NEB) nebulizer solution 3 mL  3 mL Nebulization Q8H - RT Alexander Schaefer MD   3 mL at 10/05/19 0651   • levothyroxine (SYNTHROID, LEVOTHROID) tablet 75 mcg  75 mcg Oral Q AM Taiwo Shankar MD   75 mcg at 10/05/19 0700   • midodrine (PROAMATINE) tablet 10 mg  10 mg Oral TID Ramesh Johnson MD   10 mg at 10/05/19 0810   • pantoprazole (PROTONIX) EC tablet 40 mg  40 mg Oral Q AM Taiwo Shankar MD   40 mg at 10/05/19 0700   • polyethylene glycol 3350 powder (packet)  17 g Oral Daily Taiwo Shankar MD   17 g at 10/04/19 0937       Assessment/Plan   1. ESRD.    Hyponatremia due to water retention with ESRD; stable potassium.  Large positive AG metabolic acidosis.  Volume stable.    2. 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  Palliative XRT.    3. DM2  .  4. AFIB/RVR - HR labile  5. PAD  6. Hypotension - due to AF/RVR, labile  7. Hypothyroidism. On Replacement.   8. Anemia CKD.  No BASSEM due to malignancy     Plan  1.  HD underway now, with plan to transition to usual MWF schedule next week  2.  Continue midodrine 10 TID  3.  If hemodynamics improve unstable, will have to stop HD  4.  Goals of care being discussed.  In view of widespread malignancy with bilateral pathologic femur fx's, withdrawal of dialysis and transition to comfort care would be very reasonable        Squamous cell lung cancer (CMS/HCC)    End stage renal disease (CMS/HCC)    Atelectasis of right lung    CAD (coronary artery disease)        Froy Sanchez MD  10/05/19  10:41 AM

## 2019-10-05 NOTE — PLAN OF CARE
Problem: Patient Care Overview  Goal: Plan of Care Review  Outcome: Ongoing (interventions implemented as appropriate)   10/05/19 7001   Coping/Psychosocial   Plan of Care Reviewed With patient   Plan of Care Review   Progress declining   OTHER   Outcome Summary Pt in a fib, HR got to 120 on amiodarone gtt; pt received 1x dig. Dialysis today. Denies pain/resp distress.        Problem: Fall Risk (Adult)  Goal: Absence of Fall  Outcome: Ongoing (interventions implemented as appropriate)      Problem: Hemodialysis (Adult)  Goal: Signs and Symptoms of Listed Potential Problems Will be Absent, Minimized or Managed (Hemodialysis)  Outcome: Ongoing (interventions implemented as appropriate)      Problem: Skin Injury Risk (Adult)  Goal: Skin Health and Integrity  Outcome: Ongoing (interventions implemented as appropriate)      Problem: Confusion, Acute (Adult)  Goal: Safety  Outcome: Ongoing (interventions implemented as appropriate)

## 2019-10-05 NOTE — PLAN OF CARE
Problem: Patient Care Overview  Goal: Plan of Care Review  Outcome: Ongoing (interventions implemented as appropriate)   10/05/19 0808   Coping/Psychosocial   Plan of Care Reviewed With patient   Plan of Care Review   Progress improving   OTHER   Outcome Summary REMAINS ON AMIODARONE DRIP WITH AFIB HR . B/P STABLE.  NO RESPIRATORY DISTRESS NOTED. RESTED WELL AT LONG INTERVALS IN BETWEEN CARE/TURNS.        Problem: Fall Risk (Adult)  Goal: Absence of Fall  Outcome: Ongoing (interventions implemented as appropriate)      Problem: Hemodialysis (Adult)  Goal: Signs and Symptoms of Listed Potential Problems Will be Absent, Minimized or Managed (Hemodialysis)  Outcome: Ongoing (interventions implemented as appropriate)      Problem: Skin Injury Risk (Adult)  Goal: Skin Health and Integrity  Outcome: Ongoing (interventions implemented as appropriate)      Problem: Confusion, Acute (Adult)  Goal: Safety  Outcome: Ongoing (interventions implemented as appropriate)

## 2019-10-06 NOTE — PROGRESS NOTES
"   LOS: 21 days    Patient Care Team:  Taiwo Shankar MD as PCP - General (Internal Medicine)  Paulette Torres MD as Consulting Physician (Radiation Oncology)    Chief Complaint:    Chief Complaint   Patient presents with   • Shortness of Breath     Follow UP ESRD   Subjective     Interval History:   Severe pain left leg today, with xray confirming pathologic fx. Appetite poor; no SOB.  Patient considering stopping HD    Objective     Vital Signs  Temp:  [97.5 °F (36.4 °C)-98.2 °F (36.8 °C)] 97.5 °F (36.4 °C)  Heart Rate:  [] 95  Resp:  [18-20] 20  BP: ()/(54-89) 110/89    Flowsheet Rows      First Filed Value   Admission Height  165.1 cm (65\") Documented at 09/15/2019 1603   Admission Weight  113 kg (250 lb) Documented at 09/15/2019 1603          I/O this shift:  In: 300 [P.O.:300]  Out: -   I/O last 3 completed shifts:  In: 170 [P.O.:170]  Out: -     Intake/Output Summary (Last 24 hours) at 10/6/2019 1855  Last data filed at 10/6/2019 1328  Gross per 24 hour   Intake 300 ml   Output --   Net 300 ml       Physical Exam:  GEN frail; in pain  Neck supple no JVD  Lungs dec BS bilat; not labored on supplemental O2  CV tachy, irregularly irregular  abd soft NT/ND, BS +  vasc no pedal/ankle edema   Left BKA  Right arm AV fistula patent     Results Review:    Results from last 7 days   Lab Units 10/06/19  0701 10/05/19  0517 10/04/19  0601 10/03/19  0456   SODIUM mmol/L 130* 128* 132* 132*   POTASSIUM mmol/L 3.9 5.1 4.8 5.7*   CHLORIDE mmol/L 85* 84* 86* 88*   CO2 mmol/L 22.4 18.7* 25.3 21.4*   BUN mg/dL 44* 76* 51* 85*   CREATININE mg/dL 3.92* 4.92* 3.85* 4.84*   CALCIUM mg/dL 9.7 9.7 9.7 10.0   BILIRUBIN mg/dL  --   --   --  0.4   ALK PHOS U/L  --   --   --  331*   ALT (SGPT) U/L  --   --   --  40*   AST (SGOT) U/L  --   --   --  24   GLUCOSE mg/dL 145* 151* 150* 183*       Estimated Creatinine Clearance: 17.3 mL/min (A) (by C-G formula based on SCr of 3.92 mg/dL (H)).    Results from last 7 days "   Lab Units 10/04/19  0601   PHOSPHORUS mg/dL 6.3*             Results from last 7 days   Lab Units 10/06/19  1420 10/06/19  0701 10/05/19  2316 10/05/19  0517 10/04/19  0601   WBC 10*3/mm3 10.09 6.77 7.12 6.59 8.67   HEMOGLOBIN g/dL 11.5* 11.2* 11.7* 11.8* 11.7*   PLATELETS 10*3/mm3 195 174 190 213 228       Results from last 7 days   Lab Units 10/05/19  2316   INR  1.07         Imaging Results (last 24 hours)     Procedure Component Value Units Date/Time    XR Femur 2 View Left [[id number]] Collected:  10/06/19 1050     Updated:  10/06/19 1242    Narrative:       2 VIEW PORTABLE LEFT FEMUR     HISTORY: Extensive metastatic bone disease. Recent onset of acute pain  in left femur.     FINDINGS: There is severe osteoporosis with a large lytic lesion just  above the mid shaft of the femur and there is now a pathologic fracture  through the lesion when compared to the study of 09/21/2019. There is  slight medial and posterior displacement of the distal shaft relative to  the proximal shaft as best seen on the lateral view.     This report was finalized on 10/6/2019 12:39 PM by Dr. Jw Lopez M.D.             acetylcysteine 4 mL Nebulization Q8H - RT   aspirin 81 mg Oral Daily   castor oil-balsam peru  Topical Q12H   dexamethasone 4 mg Oral Q12H   divalproex 125 mg Oral Nightly   escitalopram 10 mg Oral Daily   insulin glargine 10 Units Subcutaneous Nightly   insulin lispro 0-7 Units Subcutaneous 4x Daily With Meals & Nightly   ipratropium-albuterol 3 mL Nebulization Q8H - RT   levothyroxine 75 mcg Oral Q AM   midodrine 10 mg Oral TID AC   pantoprazole 40 mg Oral Q AM   polyethylene glycol 17 g Oral Daily       amiodarone 0.5 mg/min Last Rate: 0.5 mg/min (10/06/19 1839)   heparin (porcine) 17.5 Units/kg/hr Last Rate: 17.5 Units/kg/hr (10/06/19 1657)       Medication Review:   Current Facility-Administered Medications   Medication Dose Route Frequency Provider Last Rate Last Dose   • acetaminophen (TYLENOL) tablet  650 mg  650 mg Oral Q4H PRN Taiwo Shankar MD   650 mg at 09/21/19 0918   • acetylcysteine (MUCOMYST) 20 % nebulizer solution 4 mL  4 mL Nebulization Q8H - RT Alexadner Schaefer MD   4 mL at 10/06/19 1537   • amiodarone (NEXTERONE) 360 mg/200 mL (1.8 mg/mL) infusion  0.5 mg/min Intravenous Continuous Tomer Simpson MD 16.67 mL/hr at 10/06/19 1839 0.5 mg/min at 10/06/19 1839   • aspirin EC tablet 81 mg  81 mg Oral Daily Taiwo Shankar MD   81 mg at 10/06/19 0812   • carboxymethylcellulose sod (PF) 1 % eye gel 1 drop  1 drop Both Eyes TID PRN Taiwo Shankar MD       • castor oil-balsam peru (VENELEX) ointment   Topical Q12H Taiwo Shankar MD   5 g at 10/06/19 0812   • dexamethasone (DECADRON) tablet 4 mg  4 mg Oral Q12H Alexander Schaefer MD   4 mg at 10/06/19 0812   • dextrose (D50W) 25 g/ 50mL Intravenous Solution 25 g  25 g Intravenous Q15 Min PRN Taiwo Shankar MD       • dextrose (GLUTOSE) oral gel 15 g  15 g Oral Q15 Min WATSONN Taiwo Shankar MD       • divalproex (DEPAKOTE) DR tablet 125 mg  125 mg Oral Nightly Taiwo Shankar MD   125 mg at 10/05/19 2121   • escitalopram (LEXAPRO) tablet 10 mg  10 mg Oral Daily Taiwo Shankar MD   10 mg at 10/06/19 0812   • glucagon (human recombinant) (GLUCAGEN DIAGNOSTIC) injection 1 mg  1 mg Subcutaneous Q15 Min PRN Taiwo Shankar MD       • heparin (porcine) 5000 UNIT/ML injection 3,400-6,900 Units  40-80 Units/kg Intravenous Q6H PRN Rachid Luz MD       • heparin 27369 units/250 mL (100 units/mL) in 0.45 % NaCl infusion  17.5 Units/kg/hr Intravenous Titrated Rachid Luz MD 14.99 mL/hr at 10/06/19 1657 17.5 Units/kg/hr at 10/06/19 1657   • HYDROmorphone (DILAUDID) injection 0.5 mg  0.5 mg Intravenous Q2H PRN Taiwo Shankar MD   0.5 mg at 10/06/19 1723   • insulin glargine (LANTUS) injection 10 Units  10 Units Subcutaneous Nightly Taiwo Shankar MD   10 Units at 10/05/19 2122   • insulin lispro (humaLOG) injection 0-7 Units  0-7 Units  Subcutaneous 4x Daily With Meals & Nightly Taiwo Shankar MD   2 Units at 10/06/19 1630   • ipratropium-albuterol (DUO-NEB) nebulizer solution 3 mL  3 mL Nebulization Q8H - RT Alexander Schaefer MD   3 mL at 10/06/19 1537   • levothyroxine (SYNTHROID, LEVOTHROID) tablet 75 mcg  75 mcg Oral Q AM Taiwo Shankar MD   75 mcg at 10/06/19 0701   • midodrine (PROAMATINE) tablet 10 mg  10 mg Oral TID AC Ramesh Mcguire MD   10 mg at 10/06/19 0812   • ondansetron (ZOFRAN) injection 4 mg  4 mg Intravenous Q4H PRN Taiwo Shankar MD   4 mg at 10/06/19 1657   • oxyCODONE-acetaminophen (PERCOCET)  MG per tablet 1 tablet  1 tablet Oral Q4H PRN Taiwo Shankar MD   1 tablet at 10/06/19 1736   • pantoprazole (PROTONIX) EC tablet 40 mg  40 mg Oral Q AM Taiwo Shankar MD   40 mg at 10/06/19 0701   • polyethylene glycol 3350 powder (packet)  17 g Oral Daily Taiwo Shankar MD   17 g at 10/04/19 0937       Assessment/Plan   1. ESRD.    Hyponatremia due to water retention with ESRD; stable potassium.  Large positive AG metabolic acidosis.  Volume stable.    2. 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 Palliative XRT.    3. DM2  .  4. AFIB/RVR - HR labile  5. PAD  6. Hypotension - due to AF/RVR, labile  7. Hypothyroidism. On Replacement.   8. Anemia CKD.  No BASSEM due to malignancy     Plan  1.  Analgesia for now  2.  Hold off ordering HD tomorrow; pt considering withdrawal from HD  3.  In view of widespread malignancy with bilateral pathologic femur fx's, withdrawal of dialysis and transition to comfort care would be very reasonable        Squamous cell lung cancer (CMS/HCC)    End stage renal disease (CMS/HCC)    Atelectasis of right lung    CAD (coronary artery disease)        Froy Sanchez MD  10/06/19  6:55 PM

## 2019-10-06 NOTE — CONSULTS
Orthopaedic Consult    Alejandro Kramer MD         Patient: Melia Almeida    Date of Admission: 9/15/2019  2:35 PM    YOB: 1965    Medical Record Number: [id number]    Attending Physician: Taiwo Shankar MD  Consulting Physician: Alejandro Kramer MD      Chief Complaints: Shortness of breath [R06.02]  End stage kidney disease (CMS/HCC) [N18.6]  Pneumonia of right lung due to infectious organism, unspecified part of lung [J18.9]      History of Present Illness: 53 y.o. female admitted to [facility] with Shortness of breath [R06.02]  End stage kidney disease (CMS/HCC) [N18.6]  Pneumonia of right lung due to infectious organism, unspecified part of lung [J18.9].   Patient has a very complex medical history she has squamous cell carcinoma of the lung as well as end-stage renal disease on hemodialysis.  She has been doing very poorly over the last month or so.  She has been hospitalized for 3 to 4 weeks from what I can see.  My colleague, Dr Zavala was actually consulted about a month ago for an impending pathologic fracture of the left femoral shaft from metastatic disease as well as right femoral neck.  However, secondary to the patient's medical status as well as terminal illness it was determined to hold off on any surgical intervention.  Today, while the patient was lying in bed she felt a spontaneous pop in her left lower extremity and has had excruciating pain with any movement since that time.  She does have a gross deformity.  No open wounds are appreciated.  She is status post left below-knee amputation on this ipsilateral side in the remote past secondary to diabetic foot ulcers.  Per discussion with the nurses there has been discussion about pursuing palliative care with hospice.  A consultation is set for tomorrow.  The patient is a [place] of the [facility] and does not have any family members present today she has a very low functional status at baseline.  She is essentially bedridden.  She  has not mobilized since she has been in the hospital    Allergies:   Allergies   Allergen Reactions   • Morphine Swelling   • Ampicillin Cough       Medications:   Home Medications:  Medications Prior to Admission   Medication Sig Dispense Refill Last Dose   • acetaminophen (TYLENOL) 325 MG tablet Take 650 mg by mouth Every 4 (Four) Hours As Needed for Mild Pain .      • aspirin 81 MG tablet Take 81 mg by mouth Daily.   1/23/2018 at Unknown time   • calcium acetate (PHOSLO) 667 MG capsule Take 1,334 mg by mouth 3 (Three) Times a Day With Meals.      • Carboxymethylcellulose Sodium (REFRESH TEARS OP) Administer 1 drop to both eyes 2 (Two) Times a Day.   1/23/2018 at Unknown time   • epoetin khadar (EPOGEN,PROCRIT) 69821 UNIT/ML injection Inject 20,000 Units under the skin Every 14 (Fourteen) Days. Every 2 weeks on wednesdays 1/23/2018 at Unknown time   • escitalopram (LEXAPRO) 10 MG tablet Take 10 mg by mouth Daily.   1/23/2018 at Unknown time   • ferrous sulfate 325 (65 FE) MG tablet Take 325 mg by mouth Daily With Breakfast.   1/23/2018 at Unknown time   • insulin aspart (novoLOG FLEXPEN) 100 UNIT/ML solution pen-injector sc pen Inject  under the skin 3 (Three) Times a Day With Meals. Per sliding scale. 150-199=2, 200-249=4, 250-299=6, 300-349=8, 350-399=10, 400 or above, call MD   1/23/2018 at Unknown time   • levothyroxine (SYNTHROID, LEVOTHROID) 75 MCG tablet Take 75 mcg by mouth Daily.      • metoprolol tartrate (LOPRESSOR) 25 MG tablet Take 25 mg by mouth 2 (Two) Times a Day.   3/22/2018 at 0522   • Multiple Vitamins-Minerals (MULTIVITAMIN WITH MINERALS) tablet tablet Take 1 tablet by mouth Daily.      • polyethylene glycol (MIRALAX) packet Take 17 g by mouth Daily.   1/23/2018 at Unknown time   • QUEtiapine (SEROquel) 25 MG tablet Take 25 mg by mouth Every Night.   1/22/2018 at Unknown time   • atorvastatin (LIPITOR) 10 MG tablet Take 10 mg by mouth Every Night. PT STATES SHE DOES NOT TAKE      • bumetanide  (BUMEX) 2 MG tablet Take 4 mg by mouth 2 (Two) Times a Day. PT STATES SHE DOES NOT TAKE   1/23/2018 at Unknown time       Current Medications:  Scheduled Meds:  acetylcysteine 4 mL Nebulization Q8H - RT   aspirin 81 mg Oral Daily   castor oil-balsam peru  Topical Q12H   dexamethasone 4 mg Oral Q12H   divalproex 125 mg Oral Nightly   escitalopram 10 mg Oral Daily   insulin glargine 10 Units Subcutaneous Nightly   insulin lispro 0-7 Units Subcutaneous 4x Daily With Meals & Nightly   ipratropium-albuterol 3 mL Nebulization Q8H - RT   levothyroxine 75 mcg Oral Q AM   midodrine 10 mg Oral TID AC   pantoprazole 40 mg Oral Q AM   polyethylene glycol 17 g Oral Daily     Continuous Infusions:  amiodarone 0.5 mg/min Last Rate: 0.5 mg/min (10/06/19 1839)   heparin (porcine) 17.5 Units/kg/hr Last Rate: 17.5 Units/kg/hr (10/06/19 1657)     PRN Meds:.•  acetaminophen  •  carboxymethylcellulose sod (PF)  •  dextrose  •  dextrose  •  glucagon (human recombinant)  •  heparin (porcine)  •  HYDROmorphone  •  ondansetron  •  oxyCODONE-acetaminophen    Past Medical History:   Diagnosis Date   • Acute renal failure on dialysis (CMS/[facility])     M-W-F   • Adult failure to thrive    • Chronic kidney disease (CKD), stage V (CMS/[facility])    • Coronary artery disease    • Diabetes mellitus (CMS/[facility])    • Diabetes mellitus with chronic kidney disease (CMS/[facility])    • Disease of thyroid gland     hypothyroidism   • GERD (gastroesophageal reflux disease)    • History of anemia    • History of UTI    • Hyperlipidemia    • Hypertension    • Idiopathic neuropathy    • Major depressive disorder, recurrent episode, severe, with psychotic behavior (CMS/[facility])    • Metabolic encephalopathy    • Obesity    • Osteomyelitis of right foot (CMS/[facility])     foot and ankle   • Other symbolic dysfunctions    • Peripheral vascular disease, unspecified (CMS/[facility])    • Personal history of diabetic foot ulcer      Past Surgical History:   Procedure Laterality Date   •  ARTERIOVENOUS FISTULA/SHUNT SURGERY Right 1/29/2018    Procedure: UPPER EXTREMITY ARTERIOVENOUS SHUNT GRAFT;  Surgeon: Ambar Dobson Jr., MD;  Location: [facility] OR;  Service:    • BELOW KNEE LEG AMPUTATION Left 2008   • BRONCHOSCOPY Bilateral 9/17/2019    Procedure: BRONCHOSCOPY WITH ENDOBRONCHIAL ULTRASOUND, BX, BRUSHINGS, BAL, & TBNA;  Surgeon: Du Rogers MD;  Location: [facility] ENDOSCOPY;  Service: Pulmonary   • INSERTION HEMODIALYSIS CATHETER N/A 1/24/2018    Procedure: TUNNEL DIALYSIS CATHETER INSERTION;  Surgeon: Moncho Jackson MD;  Location: [facility] MAIN OR;  Service:    • INSERTION HEMODIALYSIS CATHETER N/A 3/22/2018    Procedure: Tunnel Catheter Insertion;  Surgeon: Juwan Phillips MD;  Location: [facility] OR 18/19;  Service: Vascular     Social History     Occupational History   • Not on file   Tobacco Use   • Smoking status: Current Every Day Smoker     Types: Cigarettes   • Smokeless tobacco: Never Used   • Tobacco comment: 4 CIG./PER DAY   Substance and Sexual Activity   • Alcohol use: No   • Drug use: No   • Sexual activity: Defer    Social History     Social History Narrative   • Not on file     History reviewed. No pertinent family history.      Review of Systems:   Constitutional: Positive for generalized pain fatigue  HEENT: Patient denies any headaches, vision changes, sore throat. Admits to wearing glasses  Pulmonary: Positive for shortness of breath  Cardiovascular: Patient denies any chest pain, palpitations, weakness,  Gastrointestinal:  Patient denies nausea, vomiting, diarrhea, constipation  Genital/Urinary: Patient denies dysuria, urinary frequency, urgency, incontinence, retention  Musculoskeletal: Positive for left lower extremity pain. Negative for muscle cramps  Neurological: Patient denies dizziness, paresthesia, loss of sensation, seizures, syncope  Endocrine system: Patient denies tremors, cold or heat intolerance  Psychological: Patient denies thoughts/plans  or harming self or other; hallucinations,  insomnia  Skin: Patient denies any bruising, rashes, lesions, or ulcers   Hematopoietic: Patient denies history of MRSA or slow wound healing,  spontaneous or excessive bleeding    Physical Exam: 53 y.o. female  Vitals:    10/06/19 1113 10/06/19 1332 10/06/19 1537 10/06/19 1542   BP: 163/89 110/89     BP Location: Left arm Left arm     Patient Position: Lying Lying     Pulse: (!) 125 90 85 95   Resp: 20 18 20 20   Temp: 97.7 °F (36.5 °C) 97.5 °F (36.4 °C)     TempSrc: Oral Oral     SpO2: 94% 95% 92%    Weight:       Height:                                General Appearance:  Alert, cooperative, in no acute distress    HEENT:    Normocephalic,  Atraumatic, Pupils are equal   Neck:   No adenopathy, supple, trachea midline, no thyromegaly       Lungs:     Clear to auscultation, equal expansion bilaterally, respirations regular, even and               unlabored    Heart:    Regular rhythm and normal rate, normal S1 and S2, no murmur, no gallops   Chest Wall:    Within normal limits   Abdomen:     Normal bowel sounds, no masses,  soft non-tender, non-distended,       Rectal:  Musculoskeletal:     Deferred  The left lower extremity demonstrates a well-healed below-knee amputation.  Stump site is well-healed.  No tenderness with knee range of motion.  There is a gross deformity of the left lower extremity.  There is no skin tenting.  She has excruciating pain in the thigh with any attempted range of motion.  Distally the extremity is neurovascularly intact.   Extremities:   No clubbing, no edema, no cyanosis   Pulses  Neurovascular:   Pulses palpable and equal bilaterally in all 4 extremities    Patient was alert and oriented x 3 spheres   Skin:   No lesions, ulcers or rashes   Neurologic:   Cranial nerves 2 - 12 grossly intact, sensation intact            Diagnostic Tests:  2 views of the left femur demonstrate a clearly lytic lesion in the left femoral shaft and a pathologic  fracture in the diaphysis of the left femur.  There is some scalloping seen throughout the femur both proximally and distally to the fracture site.  Also seen is a well-healed below-knee amputation \        Assessment:  Patient Active Problem List   Diagnosis   • End stage renal disease (CMS/HCC)   • End stage kidney disease (CMS/HCC)   • Dependence on renal dialysis (CMS/HCC)   • Problem with dialysis access (CMS/HCC)   • Type 2 diabetes mellitus (CMS/HCC)   • Pneumonia of right lung due to infectious organism   • Squamous cell lung cancer (CMS/HCC)   • Atelectasis of right lung   • CAD (coronary artery disease)     Left pathologic femoral shaft fracture      Plan:    The patient is a very complicated medical history and situation at the present time.  I discussed with the patient as well as her nurses that operative intervention in the form of an intramedullary nail in the left femur would certainly help with palliation pain control.  However, the patient was recently diagnosed with atrial fibrillation with RVR and has been put on an amiodarone and heparin drip.  This would have to be discontinued prior to surgical intervention.  Although the surgery would be relatively quick, there is still a very high morbidity associated with it for this patient given her current medical status.  She has also become unstable even with her hemodialysis secondary to hypotension recently.    She is set to have a discussion with hospice for consultation tomorrow.  I am available to perform her surgery should she and/or her power of  decide this is how they would wish to proceed if this is even reasonable in the current setting of her cardiovascular and hemodynamic status.    We will continue to follow the patient.  I am available to perform the surgery tomorrow afternoon if this is the avenue they would wish to proceed with and she is medically stabilized enough.    Date: 10/6/2019  Alejandro Kramer MD  Orthopaedic  Surgeon    [facility]  [phone]

## 2019-10-06 NOTE — NURSING NOTE
"@0945 pt yelled out in pain (does not normally do this).  Pain in L thigh, radiation area. Sudden onset \"10\" on 1-10 scale.  Hydrocodone given (had refused earlier due to zero pain).    @0953 Dr. Shankar called, voice mail left, no pt name given.  Call returned and was told about sudden pain L leg.  Order for x ray L femur and morphine.      @1004 call to Dr. Shankar again, pt is allergic to morphine.    @1027 call returned, told that pt is allergic to morphine, dilaudid now ordered. Also told L femur is fractured per x ray techs, but radiologist will read soon.     @1037 call from Dr. Lopez, radiologist: pathological fracture of lytic lesion, mid to proximal L femur.    @1112, pt in a fib HR now 130s, then 120s. 163/89, temp 97.7, diaphoretic.  94% on room air.  Call to cardiology @1120    @1130 call returned from ALICIA Matute with cardiology, told of a fib up to 130s, now 110. Told about path fracture L femur this am, in pain.  She will tell Dr. Luz (he came to see pt at 1140)    @1200 Dr. Shankar here, asked for prn pain med.  He states he will order.     @1643 Pt vomited, need med for nausea.  Dr. Shankar was called, order for zofran    @1748 consult to orthopedics called again for 2nd time (no one had seen her yet). Dr. Kramer returned call and was told about femur pathological fx today/cancer mets to bone.    @1900 Dr. Kramer (orthopedics) here for consult, update given on pt.   "

## 2019-10-06 NOTE — PROGRESS NOTES
DAILY PROGRESS NOTE  [facility], [facility]    10/6/2019    Patient Identification:  Name: Melia Almeida  Age: 53 y.o.  Sex: female  :  1965  MRN: [id number]           Primary Care Physician: Taiwo Shankar MD    Subjective:    Interval History:    This morning, patient heard a pop on her left leg, followed by excruciating pain, x-ray shows fracture, pathologic fracture  Heart rate is controlled with current medication  Blood sugar okay  On heparin drip for atrial fibrillation  Not eating much  Has completed radiation therapy.    ROS:     No nausea, vomiting, diarrhea, constipation, chest pain    Objective:    Scheduled Meds:    acetylcysteine 4 mL Nebulization Q8H - RT   aspirin 81 mg Oral Daily   castor oil-balsam peru  Topical Q12H   dexamethasone 4 mg Oral Q12H   divalproex 125 mg Oral Nightly   escitalopram 10 mg Oral Daily   HYDROcodone-acetaminophen 1 tablet Oral BID   insulin glargine 10 Units Subcutaneous Nightly   insulin lispro 0-7 Units Subcutaneous 4x Daily With Meals & Nightly   ipratropium-albuterol 3 mL Nebulization Q8H - RT   levothyroxine 75 mcg Oral Q AM   midodrine 10 mg Oral TID AC   pantoprazole 40 mg Oral Q AM   polyethylene glycol 17 g Oral Daily       Continuous Infusions:    amiodarone 0.5 mg/min Last Rate: 0.5 mg/min (10/06/19 0816)   heparin (porcine) 17.5 Units/kg/hr Last Rate: 17.5 Units/kg/hr (10/06/19 0829)       PRN Meds:  •  acetaminophen  •  carboxymethylcellulose sod (PF)  •  dextrose  •  dextrose  •  glucagon (human recombinant)  •  heparin (porcine)    Intake/Output:    Intake/Output Summary (Last 24 hours) at 10/6/2019 1200  Last data filed at 10/6/2019 0955  Gross per 24 hour   Intake 420 ml   Output --   Net 420 ml         Exam:    tMax 24 hrs: Temp (24hrs), Av.9 °F (36.6 °C), Min:97.7 °F (36.5 °C), Max:98.2 °F (36.8 °C)    Vitals Ranges:   Temp:  [97.7 °F (36.5 °C)-98.2 °F (36.8 °C)] 97.7 °F (36.5 °C)  Heart Rate:   "[] 125  Resp:  [17-20] 20  BP: ()/(50-89) 163/89    /89 (BP Location: Left arm, Patient Position: Lying)   Pulse (!) 125   Temp 97.7 °F (36.5 °C) (Oral)   Resp 20   Ht 165.1 cm (65\")   Wt 83.4 kg (183 lb 13.8 oz)   LMP  (LMP Unknown)   SpO2 94%   BMI 30.60 kg/m²     General: Having dialysis, sleepy, arousable.  No respiratory distress while on oxygen.    Neck: Supple, symmetrical, trachea midline, no adenopathy;              thyroid:  no enlargement/tenderness/nodules;              no carotid bruit or JVD  Cardiovascular: Normal rate, regular rhythm and intact distal pulses.              Exam reveals no gallop and no friction rub. No murmur heard  Chest wall: No tenderness or deformity  Pulmonary:  Diminished breath sounds bilaterally, rhonchi at bases bilaterally.    Abdominal: Soft. Soft, non-tender, bowel sounds active all four quadrants,     no masses, no hepatomegaly, no splenomegaly.   Extremities: Normal, atraumatic, no cyanosis.  No edema in RLE. S/p L BKA, tender to palpation as well as mild edema in the mid left femur.  Pulses: 2 + symmetric all extremities  Neurological: Patient is alert and oriented to person, place                 CNII-XII intact, normal strength, sensation intact throughout  Skin: Skin color, texture, normal. Turgor is decreased. No rashes or lesions            Data Review:    Results from last 7 days   Lab Units 10/06/19  0701 10/05/19  2316 10/05/19  0517   WBC 10*3/mm3 6.77 7.12 6.59   HEMOGLOBIN g/dL 11.2* 11.7* 11.8*   HEMATOCRIT % 35.6 38.3 38.7   PLATELETS 10*3/mm3 174 190 213       Results from last 7 days   Lab Units 10/06/19  0701 10/05/19  0517 10/04/19  0601 10/03/19  0456   SODIUM mmol/L 130* 128* 132* 132*   POTASSIUM mmol/L 3.9 5.1 4.8 5.7*   CHLORIDE mmol/L 85* 84* 86* 88*   CO2 mmol/L 22.4 18.7* 25.3 21.4*   BUN mg/dL 44* 76* 51* 85*   CREATININE mg/dL 3.92* 4.92* 3.85* 4.84*   CALCIUM mg/dL 9.7 9.7 9.7 10.0   BILIRUBIN mg/dL  --   --   --  " 0.4   ALK PHOS U/L  --   --   --  331*   ALT (SGPT) U/L  --   --   --  40*   AST (SGOT) U/L  --   --   --  24   GLUCOSE mg/dL 145* 151* 150* 183*     Results from last 7 days   Lab Units 10/05/19  2316   INR  1.07             No results found for: TROPONINT    Microbiology Results (last 10 days)     ** No results found for the last 240 hours. **           Imaging Results (last 7 days)     Procedure Component Value Units Date/Time    XR Femur 2 View Left [[id number]] Collected:  10/06/19 1050     Updated:  10/06/19 1050    Narrative:       2 VIEW PORTABLE LEFT FEMUR     HISTORY: Extensive metastatic bone disease. Recent onset of acute pain  in left femur.     FINDINGS: There is severe osteoporosis with a large lytic lesion just  above the mid shaft of the femur and there is now a pathologic fracture  through the lesion when compared to the study of 09/21/2019. There is  slight medial and posterior displacement of the distal shaft relative to  the proximal shaft as best seen on the lateral view.       XR Chest 1 View [[id number]] Collected:  10/01/19 0758     Updated:  10/01/19 1055    Narrative:       CLINICAL HISTORY: 53-year-old female for follow-up of pulmonary  infiltrates and atelectasis.     EXAM: Portable AP erect chest dated 10/01/2019 at 0602 hours.     FINDINGS: When compared to CT chest exam of 09/15/2019 and most recent  chest radiograph, portable AP erect projection of 09/26/2019 at 1138  hours, there is still opacification of the basilar right lung compatible  with previously demonstrated atelectasis and possible superimposed  infiltrates. Coarse markings in the perihilar and lower left lung and  some strandy opacity in the perihilar left mid lung periphery remain.  Metallic mesh vascular stents in the left upper extremity and right  subclavian distribution remain. Cardiac silhouette remains enlarged.  Sternal wire sutures and CABG markers are again demonstrated. No  pneumothorax or acute change in bony  thorax is seen.     CONCLUSION: Allowing for minor technical differences, there is little if  any change from the exam of 09/26/2019 at 1138 hours, with cardiomegaly  and right basilar atelectasis or consolidative changes remaining.     This report was finalized on 10/1/2019 10:52 AM by Dr. Sam Magallanes M.D.               Assessment:        Squamous cell lung cancer (CMS/HCC)    End stage renal disease (CMS/HCC)    Atelectasis of right lung    CAD (coronary artery disease)  Acute respiratory failure with hypoxia  DM  PVD  CAD  HTN  Anemia CKD  Hypothyroidism  Pathologic fracture of the left femur       Plan:    We will consult back to orthopedics for pathology fracture of the femur  Pain control  Completed palliative radiation therapy  Poor prognosis considering multiple comorbidities as well as metastatic lung cancer.  Will ask hospice to see patient and follow patient at the nursing home  I have a long conversation with patient regarding her prognosis, metastatic lung cancer, current femur fracture, potentially right hip fracture, the fact that pain and suffering will be more severe and more often.  I have talked to her about palliative care, withdrawal of dialysis, keep her comfortable and let her go in peace.  She understood her situation and she ask for time to think about it.  She agreed to see hospice  Continue Accu-Cheks and sliding scale insulin as well as Lantus.  Monitor and correct electrolytes  Monitor mental status  Continue home medications  DVT/stress ulcer prophylaxis  Labs in am        Taiwo Shankar MD  10/6/2019  12:00 PM

## 2019-10-06 NOTE — PROGRESS NOTES
LOS: 20 days   Patient Care Team:  Taiwo Shankar MD as PCP - General (Internal Medicine)  Paulette Torres MD as Consulting Physician (Radiation Oncology)    Chief Complaint: Follow-up for paroxysmal atrial fibrillation    Interval History: Rate has been approximately 100, although increased earlier today.  Gave dose of IV digoxin.  The patient denied any chest pain or shortness of breath currently.    Vital Signs:  Temp:  [97.4 °F (36.3 °C)-98.2 °F (36.8 °C)] 98.2 °F (36.8 °C)  Heart Rate:  [] 92  Resp:  [16-20] 18  BP: ()/(50-83) 116/83    Intake/Output Summary (Last 24 hours) at 10/5/2019 9985  Last data filed at 10/5/2019 1800  Gross per 24 hour   Intake 170 ml   Output --   Net 170 ml       Physical Exam:   General Appearance:    No acute distress, alert and oriented x4   Lungs:     Rhonci at bases bilaterally      Heart:   Irregularly irregular rhythm with a mildly tachycardic rate.  No murmurs, gallops, or       rubs.   Abdomen:     Soft, non-tender, non-distended.    Extremities:   Status post left BKA.  No edema in right lower extremity.     Results Review:    Results from last 7 days   Lab Units 10/05/19  0517   SODIUM mmol/L 128*   POTASSIUM mmol/L 5.1   CHLORIDE mmol/L 84*   CO2 mmol/L 18.7*   BUN mg/dL 76*   CREATININE mg/dL 4.92*   GLUCOSE mg/dL 151*   CALCIUM mg/dL 9.7         Results from last 7 days   Lab Units 10/05/19  0517   WBC 10*3/mm3 6.59   HEMOGLOBIN g/dL 11.8*   HEMATOCRIT % 38.7   PLATELETS 10*3/mm3 213                       I reviewed the patient's new clinical results.        Assessment:  1.  Paroxysmal atrial fibrillation with RVR  2.  Coronary artery disease  3.  End-stage renal disease, on hemodialysis   4.  Metastatic squamous cell carcinoma of the lung  5.  Anemia of chronic disease    Plan:  -Continue amiodarone drip for rate control.  Her blood pressure has not tolerated beta-blockers or calcium channel blockers recently and she still has intermittent  hypotension.    -Gave 1 dose of IV digoxin 0.25 mg today.  Would hold further with hemodialysis.    -On midodrine 3 times per day for hypotension    -I do not see a direct contraindication to anticoagulation and it does not appear that she is anticoagulated currently.  With active metastatic cancer and atrial fibrillation, I feel that she warrants anticoagulation therapy.  I am going to start her on a heparin drip.    Rachid Luz MD  10/05/19  10:49 PM

## 2019-10-06 NOTE — PLAN OF CARE
Problem: Patient Care Overview  Goal: Plan of Care Review  Outcome: Ongoing (interventions implemented as appropriate)   10/06/19 1698   Coping/Psychosocial   Plan of Care Reviewed With patient   Plan of Care Review   Progress declining   OTHER   Outcome Summary Pathological fracture of L femur today while pt was lying in bed. Pain meds given but pain not totally relieved. Orthopedic to see pt. TRISH fib. Remains on heparin and amiodarone gtt. Hospice consult called yesterday and they will see her Monday.        Problem: Fall Risk (Adult)  Goal: Absence of Fall  Outcome: Ongoing (interventions implemented as appropriate)      Problem: Hemodialysis (Adult)  Goal: Signs and Symptoms of Listed Potential Problems Will be Absent, Minimized or Managed (Hemodialysis)  Outcome: Ongoing (interventions implemented as appropriate)      Problem: Skin Injury Risk (Adult)  Goal: Skin Health and Integrity  Outcome: Ongoing (interventions implemented as appropriate)      Problem: Confusion, Acute (Adult)  Goal: Safety  Outcome: Ongoing (interventions implemented as appropriate)

## 2019-10-06 NOTE — PROGRESS NOTES
LOS: 21 days   Patient Care Team:  Taiwo Shankar MD as PCP - General (Internal Medicine)  Paulette Torres MD as Consulting Physician (Radiation Oncology)    Chief Complaint: Follow-up for paroxysmal atrial fibrillation     Interval History: Suffered lytic bone lesion in left leg today.  In pain.  Rate around 90.  No CP or SOA.    Vital Signs:  Temp:  [97.3 °F (36.3 °C)-98 °F (36.7 °C)] 97.3 °F (36.3 °C)  Heart Rate:  [] 87  Resp:  [18-20] 20  BP: ()/(54-89) 138/81    Intake/Output Summary (Last 24 hours) at 10/6/2019 1944  Last data filed at 10/6/2019 1904  Gross per 24 hour   Intake 300 ml   Output --   Net 300 ml       Physical Exam:   General Appearance:    No acute distress, alert and oriented x4   Lungs:     Rhonci at bases bilaterally     Heart:   Irregularly irregular rhythm with a normal rate.  No murmurs, gallops, or       rubs.   Abdomen:     Soft, non-tender, non-distended.    Extremities:  Status post left BKA.     Results Review:    Results from last 7 days   Lab Units 10/06/19  0701   SODIUM mmol/L 130*   POTASSIUM mmol/L 3.9   CHLORIDE mmol/L 85*   CO2 mmol/L 22.4   BUN mg/dL 44*   CREATININE mg/dL 3.92*   GLUCOSE mg/dL 145*   CALCIUM mg/dL 9.7         Results from last 7 days   Lab Units 10/06/19  1420   WBC 10*3/mm3 10.09   HEMOGLOBIN g/dL 11.5*   HEMATOCRIT % 37.6   PLATELETS 10*3/mm3 195     Results from last 7 days   Lab Units 10/06/19  0701 10/05/19  2316   INR   --  1.07   APTT seconds 79.5* 28.4                   I reviewed the patient's new clinical results.        Assessment:  1.  Paroxysmal atrial fibrillation with RVR  2.  Coronary artery disease  3.  End-stage renal disease, on hemodialysis   4.  Metastatic squamous cell carcinoma of the lung  5.  Anemia of chronic disease  6.  Metastatic lytic lesion of left lower extremity    Plan:  -I will try her on some very low-dose metoprolol in addition to the amiodarone drip for rate control.  I did give her 1 dose of  digoxin intravenously yesterday, although I would watch this closely with hemodialysis.    -She is on midodrine 3 times a day for hypotension    -Started heparin drip for anticoagulation yesterday given the atrial fibrillation and active metastatic cancer (hypercoagulable state).    -Orthopedics has seen her with regards to the lytic bone lesion.  If needed, the heparin drip can be stopped to perform this.    -She is evidently scheduled to meet with hospice tomorrow.  I really feel this is her best option as her multiple medical problems are clearly impacting the quality of her life severely.    Rachid Luz MD  10/06/19  7:44 PM

## 2019-10-06 NOTE — PLAN OF CARE
Problem: Patient Care Overview  Goal: Plan of Care Review  Outcome: Ongoing (interventions implemented as appropriate)   10/06/19 0554   Coping/Psychosocial   Plan of Care Reviewed With patient   Plan of Care Review   Progress no change   OTHER   Outcome Summary B/P STABLE. REMAINS IN AFIB HR , ASYMPTOMATIC. REMAINS ON AMIODARONE DRIP,  AND IV HEPARIN DRIP WAS STARTED, RESTED WELL OVERNIGHT IN BETWEEN CARE/TURNS.       Problem: Fall Risk (Adult)  Goal: Absence of Fall  Outcome: Ongoing (interventions implemented as appropriate)      Problem: Hemodialysis (Adult)  Goal: Signs and Symptoms of Listed Potential Problems Will be Absent, Minimized or Managed (Hemodialysis)  Outcome: Ongoing (interventions implemented as appropriate)      Problem: Pneumonia (Adult)  Goal: Signs and Symptoms of Listed Potential Problems Will be Absent, Minimized or Managed (Pneumonia)  Outcome: Ongoing (interventions implemented as appropriate)      Problem: Skin Injury Risk (Adult)  Goal: Skin Health and Integrity  Outcome: Ongoing (interventions implemented as appropriate)      Problem: Confusion, Acute (Adult)  Goal: Safety  Outcome: Ongoing (interventions implemented as appropriate)

## 2019-10-07 NOTE — PROGRESS NOTES
Continued Stay Note  [facility]     Patient Name: Melia Almeida  MRN: [id number]  Today's Date: 10/7/2019    Admit Date: 9/15/2019    Discharge Plan     Row Name 10/07/19 1428       Plan    Plan  [facility]- request for palliative to be requested once paperwork completed and returned to [facility]    Plan Comments  Inbound call rec'd from [facility] with [facility].  Advises they faxed over end of life worksheet and still awaiting return fax.  [facility] requested another copy be faxed and provided fax#.  (RNs Shivani or Shania [phone]; extensions [phone]).                  Elma Gallardo RN

## 2019-10-07 NOTE — PLAN OF CARE
Problem: Patient Care Overview  Goal: Plan of Care Review  Outcome: Ongoing (interventions implemented as appropriate)   10/07/19 0944   Coping/Psychosocial   Plan of Care Reviewed With patient   Plan of Care Review   Progress improving   OTHER   Outcome Summary Administered Dilaudid PRN for left leg pain. Continued Heparin and Amiodorone drips. Currently on 3 L NC. Slept through out the day. Pt consulted for Hospice/Pallaitive care and met with nurse from [facility]. Patient telling [facility] she wants to keep dialysis and all of her drips as well as focus on her comfort first. CCp has reached out to Jennifer, awaiting response. Will continue to monitor     Goal: Individualization and Mutuality  Outcome: Ongoing (interventions implemented as appropriate)    Goal: Discharge Needs Assessment  Outcome: Ongoing (interventions implemented as appropriate)    Goal: Interprofessional Rounds/Family Conf  Outcome: Ongoing (interventions implemented as appropriate)      Problem: Fall Risk (Adult)  Goal: Absence of Fall  Outcome: Ongoing (interventions implemented as appropriate)      Problem: Pneumonia (Adult)  Goal: Signs and Symptoms of Listed Potential Problems Will be Absent, Minimized or Managed (Pneumonia)  Outcome: Ongoing (interventions implemented as appropriate)      Problem: Skin Injury Risk (Adult)  Goal: Skin Health and Integrity  Outcome: Ongoing (interventions implemented as appropriate)      Problem: Confusion, Acute (Adult)  Goal: Safety  Outcome: Ongoing (interventions implemented as appropriate)

## 2019-10-07 NOTE — PLAN OF CARE
Problem: Patient Care Overview  Goal: Plan of Care Review  Outcome: Ongoing (interventions implemented as appropriate)   10/07/19 0613   Coping/Psychosocial   Plan of Care Reviewed With patient   Plan of Care Review   Progress declining   OTHER   Outcome Summary VSS. O2 SAT 91- 93% ON RA. REMAINS IN AFIB WITH HR 80-90'S.  RESTED AT LONG INTERVALS IN BETWEEN CARE/TURNS . ONLY C/O'S PAIN WHEN HAVING TO GIVE CARE OR REPOSITIONED. DID GIVE IV DILAUDID AND PERCOCET X 1 ALONG WITH FLEXERIL AS SUGGESTED PER ORTHO MD.       Problem: Hemodialysis (Adult)  Goal: Signs and Symptoms of Listed Potential Problems Will be Absent, Minimized or Managed (Hemodialysis)  Outcome: Outcome(s) achieved Date Met: 10/07/19      Problem: Skin Injury Risk (Adult)  Goal: Skin Health and Integrity  Outcome: Ongoing (interventions implemented as appropriate)      Problem: Confusion, Acute (Adult)  Goal: Safety  Outcome: Ongoing (interventions implemented as appropriate)

## 2019-10-07 NOTE — PROGRESS NOTES
Alejandro Kramer MD     Orthopedic Progress Note    Subjective :   No changes since patient examined overnight.  Pain seems to be a little bit improved after initiating Flexeril for muscle spasms.  Continues to have pain diffusely with any attempted movement but and particularly to the left lower extremity    Objective :    Vital signs in last 24 hours:  Temp:  [97.3 °F (36.3 °C)-97.9 °F (36.6 °C)] 97.3 °F (36.3 °C)  Heart Rate:  [] 80  Resp:  [18-20] 20  BP: ()/(54-89) 114/68  Vitals:    10/06/19 1933 10/06/19 2328 10/06/19 2349 10/07/19 0514   BP: 138/81 114/68     BP Location: Left arm Left arm     Patient Position: Lying Lying     Pulse: 87 83 80    Resp: 20 20 20    Temp: 97.3 °F (36.3 °C) 97.3 °F (36.3 °C)     TempSrc: Oral Oral     SpO2: 94% 93% 94%    Weight:    84.4 kg (186 lb 1.1 oz)   Height:           PHYSICAL EXAM:  Patient is calm, in no acute distress, awake and oriented x 3.  Responds appropriately  The left lower extremity is examined no skin lesions are appreciated there is a prior below-knee amputation with a well-healed stump.  There is deformity of the distal femur.  No skin tenting.  It is diffusely tender to palpation.  The tissues are warm and well-perfused    LABS:  Results from last 7 days   Lab Units 10/07/19  0442   WBC 10*3/mm3 11.94*   HEMOGLOBIN g/dL 11.7*   HEMATOCRIT % 38.8   PLATELETS 10*3/mm3 179     Results from last 7 days   Lab Units 10/07/19  0442   SODIUM mmol/L 125*   POTASSIUM mmol/L 4.8   CHLORIDE mmol/L 80*   CO2 mmol/L 25.2   BUN mg/dL 60*   CREATININE mg/dL 4.68*   GLUCOSE mg/dL 174*   CALCIUM mg/dL 10.1     Results from last 7 days   Lab Units 10/07/19  0442 10/06/19  0701 10/05/19  2316   INR   --   --  1.07   APTT seconds 127.2* 79.5* 28.4       ASSESSMENT:  Left pathologic femoral shaft fracture    Plan:  Fortunately, the patient has seen some improvement in pain control with muscle relaxants.  Operative intervention in the form of an intramedullary nail  could be performed for palliation/pain control however in this patient the operation carries a high risk of morbidity and mortality.    The patient and her power of  are set to meet with hospice and palliative care today.  I do agree with all of the other consulting physicians that this unfortunately is probably the best option for this patient at this point.    From an orthopedic standpoint, I am available to perform the surgery which would be an intramedullary nail in the left femur if that is what they wish to pursue however my opinion would be that palliative care may be more appropriate at this point.    I will discuss the case with 1 of my colleagues Dr. Zavala who has also consulted on the patient previously and see if he can offer his opinion from an orthopedic standpoint as well.    Alejandro Kramer MD    Date: 10/7/2019  Time: 7:29 AM    Alejandro Kramer MD  Orthopaedic Surgeon    [facility]  [phone]

## 2019-10-07 NOTE — PROGRESS NOTES
"    Patient Name: Melia Almeida  :1965  53 y.o.      Patient Care Team:  Taiwo Shankar MD as PCP - General (Internal Medicine)  Paulette Torres MD as Consulting Physician (Radiation Oncology)    Chief Complaint: follow up atrial fibrillation with RVr    Interval History: she remains in atrial fibrillation. HR is in the 90's while I was in the room. She is lying flat and appears to be breathing comfortably.        Objective   Vital Signs  Temp:  [97.3 °F (36.3 °C)-97.7 °F (36.5 °C)] 97.5 °F (36.4 °C)  Heart Rate:  [] 97  Resp:  [18-20] 18  BP: ()/(63-93) 94/63    Intake/Output Summary (Last 24 hours) at 10/7/2019 1411  Last data filed at 10/7/2019 0920  Gross per 24 hour   Intake 120 ml   Output --   Net 120 ml     Flowsheet Rows      First Filed Value   Admission Height  165.1 cm (65\") Documented at 09/15/2019 1603   Admission Weight  113 kg (250 lb) Documented at 09/15/2019 1603          Physical Exam:   General Appearance:    Appears ill   Lungs:     Clear to auscultation.  Normal respiratory effort and rate.      Heart:    irregular rhythm and normal rate, normal S1 and S2, no murmurs, gallops or rubs.     Chest Wall:    No abnormalities observed   Abdomen:     Soft, nontender, positive bowel sounds.     Extremities:   no cyanosis, clubbing or edema.  No marked joint deformities.  Adequate musculoskeletal strength.       Results Review:    Results from last 7 days   Lab Units 10/07/19  0442   SODIUM mmol/L 125*   POTASSIUM mmol/L 4.8   CHLORIDE mmol/L 80*   CO2 mmol/L 25.2   BUN mg/dL 60*   CREATININE mg/dL 4.68*   GLUCOSE mg/dL 174*   CALCIUM mg/dL 10.1         Results from last 7 days   Lab Units 10/07/19  0442   WBC 10*3/mm3 11.94*   HEMOGLOBIN g/dL 11.7*   HEMATOCRIT % 38.8   PLATELETS 10*3/mm3 179     Results from last 7 days   Lab Units 10/07/19  0442 10/06/19  0701 10/05/19  2316   INR   --   --  1.07   APTT seconds 127.2* 79.5* 28.4                       Medication Review: "     acetylcysteine 4 mL Nebulization Q8H - RT   aspirin 81 mg Oral Daily   castor oil-balsam peru  Topical Q12H   dexamethasone 4 mg Oral Q12H   divalproex 125 mg Oral Nightly   escitalopram 10 mg Oral Daily   insulin glargine 10 Units Subcutaneous Nightly   insulin lispro 0-7 Units Subcutaneous 4x Daily With Meals & Nightly   ipratropium-albuterol 3 mL Nebulization Q8H - RT   levothyroxine 75 mcg Oral Q AM   metoprolol tartrate 12.5 mg Oral Q12H   midodrine 10 mg Oral TID AC   pantoprazole 40 mg Oral Q AM   polyethylene glycol 17 g Oral Daily          amiodarone 0.5 mg/min Last Rate: 0.5 mg/min (10/07/19 0642)   heparin (porcine) 17.5 Units/kg/hr Last Rate: 14.5 Units/kg/hr (10/07/19 0639)       Assessment/Plan      1.  Afib RVR - on amiodarone and low dose beta blocker. Low BP has been an issue. She is also on midodrine. She is on a heparin drip for anticoagulation.   2.  CAD -recent stress test on September 12, 2019 without significant ischemia.  Echocardiogram shows somewhat improved function compared to echo in [facility] records from January 2018.  Continue aspirin, not currently on beta-blocker due to hypotension or statin..    3.  Heart failure with recovered ejection fraction -continue beta-blocker and not an ideal candidate for ACE or Aldactone due to end-stage renal disease.   recommendations.  Fairly euvolemic continue dialysis per renal recommendations.  4.  Hyperlipidemia -not currently on statin  5.  Hypertension -hypotensive since yesterday on scheduled midodrine.   6.  Diabetes  7.  End-stage renal disease on hemodialysis  8.  Stage IV lung cancer  9. Pathologic bilateral femur fractures - ortho decided on non operative approach. She was undergoing palliative radiation.      State guardianship has been contacted to approve process for palliative care. We will continue current regimen for now.     LAURA Fuentes  [facility]  10/07/19  2:11 PM

## 2019-10-07 NOTE — PROGRESS NOTES
Continued Stay Note  [facility]     Patient Name: Melia Almeida  MRN: [id number]  Today's Date: 10/7/2019    Admit Date: 9/15/2019    Discharge Plan     Row Name 10/07/19 1223       Plan    Plan Comments  Call placed to [facility] @ [phone] ext. [phone] ad left vm requesting process for palliative care approval.                  Elma Gallardo RN

## 2019-10-07 NOTE — CONSULTS
"Met with patient at bedside. Patient is alert and oriented to self only. Brief explanation of services provided with focus on [facility] Scattered [facility] (HSB) admission. Discussed medications for symptom management, code status, and goals of care.    Patient reports her pain is much better with IV dilaudid. She believes dilaudid is effectively controlling her left leg pain. Extensive discussion regarding dialysis, amiodarone drip, and heparin drip. Patient tells me she would like to continue aggressive measures (including dialysis, amiodarone drip, and heparin drip) while she continues to \"think about it.\" Patient appears medically eligible for HSB admission, but goals of care do not align at this time.     Updated RN and Dr Shankar via phone. Please call with any questions, concerns, or change in patient status. Thank you for allowing us the opportunity to participate in the care of this patient.    Salima Calderon RN  Admission Nurse  [facility]  [phone]  "

## 2019-10-07 NOTE — PROGRESS NOTES
DAILY PROGRESS NOTE  [facility], [facility]    10/7/2019    Patient Identification:  Name: Melia Almeida  Age: 53 y.o.  Sex: female  :  1965  MRN: [id number]           Primary Care Physician: Taiwo Shankar MD    Subjective:    Interval History:    Patient pain is controlled with IV Dilaudid.  Still on heparin as well as amiodarone.  Hemoglobin is stable at 11.7  Appreciate all consultant recommendation  Has completed radiation therapy.  Not quite comprehending that her disease is terminal, despite multiple explanations.  However she agreed to see hospice, will also request palliative team to see her.    ROS:     No nausea, vomiting, diarrhea, constipation, chest pain    Objective:    Scheduled Meds:    acetylcysteine 4 mL Nebulization Q8H - RT   aspirin 81 mg Oral Daily   castor oil-balsam peru  Topical Q12H   dexamethasone 4 mg Oral Q12H   divalproex 125 mg Oral Nightly   escitalopram 10 mg Oral Daily   insulin glargine 10 Units Subcutaneous Nightly   insulin lispro 0-7 Units Subcutaneous 4x Daily With Meals & Nightly   ipratropium-albuterol 3 mL Nebulization Q8H - RT   levothyroxine 75 mcg Oral Q AM   metoprolol tartrate 12.5 mg Oral Q12H   midodrine 10 mg Oral TID AC   pantoprazole 40 mg Oral Q AM   polyethylene glycol 17 g Oral Daily       Continuous Infusions:    amiodarone 0.5 mg/min Last Rate: 0.5 mg/min (10/07/19 0642)   heparin (porcine) 17.5 Units/kg/hr Last Rate: 14.5 Units/kg/hr (10/07/19 0639)       PRN Meds:  •  acetaminophen  •  carboxymethylcellulose sod (PF)  •  cyclobenzaprine  •  dextrose  •  dextrose  •  glucagon (human recombinant)  •  heparin (porcine)  •  HYDROmorphone  •  ondansetron  •  oxyCODONE-acetaminophen    Intake/Output:    Intake/Output Summary (Last 24 hours) at 10/7/2019 0923  Last data filed at 10/6/2019 2114  Gross per 24 hour   Intake 180 ml   Output --   Net 180 ml         Exam:    tMax 24 hrs: Temp (24hrs), Av.5 °F (36.4  "°C), Min:97.3 °F (36.3 °C), Max:97.7 °F (36.5 °C)    Vitals Ranges:   Temp:  [97.3 °F (36.3 °C)-97.7 °F (36.5 °C)] 97.7 °F (36.5 °C)  Heart Rate:  [] 104  Resp:  [18-20] 18  BP: (105-163)/(65-93) 109/93    /93   Pulse 104   Temp 97.7 °F (36.5 °C) (Oral)   Resp 18   Ht 165.1 cm (65\")   Wt 84.4 kg (186 lb 1.1 oz)   LMP  (LMP Unknown)   SpO2 100%   BMI 30.96 kg/m²     General: Having dialysis, sleepy, arousable.  No respiratory distress while on oxygen.    Neck: Supple, symmetrical, trachea midline, no adenopathy;              thyroid:  no enlargement/tenderness/nodules;              no carotid bruit or JVD  Cardiovascular: Normal rate, regular rhythm and intact distal pulses.              Exam reveals no gallop and no friction rub. No murmur heard  Chest wall: No tenderness or deformity  Pulmonary:  Diminished breath sounds bilaterally, rhonchi at bases bilaterally.    Abdominal: Soft. Soft, non-tender, bowel sounds active all four quadrants,     no masses, no hepatomegaly, no splenomegaly.   Extremities: Normal, atraumatic, no cyanosis.  No edema in RLE. S/p L BKA, tender to palpation as well as mild edema in the mid left femur.  Pulses: 2 + symmetric all extremities  Neurological: Patient is alert and oriented to person, place                 CNII-XII intact, normal strength, sensation intact throughout  Skin: Skin color, texture, normal. Turgor is decreased. No rashes or lesions            Data Review:    Results from last 7 days   Lab Units 10/07/19  0442 10/06/19  1420 10/06/19  0701   WBC 10*3/mm3 11.94* 10.09 6.77   HEMOGLOBIN g/dL 11.7* 11.5* 11.2*   HEMATOCRIT % 38.8 37.6 35.6   PLATELETS 10*3/mm3 179 195 174       Results from last 7 days   Lab Units 10/07/19  0442 10/06/19  0701 10/05/19  0517  10/03/19  0456   SODIUM mmol/L 125* 130* 128*   < > 132*   POTASSIUM mmol/L 4.8 3.9 5.1   < > 5.7*   CHLORIDE mmol/L 80* 85* 84*   < > 88*   CO2 mmol/L 25.2 22.4 18.7*   < > 21.4*   BUN mg/dL 60* " 44* 76*   < > 85*   CREATININE mg/dL 4.68* 3.92* 4.92*   < > 4.84*   CALCIUM mg/dL 10.1 9.7 9.7   < > 10.0   BILIRUBIN mg/dL  --   --   --   --  0.4   ALK PHOS U/L  --   --   --   --  331*   ALT (SGPT) U/L  --   --   --   --  40*   AST (SGOT) U/L  --   --   --   --  24   GLUCOSE mg/dL 174* 145* 151*   < > 183*    < > = values in this interval not displayed.     Results from last 7 days   Lab Units 10/05/19  2316   INR  1.07             No results found for: TROPONINT    Microbiology Results (last 10 days)     ** No results found for the last 240 hours. **           Imaging Results (last 7 days)     Procedure Component Value Units Date/Time    XR Femur 2 View Left [[id number]] Collected:  10/06/19 1050     Updated:  10/06/19 1242    Narrative:       2 VIEW PORTABLE LEFT FEMUR     HISTORY: Extensive metastatic bone disease. Recent onset of acute pain  in left femur.     FINDINGS: There is severe osteoporosis with a large lytic lesion just  above the mid shaft of the femur and there is now a pathologic fracture  through the lesion when compared to the study of 09/21/2019. There is  slight medial and posterior displacement of the distal shaft relative to  the proximal shaft as best seen on the lateral view.     This report was finalized on 10/6/2019 12:39 PM by Dr. Jw Lopez M.D.       XR Chest 1 View [[id number]] Collected:  10/01/19 0758     Updated:  10/01/19 1055    Narrative:       CLINICAL HISTORY: 53-year-old female for follow-up of pulmonary  infiltrates and atelectasis.     EXAM: Portable AP erect chest dated 10/01/2019 at 0602 hours.     FINDINGS: When compared to CT chest exam of 09/15/2019 and most recent  chest radiograph, portable AP erect projection of 09/26/2019 at 1138  hours, there is still opacification of the basilar right lung compatible  with previously demonstrated atelectasis and possible superimposed  infiltrates. Coarse markings in the perihilar and lower left lung and  some strandy opacity  in the perihilar left mid lung periphery remain.  Metallic mesh vascular stents in the left upper extremity and right  subclavian distribution remain. Cardiac silhouette remains enlarged.  Sternal wire sutures and CABG markers are again demonstrated. No  pneumothorax or acute change in bony thorax is seen.     CONCLUSION: Allowing for minor technical differences, there is little if  any change from the exam of 09/26/2019 at 1138 hours, with cardiomegaly  and right basilar atelectasis or consolidative changes remaining.     This report was finalized on 10/1/2019 10:52 AM by Dr. Sam Magallanes M.D.               Assessment:        Squamous cell lung cancer (CMS/HCC)    End stage renal disease (CMS/HCC)    Atelectasis of right lung    CAD (coronary artery disease)  Acute respiratory failure with hypoxia  DM  PVD  CAD  HTN  Anemia CKD  Hypothyroidism  Pathologic fracture of the left femur  Atrial fibrillation       Plan:    Pain control  Poor prognosis considering multiple comorbidities as well as metastatic lung cancer.  Will ask hospice to see patient and follow patient at the nursing home.  Will ask palliative team to see patient as well  I have a long conversation again with the patient today regarding her prognosis, metastatic lung cancer, current femur fracture, potentially right hip fracture, the fact that pain and suffering will be more severe and more often.  I have talked to her about palliative care, withdrawal of dialysis, keep her comfortable and let her go in peace.  She did not quite understood that her condition is terminal, however she agreed to do hospice.  Will ask palliative team also to see.    Continue Accu-Cheks and sliding scale insulin as well as Lantus.  Monitor and correct electrolytes  Monitor mental status  Continue home medications  DVT/stress ulcer prophylaxis  Labs in am        Taiwo Shankar MD  10/7/2019  9:23 AM

## 2019-10-07 NOTE — PROGRESS NOTES
NEPHROLOGY PROGRESS NOTE    PATIENT IDENTIFICATION:   Name:  Melia Almeida      MRN:  [id number]     53 y.o.  female             Reason for visit: ESRD    SUBJECTIVE:   Seen and examined.  Complaining of pain.  Nurse is on the bedside.  Denies shortness of air.  OBJECTIVE:  Vitals:    10/06/19 1933 10/06/19 2328 10/06/19 2349 10/07/19 0514   BP: 138/81 114/68     BP Location: Left arm Left arm     Patient Position: Lying Lying     Pulse: 87 83 80    Resp: 20 20 20    Temp: 97.3 °F (36.3 °C) 97.3 °F (36.3 °C)     TempSrc: Oral Oral     SpO2: 94% 93% 94%    Weight:    84.4 kg (186 lb 1.1 oz)   Height:               Body mass index is 30.96 kg/m².    Intake/Output Summary (Last 24 hours) at 10/7/2019 0713  Last data filed at 10/6/2019 2114  Gross per 24 hour   Intake 420 ml   Output --   Net 420 ml         Exam:  GEN:  No distress, appears stated age  EYES:   Anicteric sclera  ENT:    External ears/nose normal, MM are dry  NECK:  No adenopathy, JVP none  LUNGS: Normal chest on inspection; not labored  CV:  Normal S1S2, without murmur  ABD:  Non-tender, non-distended, no hepatosplenomegaly, +BS  EXT:  No edema;    Scheduled meds:    acetylcysteine 4 mL Nebulization Q8H - RT   aspirin 81 mg Oral Daily   castor oil-balsam peru  Topical Q12H   dexamethasone 4 mg Oral Q12H   divalproex 125 mg Oral Nightly   escitalopram 10 mg Oral Daily   insulin glargine 10 Units Subcutaneous Nightly   insulin lispro 0-7 Units Subcutaneous 4x Daily With Meals & Nightly   ipratropium-albuterol 3 mL Nebulization Q8H - RT   levothyroxine 75 mcg Oral Q AM   metoprolol tartrate 12.5 mg Oral Q12H   midodrine 10 mg Oral TID AC   pantoprazole 40 mg Oral Q AM   polyethylene glycol 17 g Oral Daily     IV meds:                        amiodarone 0.5 mg/min Last Rate: 0.5 mg/min (10/07/19 0642)   heparin (porcine) 17.5 Units/kg/hr Last Rate: 14.5 Units/kg/hr (10/07/19 0639)       Data Review:    Results from last 7 days   Lab Units 10/07/19  1719  10/06/19  0701 10/05/19  0517  10/03/19  0456   SODIUM mmol/L 125* 130* 128*   < > 132*   POTASSIUM mmol/L 4.8 3.9 5.1   < > 5.7*   CHLORIDE mmol/L 80* 85* 84*   < > 88*   CO2 mmol/L 25.2 22.4 18.7*   < > 21.4*   BUN mg/dL 60* 44* 76*   < > 85*   CREATININE mg/dL 4.68* 3.92* 4.92*   < > 4.84*   CALCIUM mg/dL 10.1 9.7 9.7   < > 10.0   BILIRUBIN mg/dL  --   --   --   --  0.4   ALK PHOS U/L  --   --   --   --  331*   ALT (SGPT) U/L  --   --   --   --  40*   AST (SGOT) U/L  --   --   --   --  24   GLUCOSE mg/dL 174* 145* 151*   < > 183*    < > = values in this interval not displayed.       Estimated Creatinine Clearance: 14.6 mL/min (A) (by C-G formula based on SCr of 4.68 mg/dL (H)).    Results from last 7 days   Lab Units 10/07/19  0442 10/04/19  0601   PHOSPHORUS mg/dL 8.0* 6.3*       Results from last 7 days   Lab Units 10/07/19  0442 10/06/19  1420 10/06/19  0701 10/05/19  2316 10/05/19  0517   WBC 10*3/mm3 11.94* 10.09 6.77 7.12 6.59   HEMOGLOBIN g/dL 11.7* 11.5* 11.2* 11.7* 11.8*   PLATELETS 10*3/mm3 179 195 174 190 213       Results from last 7 days   Lab Units 10/05/19  2316   INR  1.07             ASSESSMENT:     Squamous cell lung cancer (CMS/HCC)    End stage renal disease (CMS/HCC)    Atelectasis of right lung    CAD (coronary artery disease)        1. ESRD.    Hyponatremia due to water retention with ESRD; stable potassium.  Large positive AG metabolic acidosis.  Volume stable.    2. 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 Palliative XRT.    3. DM2  .  4. AFIB/RVR - HR labile  5. PAD  6. Hypotension - due to AF/RVR, labile  7. Hypothyroidism. On Replacement.   8. Anemia CKD.  No BASSEM due to malignancy      Plan    Continue to hold dialysis as the palliative care is very appropriate.  Patient is going to discuss with the palliative care team today  Pain management      Sandy Jackson MD  10/7/2019    7:13 AM

## 2019-10-07 NOTE — PROGRESS NOTES
Adult Nutrition  Assessment/PES    Patient Name:  Melia Almeida  YOB: 1965  MRN: [id number]  Admit Date:  9/15/2019    Assessment Date:  10/7/2019  Nutrition follow up.  Reason for Assessment     Row Name 10/07/19 1144          Reason for Assessment    Reason For Assessment  follow-up protocol         Nutrition/Diet History     Row Name 10/07/19 1144          Nutrition/Diet History    Typical Food/Fluid Intake  minimal po intake-0% of meals; hospice and palliative consulted per MD          Anthropometrics     Row Name 10/07/19 0514          Anthropometrics    Weight  84.4 kg (186 lb 1.1 oz)         Labs/Tests/Procedures/Meds     Row Name 10/07/19 1144          Labs/Procedures/Meds    Lab Results Reviewed  reviewed, pertinent     Lab Results Comments  Glu, Na, Creat, BUN, Phos        Diagnostic Tests/Procedures    Diagnostic Test/Procedure Reviewed  reviewed, pertinent        Medications    Pertinent Medications Reviewed  reviewed, pertinent             Nutrition Prescription Ordered     Row Name 10/07/19 1144          Nutrition Prescription PO    Common Modifiers  Consistent Carbohydrate         Problem/Interventions:    Intervention Goal     Row Name 10/07/19 1145          Intervention Goal    General  Maintain nutrition;Meet nutritional needs for age/condition     PO  Tolerate PO     Weight  Maintain weight         Nutrition Intervention     Row Name 10/07/19 1147          Nutrition Intervention    RD/Tech Action  Follow Tx progress;Care plan reviewd           Education/Evaluation     Row Name 10/07/19 1147          Education    Education  Will Instruct as appropriate        Monitor/Evaluation    Monitor  Per protocol           Electronically signed by:  Glenna Lovett RD  10/07/19 11:48 AM

## 2019-10-08 NOTE — PROGRESS NOTES
"   LOS: 23 days    Patient Care Team:  Taiwo Shankar MD as PCP - General (Internal Medicine)  Paulette Torres MD as Consulting Physician (Radiation Oncology)    Chief Complaint:    Chief Complaint   Patient presents with   • Shortness of Breath     Follow UP ESRD  Subjective     Interval History:   Slept ok.  No pain this am.  Nauseated, not vomiting. Not hungry.  Not soa. Wants to continue dialysis.    Review of Systems:   See above.     Objective     Vital Signs  Temp:  [97.4 °F (36.3 °C)-98.4 °F (36.9 °C)] 97.4 °F (36.3 °C)  Heart Rate:  [] 105  Resp:  [16-20] 20  BP: ()/(51-93) 99/72    Flowsheet Rows      First Filed Value   Admission Height  165.1 cm (65\") Documented at 09/15/2019 1603   Admission Weight  113 kg (250 lb) Documented at 09/15/2019 1603          No intake/output data recorded.  I/O last 3 completed shifts:  In: 220 [P.O.:220]  Out: -     Intake/Output Summary (Last 24 hours) at 10/8/2019 0846  Last data filed at 10/7/2019 2159  Gross per 24 hour   Intake 100 ml   Output --   Net 100 ml       Physical Exam:  Chronically ill.  Awakens easily.  Verbally answers questions .  Pupils unequal, Right pupil 4mm, left 2mm. Lateral deviation right eye.   Neck no jvd  Heart Irreg, irreg.  no s3 or rub  Lungs coarse bs, exp wheezes RML, RLL  Abd +bs soft, nontender. Obese  Ext Left BKA, No edema.  RUE AVF thrill, bruit.   Skin pale, no rash.      Results Review:    Results from last 7 days   Lab Units 10/08/19  0505 10/07/19  0442 10/06/19  0701  10/03/19  0456   SODIUM mmol/L 127* 125* 130*   < > 132*   POTASSIUM mmol/L 5.4* 4.8 3.9   < > 5.7*   CHLORIDE mmol/L 81* 80* 85*   < > 88*   CO2 mmol/L 21.6* 25.2 22.4   < > 21.4*   BUN mg/dL 80* 60* 44*   < > 85*   CREATININE mg/dL 5.55* 4.68* 3.92*   < > 4.84*   CALCIUM mg/dL 9.7 10.1 9.7   < > 10.0   BILIRUBIN mg/dL  --   --   --   --  0.4   ALK PHOS U/L  --   --   --   --  331*   ALT (SGPT) U/L  --   --   --   --  40*   AST (SGOT) U/L  --   " --   --   --  24   GLUCOSE mg/dL 158* 174* 145*   < > 183*    < > = values in this interval not displayed.       Estimated Creatinine Clearance: 12.3 mL/min (A) (by C-G formula based on SCr of 5.55 mg/dL (H)).    Results from last 7 days   Lab Units 10/08/19  0505 10/07/19  0442 10/04/19  0601   PHOSPHORUS mg/dL 8.0* 8.0* 6.3*             Results from last 7 days   Lab Units 10/08/19  0505 10/07/19  0442 10/06/19  1420 10/06/19  0701 10/05/19  2316   WBC 10*3/mm3 13.13* 11.94* 10.09 6.77 7.12   HEMOGLOBIN g/dL 11.0* 11.7* 11.5* 11.2* 11.7*   PLATELETS 10*3/mm3 165 179 195 174 190       Results from last 7 days   Lab Units 10/05/19  2316   INR  1.07         Imaging Results (last 24 hours)     ** No results found for the last 24 hours. **          acetylcysteine 4 mL Nebulization Q8H - RT   aspirin 81 mg Oral Daily   castor oil-balsam peru  Topical Q12H   dexamethasone 4 mg Oral Q12H   divalproex 125 mg Oral Nightly   escitalopram 10 mg Oral Daily   insulin glargine 10 Units Subcutaneous Nightly   insulin lispro 0-7 Units Subcutaneous 4x Daily With Meals & Nightly   ipratropium-albuterol 3 mL Nebulization Q8H - RT   levothyroxine 75 mcg Oral Q AM   metoprolol tartrate 12.5 mg Oral Q12H   midodrine 10 mg Oral TID AC   pantoprazole 40 mg Oral Q AM   polyethylene glycol 17 g Oral Daily       amiodarone 0.5 mg/min Last Rate: 0.5 mg/min (10/08/19 0616)   heparin (porcine) 17.5 Units/kg/hr Last Rate: 15.5 Units/kg/hr (10/08/19 0610)       Medication Review:   Current Facility-Administered Medications   Medication Dose Route Frequency Provider Last Rate Last Dose   • acetaminophen (TYLENOL) tablet 650 mg  650 mg Oral Q4H Taiwo Garrett MD   650 mg at 09/21/19 0918   • acetylcysteine (MUCOMYST) 20 % nebulizer solution 4 mL  4 mL Nebulization Q8H - RT Alexander Schaefer MD   4 mL at 10/08/19 0808   • amiodarone (NEXTERONE) 360 mg/200 mL (1.8 mg/mL) infusion  0.5 mg/min Intravenous Continuous Tomer Simpson MD 16.67 mL/hr at  10/08/19 0616 0.5 mg/min at 10/08/19 0616   • aspirin EC tablet 81 mg  81 mg Oral Daily Taiwo Shankar MD   81 mg at 10/08/19 0815   • carboxymethylcellulose sod (PF) 1 % eye gel 1 drop  1 drop Both Eyes TID PRN Taiwo Shankar MD       • castor oil-balsam peru (VENELEX) ointment   Topical Q12H Taiwo Shankar MD   5 g at 10/08/19 0815   • cyclobenzaprine (FLEXERIL) tablet 5 mg  5 mg Oral TID PRN Alejandro Kramer MD   5 mg at 10/07/19 0015   • dexamethasone (DECADRON) tablet 4 mg  4 mg Oral Q12H Alexander Schaefer MD   4 mg at 10/08/19 0815   • dextrose (D50W) 25 g/ 50mL Intravenous Solution 25 g  25 g Intravenous Q15 Min PRN Taiwo Shankar MD       • dextrose (GLUTOSE) oral gel 15 g  15 g Oral Q15 Min PRN Taiwo Shankar MD       • divalproex (DEPAKOTE) DR tablet 125 mg  125 mg Oral Nightly Taiwo Shankar MD   125 mg at 10/07/19 2159   • escitalopram (LEXAPRO) tablet 10 mg  10 mg Oral Daily Taiwo Shankar MD   10 mg at 10/08/19 0815   • glucagon (human recombinant) (GLUCAGEN DIAGNOSTIC) injection 1 mg  1 mg Subcutaneous Q15 Min PRN Taiwo Shankar MD       • heparin (porcine) 5000 UNIT/ML injection 3,400-6,900 Units  40-80 Units/kg Intravenous Q6H PRN Rachid Luz MD   3,400 Units at 10/08/19 0608   • heparin 86294 units/250 mL (100 units/mL) in 0.45 % NaCl infusion  17.5 Units/kg/hr Intravenous Titrated Rachid Luz MD 13.28 mL/hr at 10/08/19 0610 15.5 Units/kg/hr at 10/08/19 0610   • HYDROmorphone (DILAUDID) injection 0.5 mg  0.5 mg Intravenous Q2H PRN Taiwo Shankar MD   0.5 mg at 10/08/19 0505   • insulin glargine (LANTUS) injection 10 Units  10 Units Subcutaneous Nightly Taiwo Shankar MD   10 Units at 10/07/19 2200   • insulin lispro (humaLOG) injection 0-7 Units  0-7 Units Subcutaneous 4x Daily With Meals & Nightly Taiwo Shankar MD   3 Units at 10/08/19 0813   • ipratropium-albuterol (DUO-NEB) nebulizer solution 3 mL  3 mL Nebulization Q8H - RT Alexander Schaefer MD   3 mL at  10/08/19 0803   • levothyroxine (SYNTHROID, LEVOTHROID) tablet 75 mcg  75 mcg Oral Q AM Taiwo Shankar MD   75 mcg at 10/08/19 0610   • metoprolol tartrate (LOPRESSOR) tablet 12.5 mg  12.5 mg Oral Q12H Rachid Luz MD   12.5 mg at 10/07/19 2159   • midodrine (PROAMATINE) tablet 10 mg  10 mg Oral TID Ramesh Johnson MD   10 mg at 10/08/19 0813   • ondansetron (ZOFRAN) injection 4 mg  4 mg Intravenous Q4H Taiwo Garrett MD   4 mg at 10/07/19 0845   • oxyCODONE-acetaminophen (PERCOCET)  MG per tablet 1 tablet  1 tablet Oral Q4H PRN Taiwo Shankar MD   1 tablet at 10/07/19 0852   • pantoprazole (PROTONIX) EC tablet 40 mg  40 mg Oral Q AM Taiwo Shankar MD   40 mg at 10/08/19 0610   • polyethylene glycol 3350 powder (packet)  17 g Oral Daily Taiwo Shankar MD   17 g at 10/08/19 0813       Assessment/Plan   1. ESRD.  Dialysis has been held pending discussion with palliative care, however, patient expressed that she wants to continue dialysis.  Will schedule today.   2. 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 Palliative XRT.    3. DM2 bs better  on scheduled insulin.   4. CAD  5. AFib,  On AMiodarone drip and heparin .  6. HTN. BP not tolerating metoprolol dose. On  midodrine .  7. Hypothyroidism. On Replacement.   8. Anemia CKD.  At goal.      MARIA ISABEL Shankar.   Plan to resume dialysis since patient expressed desire to continue.     Shelia Burgess MD  10/08/19  8:46 AM

## 2019-10-08 NOTE — PLAN OF CARE
Problem: Patient Care Overview  Goal: Plan of Care Review  Outcome: Ongoing (interventions implemented as appropriate)   10/08/19 0522   Coping/Psychosocial   Plan of Care Reviewed With patient   Plan of Care Review   Progress no change   OTHER   Outcome Summary B/P LOWER BUT STABLE. HR 90'S-110. O2 SAT STABLE ON 1L/M N/C. SLEEPS WHEN LEFT UNDISTURBED. MAINLY HAS PAIN WHILE REPOSITIONING BUT SUBSIDES WHEN LYING STILL.       Problem: Fall Risk (Adult)  Goal: Absence of Fall  Outcome: Ongoing (interventions implemented as appropriate)      Problem: Hemodialysis (Adult)  Goal: Signs and Symptoms of Listed Potential Problems Will be Absent, Minimized or Managed (Hemodialysis)  Outcome: Ongoing (interventions implemented as appropriate)      Problem: Skin Injury Risk (Adult)  Goal: Skin Health and Integrity  Outcome: Ongoing (interventions implemented as appropriate)      Problem: Confusion, Acute (Adult)  Goal: Safety  Outcome: Ongoing (interventions implemented as appropriate)

## 2019-10-08 NOTE — PROGRESS NOTES
Patient: Melia Almeida  YOB: 1965     Date of Admission: 9/15/2019  2:35 PM Medical Record Number: [id number]     Attending Physician: Taiwo Shankar MD    Pathological fracture left femur shaft    Subjective : No new orthopaedic complaints     Pain Relief: some relief with present medication.     Systemic Complaints: No Complaints  Vitals:    10/08/19 0748 10/08/19 0803 10/08/19 0813 10/08/19 1203   BP:   99/72 107/74   BP Location:       Patient Position:       Pulse:  105 105 98   Resp: 20      Temp: 97.4 °F (36.3 °C)      TempSrc: Oral      SpO2: 97% 96%     Weight:       Height:           Physical Exam: 53 y.o. female    General Appearance:       Alert, cooperative, in no acute distress                  Extremities:  Left leg attempted movements are painful         No clinical sign of DVT        Able to do good movements of digits    Pulses:   Pulses palpable and equal bilaterally           Diagnostic Tests:     Results from last 7 days   Lab Units 10/08/19  0505 10/07/19  0442 10/06/19  1420   WBC 10*3/mm3 13.13* 11.94* 10.09   HEMOGLOBIN g/dL 11.0* 11.7* 11.5*   HEMATOCRIT % 34.5 38.8 37.6   PLATELETS 10*3/mm3 165 179 195     Results from last 7 days   Lab Units 10/08/19  0505 10/07/19  0442 10/06/19  0701   SODIUM mmol/L 127* 125* 130*   POTASSIUM mmol/L 5.4* 4.8 3.9   CHLORIDE mmol/L 81* 80* 85*   CO2 mmol/L 21.6* 25.2 22.4   BUN mg/dL 80* 60* 44*   CREATININE mg/dL 5.55* 4.68* 3.92*   GLUCOSE mg/dL 158* 174* 145*   CALCIUM mg/dL 9.7 10.1 9.7     Results from last 7 days   Lab Units 10/08/19  1206 10/08/19  0505 10/07/19  2101  10/05/19  2316   INR   --   --   --   --  1.07   APTT seconds 87.4* 65.2* 117.7*   < > 28.4    < > = values in this interval not displayed.     No results found for: CRP  No results found for: SEDRATE  No results found for: URICACID  No results found for: CRYSTAL  Microbiology Results (last 10 days)     ** No results found for the last 240 hours. **        No  radiology results for the last 7 days          Current Medications:  Scheduled Meds:  acetylcysteine 4 mL Nebulization Q8H - RT   amiodarone 400 mg Oral Q12H   aspirin 81 mg Oral Daily   castor oil-balsam peru  Topical Q12H   dexamethasone 4 mg Oral Q12H   divalproex 125 mg Oral Nightly   escitalopram 10 mg Oral Daily   insulin glargine 10 Units Subcutaneous Nightly   insulin lispro 0-7 Units Subcutaneous 4x Daily With Meals & Nightly   ipratropium-albuterol 3 mL Nebulization Q8H - RT   levothyroxine 75 mcg Oral Q AM   metoprolol tartrate 12.5 mg Oral Q12H   midodrine 10 mg Oral TID AC   pantoprazole 40 mg Oral Q AM   polyethylene glycol 17 g Oral Daily   warfarin 5 mg Oral Daily     Continuous Infusions:  heparin (porcine) 17.5 Units/kg/hr Last Rate: 15.5 Units/kg/hr (10/08/19 0610)     PRN Meds:.•  acetaminophen  •  carboxymethylcellulose sod (PF)  •  cyclobenzaprine  •  dextrose  •  dextrose  •  glucagon (human recombinant)  •  heparin (porcine)  •  HYDROmorphone  •  ondansetron  •  oxyCODONE-acetaminophen    Assessment: Pathological fracture involving the left femur shaft secondary to skeletal metastasis from lung cancer    Patient Active Problem List   Diagnosis   • End stage renal disease (CMS/HCC)   • End stage kidney disease (CMS/HCC)   • Dependence on renal dialysis (CMS/HCC)   • Problem with dialysis access (CMS/HCC)   • Type 2 diabetes mellitus (CMS/HCC)   • Pneumonia of right lung due to infectious organism   • Squamous cell lung cancer (CMS/HCC)   • Atelectasis of right lung   • CAD (coronary artery disease)       PLAN:   The patient is currently on amiodarone drip and also heparin drip for her atrial fibrillation.    Her dialysis has been on hold but looks like it is to be resumed today.  The patient apparently was seen by palliative care but patient expressed desire to continue all care while deciding further and needed more time.  I did receive a call from Dr. Kramer who saw the patient over the  weekend as I was not available.  He did give the options for surgery for the patient for palliative stabilization of the femur shaft fracture with intramedullary nailing.  I did talk to the patient but I do not think that she is consentable.  She is a high risk secondary to the multiple medical comorbidities and carries high risk for mortality perioperatively.  She is not going to be able to return to any good quality life.  The best recommendation for this patient will be palliative care.  The opinion also has been expressed by multiple other providers in the chart.  Myself/Dr. Kramer will continue to follow based on further decisions by the admitting team.  If the admitting team is unable to get the patient to palliative care then only orthopedics will  Consider taking the patient to the operating room for surgical intervention for the left femur.  I did discuss regarding this plan of care with Dr. Kramer and her nurse Wilberto.    Chuy Zavala MD    Date: 10/8/2019    Time: 12:59 PM

## 2019-10-08 NOTE — PROGRESS NOTES
"    Patient Name: Melia Almeida  :1965  53 y.o.      Patient Care Team:  Taiwo Shankar MD as PCP - General (Internal Medicine)  Paulette Torres MD as Consulting Physician (Radiation Oncology)    Chief Complaint: follow up atrial fibrillation with RVr    Interval History: she is lying flat. She says \"ok\" to pretty much every question. She is feeling \"ok\". Her pain is \"ok\". Her breathing is \"ok\". She wants to continue with current level of care. HD to be restarted today. She remains on heparin and amiodarone drip.        Objective   Vital Signs  Temp:  [97.4 °F (36.3 °C)-98.4 °F (36.9 °C)] 97.4 °F (36.3 °C)  Heart Rate:  [] 105  Resp:  [16-20] 20  BP: ()/(51-72) 99/72    Intake/Output Summary (Last 24 hours) at 10/8/2019 1102  Last data filed at 10/7/2019 2159  Gross per 24 hour   Intake 100 ml   Output --   Net 100 ml     Flowsheet Rows      First Filed Value   Admission Height  165.1 cm (65\") Documented at 09/15/2019 1603   Admission Weight  113 kg (250 lb) Documented at 09/15/2019 1603          Physical Exam:   General Appearance:    Appears ill   Lungs:     Clear to auscultation.  Normal respiratory effort and rate.      Heart:    irregular rhythm and normal rate, normal S1 and S2, no murmurs, gallops or rubs.     Chest Wall:    No abnormalities observed   Abdomen:     Soft, nontender, positive bowel sounds.     Extremities:   no cyanosis, clubbing or edema.  No marked joint deformities.  Adequate musculoskeletal strength.       Results Review:    Results from last 7 days   Lab Units 10/08/19  0505   SODIUM mmol/L 127*   POTASSIUM mmol/L 5.4*   CHLORIDE mmol/L 81*   CO2 mmol/L 21.6*   BUN mg/dL 80*   CREATININE mg/dL 5.55*   GLUCOSE mg/dL 158*   CALCIUM mg/dL 9.7         Results from last 7 days   Lab Units 10/08/19  0505   WBC 10*3/mm3 13.13*   HEMOGLOBIN g/dL 11.0*   HEMATOCRIT % 34.5   PLATELETS 10*3/mm3 165     Results from last 7 days   Lab Units 10/08/19  0505 10/07/19  2106 " 10/07/19  1242  10/05/19  2316   INR   --   --   --   --  1.07   APTT seconds 65.2* 117.7* 61.8*   < > 28.4    < > = values in this interval not displayed.                       Medication Review:     acetylcysteine 4 mL Nebulization Q8H - RT   aspirin 81 mg Oral Daily   castor oil-balsam peru  Topical Q12H   dexamethasone 4 mg Oral Q12H   divalproex 125 mg Oral Nightly   escitalopram 10 mg Oral Daily   insulin glargine 10 Units Subcutaneous Nightly   insulin lispro 0-7 Units Subcutaneous 4x Daily With Meals & Nightly   ipratropium-albuterol 3 mL Nebulization Q8H - RT   levothyroxine 75 mcg Oral Q AM   metoprolol tartrate 12.5 mg Oral Q12H   midodrine 10 mg Oral TID AC   pantoprazole 40 mg Oral Q AM   polyethylene glycol 17 g Oral Daily          amiodarone 0.5 mg/min Last Rate: 0.5 mg/min (10/08/19 0616)   heparin (porcine) 17.5 Units/kg/hr Last Rate: 15.5 Units/kg/hr (10/08/19 0610)       Assessment/Plan      1.  Afib RVR - on amiodarone and low dose beta blocker. Low BP has been an issue. She is also on midodrine. She is on a heparin drip for anticoagulation.   2.  CAD -recent stress test on September 12, 2019 without significant ischemia.  Echocardiogram shows somewhat improved function compared to echo in [facility] records from January 2018.  she is on low dose beta blocker and BP is tolerating.   3.  Heart failure with recovered ejection fraction -continue beta-blocker and not an ideal candidate for ACE or Aldactone due to end-stage renal disease.   recommendations.  Fairly euvolemic continue dialysis per renal recommendations.  4.  Hyperlipidemia -not currently on statin  5.  Hypertension -hypotensive since yesterday on scheduled midodrine.   6.  Diabetes  7.  End-stage renal disease on hemodialysis  8.  Stage IV lung cancer  9. Pathologic bilateral femur fractures - ortho decided on non operative approach. She was undergoing palliative radiation.      Patient has expressed desire to continue current treatment.  Will change amiodarone to oral (for rate control, she remains in AF - BP has not tolerated increased AV simón agents, digoxin not preferred given HD). Will initiate warfarin. Continue heparin drip. Target INR 2-3.     LAURA Fuentes  [facility]  10/08/19  11:02 AM

## 2019-10-08 NOTE — PROGRESS NOTES
DAILY PROGRESS NOTE  [facility], [facility]    10/8/2019    Patient Identification:  Name: Melia Almeida  Age: 53 y.o.  Sex: female  :  1965  MRN: [id number]           Primary Care Physician: Taiwo Shankar MD    Subjective:    Interval History:    Pain is controlled  On heparin as well as amiodarone for A fib  Hemoglobin is stable at 11.0  Has expressed multiple times that she wants to live and continue HD. She has been consistent with this desire.  Evaluation from hospice reviewed      ROS:     No nausea, vomiting, diarrhea, constipation, chest pain    Objective:    Scheduled Meds:    acetylcysteine 4 mL Nebulization Q8H - RT   albumin human 12.5 g Intravenous Once   amiodarone 400 mg Oral Q12H   aspirin 81 mg Oral Daily   castor oil-balsam peru  Topical Q12H   dexamethasone 4 mg Oral Q12H   divalproex 125 mg Oral Nightly   escitalopram 10 mg Oral Daily   insulin glargine 10 Units Subcutaneous Nightly   insulin lispro 0-7 Units Subcutaneous 4x Daily With Meals & Nightly   ipratropium-albuterol 3 mL Nebulization Q8H - RT   levothyroxine 75 mcg Oral Q AM   metoprolol tartrate 12.5 mg Oral Q12H   midodrine 10 mg Oral TID AC   pantoprazole 40 mg Oral Q AM   polyethylene glycol 17 g Oral Daily   warfarin 5 mg Oral Daily       Continuous Infusions:    heparin (porcine) 17.5 Units/kg/hr Last Rate: 15.5 Units/kg/hr (10/08/19 1444)       PRN Meds:  •  acetaminophen  •  carboxymethylcellulose sod (PF)  •  cyclobenzaprine  •  dextrose  •  dextrose  •  glucagon (human recombinant)  •  heparin (porcine)  •  HYDROmorphone  •  ondansetron  •  oxyCODONE-acetaminophen    Intake/Output:    Intake/Output Summary (Last 24 hours) at 10/8/2019 1716  Last data filed at 10/8/2019 1651  Gross per 24 hour   Intake 150 ml   Output --   Net 150 ml         Exam:    tMax 24 hrs: Temp (24hrs), Av.8 °F (36.6 °C), Min:97.4 °F (36.3 °C), Max:98.4 °F (36.9 °C)    Vitals Ranges:   Temp:  [97.4  "°F (36.3 °C)-98.4 °F (36.9 °C)] 97.4 °F (36.3 °C)  Heart Rate:  [] 113  Resp:  [16-20] 20  BP: ()/(51-74) 103/57    /57 (BP Location: Left arm, Patient Position: Lying)   Pulse 113   Temp 97.4 °F (36.3 °C) (Oral)   Resp 20   Ht 165.1 cm (65\")   Wt 84.2 kg (185 lb 10 oz)   LMP  (LMP Unknown)   SpO2 100%   BMI 30.89 kg/m²     General: Awake.  No respiratory distress while on oxygen.    Neck: Supple, symmetrical, trachea midline, no adenopathy;              thyroid:  no enlargement/tenderness/nodules;              no carotid bruit or JVD  Cardiovascular: Normal rate, regular rhythm and intact distal pulses.              Exam reveals no gallop and no friction rub. No murmur heard  Chest wall: No tenderness or deformity  Pulmonary:  Diminished breath sounds bilaterally, rhonchi at bases bilaterally.    Abdominal: Soft. Soft, non-tender, bowel sounds active all four quadrants,     no masses, no hepatomegaly, no splenomegaly.   Extremities: Normal, atraumatic, no cyanosis.  No edema in RLE. S/p L BKA, tender to palpation as well as mild edema in the mid left femur.  Pulses: 2 + symmetric all extremities  Neurological: Patient is alert and oriented to person, place                 CNII-XII intact, normal strength, sensation intact throughout  Skin: Skin color, texture, normal. Turgor is decreased. No rashes or lesions            Data Review:    Results from last 7 days   Lab Units 10/08/19  0505 10/07/19  0442 10/06/19  1420   WBC 10*3/mm3 13.13* 11.94* 10.09   HEMOGLOBIN g/dL 11.0* 11.7* 11.5*   HEMATOCRIT % 34.5 38.8 37.6   PLATELETS 10*3/mm3 165 179 195       Results from last 7 days   Lab Units 10/08/19  0505 10/07/19  0442 10/06/19  0701  10/03/19  0456   SODIUM mmol/L 127* 125* 130*   < > 132*   POTASSIUM mmol/L 5.4* 4.8 3.9   < > 5.7*   CHLORIDE mmol/L 81* 80* 85*   < > 88*   CO2 mmol/L 21.6* 25.2 22.4   < > 21.4*   BUN mg/dL 80* 60* 44*   < > 85*   CREATININE mg/dL 5.55* 4.68* 3.92*   < > " 4.84*   CALCIUM mg/dL 9.7 10.1 9.7   < > 10.0   BILIRUBIN mg/dL  --   --   --   --  0.4   ALK PHOS U/L  --   --   --   --  331*   ALT (SGPT) U/L  --   --   --   --  40*   AST (SGOT) U/L  --   --   --   --  24   GLUCOSE mg/dL 158* 174* 145*   < > 183*    < > = values in this interval not displayed.     Results from last 7 days   Lab Units 10/05/19  2316   INR  1.07             No results found for: TROPONINT    Microbiology Results (last 10 days)     ** No results found for the last 240 hours. **           Imaging Results (last 7 days)     Procedure Component Value Units Date/Time    XR Femur 2 View Left [[id number]] Collected:  10/06/19 1050     Updated:  10/06/19 1242    Narrative:       2 VIEW PORTABLE LEFT FEMUR     HISTORY: Extensive metastatic bone disease. Recent onset of acute pain  in left femur.     FINDINGS: There is severe osteoporosis with a large lytic lesion just  above the mid shaft of the femur and there is now a pathologic fracture  through the lesion when compared to the study of 09/21/2019. There is  slight medial and posterior displacement of the distal shaft relative to  the proximal shaft as best seen on the lateral view.     This report was finalized on 10/6/2019 12:39 PM by Dr. Jw Lopez M.D.               Assessment:        Squamous cell lung cancer (CMS/HCC)    End stage renal disease (CMS/HCC)    Atelectasis of right lung    CAD (coronary artery disease)  Acute respiratory failure with hypoxia  DM  PVD  CAD  HTN  Anemia CKD  Hypothyroidism  Pathologic fracture of the left femur  Atrial fibrillation       Plan:    Pain control  Resume HD, d/s Dr. Ventura, patient desires to continue HD  Poor prognosis considering multiple comorbidities as well as metastatic lung cancer.  Continue Accu-Cheks and sliding scale insulin as well as Lantus.  Patient expressed desire to be alive and continue HD.  Monitor and correct electrolytes  Monitor mental status  Continue home medications  DVT/stress  ulcer prophylaxis  Labs in         Taiwo Shankar MD  10/8/2019

## 2019-10-08 NOTE — PROGRESS NOTES
Continued Stay Note  [facility]     Patient Name: Melia Almeida  MRN: [id number]  Today's Date: 10/8/2019    Admit Date: 9/15/2019    Discharge Plan     Row Name 10/08/19 0941       Plan    Plan Comments  Guardianship office faxed court documents showing full guardianship.  Paperwork is ready to fax to request palliative care once Dr Shankar is ready to submit.  CCP s/w Dr Shankar and updated and he will consider and advise.                  Elma Gallardo RN

## 2019-10-08 NOTE — PROGRESS NOTES
Continued Stay Note  [facility]     Patient Name: Melia Almeida  MRN: [id number]  Today's Date: 10/8/2019    Admit Date: 9/15/2019    Discharge Plan     Row Name 10/08/19 0843       Plan    Plan Comments  VM left for guardian Becca Church @ [phone] requesting court guardianship documents and living will information ([facility] has 2016).  Provided CCP name/number with fax.  CCP to follow-up.                  Elma Gallardo RN

## 2019-10-09 NOTE — PROGRESS NOTES
"CC: Atrial fibrillation with rapid ventricular response    Interval History: No acute events overnight      Vital Signs  Temp:  [97.4 °F (36.3 °C)-98.7 °F (37.1 °C)] 97.9 °F (36.6 °C)  Heart Rate:  [] 99  Resp:  [16-20] 16  BP: ()/(39-74) 104/53    Intake/Output Summary (Last 24 hours) at 10/9/2019 0931  Last data filed at 10/9/2019 0854  Gross per 24 hour   Intake 100 ml   Output 0 ml   Net 100 ml     Flowsheet Rows      First Filed Value   Admission Height  165.1 cm (65\") Documented at 09/15/2019 1603   Admission Weight  113 kg (250 lb) Documented at 09/15/2019 1603          PHYSICAL EXAM:  General: No acute distress,.  Patient looks depressed, lying in bed  Resp:NL Rate, symmetric chest expansion,unlabored, clear  CV:NL rate and rhythm, NL PMI, NL S1 and S2, no Murmur, no gallop, no rub, No JVD.   ABD:Nl sounds, no masses or tenderness, nondistended, no guarding or rebound  Neuro: alert,cooperative and oriented  Extr: Left BKA, dialysis successful left arm      Results Review:    Results from last 7 days   Lab Units 10/09/19  0351   SODIUM mmol/L 131*   POTASSIUM mmol/L 4.1   CHLORIDE mmol/L 84*   CO2 mmol/L 24.7   BUN mg/dL 45*   CREATININE mg/dL 3.43*   GLUCOSE mg/dL 77   CALCIUM mg/dL 8.3*         Results from last 7 days   Lab Units 10/09/19  0351   WBC 10*3/mm3 5.50   HEMOGLOBIN g/dL 8.4*   HEMATOCRIT % 27.2*   PLATELETS 10*3/mm3 148     Results from last 7 days   Lab Units 10/09/19  0351 10/08/19  1206 10/08/19  0505  10/05/19  2316   INR   --   --   --   --  1.07   APTT seconds 56.9* 87.4* 65.2*   < > 28.4    < > = values in this interval not displayed.                 I reviewed the patient's new clinical results.  I personally viewed and interpreted the patient's EKG/Telemetry data, labs reviewed        Medication Review:   Meds reviewed      heparin (porcine) 17.5 Units/kg/hr Last Rate: 19.5 Units/kg/hr (10/09/19 0904)       Assessment/Plan    1.  A. fib with RVR-rhythm strip from telemetry " looked atrial flutter with 4:1  conduction, rate controlled  -Tolerating amiodarone 400 mg p.o. twice daily and metoprolol 12.5 mg p.o. twice daily  -On Coumadin/bridging heparin  -No acute events overnight    2.  ESRD, stage IV lung cancer with spinal pathology bilateral femoral fractures, diabetes  -Treat per other teams      James Dennis MD  10/09/19  9:31 AM

## 2019-10-09 NOTE — PROGRESS NOTES
Continued Stay Note  [facility]     Patient Name: Melia Almeida  MRN: [id number]  Today's Date: 10/9/2019    Admit Date: 9/15/2019    Discharge Plan     Row Name 10/09/19 0948       Plan    Plan  Ankur Murdock - palliative request on hold for signatures.    Patient/Family in Agreement with Plan  unable to assess    Plan Comments  Palliative care request not yet signed or sumitted to state.  If Dr. Shankar would like to pursue please advise and process will be completed by CCP.        Discharge Codes    No documentation.             France Andrea RN

## 2019-10-09 NOTE — PROGRESS NOTES
"   LOS: 24 days    Patient Care Team:  Taiwo Shankar MD as PCP - General (Internal Medicine)  Paulette Torres MD as Consulting Physician (Radiation Oncology)    Chief Complaint:    Chief Complaint   Patient presents with   • Shortness of Breath     Follow UP ESRD  Subjective     Interval History:   Very poor tolerance of dialysis yesterday.  Tachycardic and BP required NS and albumin.  Not eating at all.  Pain in left femur.  Falls asleep during exam.    Review of Systems:   See above.     Objective     Vital Signs  Temp:  [97.4 °F (36.3 °C)-98.7 °F (37.1 °C)] 97.9 °F (36.6 °C)  Heart Rate:  [] 99  Resp:  [16-20] 16  BP: ()/(39-74) 104/53    Flowsheet Rows      First Filed Value   Admission Height  165.1 cm (65\") Documented at 09/15/2019 1603   Admission Weight  113 kg (250 lb) Documented at 09/15/2019 1603          No intake/output data recorded.  I/O last 3 completed shifts:  In: 200 [P.O.:100; IV Piggyback:100]  Out: 0     Intake/Output Summary (Last 24 hours) at 10/9/2019 0935  Last data filed at 10/9/2019 0854  Gross per 24 hour   Intake 100 ml   Output 0 ml   Net 100 ml       Physical Exam:  Chronically ill.  Sleepy, but does say yes and no.  Pupils unequal, Right pupil 4mm, left 2mm.  Lateral deviation left eye.   Neck no jvd  Heart Irreg, irreg.  no s3 or rub  Lungs coarse bs, no wheezes  Abd +bs soft, nontender. Obese  Ext Left BKA, No edema.  RUE AVF thrill, bruit.   Skin pale, no rash.      Results Review:    Results from last 7 days   Lab Units 10/09/19  0351 10/08/19  0505 10/07/19  0442  10/03/19  0456   SODIUM mmol/L 131* 127* 125*   < > 132*   POTASSIUM mmol/L 4.1 5.4* 4.8   < > 5.7*   CHLORIDE mmol/L 84* 81* 80*   < > 88*   CO2 mmol/L 24.7 21.6* 25.2   < > 21.4*   BUN mg/dL 45* 80* 60*   < > 85*   CREATININE mg/dL 3.43* 5.55* 4.68*   < > 4.84*   CALCIUM mg/dL 8.3* 9.7 10.1   < > 10.0   BILIRUBIN mg/dL  --   --   --   --  0.4   ALK PHOS U/L  --   --   --   --  331*   ALT (SGPT) " U/L  --   --   --   --  40*   AST (SGOT) U/L  --   --   --   --  24   GLUCOSE mg/dL 77 158* 174*   < > 183*    < > = values in this interval not displayed.       Estimated Creatinine Clearance: 19.9 mL/min (A) (by C-G formula based on SCr of 3.43 mg/dL (H)).    Results from last 7 days   Lab Units 10/08/19  0505 10/07/19  0442 10/04/19  0601   PHOSPHORUS mg/dL 8.0* 8.0* 6.3*             Results from last 7 days   Lab Units 10/09/19  0351 10/08/19  0505 10/07/19  0442 10/06/19  1420 10/06/19  0701   WBC 10*3/mm3 5.50 13.13* 11.94* 10.09 6.77   HEMOGLOBIN g/dL 8.4* 11.0* 11.7* 11.5* 11.2*   PLATELETS 10*3/mm3 148 165 179 195 174       Results from last 7 days   Lab Units 10/05/19  2316   INR  1.07         Imaging Results (last 24 hours)     ** No results found for the last 24 hours. **          acetylcysteine 4 mL Nebulization Q8H - RT   amiodarone 400 mg Oral Q12H   aspirin 81 mg Oral Daily   castor oil-balsam peru  Topical Q12H   dexamethasone 4 mg Oral Q12H   divalproex 125 mg Oral Nightly   escitalopram 10 mg Oral Daily   insulin glargine 10 Units Subcutaneous Nightly   insulin lispro 0-7 Units Subcutaneous 4x Daily With Meals & Nightly   ipratropium-albuterol 3 mL Nebulization Q8H - RT   levothyroxine 75 mcg Oral Q AM   metoprolol tartrate 12.5 mg Oral Q12H   midodrine 10 mg Oral TID AC   pantoprazole 40 mg Oral Q AM   polyethylene glycol 17 g Oral Daily   warfarin 5 mg Oral Daily       heparin (porcine) 17.5 Units/kg/hr Last Rate: 19.5 Units/kg/hr (10/09/19 0904)       Medication Review:   Current Facility-Administered Medications   Medication Dose Route Frequency Provider Last Rate Last Dose   • acetaminophen (TYLENOL) tablet 650 mg  650 mg Oral Q4H Taiwo Garrett MD   650 mg at 09/21/19 0918   • acetylcysteine (MUCOMYST) 20 % nebulizer solution 4 mL  4 mL Nebulization Q8H - RT Alexander Schaefer MD   4 mL at 10/09/19 0651   • amiodarone (PACERONE) tablet 400 mg  400 mg Oral Q12H Ruthy Jacobs, APRN   400  mg at 10/08/19 2118   • aspirin EC tablet 81 mg  81 mg Oral Daily Taiwo Shankar MD   81 mg at 10/08/19 0815   • carboxymethylcellulose sod (PF) 1 % eye gel 1 drop  1 drop Both Eyes TID PRN Taiwo Shankar MD       • castor oil-balsam peru (VENELEX) ointment   Topical Q12H Taiwo Shankar MD   5 g at 10/08/19 2118   • cyclobenzaprine (FLEXERIL) tablet 5 mg  5 mg Oral TID PRN Alejandro Kramer MD   5 mg at 10/07/19 0015   • dexamethasone (DECADRON) tablet 4 mg  4 mg Oral Q12H Alexander Schaefer MD   4 mg at 10/08/19 2118   • dextrose (D50W) 25 g/ 50mL Intravenous Solution 25 g  25 g Intravenous Q15 Min Taiwo Garrett MD       • dextrose (GLUTOSE) oral gel 15 g  15 g Oral Q15 Min WATSONN Taiwo Shankar MD       • divalproex (DEPAKOTE) DR tablet 125 mg  125 mg Oral Nightly Taiwo Shankar MD   125 mg at 10/08/19 2118   • escitalopram (LEXAPRO) tablet 10 mg  10 mg Oral Daily Taiwo Shankar MD   10 mg at 10/08/19 0815   • glucagon (human recombinant) (GLUCAGEN DIAGNOSTIC) injection 1 mg  1 mg Subcutaneous Q15 Min WATSONN Taiwo Shankar MD       • heparin (porcine) 5000 UNIT/ML injection 3,400-6,900 Units  40-80 Units/kg Intravenous Q6H PRN Rachid Luz MD   6,800 Units at 10/09/19 0612   • heparin 53799 units/250 mL (100 units/mL) in 0.45 % NaCl infusion  17.5 Units/kg/hr Intravenous Titrated Rachid Luz MD 16.71 mL/hr at 10/09/19 0904 19.5 Units/kg/hr at 10/09/19 0904   • HYDROmorphone (DILAUDID) injection 0.5 mg  0.5 mg Intravenous Q2H PRN Taiwo Shankar MD   0.5 mg at 10/08/19 0943   • insulin glargine (LANTUS) injection 10 Units  10 Units Subcutaneous Nightly Taiwo Shankar MD   10 Units at 10/08/19 2150   • insulin lispro (humaLOG) injection 0-7 Units  0-7 Units Subcutaneous 4x Daily With Meals & Nightly Taiwo Shankar MD   2 Units at 10/08/19 2150   • ipratropium-albuterol (DUO-NEB) nebulizer solution 3 mL  3 mL Nebulization Q8H - RT Alexander Schaefer MD   3 mL at 10/09/19 0651   •  levothyroxine (SYNTHROID, LEVOTHROID) tablet 75 mcg  75 mcg Oral Q AM Taiwo Shankar MD   75 mcg at 10/09/19 0611   • metoprolol tartrate (LOPRESSOR) tablet 12.5 mg  12.5 mg Oral Q12H Shelia Burgess MD   12.5 mg at 10/08/19 0813   • midodrine (PROAMATINE) tablet 10 mg  10 mg Oral TID Ramesh Johnson MD   10 mg at 10/09/19 0633   • ondansetron (ZOFRAN) injection 4 mg  4 mg Intravenous Q4H PRN Taiwo Shankar MD   4 mg at 10/07/19 0845   • oxyCODONE-acetaminophen (PERCOCET)  MG per tablet 1 tablet  1 tablet Oral Q4H PRN Taiwo Shankar MD   1 tablet at 10/09/19 0611   • pantoprazole (PROTONIX) EC tablet 40 mg  40 mg Oral Q AM Taiwo Shankar MD   40 mg at 10/09/19 0611   • polyethylene glycol 3350 powder (packet)  17 g Oral Daily Taiwo Shankar MD   17 g at 10/08/19 0813   • warfarin (COUMADIN) tablet 5 mg  5 mg Oral Daily Ruthy Jacobs APRN   5 mg at 10/08/19 2118       Assessment/Plan   1. ESRD.  Dialysis has been held pending discussion with palliative care, however, patient expressed that she wants to continue dialysis.  Dialysis yesterday with very poor hemodynamic tolerance.  Tachycardic and BP less than 90 requiring saline and albumin. I am not sure that she will tolerate further dialysis.   2. 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 Palliative XRT.    3. DM2 bs better  on scheduled insulin.   4. CAD  5. AFib,  On AMiodarone po, heparin drip.   6. HTN. BP not tolerating metoprolol dose. On  midodrine .  7. Hypothyroidism. On Replacement.   8. Anemia CKD. Hg down today.   Recheck STAT since on heparin.     Not taking anything po.     Shelia Burgess MD  10/09/19  9:35 AM

## 2019-10-09 NOTE — PLAN OF CARE
Problem: Patient Care Overview  Goal: Plan of Care Review  Outcome: Ongoing (interventions implemented as appropriate)   10/09/19 9994   Coping/Psychosocial   Plan of Care Reviewed With patient   Plan of Care Review   Progress declining   OTHER   Outcome Summary Remains in a flutter, taking Heparin gtt & warfarin. Refuses to eat; drinks very little. Sleeps a lot. Pain in R eye and L femur when moved.        Problem: Fall Risk (Adult)  Goal: Absence of Fall  Outcome: Ongoing (interventions implemented as appropriate)      Problem: Hemodialysis (Adult)  Goal: Signs and Symptoms of Listed Potential Problems Will be Absent, Minimized or Managed (Hemodialysis)  Outcome: Ongoing (interventions implemented as appropriate)      Problem: Pneumonia (Adult)  Goal: Signs and Symptoms of Listed Potential Problems Will be Absent, Minimized or Managed (Pneumonia)  Outcome: Ongoing (interventions implemented as appropriate)      Problem: Skin Injury Risk (Adult)  Goal: Skin Health and Integrity  Outcome: Ongoing (interventions implemented as appropriate)      Problem: Confusion, Acute (Adult)  Goal: Safety  Outcome: Ongoing (interventions implemented as appropriate)

## 2019-10-09 NOTE — PROGRESS NOTES
DAILY PROGRESS NOTE  [facility], [facility]    10/9/2019    Patient Identification:  Name: Melia Almeida  Age: 53 y.o.  Sex: female  :  1965  MRN: [id number]           Primary Care Physician: Taiwo Shankar MD    Subjective:    Interval History:    Pain is controlled with mainly po medication  On heparin as well as amiodarone for A fib. Coumadin started today  Hemoglobin dropped today to 8.4, with no gross bleeding  Has expressed multiple times that she wants to live and continue HD. She has been consistent with this desire.  Evaluation from hospice reviewed      ROS:     No nausea, vomiting, diarrhea, constipation, chest pain    Objective:    Scheduled Meds:    acetylcysteine 4 mL Nebulization Q8H - RT   amiodarone 400 mg Oral Q12H   aspirin 81 mg Oral Daily   castor oil-balsam peru  Topical Q12H   dexamethasone 4 mg Oral Q12H   divalproex 125 mg Oral Nightly   escitalopram 10 mg Oral Daily   insulin glargine 10 Units Subcutaneous Nightly   insulin lispro 0-7 Units Subcutaneous 4x Daily With Meals & Nightly   ipratropium-albuterol 3 mL Nebulization Q8H - RT   levothyroxine 75 mcg Oral Q AM   metoprolol tartrate 12.5 mg Oral Q12H   midodrine 10 mg Oral TID AC   pantoprazole 40 mg Oral Q AM   polyethylene glycol 17 g Oral Daily   warfarin 5 mg Oral Daily       Continuous Infusions:    heparin (porcine) 17.5 Units/kg/hr Last Rate: 19.5 Units/kg/hr (10/09/19 0904)       PRN Meds:  •  acetaminophen  •  carboxymethylcellulose sod (PF)  •  cyclobenzaprine  •  dextrose  •  dextrose  •  glucagon (human recombinant)  •  heparin (porcine)  •  HYDROmorphone  •  ondansetron  •  oxyCODONE-acetaminophen    Intake/Output:    Intake/Output Summary (Last 24 hours) at 10/9/2019 1023  Last data filed at 10/9/2019 1000  Gross per 24 hour   Intake 200 ml   Output 0 ml   Net 200 ml         Exam:    tMax 24 hrs: Temp (24hrs), Av.1 °F (36.7 °C), Min:97.4 °F (36.3 °C), Max:98.7 °F (37.1  "°C)    Vitals Ranges:   Temp:  [97.4 °F (36.3 °C)-98.7 °F (37.1 °C)] 97.9 °F (36.6 °C)  Heart Rate:  [] 99  Resp:  [16-20] 16  BP: ()/(39-74) 104/53    /53 (BP Location: Left arm, Patient Position: Lying)   Pulse 99   Temp 97.9 °F (36.6 °C) (Oral)   Resp 16   Ht 165.1 cm (65\")   Wt 84.6 kg (186 lb 9.6 oz)   LMP  (LMP Unknown)   SpO2 96%   BMI 31.05 kg/m²     General: Awake.  No respiratory distress while on oxygen.    Neck: Supple, symmetrical, trachea midline, no adenopathy;              thyroid:  no enlargement/tenderness/nodules;              no carotid bruit or JVD  Cardiovascular: Normal rate, regular rhythm and intact distal pulses. Exam reveals no gallop and no friction rub. No murmur heard  Chest wall: No tenderness or deformity  Pulmonary:  Diminished breath sounds bilaterally, rhonchi at bases bilaterally.    Abdominal: Soft. Soft, non-tender, bowel sounds active all four quadrants,     no masses, no hepatomegaly, no splenomegaly.   Extremities: Normal, atraumatic, no cyanosis.  No edema in RLE. S/p L BKA, tender to palpation as well as mild edema in the mid left femur.  Pulses: 2 + symmetric all extremities  Neurological: Patient is alert and oriented to person, place                 CNII-XII intact, normal strength, sensation intact throughout  Skin: Skin color, texture, normal. Turgor is decreased. No rashes or lesions            Data Review:    Results from last 7 days   Lab Units 10/09/19  0351 10/08/19  0505 10/07/19  0442   WBC 10*3/mm3 5.50 13.13* 11.94*   HEMOGLOBIN g/dL 8.4* 11.0* 11.7*   HEMATOCRIT % 27.2* 34.5 38.8   PLATELETS 10*3/mm3 148 165 179       Results from last 7 days   Lab Units 10/09/19  0351 10/08/19  0505 10/07/19  0442  10/03/19  0456   SODIUM mmol/L 131* 127* 125*   < > 132*   POTASSIUM mmol/L 4.1 5.4* 4.8   < > 5.7*   CHLORIDE mmol/L 84* 81* 80*   < > 88*   CO2 mmol/L 24.7 21.6* 25.2   < > 21.4*   BUN mg/dL 45* 80* 60*   < > 85*   CREATININE mg/dL " 3.43* 5.55* 4.68*   < > 4.84*   CALCIUM mg/dL 8.3* 9.7 10.1   < > 10.0   BILIRUBIN mg/dL  --   --   --   --  0.4   ALK PHOS U/L  --   --   --   --  331*   ALT (SGPT) U/L  --   --   --   --  40*   AST (SGOT) U/L  --   --   --   --  24   GLUCOSE mg/dL 77 158* 174*   < > 183*    < > = values in this interval not displayed.     Results from last 7 days   Lab Units 10/05/19  2316   INR  1.07             No results found for: TROPONINT    Microbiology Results (last 10 days)     ** No results found for the last 240 hours. **           Imaging Results (last 7 days)     Procedure Component Value Units Date/Time    XR Femur 2 View Left [[id number]] Collected:  10/06/19 1050     Updated:  10/06/19 1242    Narrative:       2 VIEW PORTABLE LEFT FEMUR     HISTORY: Extensive metastatic bone disease. Recent onset of acute pain  in left femur.     FINDINGS: There is severe osteoporosis with a large lytic lesion just  above the mid shaft of the femur and there is now a pathologic fracture  through the lesion when compared to the study of 09/21/2019. There is  slight medial and posterior displacement of the distal shaft relative to  the proximal shaft as best seen on the lateral view.     This report was finalized on 10/6/2019 12:39 PM by Dr. Jw Lopez M.D.               Assessment:        Squamous cell lung cancer (CMS/HCC)    End stage renal disease (CMS/HCC)    Atelectasis of right lung    CAD (coronary artery disease)  Metastatic lung cancer  Acute respiratory failure with hypoxia  DM  PVD  CAD  HTN  Anemia CKD  Hypothyroidism  Pathologic fracture of the left femur  Atrial fibrillation       Plan:    Pain control  HD,  patient desires to continue HD  Poor prognosis considering multiple comorbidities as well as metastatic lung cancer.  Continue Accu-Cheks and sliding scale insulin as well as Lantus.  Patient expressed desire to be alive and continue HD.  Monitor and correct electrolytes  Monitor mental status  Continue home  medications  DVT/stress ulcer prophylaxis  Labs in am        Taiwo Shankar MD  10/9/2019

## 2019-10-10 NOTE — PROGRESS NOTES
Pharmacy Consult: Warfarin Dosing/ Monitoring    Melia Almeida is a 54 y.o. female, estimated creatinine clearance is 15.5 mL/min (A) (by C-G formula based on SCr of 4.35 mg/dL (H)). weighing 84.8 kg (187 lb).     has a past medical history of Acute renal failure on dialysis (CMS/[facility]), Adult failure to thrive, Chronic kidney disease (CKD), stage V (CMS/[facility]), Coronary artery disease, Diabetes mellitus (CMS/[facility]), Diabetes mellitus with chronic kidney disease (CMS/[facility]), Disease of thyroid gland, GERD (gastroesophageal reflux disease), History of anemia, History of UTI, Hyperlipidemia, Hypertension, Idiopathic neuropathy, Major depressive disorder, recurrent episode, severe, with psychotic behavior (CMS/[facility]), Metabolic encephalopathy, Obesity, Osteomyelitis of right foot (CMS/[facility]), Other symbolic dysfunctions, Peripheral vascular disease, unspecified (CMS/[facility]), and Personal history of diabetic foot ulcer.    Social History     Tobacco Use   • Smoking status: Current Every Day Smoker     Types: Cigarettes   • Smokeless tobacco: Never Used   • Tobacco comment: 4 CIG./PER DAY   Substance Use Topics   • Alcohol use: No   • Drug use: No       Results from last 7 days   Lab Units 10/10/19  1201 10/10/19  0541 10/09/19  2052 10/09/19  1159 10/09/19  1037 10/09/19  0351 10/08/19  1206 10/08/19  0505  10/07/19  0442 10/06/19  1420 10/06/19  0701 10/05/19  2316   INR  1.08  --   --   --   --   --   --   --   --   --   --   --  1.07   APTT seconds 88.0* 72.8* 62.6* 137.7*  --  56.9* 87.4* 65.2*   < > 127.2*  --  79.5* 28.4   HEMOGLOBIN g/dL  --  8.8*  --   --  9.0* 8.4*  --  11.0*  --  11.7* 11.5* 11.2* 11.7*   HEMATOCRIT %  --  27.6*  --   --  29.2* 27.2*  --  34.5  --  38.8 37.6 35.6 38.3   PLATELETS 10*3/mm3  --  110*  --   --  153 148  --  165  --  179 195 174 190    < > = values in this interval not displayed.     Results from last 7 days   Lab Units 10/10/19  0541 10/09/19  0351 10/08/19  0505   SODIUM mmol/L 133* 131* 127*    POTASSIUM mmol/L 5.6* 4.1 5.4*   CHLORIDE mmol/L 86* 84* 81*   CO2 mmol/L 22.1 24.7 21.6*   BUN mg/dL 67* 45* 80*   CREATININE mg/dL 4.35* 3.43* 5.55*   CALCIUM mg/dL 9.1 8.3* 9.7   GLUCOSE mg/dL 73 77 158*     Anticoagulation history:   Warfarin consult - on amiodarone (new start inpatient). Warfarin 5 mg PO daily was started on 10/8.     Hospital Anticoagulation:  Consulting provider: LAURA Bach  Start date: 10/10/19  Indication: Atrial fibrillation  Target INR: 2-3  Expected duration: chronic  Bridge Therapy: Yes    Date 10/8 10/9 10/10          INR   1.08          Warfarin dose 5 mg 5 mg 7.5 mg            Potential drug interactions:   Amiodarone - may result in increased INR and an increased risk of bleeding.  Aspirin - may result in increased risk of bleeding.  Dexamethasone - may result in increased risk of bleeding or diminished effects of warfarin.  Escitalopram - may result in an increased risk of bleeding.  Glucagon - may result in an increased risk of bleeding.  Heparin - may result in increased risk of bleeding.  Pantoprazole - may result in increased International Normalized Ratio (INR) and prothrombin time.      Relevant nutrition status: Nutrition supplements TID    Other:     Education complete: No  Date:     Assessment/Plan:  Dose: Will continue warfarin 5 mg PO daily with an additional 2.5 mg today for a total dose of 7.5 mg.  Monitor: Daily PT/INR  Follow up: 10/11    Pharmacy will continue to follow until discharge or discontinuation of warfarin.   Clifford Poole RPH  10/10/2019

## 2019-10-10 NOTE — PROGRESS NOTES
Alejandro Kramer MD     Orthopedic Progress Note    Subjective :   Patient is resting comfortably this a.m.  There have been extensive discussions regarding transition to palliative care and hospice.  The patient declines.  Hemodialysis has been resumed though she is tolerating it very poorly per review of the notes.    Today, she states her leg is feeling better it is not causing her any pain at the present time.  Per discussion with the nurse she has been refusing pain medication stating that she does not need it.    Objective :    Vital signs in last 24 hours:  Temp:  [97.5 °F (36.4 °C)-97.9 °F (36.6 °C)] 97.5 °F (36.4 °C)  Heart Rate:  [] 82  Resp:  [16-18] 16  BP: (120-143)/(53-75) 125/75  Vitals:    10/10/19 0517 10/10/19 0656 10/10/19 0706 10/10/19 0744   BP:    125/75   BP Location:    Left arm   Patient Position:    Lying   Pulse:  73 82    Resp:  16 16 16   Temp:    97.5 °F (36.4 °C)   TempSrc:    Oral   SpO2:  100% 100%    Weight: 84.8 kg (187 lb)      Height:           PHYSICAL EXAM:  Patient is calm, in no acute distress, awake and oriented x 3.  Responds appropriately  The left lower extremity is examined no skin lesions are appreciated there is a prior below-knee amputation with a well-healed stump.  There is deformity of the distal femur.  No skin tenting.  Mild swelling to the left thigh.  Some tenderness to palpation I did not manipulate the extremity today.    LABS:  Results from last 7 days   Lab Units 10/10/19  0541   WBC 10*3/mm3 5.57   HEMOGLOBIN g/dL 8.8*   HEMATOCRIT % 27.6*   PLATELETS 10*3/mm3 110*     Results from last 7 days   Lab Units 10/10/19  0541   SODIUM mmol/L 133*   POTASSIUM mmol/L 5.6*   CHLORIDE mmol/L 86*   CO2 mmol/L 22.1   BUN mg/dL 67*   CREATININE mg/dL 4.35*   GLUCOSE mg/dL 73   CALCIUM mg/dL 9.1     Results from last 7 days   Lab Units 10/10/19  0541 10/09/19  2052 10/09/19  1159  10/05/19  2316   INR   --   --   --   --  1.07   APTT seconds 72.8* 62.6* 137.7*    < > 28.4    < > = values in this interval not displayed.       ASSESSMENT:  Left pathologic femoral shaft fracture    Plan:  At this time, I would continue to recommend conservative nonoperative management of the left pathologic femur fracture.  She seems to actually be doing quite well is refusing pain medication and stating that her leg is not bothering her that much.    Given the patient's refusal to enter palliative care and hospice, I would be willing to perform surgery in the form of an intramedullary nail for palliative measures and pain control into the left lower extremity however this carries a very high morbidity in this patient and she is currently not experiencing much pain, it continues to improve day by day.  Therefore, I would recommend against operative intervention at this time.    I have also discussed the patient with my colleague, Dr. Zavala who has also evaluated the patient and agrees with the aforementioned treatment plan.    Please call should anything change with the patient began experiencing more pain and wished to proceed with operative intervention.  In order to perform surgery however, we would need to discontinue the Coumadin and reverse INR.    Alejandro Kramer MD    Date: 10/10/2019  Time: 7:53 AM    Alejandro Kramer MD  Orthopaedic Surgeon    [facility]  [phone]

## 2019-10-10 NOTE — PROGRESS NOTES
"   LOS: 25 days    Patient Care Team:  Taiwo Shankar MD as PCP - General (Internal Medicine)  Paulette Torres MD as Consulting Physician (Radiation Oncology)    Chief Complaint:    Chief Complaint   Patient presents with   • Shortness of Breath     Follow UP ESRD  Subjective     Interval History:   More awake and conversant today.  Denies pain in leg.  Ortho eval noted. Bowels moved.  Tolerating XRT.  She says she ate, but RN and aid say she did not.  Congested cough.   Review of Systems:   See above.     Objective     Vital Signs  Temp:  [97.5 °F (36.4 °C)-97.9 °F (36.6 °C)] 97.5 °F (36.4 °C)  Heart Rate:  [] 82  Resp:  [16-18] 16  BP: (120-143)/(53-75) 125/75    Flowsheet Rows      First Filed Value   Admission Height  165.1 cm (65\") Documented at 09/15/2019 1603   Admission Weight  113 kg (250 lb) Documented at 09/15/2019 1603          No intake/output data recorded.  I/O last 3 completed shifts:  In: 200 [P.O.:200]  Out: -     Intake/Output Summary (Last 24 hours) at 10/10/2019 1201  Last data filed at 10/10/2019 0900  Gross per 24 hour   Intake 50 ml   Output --   Net 50 ml       Physical Exam:  More awake.  Answers questions.  Neck no jvd  Heart Irreg, irreg.  no s3 or rub  Lungs coarse bs, Wheezing in lower lobes bilaterally .   Abd +bs soft, nontender. Obese  Ext Left BKA, No edema.  RUE AVF thrill, bruit.   Skin pale, no rash.      Results Review:    Results from last 7 days   Lab Units 10/10/19  0541 10/09/19  0351 10/08/19  0505   SODIUM mmol/L 133* 131* 127*   POTASSIUM mmol/L 5.6* 4.1 5.4*   CHLORIDE mmol/L 86* 84* 81*   CO2 mmol/L 22.1 24.7 21.6*   BUN mg/dL 67* 45* 80*   CREATININE mg/dL 4.35* 3.43* 5.55*   CALCIUM mg/dL 9.1 8.3* 9.7   GLUCOSE mg/dL 73 77 158*       Estimated Creatinine Clearance: 15.5 mL/min (A) (by C-G formula based on SCr of 4.35 mg/dL (H)).    Results from last 7 days   Lab Units 10/08/19  0505 10/07/19  0442 10/04/19  0601   PHOSPHORUS mg/dL 8.0* 8.0* 6.3*         "     Results from last 7 days   Lab Units 10/10/19  0541 10/09/19  1037 10/09/19  0351 10/08/19  0505 10/07/19  0442   WBC 10*3/mm3 5.57 5.53 5.50 13.13* 11.94*   HEMOGLOBIN g/dL 8.8* 9.0* 8.4* 11.0* 11.7*   PLATELETS 10*3/mm3 110* 153 148 165 179       Results from last 7 days   Lab Units 10/05/19  2316   INR  1.07         Imaging Results (last 24 hours)     ** No results found for the last 24 hours. **          acetylcysteine 4 mL Nebulization Q8H - RT   amiodarone 400 mg Oral Q12H   aspirin 81 mg Oral Daily   castor oil-balsam peru  Topical Q12H   dexamethasone 4 mg Oral Q12H   divalproex 125 mg Oral Nightly   escitalopram 10 mg Oral Daily   insulin glargine 10 Units Subcutaneous Nightly   insulin lispro 0-7 Units Subcutaneous 4x Daily With Meals & Nightly   ipratropium-albuterol 3 mL Nebulization Q8H - RT   levothyroxine 75 mcg Oral Q AM   metoprolol tartrate 12.5 mg Oral Q12H   midodrine 10 mg Oral TID AC   pantoprazole 40 mg Oral Q AM   polyethylene glycol 17 g Oral Daily   warfarin 5 mg Oral Daily       heparin (porcine) 17.5 Units/kg/hr Last Rate: 18.5 Units/kg/hr (10/10/19 0428)   Pharmacy to dose warfarin         Medication Review:   Current Facility-Administered Medications   Medication Dose Route Frequency Provider Last Rate Last Dose   • acetaminophen (TYLENOL) tablet 650 mg  650 mg Oral Q4H PRN Taiwo Shankar MD   650 mg at 09/21/19 0918   • acetylcysteine (MUCOMYST) 20 % nebulizer solution 4 mL  4 mL Nebulization Q8H - RT Alexander Schaefer MD   4 mL at 10/10/19 0656   • albumin human 25 % IV SOLN 12.5 g  12.5 g Intravenous PRN Shelia Burgess MD       • amiodarone (PACERONE) tablet 400 mg  400 mg Oral Q12H Ruthy Jacobs APRN   400 mg at 10/10/19 0823   • aspirin EC tablet 81 mg  81 mg Oral Daily Taiwo Shankar MD   81 mg at 10/10/19 0823   • carboxymethylcellulose sod (PF) 1 % eye gel 1 drop  1 drop Both Eyes TID Taiwo Garrett MD       • castor oil-balsam peru (VENELEX) ointment    Topical Q12H Taiwo Shankar MD   5 g at 10/10/19 0823   • cyclobenzaprine (FLEXERIL) tablet 5 mg  5 mg Oral TID PRN Alejandro Kramer MD   5 mg at 10/07/19 0015   • dexamethasone (DECADRON) tablet 4 mg  4 mg Oral Q12H Alexander Schaefer MD   4 mg at 10/10/19 0824   • dextrose (D50W) 25 g/ 50mL Intravenous Solution 25 g  25 g Intravenous Q15 Min PRN Taiwo Shankar MD       • dextrose (GLUTOSE) oral gel 15 g  15 g Oral Q15 Min PRN Taiwo Shankar MD       • divalproex (DEPAKOTE) DR tablet 125 mg  125 mg Oral Nightly Taiwo Shankar MD   125 mg at 10/09/19 2113   • escitalopram (LEXAPRO) tablet 10 mg  10 mg Oral Daily Taiwo Shankar MD   10 mg at 10/10/19 0823   • glucagon (human recombinant) (GLUCAGEN DIAGNOSTIC) injection 1 mg  1 mg Subcutaneous Q15 Min PRN Taiwo Shankar MD       • heparin (porcine) 5000 UNIT/ML injection 3,400-6,900 Units  40-80 Units/kg Intravenous Q6H PRN Rachid Luz MD   3,400 Units at 10/09/19 2308   • heparin 00663 units/250 mL (100 units/mL) in 0.45 % NaCl infusion  17.5 Units/kg/hr Intravenous Titrated Rachid Luz MD 15.85 mL/hr at 10/10/19 0428 18.5 Units/kg/hr at 10/10/19 0428   • HYDROmorphone (DILAUDID) injection 0.5 mg  0.5 mg Intravenous Q2H PRN Taiwo Shankar MD   0.5 mg at 10/09/19 1420   • insulin glargine (LANTUS) injection 10 Units  10 Units Subcutaneous Nightly Taiwo Shankar MD   10 Units at 10/09/19 2114   • insulin lispro (humaLOG) injection 0-7 Units  0-7 Units Subcutaneous 4x Daily With Meals & Nightly Taiwo Shankar MD   2 Units at 10/09/19 1716   • ipratropium-albuterol (DUO-NEB) nebulizer solution 3 mL  3 mL Nebulization Q8H - RT Alexander Schaefer MD   3 mL at 10/10/19 0656   • levothyroxine (SYNTHROID, LEVOTHROID) tablet 75 mcg  75 mcg Oral Q AM Taiwo Shankar MD   75 mcg at 10/10/19 0633   • metoprolol tartrate (LOPRESSOR) tablet 12.5 mg  12.5 mg Oral Q12H Shelia Burgess MD   12.5 mg at 10/10/19 0823   • midodrine (PROAMATINE)  tablet 10 mg  10 mg Oral TID AC Ramesh Mcguire MD   10 mg at 10/10/19 0824   • ondansetron (ZOFRAN) injection 4 mg  4 mg Intravenous Q4H PRN Taiwo Shankar MD   4 mg at 10/07/19 0845   • oxyCODONE-acetaminophen (PERCOCET)  MG per tablet 1 tablet  1 tablet Oral Q4H PRN Taiwo Shankar MD   1 tablet at 10/09/19 0611   • pantoprazole (PROTONIX) EC tablet 40 mg  40 mg Oral Q AM Taiwo Shankar MD   40 mg at 10/10/19 0633   • Pharmacy to dose warfarin   Does not apply Continuous PRN Virgie Lawrence APRN       • polyethylene glycol 3350 powder (packet)  17 g Oral Daily Taiwo Shankar MD   17 g at 10/10/19 0825   • warfarin (COUMADIN) tablet 5 mg  5 mg Oral Daily Ruthy Jacobs APRN   5 mg at 10/09/19 1716       Assessment/Plan   1. ESRD.  Dialysis today.  Holding beta blocker pre HD for bp, but tachycardia was an issue last treatment.  Will see how she tolerates it today.    2. 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 Palliative XRT.    3. DM2 bs better  on scheduled insulin.   4. CAD  5. AFib/flutter,  On Amiodarone po, heparin drip. Discussed with cardiology.  INR pending.  Platelet count dropping on heparin.    6. TCP new.  On heparin .  Check platelet antibody.   7. Hypothyroidism. On Replacement.   8. Anemia CKD. Hg down further today.  No BASSEM due to malignancy.   If it continues to fall, will transfuse.  Hoping to dc heparin drip today pending INR.          Shelia Burgess MD  10/10/19  12:01 PM

## 2019-10-10 NOTE — PLAN OF CARE
Problem: Patient Care Overview  Goal: Plan of Care Review  Outcome: Ongoing (interventions implemented as appropriate)    Goal: Individualization and Mutuality  Outcome: Ongoing (interventions implemented as appropriate)      Problem: Fall Risk (Adult)  Goal: Absence of Fall  Outcome: Ongoing (interventions implemented as appropriate)      Problem: Hemodialysis (Adult)  Goal: Signs and Symptoms of Listed Potential Problems Will be Absent, Minimized or Managed (Hemodialysis)  Outcome: Ongoing (interventions implemented as appropriate)      Problem: Pneumonia (Adult)  Goal: Signs and Symptoms of Listed Potential Problems Will be Absent, Minimized or Managed (Pneumonia)  Outcome: Ongoing (interventions implemented as appropriate)

## 2019-10-10 NOTE — PROGRESS NOTES
DAILY PROGRESS NOTE  [facility], [facility]    10/10/2019    Patient Identification:  Name: Melia Almeida  Age: 54 y.o.  Sex: female  :  1965  MRN: [id number]           Primary Care Physician: Taiwo Shankar MD    Subjective:    Interval History:    Did not tolerate HD yesterday, so to do HD today  O2 OK in 2 L  Pain is controlled with mainly po medication  On heparin as well as amiodarone for A fib. Coumadin started yesterday  Hemoglobin now 8.8  Still as expressing that she wants to live and continue HD. She has been consistent with this desire.        ROS:     No nausea, vomiting, diarrhea, constipation, chest pain    Objective:    Scheduled Meds:    acetylcysteine 4 mL Nebulization Q8H - RT   amiodarone 400 mg Oral Q12H   aspirin 81 mg Oral Daily   castor oil-balsam peru  Topical Q12H   dexamethasone 4 mg Oral Q12H   divalproex 125 mg Oral Nightly   escitalopram 10 mg Oral Daily   insulin glargine 10 Units Subcutaneous Nightly   insulin lispro 0-7 Units Subcutaneous 4x Daily With Meals & Nightly   ipratropium-albuterol 3 mL Nebulization Q8H - RT   levothyroxine 75 mcg Oral Q AM   metoprolol tartrate 12.5 mg Oral Q12H   midodrine 10 mg Oral TID AC   pantoprazole 40 mg Oral Q AM   polyethylene glycol 17 g Oral Daily   warfarin 5 mg Oral Daily       Continuous Infusions:    heparin (porcine) 17.5 Units/kg/hr Last Rate: 18.5 Units/kg/hr (10/10/19 0428)       PRN Meds:  •  acetaminophen  •  albumin human  •  carboxymethylcellulose sod (PF)  •  cyclobenzaprine  •  dextrose  •  dextrose  •  glucagon (human recombinant)  •  heparin (porcine)  •  HYDROmorphone  •  ondansetron  •  oxyCODONE-acetaminophen    Intake/Output:    Intake/Output Summary (Last 24 hours) at 10/10/2019 0859  Last data filed at 10/9/2019 1800  Gross per 24 hour   Intake 150 ml   Output --   Net 150 ml         Exam:    tMax 24 hrs: Temp (24hrs), Av.8 °F (36.6 °C), Min:97.5 °F (36.4 °C), Max:97.9  "°F (36.6 °C)    Vitals Ranges:   Temp:  [97.5 °F (36.4 °C)-97.9 °F (36.6 °C)] 97.5 °F (36.4 °C)  Heart Rate:  [] 82  Resp:  [16-18] 16  BP: (120-143)/(53-75) 125/75    /75 (BP Location: Left arm, Patient Position: Lying)   Pulse 82   Temp 97.5 °F (36.4 °C) (Oral)   Resp 16   Ht 165.1 cm (65\")   Wt 84.8 kg (187 lb)   LMP  (LMP Unknown)   SpO2 100%   BMI 31.12 kg/m²     General: Awake.  No respiratory distress while on oxygen.    Neck: Supple, symmetrical, trachea midline, no adenopathy;              thyroid:  no enlargement/tenderness/nodules;              no carotid bruit or JVD  Cardiovascular: Normal rate, regular rhythm and intact distal pulses. Exam reveals no gallop and no friction rub. No murmur heard  Chest wall: No tenderness or deformity  Pulmonary:  Diminished breath sounds bilaterally, rhonchi at bases bilaterally.    Abdominal: Soft. Soft, non-tender, bowel sounds active all four quadrants,     no masses, no hepatomegaly, no splenomegaly.   Extremities: Normal, atraumatic, no cyanosis.  No edema in RLE. S/p L BKA, tender to palpation as well as mild edema in the mid left femur.  Pulses: 2 + symmetric all extremities  Neurological: Patient is alert and oriented to person, place                 CNII-XII intact, normal strength, sensation intact throughout  Skin: Skin color, texture, normal. Turgor is decreased. No rashes or lesions            Data Review:    Results from last 7 days   Lab Units 10/10/19  0541 10/09/19  1037 10/09/19  0351   WBC 10*3/mm3 5.57 5.53 5.50   HEMOGLOBIN g/dL 8.8* 9.0* 8.4*   HEMATOCRIT % 27.6* 29.2* 27.2*   PLATELETS 10*3/mm3 110* 153 148       Results from last 7 days   Lab Units 10/10/19  0541 10/09/19  0351 10/08/19  0505   SODIUM mmol/L 133* 131* 127*   POTASSIUM mmol/L 5.6* 4.1 5.4*   CHLORIDE mmol/L 86* 84* 81*   CO2 mmol/L 22.1 24.7 21.6*   BUN mg/dL 67* 45* 80*   CREATININE mg/dL 4.35* 3.43* 5.55*   CALCIUM mg/dL 9.1 8.3* 9.7   GLUCOSE mg/dL 73 77 " 158*     Results from last 7 days   Lab Units 10/05/19  2316   INR  1.07             No results found for: TROPONINT    Microbiology Results (last 10 days)     ** No results found for the last 240 hours. **           Imaging Results (last 7 days)     Procedure Component Value Units Date/Time    XR Femur 2 View Left [[id number]] Collected:  10/06/19 1050     Updated:  10/06/19 1242    Narrative:       2 VIEW PORTABLE LEFT FEMUR     HISTORY: Extensive metastatic bone disease. Recent onset of acute pain  in left femur.     FINDINGS: There is severe osteoporosis with a large lytic lesion just  above the mid shaft of the femur and there is now a pathologic fracture  through the lesion when compared to the study of 09/21/2019. There is  slight medial and posterior displacement of the distal shaft relative to  the proximal shaft as best seen on the lateral view.     This report was finalized on 10/6/2019 12:39 PM by Dr. Jw Lopez M.D.               Assessment:        Squamous cell lung cancer (CMS/HCC)    End stage renal disease (CMS/HCC)    Atelectasis of right lung    CAD (coronary artery disease)  Metastatic lung cancer  Acute respiratory failure with hypoxia  DM  PVD  CAD  HTN  Anemia CKD  Hypothyroidism  Pathologic fracture of the left femur  Atrial fibrillation  Thrombocytopenia    Plan:    Pain controlled  HD,  patient desires to continue HD  Poor prognosis considering multiple comorbidities as well as metastatic lung cancer.  Monitor plt, watch for HIT  On coumadin, following INR  Continue Accu-Cheks and sliding scale insulin as well as Lantus.  Patient expressed desire to be alive and continue HD again today  Monitor and correct electrolytes  Monitor mental status  Simplify medications  DVT/stress ulcer prophylaxis  Labs in         Taiwo Shankar MD  10/10/2019

## 2019-10-10 NOTE — PLAN OF CARE
Problem: Patient Care Overview  Goal: Plan of Care Review   10/10/19 6895   Coping/Psychosocial   Plan of Care Reviewed With patient   Plan of Care Review   Progress no change   OTHER   Outcome Summary VSS. Heparin gtt continued. Added Boost drinks to diet. Complained of pain- meds given x1. Dialysis today. Refused radiation. Safety maintained. A-flutter with a controlled rate. Will continue monitor.     Goal: Individualization and Mutuality  Outcome: Ongoing (interventions implemented as appropriate)    Goal: Discharge Needs Assessment  Outcome: Ongoing (interventions implemented as appropriate)    Goal: Interprofessional Rounds/Family Conf  Outcome: Ongoing (interventions implemented as appropriate)      Problem: Fall Risk (Adult)  Goal: Absence of Fall  Outcome: Ongoing (interventions implemented as appropriate)      Problem: Hemodialysis (Adult)  Goal: Signs and Symptoms of Listed Potential Problems Will be Absent, Minimized or Managed (Hemodialysis)  Outcome: Ongoing (interventions implemented as appropriate)      Problem: Pneumonia (Adult)  Goal: Signs and Symptoms of Listed Potential Problems Will be Absent, Minimized or Managed (Pneumonia)  Outcome: Ongoing (interventions implemented as appropriate)      Problem: Skin Injury Risk (Adult)  Goal: Skin Health and Integrity  Outcome: Ongoing (interventions implemented as appropriate)      Problem: Confusion, Acute (Adult)  Goal: Safety  Outcome: Ongoing (interventions implemented as appropriate)

## 2019-10-10 NOTE — PROGRESS NOTES
"    Patient Name: Melia Almeida  :1965  54 y.o.      Patient Care Team:  Taiwo Shankar MD as PCP - General (Internal Medicine)  Paulette Torres MD as Consulting Physician (Radiation Oncology)    Chief Complaint: f/u atrial fib/flutter    Interval History: VSS. Remains in flutter with controlled rate. On warfarin and Heparin. No INRs have been checked in last couple days. No particular complaints. Denies CP, SOB. A little confused. To have HD today.        Objective   Vital Signs  Temp:  [97.5 °F (36.4 °C)-97.9 °F (36.6 °C)] 97.5 °F (36.4 °C)  Heart Rate:  [] 82  Resp:  [16-18] 16  BP: (120-143)/(53-75) 125/75    Intake/Output Summary (Last 24 hours) at 10/10/2019 1140  Last data filed at 10/10/2019 0900  Gross per 24 hour   Intake 50 ml   Output --   Net 50 ml     Flowsheet Rows      First Filed Value   Admission Height  165.1 cm (65\") Documented at 09/15/2019 1603   Admission Weight  113 kg (250 lb) Documented at 09/15/2019 1603          Physical Exam:   General Appearance:    Alert, cooperative, in no acute distress, Confused to month, hospital and situation (oriented to self, year).   Lungs:     Clear to auscultation.  Normal respiratory effort and rate.      Heart:    irregular rhythm and normal rate, normal S1 and S2, no murmurs, gallops or rubs.     Chest Wall:    No abnormalities observed   Abdomen:     Soft, nontender, positive bowel sounds.     Extremities:   no cyanosis, clubbing or significant edema.  Left BKA      Results Review:    Results from last 7 days   Lab Units 10/10/19  0541   SODIUM mmol/L 133*   POTASSIUM mmol/L 5.6*   CHLORIDE mmol/L 86*   CO2 mmol/L 22.1   BUN mg/dL 67*   CREATININE mg/dL 4.35*   GLUCOSE mg/dL 73   CALCIUM mg/dL 9.1         Results from last 7 days   Lab Units 10/10/19  0541   WBC 10*3/mm3 5.57   HEMOGLOBIN g/dL 8.8*   HEMATOCRIT % 27.6*   PLATELETS 10*3/mm3 110*     Results from last 7 days   Lab Units 10/10/19  0541 10/09/19  2052 10/09/19  1159  " 10/05/19  2316   INR   --   --   --   --  1.07   APTT seconds 72.8* 62.6* 137.7*   < > 28.4    < > = values in this interval not displayed.                       Medication Review:     acetylcysteine 4 mL Nebulization Q8H - RT   amiodarone 400 mg Oral Q12H   aspirin 81 mg Oral Daily   castor oil-balsam peru  Topical Q12H   dexamethasone 4 mg Oral Q12H   divalproex 125 mg Oral Nightly   escitalopram 10 mg Oral Daily   insulin glargine 10 Units Subcutaneous Nightly   insulin lispro 0-7 Units Subcutaneous 4x Daily With Meals & Nightly   ipratropium-albuterol 3 mL Nebulization Q8H - RT   levothyroxine 75 mcg Oral Q AM   metoprolol tartrate 12.5 mg Oral Q12H   midodrine 10 mg Oral TID AC   pantoprazole 40 mg Oral Q AM   polyethylene glycol 17 g Oral Daily   warfarin 5 mg Oral Daily          heparin (porcine) 17.5 Units/kg/hr Last Rate: 18.5 Units/kg/hr (10/10/19 0428)       Assessment/Plan     1.  A. Fib/flutter:   -Tolerating amiodarone 400 mg p.o. twice daily and metoprolol 12.5 mg p.o. twice daily with controlled rate.  -On Coumadin/bridging heparin       2.  ESRD: Nephrology following. HD planned today.  3. stage IV lung cancer with spinal pathology bilateral femoral fracture  4.  Diabetes  5. Anemia/thrombocytopenia: Hgb has downtrended. No obvious signs of bleeding. VSS. Continue to monitor closely with anticoagulation. Platelet antibody ordered by nephrology. If her H & H continues to downtrend will have to discuss other options.     -Treatment of other comorbidities per other teams    Stat INR 1.08 this AM. Has received  5 mg Warfarin last 2 days. Will leave on heparin drip for now and Give a total of 7.5 mg Warfarin today.     I have discussed with Dr. Simpson.    LAURA Cee  [facility]  10/10/19  11:40 AM

## 2019-10-11 NOTE — PROGRESS NOTES
Pharmacy Consult: Warfarin Dosing    Melia Almeida is a 54 y.o. female, estimated creatinine clearance is 15.5 mL/min (A) (by C-G formula based on SCr of 4.35 mg/dL (H)). weighing 84.8 kg (187 lb).     has a past medical history of Acute renal failure on dialysis (CMS/[facility]), Adult failure to thrive, Chronic kidney disease (CKD), stage V (CMS/[facility]), Coronary artery disease, Diabetes mellitus (CMS/[facility]), Diabetes mellitus with chronic kidney disease (CMS/[facility]), Disease of thyroid gland, GERD (gastroesophageal reflux disease), History of anemia, History of UTI, Hyperlipidemia, Hypertension, Idiopathic neuropathy, Major depressive disorder, recurrent episode, severe, with psychotic behavior (CMS/[facility]), Metabolic encephalopathy, Obesity, Osteomyelitis of right foot (CMS/[facility]), Other symbolic dysfunctions, Peripheral vascular disease, unspecified (CMS/[facility]), and Personal history of diabetic foot ulcer.    Social History     Tobacco Use   • Smoking status: Current Every Day Smoker     Types: Cigarettes   • Smokeless tobacco: Never Used   • Tobacco comment: 4 CIG./PER DAY   Substance Use Topics   • Alcohol use: No   • Drug use: No       Results from last 7 days   Lab Units 10/10/19  1201 10/10/19  0541 10/09/19  2052 10/09/19  1159 10/09/19  1037 10/09/19  0351 10/08/19  1206 10/08/19  0505  10/07/19  0442 10/06/19  1420 10/06/19  0701 10/05/19  2316   INR  1.08  --   --   --   --   --   --   --   --   --   --   --  1.07   APTT seconds 88.0* 72.8* 62.6* 137.7*  --  56.9* 87.4* 65.2*   < > 127.2*  --  79.5* 28.4   HEMOGLOBIN g/dL  --  8.8*  --   --  9.0* 8.4*  --  11.0*  --  11.7* 11.5* 11.2* 11.7*   HEMATOCRIT %  --  27.6*  --   --  29.2* 27.2*  --  34.5  --  38.8 37.6 35.6 38.3   PLATELETS 10*3/mm3  --  110*  --   --  153 148  --  165  --  179 195 174 190    < > = values in this interval not displayed.     Results from last 7 days   Lab Units 10/10/19  0541 10/09/19  0351 10/08/19  0505   SODIUM mmol/L 133* 131* 127*   POTASSIUM  mmol/L 5.6* 4.1 5.4*   CHLORIDE mmol/L 86* 84* 81*   CO2 mmol/L 22.1 24.7 21.6*   BUN mg/dL 67* 45* 80*   CREATININE mg/dL 4.35* 3.43* 5.55*   CALCIUM mg/dL 9.1 8.3* 9.7   GLUCOSE mg/dL 73 77 158*     Anticoagulation history:   Warfarin consult - on amiodarone (new start inpatient). Warfarin 5 mg PO daily was started on 10/8.     Hospital Anticoagulation:  Consulting provider: LAURA Bach  Start date: 10/10/19  Indication: Atrial fibrillation  Target INR: 2-3  Expected duration: chronic  Bridge Therapy: Yes    Date 10/8 10/9 10/10 10/11         INR   1.08 1.26         Warfarin dose 5 mg 5 mg 7.5 mg 7.5 mg           Potential drug interactions:   Amiodarone - may result in increased INR and an increased risk of bleeding.  Aspirin - may result in increased risk of bleeding.  Dexamethasone - may result in increased risk of bleeding or diminished effects of warfarin.  Escitalopram - may result in an increased risk of bleeding.  Glucagon - may result in an increased risk of bleeding.  Heparin - may result in increased risk of bleeding.  Pantoprazole - may result in increased International Normalized Ratio (INR) and prothrombin time.      Relevant nutrition status: Nutrition supplements TID    Other:     Education complete: No  Date:     Assessment/Plan:  Dose: Will continue warfarin 5 mg PO daily with an additional 2.5 mg today for a total dose of 7.5 mg today; would consider this extra dose x1 more day if pt INR doesn't increase by >0.2 on 10/12.  Monitor: Daily PT/INR  Follow up: 10/12    Pharmacy will continue to follow until discharge or discontinuation of warfarin.     Cristian Waterman, PharmD, BCPS  Clinical Staff Pharmacist  Ext. [phone]

## 2019-10-11 NOTE — PROGRESS NOTES
"   LOS: 26 days    Patient Care Team:  Taiwo Shankar MD as PCP - General (Internal Medicine)  Paulette Torres MD as Consulting Physician (Radiation Oncology)    Chief Complaint:    Chief Complaint   Patient presents with   • Shortness of Breath     Follow UP ESRD  Subjective     Interval History:   More awake and conversant today.  Denies pain in leg.  No chest pain shortness of air, no nausea or vomiting.  Had dialysis yesterday.   Review of Systems:   See above.     Objective     Vital Signs  Temp:  [97.7 °F (36.5 °C)-98.6 °F (37 °C)] 98.6 °F (37 °C)  Heart Rate:  [] 142  Resp:  [16-20] 16  BP: (101-156)/(56-75) 101/63    Flowsheet Rows      First Filed Value   Admission Height  165.1 cm (65\") Documented at 09/15/2019 1603   Admission Weight  113 kg (250 lb) Documented at 09/15/2019 1603          I/O this shift:  In: 120 [P.O.:120]  Out: -   I/O last 3 completed shifts:  In: 460 [P.O.:460]  Out: 1500 [Other:1500]    Intake/Output Summary (Last 24 hours) at 10/11/2019 1008  Last data filed at 10/11/2019 0856  Gross per 24 hour   Intake 580 ml   Output 1500 ml   Net -920 ml       Physical Exam:  General Appearance: Awake, answering question, she had constant motion of his if she is chewing on something and she has nothing in her mouth.  Appears to be chronically  Skin: warm and dry  HEENT: Anicteric sclerae, oral mucosa is normal,   Neck: supple, no JVD, trachea midline  Lungs: CTA, unlabored breathing effort  Heart: Irregularly irregular, no S3, no rub  Abdomen: soft, non-tender,  present bowel sounds to auscultation  : no palpable bladder,  Extremities: No edema, left BKA, right upper extremity AV fistula with thrill and bruit           Results Review:    Results from last 7 days   Lab Units 10/11/19  0343 10/10/19  0541 10/09/19  0351   SODIUM mmol/L 133* 133* 131*   POTASSIUM mmol/L 4.2 5.6* 4.1   CHLORIDE mmol/L 87* 86* 84*   CO2 mmol/L 22.8 22.1 24.7   BUN mg/dL 35* 67* 45*   CREATININE mg/dL " 2.66* 4.35* 3.43*   CALCIUM mg/dL 9.1 9.1 8.3*   GLUCOSE mg/dL 104* 73 77       Estimated Creatinine Clearance: 25.5 mL/min (A) (by C-G formula based on SCr of 2.66 mg/dL (H)).    Results from last 7 days   Lab Units 10/11/19  0343 10/08/19  0505 10/07/19  0442   PHOSPHORUS mg/dL 4.2 8.0* 8.0*             Results from last 7 days   Lab Units 10/11/19  0343 10/10/19  0541 10/09/19  1037 10/09/19  0351 10/08/19  0505   WBC 10*3/mm3 5.71 5.57 5.53 5.50 13.13*   HEMOGLOBIN g/dL 8.5* 8.8* 9.0* 8.4* 11.0*   PLATELETS 10*3/mm3 153 110* 153 148 165       Results from last 7 days   Lab Units 10/11/19  0343 10/10/19  1201 10/05/19  2316   INR  1.26* 1.08 1.07         Imaging Results (last 24 hours)     ** No results found for the last 24 hours. **          acetylcysteine 4 mL Nebulization Q8H - RT   amiodarone 400 mg Oral Q12H   aspirin 81 mg Oral Daily   castor oil-balsam peru  Topical Q12H   dexamethasone 4 mg Oral Q12H   divalproex 125 mg Oral Nightly   escitalopram 10 mg Oral Daily   insulin glargine 10 Units Subcutaneous Nightly   insulin lispro 0-7 Units Subcutaneous 4x Daily With Meals & Nightly   ipratropium-albuterol 3 mL Nebulization Q8H - RT   levothyroxine 75 mcg Oral Q AM   metoprolol tartrate 12.5 mg Oral Q12H   midodrine 10 mg Oral TID AC   pantoprazole 40 mg Oral Q AM   polyethylene glycol 17 g Oral Daily   warfarin 5 mg Oral Daily       heparin (porcine) 17.5 Units/kg/hr Last Rate: 18.5 Units/kg/hr (10/11/19 0828)   Pharmacy to dose warfarin         Medication Review:   Current Facility-Administered Medications   Medication Dose Route Frequency Provider Last Rate Last Dose   • acetaminophen (TYLENOL) tablet 650 mg  650 mg Oral Q4H Taiwo Garrett MD   650 mg at 09/21/19 0918   • acetylcysteine (MUCOMYST) 20 % nebulizer solution 4 mL  4 mL Nebulization Q8H - RT Alexander Schaefer MD   4 mL at 10/11/19 0649   • albumin human 25 % IV SOLN 12.5 g  12.5 g Intravenous PRN Shelia Burgess MD       • amiodarone  (PACERONE) tablet 400 mg  400 mg Oral Q12H Ruthy Jacobs APRN   400 mg at 10/11/19 0829   • aspirin EC tablet 81 mg  81 mg Oral Daily Taiwo Shankar MD   81 mg at 10/11/19 0829   • carboxymethylcellulose sod (PF) 1 % eye gel 1 drop  1 drop Both Eyes TID PRN Taiwo Shankar MD       • castor oil-balsam peru (VENELEX) ointment   Topical Q12H Taiwo Shankar MD   5 g at 10/11/19 0829   • cyclobenzaprine (FLEXERIL) tablet 5 mg  5 mg Oral TID PRN Alejandro Kramer MD   5 mg at 10/07/19 0015   • dexamethasone (DECADRON) tablet 4 mg  4 mg Oral Q12H Alexander Schaefer MD   4 mg at 10/11/19 0829   • dextrose (D50W) 25 g/ 50mL Intravenous Solution 25 g  25 g Intravenous Q15 Min WATSONN Taiwo Shankar MD       • dextrose (GLUTOSE) oral gel 15 g  15 g Oral Q15 Min [place]AT[place]N Taiwo Shankar MD       • divalproex (DEPAKOTE) DR tablet 125 mg  125 mg Oral Nightly Taiwo Shankar MD   125 mg at 10/10/19 2129   • escitalopram (LEXAPRO) tablet 10 mg  10 mg Oral Daily Taiwo Shankar MD   10 mg at 10/11/19 0829   • glucagon (human recombinant) (GLUCAGEN DIAGNOSTIC) injection 1 mg  1 mg Subcutaneous Q15 Min PRN Taiwo Shankar MD       • heparin (porcine) 5000 UNIT/ML injection 3,400-6,900 Units  40-80 Units/kg Intravenous Q6H PRN Rachid Luz MD   3,400 Units at 10/09/19 2308   • heparin 03074 units/250 mL (100 units/mL) in 0.45 % NaCl infusion  17.5 Units/kg/hr Intravenous Titrated Rachid Luz MD 15.85 mL/hr at 10/11/19 0828 18.5 Units/kg/hr at 10/11/19 0828   • HYDROmorphone (DILAUDID) injection 0.5 mg  0.5 mg Intravenous Q2H PRN Taiwo Shankar MD   0.5 mg at 10/11/19 0637   • insulin glargine (LANTUS) injection 10 Units  10 Units Subcutaneous Nightly Taiwo Shankar MD   10 Units at 10/10/19 2134   • insulin lispro (humaLOG) injection 0-7 Units  0-7 Units Subcutaneous 4x Daily With Meals & Nightly Taiwo Shankar MD   2 Units at 10/11/19 0824   • ipratropium-albuterol (DUO-NEB) nebulizer solution 3 mL  3  mL Nebulization Q8H - RT Alexander Schaefer MD   3 mL at 10/11/19 0649   • levothyroxine (SYNTHROID, LEVOTHROID) tablet 75 mcg  75 mcg Oral Q AM Taiwo Shankar MD   75 mcg at 10/11/19 0637   • metoprolol tartrate (LOPRESSOR) tablet 12.5 mg  12.5 mg Oral Q12H Shelia Burgess MD   12.5 mg at 10/11/19 0829   • midodrine (PROAMATINE) tablet 10 mg  10 mg Oral TID AC Ramesh Mcguire MD   10 mg at 10/11/19 0829   • ondansetron (ZOFRAN) injection 4 mg  4 mg Intravenous Q4H PRN Taiwo Shankar MD   4 mg at 10/07/19 0845   • oxyCODONE-acetaminophen (PERCOCET)  MG per tablet 1 tablet  1 tablet Oral Q4H PRN Taiwo Shankar MD   1 tablet at 10/09/19 0611   • pantoprazole (PROTONIX) EC tablet 40 mg  40 mg Oral Q AM Taiwo Shankar MD   40 mg at 10/11/19 0637   • Pharmacy to dose warfarin   Does not apply Continuous PRN Virgie Lawrence APRN       • polyethylene glycol 3350 powder (packet)  17 g Oral Daily Taiwo Shankar MD   17 g at 10/10/19 0825   • warfarin (COUMADIN) tablet 5 mg  5 mg Oral Daily Ruthy Jacobs APRN   5 mg at 10/10/19 2131       Assessment/Plan   1. ESRD.  Allises yesterday without any difficulty   2. 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 Palliative XRT.    3. DM2 bs better  on scheduled insulin.   4. CAD  5. AFib/flutter, treated per cardiology   6.  Thrombocytopenia, platelet yesterday was 110,000 this morning is 153,000  7. Hypothyroidism. On Replacement.   8. Anemia CKD.  Hemoglobin today is 8.5.    Plan:  1.  Continue the same treatment  2.  Hemodialysis tomorrow  3.  Surveillance labs          Adilson Colindres MD  10/11/19  10:08 AM

## 2019-10-11 NOTE — PLAN OF CARE
Problem: Patient Care Overview  Goal: Plan of Care Review  Outcome: Ongoing (interventions implemented as appropriate)   10/11/19 3956   Coping/Psychosocial   Plan of Care Reviewed With patient   Plan of Care Review   Progress no change   OTHER   Outcome Summary Remains in a flutter. HR 140s in am but went back to 80s. Went for radiation but unable to complete due to pain when limbs moved. Reports pain in LLE but refused pain med when offered. Eating/drinking more today than usual.        Problem: Fall Risk (Adult)  Goal: Absence of Fall  Outcome: Ongoing (interventions implemented as appropriate)      Problem: Hemodialysis (Adult)  Goal: Signs and Symptoms of Listed Potential Problems Will be Absent, Minimized or Managed (Hemodialysis)  Outcome: Ongoing (interventions implemented as appropriate)      Problem: Pneumonia (Adult)  Goal: Signs and Symptoms of Listed Potential Problems Will be Absent, Minimized or Managed (Pneumonia)  Outcome: Ongoing (interventions implemented as appropriate)      Problem: Skin Injury Risk (Adult)  Goal: Skin Health and Integrity  Outcome: Ongoing (interventions implemented as appropriate)      Problem: Confusion, Acute (Adult)  Goal: Safety  Outcome: Ongoing (interventions implemented as appropriate)

## 2019-10-11 NOTE — PROGRESS NOTES
DAILY PROGRESS NOTE  [facility], [facility]    10/11/2019    Patient Identification:  Name: Melia Almeida  Age: 54 y.o.  Sex: female  :  1965  MRN: [id number]           Primary Care Physician: Taiwo Shankar MD    Subjective:    Interval History:    HR elevated this am, BP in the low side, on low dose bb and amiodarone  O2 OK in 2 L  Pain is controlled with mainly po medication  On heparin as well as amiodarone for A fib. Coumadin started yesterday  Hemoglobin now 8.8  Still expressing that she wants to live and continue to do HD. She has been consistent with this desire.        ROS:     No nausea, vomiting, diarrhea, constipation, chest pain    Objective:    Scheduled Meds:    acetylcysteine 4 mL Nebulization Q8H - RT   amiodarone 400 mg Oral Q12H   aspirin 81 mg Oral Daily   castor oil-balsam peru  Topical Q12H   dexamethasone 4 mg Oral Q12H   divalproex 125 mg Oral Nightly   escitalopram 10 mg Oral Daily   insulin glargine 10 Units Subcutaneous Nightly   insulin lispro 0-7 Units Subcutaneous 4x Daily With Meals & Nightly   ipratropium-albuterol 3 mL Nebulization Q8H - RT   levothyroxine 75 mcg Oral Q AM   metoprolol tartrate 12.5 mg Oral Q12H   midodrine 10 mg Oral TID AC   pantoprazole 40 mg Oral Q AM   polyethylene glycol 17 g Oral Daily   warfarin 5 mg Oral Daily       Continuous Infusions:    heparin (porcine) 17.5 Units/kg/hr Last Rate: 18.5 Units/kg/hr (10/10/19 2142)   Pharmacy to dose warfarin         PRN Meds:  •  acetaminophen  •  albumin human  •  carboxymethylcellulose sod (PF)  •  cyclobenzaprine  •  dextrose  •  dextrose  •  glucagon (human recombinant)  •  heparin (porcine)  •  HYDROmorphone  •  ondansetron  •  oxyCODONE-acetaminophen  •  Pharmacy to dose warfarin    Intake/Output:    Intake/Output Summary (Last 24 hours) at 10/11/2019 0815  Last data filed at 10/11/2019 0306  Gross per 24 hour   Intake 460 ml   Output 1500 ml   Net -1040 ml  "        Exam:    tMax 24 hrs: Temp (24hrs), Av.2 °F (36.8 °C), Min:97.7 °F (36.5 °C), Max:98.6 °F (37 °C)    Vitals Ranges:   Temp:  [97.7 °F (36.5 °C)-98.6 °F (37 °C)] 98.6 °F (37 °C)  Heart Rate:  [] 124  Resp:  [16-20] 16  BP: (101-156)/(56-75) 101/63    /63 (BP Location: Left arm, Patient Position: Lying)   Pulse (!) 124   Temp 98.6 °F (37 °C) (Oral)   Resp 16   Ht 165.1 cm (65\")   Wt 85.6 kg (188 lb 11.4 oz)   LMP  (LMP Unknown)   SpO2 91%   BMI 31.40 kg/m²     General: Awake.  No respiratory distress while on oxygen.    Neck: Supple, symmetrical, trachea midline, no adenopathy;              thyroid:  no enlargement/tenderness/nodules;              no carotid bruit or JVD  Cardiovascular: Normal rate, regular rhythm and intact distal pulses. Exam reveals no gallop and no friction rub. No murmur heard  Chest wall: No tenderness or deformity  Pulmonary:  Diminished breath sounds bilaterally, rhonchi at bases bilaterally.    Abdominal: Soft. Soft, non-tender, bowel sounds active all four quadrants,     no masses, no hepatomegaly, no splenomegaly.   Extremities: Normal, atraumatic, no cyanosis.  No edema in RLE. S/p L BKA, tender to palpation as well as mild edema in the mid left femur.  Pulses: 2 + symmetric all extremities  Neurological: Patient is alert and oriented to person, place                 CNII-XII intact, normal strength, sensation intact throughout  Skin: Skin color, texture, normal. Turgor is decreased. No rashes or lesions            Data Review:    Results from last 7 days   Lab Units 10/11/19  0343 10/10/19  0541 10/09/19  1037   WBC 10*3/mm3 5.71 5.57 5.53   HEMOGLOBIN g/dL 8.5* 8.8* 9.0*   HEMATOCRIT % 27.9* 27.6* 29.2*   PLATELETS 10*3/mm3 153 110* 153       Results from last 7 days   Lab Units 10/11/19  0343 10/10/19  0541 10/09/19  0351   SODIUM mmol/L 133* 133* 131*   POTASSIUM mmol/L 4.2 5.6* 4.1   CHLORIDE mmol/L 87* 86* 84*   CO2 mmol/L 22.8 22.1 24.7   BUN mg/dL " 35* 67* 45*   CREATININE mg/dL 2.66* 4.35* 3.43*   CALCIUM mg/dL 9.1 9.1 8.3*   GLUCOSE mg/dL 104* 73 77     Results from last 7 days   Lab Units 10/11/19  0343 10/10/19  1201 10/05/19  2316   INR  1.26* 1.08 1.07             No results found for: TROPONINT    Microbiology Results (last 10 days)     ** No results found for the last 240 hours. **           Imaging Results (last 7 days)     Procedure Component Value Units Date/Time    XR Femur 2 View Left [[id number]] Collected:  10/06/19 1050     Updated:  10/06/19 1242    Narrative:       2 VIEW PORTABLE LEFT FEMUR     HISTORY: Extensive metastatic bone disease. Recent onset of acute pain  in left femur.     FINDINGS: There is severe osteoporosis with a large lytic lesion just  above the mid shaft of the femur and there is now a pathologic fracture  through the lesion when compared to the study of 09/21/2019. There is  slight medial and posterior displacement of the distal shaft relative to  the proximal shaft as best seen on the lateral view.     This report was finalized on 10/6/2019 12:39 PM by Dr. Jw Lopez M.D.               Assessment:        Squamous cell lung cancer (CMS/HCC)    End stage renal disease (CMS/HCC)    Atelectasis of right lung    CAD (coronary artery disease)  Metastatic lung cancer  Acute respiratory failure with hypoxia  DM  PVD  CAD  HTN  Anemia CKD  Hypothyroidism  Pathologic fracture of the left femur  Atrial fibrillation  Thrombocytopenia    Plan:    Pain controlled  HD,  patient desires to continue HD  A flutter with RVR:   Poor prognosis considering multiple comorbidities as well as metastatic lung cancer.  Monitor plt, better now  On coumadin, following INR, coumadin being adjusted  Continue Accu-Cheks and sliding scale insulin as well as Lantus.  Patient expressed desire to be alive and continue HD again today  Monitor and correct electrolytes  Monitor mental status  DVT/stress ulcer prophylaxis  Labs in [place] FONSECA  MD Vonnie  10/11/2019

## 2019-10-11 NOTE — PLAN OF CARE
Problem: Patient Care Overview  Goal: Plan of Care Review  Outcome: Ongoing (interventions implemented as appropriate)      Problem: Fall Risk (Adult)  Goal: Absence of Fall  Outcome: Ongoing (interventions implemented as appropriate)      Problem: Hemodialysis (Adult)  Goal: Signs and Symptoms of Listed Potential Problems Will be Absent, Minimized or Managed (Hemodialysis)  Outcome: Ongoing (interventions implemented as appropriate)      Problem: Skin Injury Risk (Adult)  Goal: Skin Health and Integrity  Outcome: Ongoing (interventions implemented as appropriate)

## 2019-10-11 NOTE — PROGRESS NOTES
"   LOS: 26 days   Patient Care Team:  Taiwo Shankar MD as PCP - General (Internal Medicine)  Paulette Torres MD as Consulting Physician (Radiation Oncology)    Chief Complaint: Coronary disease, atrial fibrillation, metastatic lung cancer, end-stage renal disease.       Interval History: No change, no complaints      Objective   Vital Signs  Temp:  [97.7 °F (36.5 °C)-98.6 °F (37 °C)] 98.6 °F (37 °C)  Heart Rate:  [] 142  Resp:  [16-20] 16  BP: (101-156)/(56-75) 101/63    Intake/Output Summary (Last 24 hours) at 10/11/2019 1106  Last data filed at 10/11/2019 0856  Gross per 24 hour   Intake 580 ml   Output 1500 ml   Net -920 ml       Last Weight and Admission Weight        10/11/19  0506   Weight: 85.6 kg (188 lb 11.4 oz)     Flowsheet Rows      First Filed Value   Admission Height  165.1 cm (65\") Documented at 09/15/2019 1603   Admission Weight  113 kg (250 lb) Documented at 09/15/2019 1603                  Physical Exam   Constitutional: She appears well-developed and well-nourished. No distress.   Neck: No JVD present.   Cardiovascular: An irregular rhythm present. Tachycardia present.   Pulmonary/Chest: She has wheezes. She has rhonchi. She has rales.       Results Review:      Results from last 7 days   Lab Units 10/11/19  0343 10/10/19  0541 10/09/19  0351   SODIUM mmol/L 133* 133* 131*   POTASSIUM mmol/L 4.2 5.6* 4.1   CHLORIDE mmol/L 87* 86* 84*   CO2 mmol/L 22.8 22.1 24.7   BUN mg/dL 35* 67* 45*   CREATININE mg/dL 2.66* 4.35* 3.43*   GLUCOSE mg/dL 104* 73 77   CALCIUM mg/dL 9.1 9.1 8.3*         Results from last 7 days   Lab Units 10/11/19  0343 10/10/19  0541 10/09/19  1037   WBC 10*3/mm3 5.71 5.57 5.53   HEMOGLOBIN g/dL 8.5* 8.8* 9.0*   HEMATOCRIT % 27.9* 27.6* 29.2*   PLATELETS 10*3/mm3 153 110* 153     Results from last 7 days   Lab Units 10/11/19  0343 10/10/19  1201 10/10/19  0541  10/05/19  2316   INR  1.26* 1.08  --   --  1.07   APTT seconds 85.2* 88.0* 72.8*   < > 28.4    < > = values " in this interval not displayed.                   I reviewed the patient's new clinical results.  I personally viewed and interpreted the patient's EKG/Telemetry data        Medication Review:     acetylcysteine 4 mL Nebulization Q8H - RT   amiodarone 400 mg Oral Q12H   aspirin 81 mg Oral Daily   castor oil-balsam peru  Topical Q12H   dexamethasone 4 mg Oral Q12H   divalproex 125 mg Oral Nightly   escitalopram 10 mg Oral Daily   insulin glargine 10 Units Subcutaneous Nightly   insulin lispro 0-7 Units Subcutaneous 4x Daily With Meals & Nightly   ipratropium-albuterol 3 mL Nebulization Q8H - RT   levothyroxine 75 mcg Oral Q AM   metoprolol tartrate 12.5 mg Oral Q12H   midodrine 10 mg Oral TID AC   pantoprazole 40 mg Oral Q AM   polyethylene glycol 17 g Oral Daily   warfarin 5 mg Oral Daily         heparin (porcine) 17.5 Units/kg/hr Last Rate: 18.5 Units/kg/hr (10/11/19 0828)   Pharmacy to dose warfarin         Assessment/Plan      She is a patient with multiple medical issues.  She has ischemic cardiomyopathy, lung cancer with multiple bone metastases, end-stage renal disease on hemodialysis, and dementia.  She is a [facility] of the [facility].  She presented with shortness of breath and leg pain and was found to have pathological fractures of the hip.  She was seen in consultation by Dr. Guerrero for preop risk assessment.  Her EF in January 2018 was 45 to 50%.  No further cardiac testing was recommended and she was cleared for her surgery.  Ultimately medical management was recommended and she has not undergone surgery for her bilateral femur fractures.  She is having palliative radiation.  In the setting of acute illness she had atrial fibrillation with RVR.  Hospice care has been recommended by oncology.  State guardianship has been contacted to approve the process for palliative care.      As of 10/8/2019 the patient had expressed desire to continue treatment.  We have had issues with hypotension and for this reason  amiodarone was started for an attempt at rate control rather than beta-blockers or calcium channel blockers.  She is also been given digoxin.  She was started on heparin and also warfarin for stroke prophylaxis.  We have avoided digoxin other than some initial doses that she got IV because of her renal disease.  She is currently tolerating low-dose Lopressor 12.5 mg twice daily and she did have some elevated heart rates last night in the 140s.  In looking back through her vitals, her lowest blood pressure has been 101/63 and this is when her heart rate was in the 120s.  Prior to that with a normal heart rate, her blood pressures have been in the 120s to 150s systolic over the past 48 hours.  I am going to increase her Lopressor to 25 mg twice daily.    She has been getting warfarin 5 mg daily for the past 3 days with really no movement in her INR.  Yesterday they gave her an extra 2.5 mg for a total of 7.5 mg yesterday.  Her INR is now 1.26.  I am going to go ahead and give her an extra 2.5 mg today as well for a total of 7.5 mg today.  She will remain on the heparin drip.  She has no complaints.  She denies any pain or any issues altogether.      Squamous cell lung cancer (CMS/HCC)    End stage renal disease (CMS/HCC)    Atelectasis of right lung    CAD (coronary artery disease)        HEATH Frye  10/11/19  11:06 AM

## 2019-10-12 NOTE — PROGRESS NOTES
"   LOS: 27 days    Patient Care Team:  Taiwo Shankar MD as PCP - General (Internal Medicine)  Paulette Torres MD as Consulting Physician (Radiation Oncology)    Chief Complaint:    Chief Complaint   Patient presents with   • Shortness of Breath     Follow UP ESRD  Subjective     Interval History:   The patient is seen and examined while dialysis, she is asleep, was awakened she was oriented and sitting properly.  Denies any chest pain or shortness of air, no orthopnea PND, no nausea or vomiting.  Review of Systems:   See above.     Objective     Vital Signs  Temp:  [97.4 °F (36.3 °C)-98.7 °F (37.1 °C)] 97.7 °F (36.5 °C)  Heart Rate:  [74-98] 80  Resp:  [16-20] 18  BP: (113-145)/(58-73) 119/64    Flowsheet Rows      First Filed Value   Admission Height  165.1 cm (65\") Documented at 09/15/2019 1603   Admission Weight  113 kg (250 lb) Documented at 09/15/2019 1603          I/O this shift:  In: 100 [IV Piggyback:100]  Out: -   I/O last 3 completed shifts:  In: 660 [P.O.:660]  Out: -     Intake/Output Summary (Last 24 hours) at 10/12/2019 1305  Last data filed at 10/12/2019 1015  Gross per 24 hour   Intake 420 ml   Output --   Net 420 ml       Physical Exam:  Blood pressure 112/65, heart rate 83/min, ultrafiltration goal 2000 cc QB is 400 cc  General Appearance: Awake, answering question, she had constant motion of his if she is chewing on something and she has nothing in her mouth.  Appears to be chronically  Skin: warm and dry  HEENT: Anicteric sclerae, oral mucosa is normal,   Neck: supple, no JVD, trachea midline  Lungs: CTA, unlabored breathing effort  Heart: Irregularly irregular, no S3, no rub  Abdomen: soft, non-tender,  present bowel sounds to auscultation  : no palpable bladder,  Extremities: No edema, left BKA, right upper extremity AV fistula with thrill and bruit           Results Review:    Results from last 7 days   Lab Units 10/12/19  0408 10/11/19  0343 10/10/19  0541   SODIUM mmol/L 132* 133* " 133*   POTASSIUM mmol/L 4.4 4.2 5.6*   CHLORIDE mmol/L 88* 87* 86*   CO2 mmol/L 24.8 22.8 22.1   BUN mg/dL 62* 35* 67*   CREATININE mg/dL 3.73* 2.66* 4.35*   CALCIUM mg/dL 9.5 9.1 9.1   GLUCOSE mg/dL 189* 104* 73       Estimated Creatinine Clearance: 18.2 mL/min (A) (by C-G formula based on SCr of 3.73 mg/dL (H)).    Results from last 7 days   Lab Units 10/12/19  0408 10/11/19  0343 10/08/19  0505   PHOSPHORUS mg/dL 5.4* 4.2 8.0*             Results from last 7 days   Lab Units 10/12/19  0408 10/11/19  0343 10/10/19  0541 10/09/19  1037 10/09/19  0351   WBC 10*3/mm3 4.50 5.71 5.57 5.53 5.50   HEMOGLOBIN g/dL 8.4* 8.5* 8.8* 9.0* 8.4*   PLATELETS 10*3/mm3 163 153 110* 153 148       Results from last 7 days   Lab Units 10/12/19  0408 10/11/19  0343 10/10/19  1201 10/05/19  2316   INR  1.92* 1.26* 1.08 1.07         Imaging Results (last 24 hours)     ** No results found for the last 24 hours. **          acetylcysteine 4 mL Nebulization Q8H - RT   amiodarone 400 mg Oral Q12H   aspirin 81 mg Oral Daily   castor oil-balsam peru  Topical Q12H   dexamethasone 4 mg Oral Q12H   divalproex 125 mg Oral Nightly   escitalopram 10 mg Oral Daily   insulin glargine 10 Units Subcutaneous Nightly   insulin lispro 0-7 Units Subcutaneous 4x Daily With Meals & Nightly   ipratropium-albuterol 3 mL Nebulization Q8H - RT   levothyroxine 75 mcg Oral Q AM   metoprolol tartrate 25 mg Oral Q12H   midodrine 10 mg Oral TID AC   pantoprazole 40 mg Oral Q AM   polyethylene glycol 17 g Oral Daily   warfarin 5 mg Oral Daily       heparin (porcine) 17.5 Units/kg/hr Last Rate: 15.5 Units/kg/hr (10/12/19 0714)   Pharmacy to dose warfarin         Medication Review:   Current Facility-Administered Medications   Medication Dose Route Frequency Provider Last Rate Last Dose   • acetaminophen (TYLENOL) tablet 650 mg  650 mg Oral Q4H PRN Taiwo Shankar MD   650 mg at 09/21/19 0918   • acetylcysteine (MUCOMYST) 20 % nebulizer solution 4 mL  4 mL Nebulization  Q8H - RT Alexander Schaefer MD   4 mL at 10/12/19 0709   • amiodarone (PACERONE) tablet 400 mg  400 mg Oral Q12H Ruthy Jacobs APRN   400 mg at 10/12/19 0906   • aspirin EC tablet 81 mg  81 mg Oral Daily Taiwo Shankar MD   81 mg at 10/12/19 0908   • carboxymethylcellulose sod (PF) 1 % eye gel 1 drop  1 drop Both Eyes TID PRN Taiwo Shankar MD       • castor oil-balsam peru (VENELEX) ointment   Topical Q12H Taiwo Shankar MD   5 g at 10/12/19 0905   • cyclobenzaprine (FLEXERIL) tablet 5 mg  5 mg Oral TID PRN Alejandro Kramer MD   5 mg at 10/07/19 0015   • dexamethasone (DECADRON) tablet 4 mg  4 mg Oral Q12H Alexander Schaefer MD   4 mg at 10/12/19 0907   • dextrose (D50W) 25 g/ 50mL Intravenous Solution 25 g  25 g Intravenous Q15 Min PRN Taiwo Shankar MD       • dextrose (GLUTOSE) oral gel 15 g  15 g Oral Q15 Min WATSONN Taiwo Shankar MD       • divalproex (DEPAKOTE) DR tablet 125 mg  125 mg Oral Nightly Taiwo Shankar MD   125 mg at 10/11/19 2047   • escitalopram (LEXAPRO) tablet 10 mg  10 mg Oral Daily Taiwo Shankar MD   10 mg at 10/12/19 0906   • glucagon (human recombinant) (GLUCAGEN DIAGNOSTIC) injection 1 mg  1 mg Subcutaneous Q15 Min PRN Taiwo Shankar MD       • heparin (porcine) 5000 UNIT/ML injection 3,400-6,900 Units  40-80 Units/kg Intravenous Q6H PRN Rachid Luz MD   3,400 Units at 10/09/19 2308   • heparin 19134 units/250 mL (100 units/mL) in 0.45 % NaCl infusion  17.5 Units/kg/hr Intravenous Titrated Rachid Luz MD 13.28 mL/hr at 10/12/19 0714 15.5 Units/kg/hr at 10/12/19 0714   • HYDROmorphone (DILAUDID) injection 0.5 mg  0.5 mg Intravenous Q2H PRN Taiwo Shankar MD   0.5 mg at 10/12/19 0617   • insulin glargine (LANTUS) injection 10 Units  10 Units Subcutaneous Nightly Taiwo Shankar MD   10 Units at 10/11/19 2048   • insulin lispro (humaLOG) injection 0-7 Units  0-7 Units Subcutaneous 4x Daily With Meals & Nightly Taiwo Shankar MD   2 Units at 10/12/19 0904    • ipratropium-albuterol (DUO-NEB) nebulizer solution 3 mL  3 mL Nebulization Q8H - RT Alexander Schaefer MD   3 mL at 10/12/19 0704   • levothyroxine (SYNTHROID, LEVOTHROID) tablet 75 mcg  75 mcg Oral Q AM Taiwo Shankar MD   75 mcg at 10/12/19 0618   • mannitol 25% (RENAL) injection  12.5 g Intravenous PRN Adilson Colindres MD       • metoprolol tartrate (LOPRESSOR) tablet 25 mg  25 mg Oral Q12H vE Mclean PA   25 mg at 10/11/19 2047   • midodrine (PROAMATINE) tablet 10 mg  10 mg Oral TID AC Ramesh Mcguire MD   10 mg at 10/12/19 0906   • ondansetron (ZOFRAN) injection 4 mg  4 mg Intravenous Q4H PRN Taiwo Shankar MD   4 mg at 10/07/19 0845   • oxyCODONE-acetaminophen (PERCOCET)  MG per tablet 1 tablet  1 tablet Oral Q4H PRN Taiwo Shankar MD   1 tablet at 10/12/19 0907   • pantoprazole (PROTONIX) EC tablet 40 mg  40 mg Oral Q AM Taiwo Shankar MD   40 mg at 10/12/19 0617   • Pharmacy to dose warfarin   Does not apply Continuous PRN Virgie Lawrence APRN       • polyethylene glycol 3350 powder (packet)  17 g Oral Daily Taiwo Shankar MD   17 g at 10/12/19 0905   • sodium chloride 0.9 % bolus 1,000 mL  1,000 mL Intravenous PRN Adilson Colindres MD       • warfarin (COUMADIN) tablet 5 mg  5 mg Oral Daily Ruthy Jacobs APRN   5 mg at 10/11/19 1704       Assessment/Plan   1. ESRD.  He is currently on dialysis, tolerating the treatment without any difficulties.  2. 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 Palliative XRT.    3. DM2 bs better  on scheduled insulin.   4. CAD  5. AFib/flutter, treated per cardiology   6.  Thrombocytopenia, resolved  7. Hypothyroidism. On Replacement.   8. Anemia CKD.  Hemoglobin today is 8.4.    Plan:  1.  Continue the same treatment  2.  Surveillance labs          Adilson Colindres MD  10/12/19  1:05 PM

## 2019-10-12 NOTE — PLAN OF CARE
Problem: Patient Care Overview  Goal: Plan of Care Review  Outcome: Ongoing (interventions implemented as appropriate)   10/12/19 0503   Coping/Psychosocial   Plan of Care Reviewed With patient   Plan of Care Review   Progress no change   OTHER   Outcome Summary VSS. AFLUTTER WITH HR 70-80'S. O2 SAT STABLE ON 1L/M N/C. CONTINUES TO REST WELL WHEN UNDISTURBED BUT HAS PAIN LEFT LEG WHEN REPOSITIONED.        Problem: Fall Risk (Adult)  Goal: Absence of Fall  Outcome: Ongoing (interventions implemented as appropriate)      Problem: Hemodialysis (Adult)  Goal: Signs and Symptoms of Listed Potential Problems Will be Absent, Minimized or Managed (Hemodialysis)  Outcome: Ongoing (interventions implemented as appropriate)      Problem: Skin Injury Risk (Adult)  Goal: Skin Health and Integrity  Outcome: Ongoing (interventions implemented as appropriate)      Problem: Confusion, Acute (Adult)  Goal: Safety  Outcome: Ongoing (interventions implemented as appropriate)

## 2019-10-12 NOTE — PROGRESS NOTES
[facility]  Progress note: 10/12/2019    Patient Identification:  Name:Melia Almeida  Age:54 y.o.  Sex: female  :  1965  MRN: [id number]           CC:  Ischemic cardiomyopathy, atrial fibrillation with rapid ventricular rates    Interval history:  Rates controlled overnight.  On dialysis now.  Denies any chest pain or shortness of breath    Vital Signs:   Temp:  [97.4 °F (36.3 °C)-98.7 °F (37.1 °C)] 97.7 °F (36.5 °C)  Heart Rate:  [74-98] 80  Resp:  [16-20] 18  BP: (113-145)/(58-73) 119/64    Intake/Output Summary (Last 24 hours) at 10/12/2019 1056  Last data filed at 10/12/2019 1015  Gross per 24 hour   Intake 420 ml   Output --   Net 420 ml       Physical Examination:    General Appearance No acute distress   Neck No adenopathy, supple, trachea midline, no thyromegaly, no carotid bruit, no JVD   Lungs Clear to auscultation,respirations regular, even and unlabored   Heart Regular rhythm and normal rate, normal S1 and S2, no murmur, no gallop, no rub, no click   Chest wall No abnormalities observed   Abdomen Normal bowel sounds, no masses, no hepatomegaly, soft   Extremities  left BKA noted., 1+  edema, no cyanosis, no redness   Neurological Alert and oriented x 3     Lab Review:  Personally reviewed the labs, radiology imaging and other cardiac procedures. Results from last 7 days   Lab Units 10/12/19  0408   SODIUM mmol/L 132*   POTASSIUM mmol/L 4.4   CHLORIDE mmol/L 88*   CO2 mmol/L 24.8   BUN mg/dL 62*   CREATININE mg/dL 3.73*   CALCIUM mg/dL 9.5   GLUCOSE mg/dL 189*       Results from last 7 days   Lab Units 10/12/19  0408 10/11/19  0343 10/10/19  0541   WBC 10*3/mm3 4.50 5.71 5.57   HEMOGLOBIN g/dL 8.4* 8.5* 8.8*   HEMATOCRIT % 27.9* 27.9* 27.6*   PLATELETS 10*3/mm3 163 153 110*     Results from last 7 days   Lab Units 10/12/19  0408 10/11/19  0343 10/10/19  1201   INR  1.92* 1.26* 1.08   APTT seconds 134.5* 85.2* 88.0*     Medication Review:   Meds reviewed  Scheduled Meds:  acetylcysteine  4 mL Nebulization Q8H - RT   amiodarone 400 mg Oral Q12H   aspirin 81 mg Oral Daily   castor oil-balsam peru  Topical Q12H   dexamethasone 4 mg Oral Q12H   divalproex 125 mg Oral Nightly   escitalopram 10 mg Oral Daily   insulin glargine 10 Units Subcutaneous Nightly   insulin lispro 0-7 Units Subcutaneous 4x Daily With Meals & Nightly   ipratropium-albuterol 3 mL Nebulization Q8H - RT   levothyroxine 75 mcg Oral Q AM   metoprolol tartrate 25 mg Oral Q12H   midodrine 10 mg Oral TID AC   pantoprazole 40 mg Oral Q AM   polyethylene glycol 17 g Oral Daily   warfarin 5 mg Oral Daily     Continuous Infusions:  heparin (porcine) 17.5 Units/kg/hr Last Rate: 15.5 Units/kg/hr (10/12/19 0714)   Pharmacy to dose warfarin       I personally viewed and interpreted the patient's EKG/Telemetry data    Assessment and Plan  1.  Atrial fibrillation/flutter.  Rates controlled on amiodarone.  Blood pressure too low for the AV simón blockers and digoxin not utilized due to renal insufficiency.  Likely will stop heparin drip tomorrow once INR is higher.  Give 5 mg of Coumadin today  2.  Cardiomyopathy  3.  Metastatic lung cancer  4.  End-stage renal disease, on dialysis  5.  Anemia and thrombocytopenia  6.  Hyponatremia      Mini Jimenez  10/12/257949:56 AM  25min spent in reviewing records, discussion and examination of the patient and discussion with other members of the patient's medical team.     Dictated utilizing Dragon dictation

## 2019-10-12 NOTE — PLAN OF CARE
Problem: Patient Care Overview  Goal: Plan of Care Review   10/12/19 0351   Coping/Psychosocial   Plan of Care Reviewed With patient   Plan of Care Review   Progress no change   OTHER   Outcome Summary Pt weaned to 1 lpm, attempted to wean to room air, spo2 could not maintian. Breath sounds have fine crackles with ronchi, deep breathing and coughing encouraged along with flutter. Will contl. tx as ordered at this time.

## 2019-10-12 NOTE — PROGRESS NOTES
Progress Note  Chi Garcia MD    Patient ID:  Name:  Melia Almeida  MRN:  [id number]  1965  54 y.o.  female            CC/Reason for visit:     Squamous cell lung cancer (CMS/HCC)    End stage renal disease (CMS/HCC)    Atelectasis of right lung    CAD (coronary artery disease)    Interval History:     BP in the low side, on low dose bb and amiodarone  O2 OK in 2 L  Pain is controlled with mainly po medication  On heparin as well as amiodarone for A fib. Coumadin started yesterday  Hemoglobin now 8.8  Still expressing that she wants to live and continue to do HD. She has been consistent with this desire.  Resting in no distress    Vitals:  Vitals:    10/12/19 0644 10/12/19 0704 10/12/19 0709 10/12/19 0831   BP:    113/59   BP Location:    Left arm   Patient Position:    Lying   Pulse:  77 76 78   Resp:  20 20 20   Temp:    97.4 °F (36.3 °C)   TempSrc:    Oral   SpO2:  94% 100%    Weight: 85.5 kg (188 lb 7.9 oz)      Height:               Body mass index is 31.37 kg/m².    Intake/Output Summary (Last 24 hours) at 10/12/2019 0947  Last data filed at 10/11/2019 1733  Gross per 24 hour   Intake 320 ml   Output --   Net 320 ml       Exam:  GEN:  No distress, appears stated age  EYES:   EOM-i, anicteric sclera bilat  ENT:    External ears/nose normal, OP clear  NECK:  Supple, midline trachea  LUNGS: Ronchi,rales bilat, nonlabored breathing  CV:  Normal S1S2,  murmur, no edema  ABD:  Non tender, no enlarged liver or masses  EXT:  No cyanosis or clubbing    Scheduled meds:    acetylcysteine 4 mL Nebulization Q8H - RT   albumin human 25 g Intravenous Once in Dialysis   amiodarone 400 mg Oral Q12H   aspirin 81 mg Oral Daily   castor oil-balsam peru  Topical Q12H   dexamethasone 4 mg Oral Q12H   divalproex 125 mg Oral Nightly   escitalopram 10 mg Oral Daily   insulin glargine 10 Units Subcutaneous Nightly   insulin lispro 0-7 Units Subcutaneous 4x Daily With Meals & Nightly   ipratropium-albuterol 3 mL Nebulization Q8H -  RT   levothyroxine 75 mcg Oral Q AM   metoprolol tartrate 25 mg Oral Q12H   midodrine 10 mg Oral TID AC   pantoprazole 40 mg Oral Q AM   polyethylene glycol 17 g Oral Daily   warfarin 5 mg Oral Daily     IV meds:                        heparin (porcine) 17.5 Units/kg/hr Last Rate: 15.5 Units/kg/hr (10/12/19 0714)   Pharmacy to dose warfarin         Data Review:   I reviewed the patient's medications and new clinical results.  Lab Results   Component Value Date    CALCIUM 9.5 10/12/2019    PHOS 5.4 (H) 10/12/2019     Results from last 7 days   Lab Units 10/12/19  0408 10/11/19  0343 10/10/19  1201 10/10/19  0541   SODIUM mmol/L 132* 133*  --  133*   POTASSIUM mmol/L 4.4 4.2  --  5.6*   CHLORIDE mmol/L 88* 87*  --  86*   CO2 mmol/L 24.8 22.8  --  22.1   BUN mg/dL 62* 35*  --  67*   CREATININE mg/dL 3.73* 2.66*  --  4.35*   CALCIUM mg/dL 9.5 9.1  --  9.1   GLUCOSE mg/dL 189* 104*  --  73   WBC 10*3/mm3 4.50 5.71  --  5.57   HEMOGLOBIN g/dL 8.4* 8.5*  --  8.8*   PLATELETS 10*3/mm3 163 153  --  110*   INR  1.92* 1.26* 1.08  --                        Estimated Creatinine Clearance: 18.2 mL/min (A) (by C-G formula based on SCr of 3.73 mg/dL (H)).    WEIGHTS:     Wt Readings from Last 1 Encounters:   10/12/19 0644 85.5 kg (188 lb 7.9 oz)   10/11/19 0506 85.6 kg (188 lb 11.4 oz)   10/10/19 0517 84.8 kg (187 lb)   10/09/19 0455 84.6 kg (186 lb 9.6 oz)   10/08/19 0537 84.2 kg (185 lb 10 oz)   10/07/19 0514 84.4 kg (186 lb 1.1 oz)   10/06/19 0545 83.4 kg (183 lb 13.8 oz)   10/05/19 0529 85.7 kg (188 lb 15 oz)   10/04/19 0616 85.9 kg (189 lb 6 oz)   10/02/19 0637 84.4 kg (186 lb 1.6 oz)   10/01/19 0622 84.5 kg (186 lb 4.8 oz)   09/30/19 0500 86.8 kg (191 lb 4.8 oz)   09/29/19 0650 85 kg (187 lb 8 oz)   09/28/19 0604 85.7 kg (189 lb)   09/27/19 0521 85.4 kg (188 lb 4.4 oz)   09/26/19 0552 86.7 kg (191 lb 2.2 oz)   09/25/19 0535 87.7 kg (193 lb 4.8 oz)   09/24/19 0438 90.4 kg (199 lb 4.8 oz)   09/23/19 0413 89.4 kg (197 lb)    09/22/19 0545 89.4 kg (197 lb 1.5 oz)   09/21/19 0535 86.5 kg (190 lb 11.2 oz)   09/19/19 0548 88.9 kg (195 lb 14.4 oz)   09/18/19 1339 89.2 kg (196 lb 10.4 oz)   09/18/19 0525 89.7 kg (197 lb 12 oz)   09/17/19 0544 87.5 kg (192 lb 14.4 oz)   09/16/19 1010 89.6 kg (197 lb 8.5 oz)   09/16/19 0615 90.3 kg (199 lb)   09/15/19 1603 113 kg (250 lb)         ASSESSMENT:     Squamous cell lung cancer (CMS/HCC)    End stage renal disease (CMS/HCC)    Atelectasis of right lung    CAD (coronary artery disease)      PLAN:    Pain controlled  HD,  patient desires to continue HD  A flutter with RVR:   Poor prognosis considering multiple comorbidities as well as metastatic lung cancer.  Monitor plt, better now  On coumadin, following INR, coumadin being adjusted  Continue Accu-Cheks and sliding scale insulin as well as Lantus.  Patient expressed desire to be alive and continue HD again today  Monitor and correct electrolytes  Monitor mental status  DVT/stress ulcer prophylaxis  Labs in am  It took me additional 10 minutes to review complete chart     Chi Garcia MD  10/12/2019

## 2019-10-12 NOTE — PROGRESS NOTES
Pharmacy Consult: Warfarin Dosing    Melia Almeida is a 54 y.o. female, estimated creatinine clearance is 15.5 mL/min (A) (by C-G formula based on SCr of 4.35 mg/dL (H)). weighing 84.8 kg (187 lb).     has a past medical history of Acute renal failure on dialysis (CMS/[facility]), Adult failure to thrive, Chronic kidney disease (CKD), stage V (CMS/[facility]), Coronary artery disease, Diabetes mellitus (CMS/[facility]), Diabetes mellitus with chronic kidney disease (CMS/[facility]), Disease of thyroid gland, GERD (gastroesophageal reflux disease), History of anemia, History of UTI, Hyperlipidemia, Hypertension, Idiopathic neuropathy, Major depressive disorder, recurrent episode, severe, with psychotic behavior (CMS/[facility]), Metabolic encephalopathy, Obesity, Osteomyelitis of right foot (CMS/[facility]), Other symbolic dysfunctions, Peripheral vascular disease, unspecified (CMS/[facility]), and Personal history of diabetic foot ulcer.    Social History     Tobacco Use   • Smoking status: Current Every Day Smoker     Types: Cigarettes   • Smokeless tobacco: Never Used   • Tobacco comment: 4 CIG./PER DAY   Substance Use Topics   • Alcohol use: No   • Drug use: No       Results from last 7 days   Lab Units 10/12/19  0408 10/11/19  0343 10/10/19  1201 10/10/19  0541 10/09/19  2052 10/09/19  1159 10/09/19  1037 10/09/19  0351  10/08/19  0505  10/07/19  0442  10/05/19  2316   INR  1.92* 1.26* 1.08  --   --   --   --   --   --   --   --   --   --  1.07   APTT seconds 134.5* 85.2* 88.0* 72.8* 62.6* 137.7*  --  56.9*   < > 65.2*   < > 127.2*   < > 28.4   HEMOGLOBIN g/dL 8.4* 8.5*  --  8.8*  --   --  9.0* 8.4*  --  11.0*  --  11.7*   < > 11.7*   HEMATOCRIT % 27.9* 27.9*  --  27.6*  --   --  29.2* 27.2*  --  34.5  --  38.8   < > 38.3   PLATELETS 10*3/mm3 163 153  --  110*  --   --  153 148  --  165  --  179   < > 190    < > = values in this interval not displayed.     Results from last 7 days   Lab Units 10/12/19  0408 10/11/19  0343 10/10/19  0541   SODIUM mmol/L 132*  133* 133*   POTASSIUM mmol/L 4.4 4.2 5.6*   CHLORIDE mmol/L 88* 87* 86*   CO2 mmol/L 24.8 22.8 22.1   BUN mg/dL 62* 35* 67*   CREATININE mg/dL 3.73* 2.66* 4.35*   CALCIUM mg/dL 9.5 9.1 9.1   GLUCOSE mg/dL 189* 104* 73     Anticoagulation history:   Warfarin consult - on amiodarone (new start inpatient). Warfarin 5 mg PO daily was started on 10/8.     Hospital Anticoagulation:  Consulting provider: LAURA Bach  Start date: 10/10/19  Indication: Atrial fibrillation  Target INR: 2-3  Expected duration: chronic  Bridge Therapy: Yes    Date 10/8 10/9 10/10 10/11 10/12        INR   1.08 1.26 1.92        Warfarin dose 5 mg 5 mg 7.5 mg 7.5 mg 5 mg          Potential drug interactions:   Amiodarone - may result in increased INR and an increased risk of bleeding.  Aspirin - may result in increased risk of bleeding.  Dexamethasone - may result in increased risk of bleeding or diminished effects of warfarin.  Escitalopram - may result in an increased risk of bleeding.  Glucagon - may result in an increased risk of bleeding.  Heparin - may result in increased risk of bleeding.  Pantoprazole - may result in increased International Normalized Ratio (INR) and prothrombin time.      Relevant nutrition status: Nutrition supplements TID    Other:     Education complete: No  Date:     Assessment/Plan:  Dose: Will continue warfarin 5 mg PO daily   Monitor: Daily PT/INR  Follow up: 10/13    Pharmacy will continue to follow until discharge or discontinuation of warfarin.     Shivani Wesley [facility]    Clinical Staff Pharmacist

## 2019-10-13 NOTE — PROGRESS NOTES
[facility]  Progress note: 10/13/2019    Patient Identification:  Name:Melia Almeida  Age:54 y.o.  Sex: female  :  1965  MRN: [id number]           CC:  Ischemic cardiomyopathy, atrial fibrillation with rapid ventricular rates    Interval history:  Rates controlled blood pressure overall stable.  Slightly more anemic.  She is resting but arousable.  Denies any chest pain or shortness of breath    Vital Signs:   Temp:  [97.5 °F (36.4 °C)-98.7 °F (37.1 °C)] 97.5 °F (36.4 °C)  Heart Rate:  [69-82] 72  Resp:  [16-24] 20  BP: (118-148)/(53-68) 147/68    Intake/Output Summary (Last 24 hours) at 10/13/2019 1253  Last data filed at 10/13/2019 0847  Gross per 24 hour   Intake 0 ml   Output 1300 ml   Net -1300 ml       Physical Examination:    General Appearance No acute distress   Neck No adenopathy, supple, trachea midline, no thyromegaly, no carotid bruit, no JVD   Lungs Clear to auscultation,respirations regular, even and unlabored   Heart Regular rhythm and normal rate, normal S1 and S2, no murmur, no gallop, no rub, no click   Chest wall No abnormalities observed   Abdomen Normal bowel sounds, no masses, no hepatomegaly, soft   Extremities Trace edema, no cyanosis, no redness   Neurological Alert and oriented x 3     Lab Review:  Personally reviewed the labs, radiology imaging and other cardiac procedures. Results from last 7 days   Lab Units 10/13/19  0336   SODIUM mmol/L 131*   POTASSIUM mmol/L 3.6   CHLORIDE mmol/L 88*   CO2 mmol/L 25.2   BUN mg/dL 32*   CREATININE mg/dL 2.25*   CALCIUM mg/dL 9.4   GLUCOSE mg/dL 85         )  Results from last 7 days   Lab Units 10/13/19  0336 10/12/19  0408 10/11/19  0343   WBC 10*3/mm3 4.93 4.50 5.71   HEMOGLOBIN g/dL 7.7* 8.4* 8.5*   HEMATOCRIT % 25.8* 27.9* 27.9*   PLATELETS 10*3/mm3 152 163 153     Results from last 7 days   Lab Units 10/13/19  0335 10/12/19  1432 10/12/19  0408 10/11/19  0343   INR  2.56*  --  1.92* 1.26*   APTT seconds 64.2* 74.9* 134.5*  85.2*       Medication Review:   Meds reviewed  Scheduled Meds:  acetylcysteine 4 mL Nebulization Q8H - RT   amiodarone 400 mg Oral Q12H   aspirin 81 mg Oral Daily   castor oil-balsam peru  Topical Q12H   dexamethasone 4 mg Oral Q12H   divalproex 125 mg Oral Nightly   escitalopram 10 mg Oral Daily   insulin glargine 10 Units Subcutaneous Nightly   insulin lispro 0-7 Units Subcutaneous 4x Daily With Meals & Nightly   ipratropium-albuterol 3 mL Nebulization Q8H - RT   levothyroxine 75 mcg Oral Q AM   metoprolol tartrate 25 mg Oral Q12H   midodrine 10 mg Oral TID AC   pantoprazole 40 mg Oral Q AM   polyethylene glycol 17 g Oral Daily   warfarin 2.5 mg Oral Once     Continuous Infusions:  Pharmacy to dose warfarin      I personally viewed and interpreted the patient's EKG/Telemetry data    Assessment and Plan  1.  Atrial fibrillation/flutter.  Rates controlled on amiodarone.    Will decrease amiodarone to 400 mg a day.  Therapeutic with warfarin the INR may drop slightly in the next 48 hours to decrease in amiodarone dose.   2.  Cardiomyopathy, fluid management per nephrology clinically fairly well compensated  3.  Metastatic lung cancer with endobronchial obstruction.  Patient to get palliative radiation therapy  4.  End-stage renal disease, on dialysis  5.  Anemia  6.  Hyponatremia, recommendations per nephrology     Mini Jimenez  10/13/216087:53 PM  25min spent in reviewing records, discussion and examination of the patient and discussion with other members of the patient's medical team.     Dictated utilizing Dragon dictation

## 2019-10-13 NOTE — PROGRESS NOTES
Progress Note  Chi Garcia MD    Patient ID:  Name:  Melia Almeida  MRN:  [id number]  1965  54 y.o.  female            CC/Reason for visit:      Squamous cell lung cancer (CMS/HCC)    End stage renal disease (CMS/HCC)    Atelectasis of right lung    CAD (coronary artery disease)    Interval History:  Doing about the same. Most of the time resting.    Vitals:  Vitals:    10/13/19 0655 10/13/19 0659 10/13/19 0708 10/13/19 0740   BP:    147/68   BP Location:    Left arm   Patient Position:    Lying   Pulse: 69 72  72   Resp: 24 24 24 20   Temp:    97.5 °F (36.4 °C)   TempSrc:    Oral   SpO2: (!) 88%      Weight:       Height:               Body mass index is 30.41 kg/m².    Intake/Output Summary (Last 24 hours) at 10/13/2019 1144  Last data filed at 10/13/2019 0847  Gross per 24 hour   Intake 0 ml   Output 1300 ml   Net -1300 ml       Exam:  GEN:  No distress, appears stated age  EYES:   EOM-i, anicteric sclera bilat  ENT:    External ears/nose normal, OP clear  NECK:  Supple, midline trachea  LUNGS: Clear breath sounds bilat, nonlabored breathing  CV:  Normal S1S2, without murmur, no edema  ABD:  Non tender, no enlarged liver or masses  EXT:  No cyanosis or clubbing    Scheduled meds:    acetylcysteine 4 mL Nebulization Q8H - RT   amiodarone 400 mg Oral Q12H   aspirin 81 mg Oral Daily   castor oil-balsam peru  Topical Q12H   dexamethasone 4 mg Oral Q12H   divalproex 125 mg Oral Nightly   escitalopram 10 mg Oral Daily   insulin glargine 10 Units Subcutaneous Nightly   insulin lispro 0-7 Units Subcutaneous 4x Daily With Meals & Nightly   ipratropium-albuterol 3 mL Nebulization Q8H - RT   levothyroxine 75 mcg Oral Q AM   metoprolol tartrate 25 mg Oral Q12H   midodrine 10 mg Oral TID AC   pantoprazole 40 mg Oral Q AM   polyethylene glycol 17 g Oral Daily   warfarin 2.5 mg Oral Once     IV meds:                        Pharmacy to dose warfarin        Data Review:   I reviewed the patient's medications and new  clinical results.  Lab Results   Component Value Date    CALCIUM 9.4 10/13/2019    PHOS 3.4 10/13/2019     Results from last 7 days   Lab Units 10/13/19  0336 10/13/19  0335 10/12/19  0408 10/11/19  0343   SODIUM mmol/L 131*  --  132* 133*   POTASSIUM mmol/L 3.6  --  4.4 4.2   CHLORIDE mmol/L 88*  --  88* 87*   CO2 mmol/L 25.2  --  24.8 22.8   BUN mg/dL 32*  --  62* 35*   CREATININE mg/dL 2.25*  --  3.73* 2.66*   CALCIUM mg/dL 9.4  --  9.5 9.1   GLUCOSE mg/dL 85  --  189* 104*   WBC 10*3/mm3 4.93  --  4.50 5.71   HEMOGLOBIN g/dL 7.7*  --  8.4* 8.5*   PLATELETS 10*3/mm3 152  --  163 153   INR   --  2.56* 1.92* 1.26*                       Estimated Creatinine Clearance: 29.7 mL/min (A) (by C-G formula based on SCr of 2.25 mg/dL (H)).    WEIGHTS:     Wt Readings from Last 1 Encounters:   10/13/19 0616 82.9 kg (182 lb 12.2 oz)   10/12/19 0644 85.5 kg (188 lb 7.9 oz)   10/11/19 0506 85.6 kg (188 lb 11.4 oz)   10/10/19 0517 84.8 kg (187 lb)   10/09/19 0455 84.6 kg (186 lb 9.6 oz)   10/08/19 0537 84.2 kg (185 lb 10 oz)   10/07/19 0514 84.4 kg (186 lb 1.1 oz)   10/06/19 0545 83.4 kg (183 lb 13.8 oz)   10/05/19 0529 85.7 kg (188 lb 15 oz)   10/04/19 0616 85.9 kg (189 lb 6 oz)   10/02/19 0637 84.4 kg (186 lb 1.6 oz)   10/01/19 0622 84.5 kg (186 lb 4.8 oz)   09/30/19 0500 86.8 kg (191 lb 4.8 oz)   09/29/19 0650 85 kg (187 lb 8 oz)   09/28/19 0604 85.7 kg (189 lb)   09/27/19 0521 85.4 kg (188 lb 4.4 oz)   09/26/19 0552 86.7 kg (191 lb 2.2 oz)   09/25/19 0535 87.7 kg (193 lb 4.8 oz)   09/24/19 0438 90.4 kg (199 lb 4.8 oz)   09/23/19 0413 89.4 kg (197 lb)   09/22/19 0545 89.4 kg (197 lb 1.5 oz)   09/21/19 0535 86.5 kg (190 lb 11.2 oz)   09/19/19 0548 88.9 kg (195 lb 14.4 oz)   09/18/19 1339 89.2 kg (196 lb 10.4 oz)   09/18/19 0525 89.7 kg (197 lb 12 oz)   09/17/19 0544 87.5 kg (192 lb 14.4 oz)   09/16/19 1010 89.6 kg (197 lb 8.5 oz)   09/16/19 0615 90.3 kg (199 lb)   09/15/19 1603 113 kg (250 lb)         ASSESSMENT:     Squamous  cell lung cancer (CMS/HCC)    End stage renal disease (CMS/HCC)    Atelectasis of right lung    CAD (coronary artery disease)      PLAN:    Pain controlled  HD,  patient desires to continue HD  A flutter with RVR:   Poor prognosis considering multiple comorbidities as well as metastatic lung cancer.  Monitor plt, better now  On coumadin, following INR, coumadin being adjusted  Continue Accu-Cheks and sliding scale insulin as well as Lantus.  Patient expressed desire to be alive and continue HD again today  Monitor and correct electrolytes  Monitor mental status  DVT/stress ulcer prophylaxis  Consultants notes apreciated  Labs in am    Chi Garcia MD  10/13/2019

## 2019-10-13 NOTE — PROGRESS NOTES
Pharmacy Consult: Warfarin Dosing    Melia Almeida is a 54 y.o. female, estimated creatinine clearance is 15.5 mL/min (A) (by C-G formula based on SCr of 4.35 mg/dL (H)). weighing 84.8 kg (187 lb).     has a past medical history of Acute renal failure on dialysis (CMS/[facility]), Adult failure to thrive, Chronic kidney disease (CKD), stage V (CMS/[facility]), Coronary artery disease, Diabetes mellitus (CMS/[facility]), Diabetes mellitus with chronic kidney disease (CMS/[facility]), Disease of thyroid gland, GERD (gastroesophageal reflux disease), History of anemia, History of UTI, Hyperlipidemia, Hypertension, Idiopathic neuropathy, Major depressive disorder, recurrent episode, severe, with psychotic behavior (CMS/[facility]), Metabolic encephalopathy, Obesity, Osteomyelitis of right foot (CMS/[facility]), Other symbolic dysfunctions, Peripheral vascular disease, unspecified (CMS/[facility]), and Personal history of diabetic foot ulcer.    Social History     Tobacco Use   • Smoking status: Current Every Day Smoker     Types: Cigarettes   • Smokeless tobacco: Never Used   • Tobacco comment: 4 CIG./PER DAY   Substance Use Topics   • Alcohol use: No   • Drug use: No       Results from last 7 days   Lab Units 10/13/19  0336 10/13/19  0335 10/12/19  1432 10/12/19  0408 10/11/19  0343 10/10/19  1201 10/10/19  0541 10/09/19  2052  10/09/19  1037 10/09/19  0351  10/08/19  0505   INR   --  2.56*  --  1.92* 1.26* 1.08  --   --   --   --   --   --   --    APTT seconds  --  64.2* 74.9* 134.5* 85.2* 88.0* 72.8* 62.6*   < >  --  56.9*   < > 65.2*   HEMOGLOBIN g/dL 7.7*  --   --  8.4* 8.5*  --  8.8*  --   --  9.0* 8.4*  --  11.0*   HEMATOCRIT % 25.8*  --   --  27.9* 27.9*  --  27.6*  --   --  29.2* 27.2*  --  34.5   PLATELETS 10*3/mm3 152  --   --  163 153  --  110*  --   --  153 148  --  165    < > = values in this interval not displayed.     Results from last 7 days   Lab Units 10/13/19  0336 10/12/19  0408 10/11/19  0343   SODIUM mmol/L 131* 132* 133*   POTASSIUM mmol/L 3.6  4.4 4.2   CHLORIDE mmol/L 88* 88* 87*   CO2 mmol/L 25.2 24.8 22.8   BUN mg/dL 32* 62* 35*   CREATININE mg/dL 2.25* 3.73* 2.66*   CALCIUM mg/dL 9.4 9.5 9.1   GLUCOSE mg/dL 85 189* 104*     Anticoagulation history:   Warfarin consult - on amiodarone (new start inpatient). Warfarin 5 mg PO daily was started on 10/8.     Hospital Anticoagulation:  Consulting provider: LAURA Bach  Start date: 10/10/19  Indication: Atrial fibrillation  Target INR: 2-3  Expected duration: chronic  Bridge Therapy: Yes    Date 10/8 10/9 10/10 10/11 10/12 10/13       INR   1.08 1.26 1.92 2.56       Warfarin dose 5 mg 5 mg 7.5 mg 7.5 mg 5 mg 2.5mg         Potential drug interactions:   Amiodarone - may result in increased INR and an increased risk of bleeding.  Aspirin - may result in increased risk of bleeding.  Dexamethasone - may result in increased risk of bleeding or diminished effects of warfarin.  Escitalopram - may result in an increased risk of bleeding.  Glucagon - may result in an increased risk of bleeding.  Heparin - may result in increased risk of bleeding.  Pantoprazole - may result in increased International Normalized Ratio (INR) and prothrombin time.      Relevant nutrition status: Nutrition supplements TID    Other:     Education complete: No  Date:     Assessment/Plan:  Dose: significant jump in INR    will give warfarin 2.5mg po x1 dose today  Monitor: Daily PT/INR  Follow up: 10/14    Pharmacy will continue to follow until discharge or discontinuation of warfarin.     Shivani Wesley, [facility]    Clinical Staff Pharmacist

## 2019-10-13 NOTE — PLAN OF CARE
Problem: Patient Care Overview  Goal: Plan of Care Review  Outcome: Ongoing (interventions implemented as appropriate)   10/13/19 0327   Coping/Psychosocial   Plan of Care Reviewed With patient   Plan of Care Review   Progress no change   OTHER   Outcome Summary SR at shift change. Pt in a lot of pain when moved. Percocet given x2. Sm BM. and did not want to be moved. C/o nausea but no vomiting. Pt agreeable to almost anything except being turned. Pt rested well overnight. Cont to monitor, safety maintained..       10/13/19 0327   Coping/Psychosocial   Plan of Care Reviewed With patient   Plan of Care Review   Progress no change   OTHER   Outcome Summary SR at shift change. Pt in a lot of pain when moved. Percocet given x2. Sm BM. and did not want to be moved. C/o nausea but no vomiting. Pt agreeable to almost anything except being turned. Pt rested well overnight. Cont to monitor, safety maintained..

## 2019-10-13 NOTE — PROGRESS NOTES
"   LOS: 28 days    Patient Care Team:  Taiwo Shankar MD as PCP - General (Internal Medicine)  Paulette Torres MD as Consulting Physician (Radiation Oncology)    Chief Complaint:    Chief Complaint   Patient presents with   • Shortness of Breath     Follow-up end-stage renal disease  Subjective     Interval History:   Patient had dialysis yesterday without any difficulty, denies any chest pain or shortness of air, no orthopnea PND, no nausea or vomiting, no abdominal pain.  No lightheadedness    Review of Systems:   See above.     Objective     Vital Signs  Temp:  [97.5 °F (36.4 °C)-98.7 °F (37.1 °C)] 97.5 °F (36.4 °C)  Heart Rate:  [69-82] 72  Resp:  [16-24] 20  BP: (118-148)/(53-68) 147/68    Flowsheet Rows      First Filed Value   Admission Height  165.1 cm (65\") Documented at 09/15/2019 1603   Admission Weight  113 kg (250 lb) Documented at 09/15/2019 1603          No intake/output data recorded.  I/O last 3 completed shifts:  In: 220 [P.O.:120; IV Piggyback:100]  Out: 1300 [Other:1300]    Intake/Output Summary (Last 24 hours) at 10/13/2019 1351  Last data filed at 10/13/2019 0847  Gross per 24 hour   Intake 0 ml   Output 1300 ml   Net -1300 ml       Physical Exam:  General Appearance: Awake, alert, appears to be chronically ill, no acute distress.    Skin: warm and dry  HEENT: Anicteric sclerae, oral mucosa is normal,   Neck: supple, no JVD, trachea midline  Lungs: CTA, unlabored breathing effort  Heart: Irregularly irregular, no S3, no rub  Abdomen: soft, non-tender,  present bowel sounds to auscultation  : no palpable bladder,  Extremities: No edema, left BKA, right upper extremity AV fistula with thrill and bruit           Results Review:    Results from last 7 days   Lab Units 10/13/19  0336 10/12/19  0408 10/11/19  0343   SODIUM mmol/L 131* 132* 133*   POTASSIUM mmol/L 3.6 4.4 4.2   CHLORIDE mmol/L 88* 88* 87*   CO2 mmol/L 25.2 24.8 22.8   BUN mg/dL 32* 62* 35*   CREATININE mg/dL 2.25* 3.73* " 2.66*   CALCIUM mg/dL 9.4 9.5 9.1   GLUCOSE mg/dL 85 189* 104*       Estimated Creatinine Clearance: 29.7 mL/min (A) (by C-G formula based on SCr of 2.25 mg/dL (H)).    Results from last 7 days   Lab Units 10/13/19  0336 10/12/19  0408 10/11/19  0343   PHOSPHORUS mg/dL 3.4 5.4* 4.2             Results from last 7 days   Lab Units 10/13/19  0336 10/12/19  0408 10/11/19  0343 10/10/19  0541 10/09/19  1037   WBC 10*3/mm3 4.93 4.50 5.71 5.57 5.53   HEMOGLOBIN g/dL 7.7* 8.4* 8.5* 8.8* 9.0*   PLATELETS 10*3/mm3 152 163 153 110* 153       Results from last 7 days   Lab Units 10/13/19  0335 10/12/19  0408 10/11/19  0343 10/10/19  1201   INR  2.56* 1.92* 1.26* 1.08         Imaging Results (last 24 hours)     ** No results found for the last 24 hours. **          acetylcysteine 4 mL Nebulization Q8H - RT   [START ON 10/14/2019] amiodarone 400 mg Oral Q24H   aspirin 81 mg Oral Daily   castor oil-balsam peru  Topical Q12H   dexamethasone 4 mg Oral Q12H   divalproex 125 mg Oral Nightly   escitalopram 10 mg Oral Daily   insulin glargine 10 Units Subcutaneous Nightly   insulin lispro 0-7 Units Subcutaneous 4x Daily With Meals & Nightly   ipratropium-albuterol 3 mL Nebulization Q8H - RT   levothyroxine 75 mcg Oral Q AM   metoprolol tartrate 25 mg Oral Q12H   midodrine 10 mg Oral TID AC   pantoprazole 40 mg Oral Q AM   polyethylene glycol 17 g Oral Daily   warfarin 2.5 mg Oral Once       Pharmacy to dose warfarin        Medication Review:   Current Facility-Administered Medications   Medication Dose Route Frequency Provider Last Rate Last Dose   • acetaminophen (TYLENOL) tablet 650 mg  650 mg Oral Q4H Taiwo Garrett MD   650 mg at 09/21/19 0918   • acetylcysteine (MUCOMYST) 20 % nebulizer solution 4 mL  4 mL Nebulization Q8H - RT Alexnader Schaefer MD   4 mL at 10/13/19 0659   • [START ON 10/14/2019] amiodarone (PACERONE) tablet 400 mg  400 mg Oral Q24H Janell Jimenez MD       • aspirin EC tablet 81 mg  81 mg Oral Daily Vonnie,  Taiwo DE LA O MD   81 mg at 10/13/19 0855   • carboxymethylcellulose sod (PF) 1 % eye gel 1 drop  1 drop Both Eyes TID PRN Taiwo Shankar MD       • castor oil-balsam peru (VENELEX) ointment   Topical Q12H Taiwo Shankar MD   5 g at 10/13/19 0856   • cyclobenzaprine (FLEXERIL) tablet 5 mg  5 mg Oral TID PRN Alejandro Kramer MD   5 mg at 10/07/19 0015   • dexamethasone (DECADRON) tablet 4 mg  4 mg Oral Q12H Alexander Schaefer MD   4 mg at 10/13/19 0854   • dextrose (D50W) 25 g/ 50mL Intravenous Solution 25 g  25 g Intravenous Q15 Min Taiwo Garrett MD       • dextrose (GLUTOSE) oral gel 15 g  15 g Oral Q15 Min WATSONN Taiwo Shankar MD       • divalproex (DEPAKOTE) DR tablet 125 mg  125 mg Oral Nightly Taiwo Shankar MD   125 mg at 10/12/19 2206   • escitalopram (LEXAPRO) tablet 10 mg  10 mg Oral Daily Taiwo Shankar MD   10 mg at 10/13/19 0854   • glucagon (human recombinant) (GLUCAGEN DIAGNOSTIC) injection 1 mg  1 mg Subcutaneous Q15 Min [place]AT[place] Taiwo Shankar MD       • HYDROmorphone (DILAUDID) injection 0.5 mg  0.5 mg Intravenous Q2H [place] Taiwo Shankar MD   0.5 mg at 10/12/19 0617   • insulin glargine (LANTUS) injection 10 Units  10 Units Subcutaneous Nightly Taiwo Shankar MD   10 Units at 10/12/19 2215   • insulin lispro (humaLOG) injection 0-7 Units  0-7 Units Subcutaneous 4x Daily With Meals & Nightly Taiwo Shankar MD   2 Units at 10/12/19 2213   • ipratropium-albuterol (DUO-NEB) nebulizer solution 3 mL  3 mL Nebulization Q8H - RT Alexander Schaefer MD   3 mL at 10/13/19 0655   • levothyroxine (SYNTHROID, LEVOTHROID) tablet 75 mcg  75 mcg Oral Q AM Taiwo Shankar MD   75 mcg at 10/13/19 0700   • metoprolol tartrate (LOPRESSOR) tablet 25 mg  25 mg Oral Q12H Ev cMlean PA   25 mg at 10/13/19 0853   • midodrine (PROAMATINE) tablet 10 mg  10 mg Oral TID Ramesh Johnson MD   10 mg at 10/13/19 0854   • ondansetron (ZOFRAN) injection 4 mg  4 mg Intravenous Q4H Taiwo Garrett MD    4 mg at 10/07/19 0845   • oxyCODONE-acetaminophen (PERCOCET)  MG per tablet 1 tablet  1 tablet Oral Q4H PRN Taiwo Shankar MD   1 tablet at 10/13/19 0855   • pantoprazole (PROTONIX) EC tablet 40 mg  40 mg Oral Q AM Taiwo Shankar MD   40 mg at 10/13/19 0700   • Pharmacy to dose warfarin   Does not apply Continuous PRN Virgie Lawrence APRN       • polyethylene glycol 3350 powder (packet)  17 g Oral Daily Taiwo Shankar MD   17 g at 10/13/19 0854   • warfarin (COUMADIN) tablet 2.5 mg  2.5 mg Oral Once Virgie Lawrence APRN           Assessment/Plan   1. ESRD.  Had dialysis yesterday without any difficulties, her volume status is within acceptable range  2. 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 Palliative XRT.    3. DM2 bs better  on scheduled insulin.   4. CAD  5. AFib/flutter, treated per cardiology   6.  Thrombocytopenia, resolved  7. Hypothyroidism. On Replacement.   8. Anemia CKD.  Hemoglobin today is 7.7    Plan:  1.  Continue the same treatment  2.  Surveillance labs          Adilson Colindres MD  10/13/19  1:51 PM

## 2019-10-14 NOTE — PROGRESS NOTES
"    Patient Name: Melia Almeida  :1965  54 y.o.      Patient Care Team:  Taiwo Shankar MD as PCP - General (Internal Medicine)  Paulette Torres MD as Consulting Physician (Radiation Oncology)    Chief Complaint: f/u AF with RVR, ischemic cardiomyopathy    Interval History: Maintaining in SR on tele. B/P slightly elevated- she is receiving midodrine TID. INR therapeutic today. Hgb increased slightly to 7.9 today. Denies any complaints today including CP, SOB, Palpitations. She is not oriented to her situation.       Objective   Vital Signs  Temp:  [97.5 °F (36.4 °C)-98.3 °F (36.8 °C)] 98 °F (36.7 °C)  Heart Rate:  [62-67] 65  Resp:  [16-24] 18  BP: (145-160)/(64-71) 145/64    Intake/Output Summary (Last 24 hours) at 10/14/2019 1323  Last data filed at 10/14/2019 0930  Gross per 24 hour   Intake 0 ml   Output --   Net 0 ml     Flowsheet Rows      First Filed Value   Admission Height  165.1 cm (65\") Documented at 09/15/2019 1603   Admission Weight  113 kg (250 lb) Documented at 09/15/2019 1603          Physical Exam:           General Appearance:    Alert, cooperative, in no acute distress, Confused to month, hospital and situation (oriented to self) disoriented to place and situation.   Lungs:     Clear to auscultation.  Normal respiratory effort and rate.      Heart:    Regular rhythm and normal to occ bradycardic rate rate, normal S1 and S2, no murmurs, gallops or rubs.     Chest Wall:    No abnormalities observed   Abdomen:     Soft, nontender, positive bowel sounds.     Extremities:   no cyanosis, clubbing or significant edema.  Left BKA          Results Review:    Results from last 7 days   Lab Units 10/14/19  0450   SODIUM mmol/L 131*   POTASSIUM mmol/L 4.1   CHLORIDE mmol/L 87*   CO2 mmol/L 25.4   BUN mg/dL 54*   CREATININE mg/dL 3.14*   GLUCOSE mg/dL 92   CALCIUM mg/dL 9.4         Results from last 7 days   Lab Units 10/14/19  0450   WBC 10*3/mm3 4.87   HEMOGLOBIN g/dL 7.9*   HEMATOCRIT % " 25.4*   PLATELETS 10*3/mm3 153     Results from last 7 days   Lab Units 10/14/19  0450 10/13/19  1602 10/13/19  0335 10/12/19  1432 10/12/19  0408   INR  2.84*  --  2.56*  --  1.92*   APTT seconds  --  33.2 64.2* 74.9* 134.5*                       Medication Review:     acetylcysteine 4 mL Nebulization Q8H - RT   amiodarone 400 mg Oral Q24H   aspirin 81 mg Oral Daily   castor oil-balsam peru  Topical Q12H   dexamethasone 4 mg Oral Q12H   divalproex 125 mg Oral Nightly   escitalopram 10 mg Oral Daily   insulin glargine 10 Units Subcutaneous Nightly   insulin lispro 0-7 Units Subcutaneous 4x Daily With Meals & Nightly   ipratropium-albuterol 3 mL Nebulization Q8H - RT   levothyroxine 75 mcg Oral Q AM   metoprolol tartrate 25 mg Oral Q12H   midodrine 10 mg Oral TID AC   pantoprazole 40 mg Oral Q AM   polyethylene glycol 17 g Oral Daily          Pharmacy to dose warfarin        Assessment/Plan     1.  Atrial fibrillation/flutter: Now in SR on tele. On amiodarone and metoprolol.   Amiodarone decreased yesterday to 400 mg a day.  Therapeutic with warfarin the INR may drop slightly in the next 48 hours to decrease in amiodarone dose. Pharmacy is dosing warfarin.  2.  Cardiomyopathy: Denies angina/shortness of breath.Fluid management per nephrology-clinically fairly well compensated  3.  Metastatic lung cancer with endobronchial obstruction:  Patient to get palliative radiation therapy. On my exam she denied that she had cancer and was not oriented to her situation.  4.  End-stage renal disease: Oh HD dialysis managed by nephrology.  5.  Anemia: Hgb slightly improved today. Felt related to CKD.  6.  Hyponatremia:  recommendations per nephrology  7. Thrombocytopenia: Improved to normal at this time.     -Clarify parameters for midodrine with nephrology.  I have discussed with Dr. Sanchez who will address.  - No other changes from cardiac standpoint today.     LAURA Cee  [facility]  10/14/19  1:23  PM

## 2019-10-14 NOTE — PROGRESS NOTES
DAILY PROGRESS NOTE  [facility], [facility]    10/14/2019    Patient Identification:  Name: Melia Almeida  Age: 54 y.o.  Sex: female  :  1965  MRN: [id number]           Primary Care Physician: Taiwo Shankar MD    Subjective:    Interval History:    Patient has not been eating much during the last few days.  Drink a little.  Pain is controlled with current medication  On p.o. amiodarone as well as metoprolol, heart rate okay.  Now.  Blood sugar in the low side when she is no eating.  INR 2.84 hemoglobin 7.9.    Still expressing that she wants to live and continue to do HD. She has been consistent with this desire.        ROS:     No nausea, vomiting, diarrhea, constipation, chest pain    Objective:    Scheduled Meds:    acetylcysteine 4 mL Nebulization Q8H - RT   amiodarone 400 mg Oral Q24H   aspirin 81 mg Oral Daily   castor oil-balsam peru  Topical Q12H   dexamethasone 4 mg Oral Q12H   divalproex 125 mg Oral Nightly   escitalopram 10 mg Oral Daily   insulin glargine 10 Units Subcutaneous Nightly   insulin lispro 0-7 Units Subcutaneous 4x Daily With Meals & Nightly   ipratropium-albuterol 3 mL Nebulization Q8H - RT   levothyroxine 75 mcg Oral Q AM   metoprolol tartrate 25 mg Oral Q12H   midodrine 10 mg Oral TID AC   pantoprazole 40 mg Oral Q AM   polyethylene glycol 17 g Oral Daily       Continuous Infusions:    Pharmacy to dose warfarin        PRN Meds:  •  acetaminophen  •  carboxymethylcellulose sod (PF)  •  cyclobenzaprine  •  dextrose  •  dextrose  •  glucagon (human recombinant)  •  HYDROmorphone  •  ondansetron  •  oxyCODONE-acetaminophen  •  Pharmacy to dose warfarin    Intake/Output:    Intake/Output Summary (Last 24 hours) at 10/14/2019 1419  Last data filed at 10/14/2019 1324  Gross per 24 hour   Intake 0 ml   Output --   Net 0 ml         Exam:    tMax 24 hrs: Temp (24hrs), Av.8 °F (36.6 °C), Min:97.5 °F (36.4 °C), Max:98.3 °F (36.8 °C)    Vitals  "Ranges:   Temp:  [97.5 °F (36.4 °C)-98.3 °F (36.8 °C)] 97.6 °F (36.4 °C)  Heart Rate:  [62-67] 62  Resp:  [16-24] 18  BP: (145-160)/(64-71) 152/68    /68 (BP Location: Left arm, Patient Position: Lying)   Pulse 62   Temp 97.6 °F (36.4 °C) (Oral)   Resp 18   Ht 165.1 cm (65\")   Wt 82.4 kg (181 lb 10.5 oz)   LMP  (LMP Unknown)   SpO2 98%   BMI 30.23 kg/m²     General: Awake.  No respiratory distress while on oxygen.    Neck: Supple, symmetrical, trachea midline, no adenopathy;              thyroid:  no enlargement/tenderness/nodules;              no carotid bruit or JVD  Cardiovascular: Normal rate, regular rhythm and intact distal pulses. Exam reveals no gallop and no friction rub. No murmur heard  Chest wall: No tenderness or deformity  Pulmonary:  Diminished breath sounds bilaterally, rhonchi at bases bilaterally.    Abdominal: Soft. Soft, non-tender, bowel sounds active all four quadrants,     no masses, no hepatomegaly, no splenomegaly.   Extremities: Normal, atraumatic, no cyanosis.  No edema in RLE. S/p L BKA, tender to palpation as well as mild edema in the mid left femur.  Pulses: 2 + symmetric all extremities  Neurological: Patient is alert and oriented to person, place                 CNII-XII intact, normal strength, sensation intact throughout  Skin: Skin color, texture, normal. Turgor is decreased. No rashes or lesions            Data Review:    Results from last 7 days   Lab Units 10/14/19  0450 10/13/19  0336 10/12/19  0408   WBC 10*3/mm3 4.87 4.93 4.50   HEMOGLOBIN g/dL 7.9* 7.7* 8.4*   HEMATOCRIT % 25.4* 25.8* 27.9*   PLATELETS 10*3/mm3 153 152 163       Results from last 7 days   Lab Units 10/14/19  0450 10/13/19  0336 10/12/19  0408   SODIUM mmol/L 131* 131* 132*   POTASSIUM mmol/L 4.1 3.6 4.4   CHLORIDE mmol/L 87* 88* 88*   CO2 mmol/L 25.4 25.2 24.8   BUN mg/dL 54* 32* 62*   CREATININE mg/dL 3.14* 2.25* 3.73*   CALCIUM mg/dL 9.4 9.4 9.5   GLUCOSE mg/dL 92 85 189*     Results from " last 7 days   Lab Units 10/14/19  0450 10/13/19  0335 10/12/19  0408   INR  2.84* 2.56* 1.92*             No results found for: TROPONINT    Microbiology Results (last 10 days)     ** No results found for the last 240 hours. **           Imaging Results (last 7 days)     ** No results found for the last 168 hours. **            Assessment:        Squamous cell lung cancer (CMS/HCC)    End stage renal disease (CMS/HCC)    Atelectasis of right lung    CAD (coronary artery disease)  Metastatic lung cancer  Acute respiratory failure with hypoxia  DM  PVD  CAD  HTN  Anemia CKD  Hypothyroidism  Pathologic fracture of the left femur  Atrial fibrillation  Thrombocytopenia  Anticoagulated on Coumadin.    Plan:    Unfortunately patient is not eating much.  Pain, however, is controlled.    Patient still desires to continue HD  A flutter with RVR: Rate controlled on current medications  Poor prognosis considering multiple comorbidities as well as metastatic lung cancer.  Monitor plt, better now  On coumadin, following INR, coumadin being adjusted  Continue Accu-Cheks and sliding scale insulin as well as Lantus.  Patient expressed desire to be alive and continue HD again today  Monitor and correct electrolytes  Monitor mental status  DVT/stress ulcer prophylaxis  Labs in         Taiwo Shankar MD  10/14/2019

## 2019-10-14 NOTE — PROGRESS NOTES
"   LOS: 29 days    Patient Care Team:  Taiwo Shankar MD as PCP - General (Internal Medicine)  Paulette Torres MD as Consulting Physician (Radiation Oncology)    Chief Complaint:    Chief Complaint   Patient presents with   • Shortness of Breath     Follow-up end-stage renal disease  Subjective     Interval History:   She remains conversant but is confused and not oriented; denies shortness of breath; denies any pain    Review of Systems:   See above.     Objective     Vital Signs  Temp:  [97.5 °F (36.4 °C)-98.3 °F (36.8 °C)] 97.6 °F (36.4 °C)  Heart Rate:  [60-65] 62  Resp:  [16-20] 16  BP: (145-160)/(64-71) 152/68    Flowsheet Rows      First Filed Value   Admission Height  165.1 cm (65\") Documented at 09/15/2019 1603   Admission Weight  113 kg (250 lb) Documented at 09/15/2019 1603          No intake/output data recorded.  No intake/output data recorded.    Intake/Output Summary (Last 24 hours) at 10/14/2019 1547  Last data filed at 10/14/2019 1324  Gross per 24 hour   Intake 0 ml   Output --   Net 0 ml       Physical Exam:  General Appearance: Awake, not oriented, appears to be chronically ill, no acute distress    Skin: warm and dry  HEENT: Anicteric sclerae, oral mucosa is normal   Neck: supple, no JVD, trachea midline  Lungs: CTA, unlabored breathing effort  Heart: Irregularly irregular, no S3, no rub  Abdomen: soft, non-tender, present bowel sounds to auscultation; not distended  : no palpable bladder,  Extremities: No edema, left BKA, right upper extremity AV fistula with thrill and bruit           Results Review:    Results from last 7 days   Lab Units 10/14/19  0450 10/13/19  0336 10/12/19  0408   SODIUM mmol/L 131* 131* 132*   POTASSIUM mmol/L 4.1 3.6 4.4   CHLORIDE mmol/L 87* 88* 88*   CO2 mmol/L 25.4 25.2 24.8   BUN mg/dL 54* 32* 62*   CREATININE mg/dL 3.14* 2.25* 3.73*   CALCIUM mg/dL 9.4 9.4 9.5   GLUCOSE mg/dL 92 85 189*       Estimated Creatinine Clearance: 21.2 mL/min (A) (by C-G formula " based on SCr of 3.14 mg/dL (H)).    Results from last 7 days   Lab Units 10/14/19  0450 10/13/19  0336 10/12/19  0408   PHOSPHORUS mg/dL 4.9* 3.4 5.4*             Results from last 7 days   Lab Units 10/14/19  0450 10/13/19  0336 10/12/19  0408 10/11/19  0343 10/10/19  0541   WBC 10*3/mm3 4.87 4.93 4.50 5.71 5.57   HEMOGLOBIN g/dL 7.9* 7.7* 8.4* 8.5* 8.8*   PLATELETS 10*3/mm3 153 152 163 153 110*       Results from last 7 days   Lab Units 10/14/19  0450 10/13/19  0335 10/12/19  0408 10/11/19  0343 10/10/19  1201   INR  2.84* 2.56* 1.92* 1.26* 1.08         Imaging Results (last 24 hours)     ** No results found for the last 24 hours. **          acetylcysteine 4 mL Nebulization Q8H - RT   amiodarone 400 mg Oral Q24H   aspirin 81 mg Oral Daily   castor oil-balsam peru  Topical Q12H   dexamethasone 4 mg Oral Q12H   divalproex 125 mg Oral Nightly   escitalopram 10 mg Oral Daily   insulin glargine 10 Units Subcutaneous Nightly   insulin lispro 0-7 Units Subcutaneous 4x Daily With Meals & Nightly   ipratropium-albuterol 3 mL Nebulization Q8H - RT   levothyroxine 75 mcg Oral Q AM   metoprolol tartrate 25 mg Oral Q12H   midodrine 10 mg Oral TID AC   pantoprazole 40 mg Oral Q AM   polyethylene glycol 17 g Oral Daily   warfarin 2.5 mg Oral Once       Pharmacy to dose warfarin        Medication Review:   Current Facility-Administered Medications   Medication Dose Route Frequency Provider Last Rate Last Dose   • acetaminophen (TYLENOL) tablet 650 mg  650 mg Oral Q4H Taiwo Garrett MD   650 mg at 09/21/19 0918   • acetylcysteine (MUCOMYST) 20 % nebulizer solution 4 mL  4 mL Nebulization Q8H - RT Alexander Schaefer MD   4 mL at 10/14/19 1448   • amiodarone (PACERONE) tablet 400 mg  400 mg Oral Q24H Janell Jimenez MD   400 mg at 10/14/19 0957   • aspirin EC tablet 81 mg  81 mg Oral Daily Taiwo Shankar MD   81 mg at 10/14/19 0959   • carboxymethylcellulose sod (PF) 1 % eye gel 1 drop  1 drop Both Eyes TID PRN Taiwo Shankar  MD       • castor oil-balsam peru (VENELEX) ointment   Topical Q12H Taiwo Shankar MD   5 g at 10/14/19 0957   • cyclobenzaprine (FLEXERIL) tablet 5 mg  5 mg Oral TID PRN Alejandro Kramer MD   5 mg at 10/07/19 0015   • dexamethasone (DECADRON) tablet 4 mg  4 mg Oral Q12H Alexander Schaefer MD   4 mg at 10/14/19 0959   • dextrose (D50W) 25 g/ 50mL Intravenous Solution 25 g  25 g Intravenous Q15 Min PRN Taiwo Shankar MD       • dextrose (GLUTOSE) oral gel 15 g  15 g Oral Q15 Min WATSONN Taiwo Shankar MD       • divalproex (DEPAKOTE) DR tablet 125 mg  125 mg Oral Nightly Taiwo Shankar MD   125 mg at 10/13/19 2009   • escitalopram (LEXAPRO) tablet 10 mg  10 mg Oral Daily Taiwo Shankar MD   10 mg at 10/14/19 0957   • glucagon (human recombinant) (GLUCAGEN DIAGNOSTIC) injection 1 mg  1 mg Subcutaneous Q15 Min PRN Taiwo Shankar MD       • HYDROmorphone (DILAUDID) injection 0.5 mg  0.5 mg Intravenous Q2H PRN Taiwo Shankar MD   0.5 mg at 10/14/19 0956   • insulin glargine (LANTUS) injection 10 Units  10 Units Subcutaneous Nightly Taiwo Shankar MD   10 Units at 10/13/19 2153   • insulin lispro (humaLOG) injection 0-7 Units  0-7 Units Subcutaneous 4x Daily With Meals & Nightly Taiwo Shankar MD   2 Units at 10/14/19 1224   • ipratropium-albuterol (DUO-NEB) nebulizer solution 3 mL  3 mL Nebulization Q8H - RT Alexander Schaefer MD   3 mL at 10/14/19 1446   • levothyroxine (SYNTHROID, LEVOTHROID) tablet 75 mcg  75 mcg Oral Q AM Taiwo Shankar MD   75 mcg at 10/14/19 0602   • metoprolol tartrate (LOPRESSOR) tablet 25 mg  25 mg Oral Q12H Ev Mclean PA   25 mg at 10/14/19 0959   • midodrine (PROAMATINE) tablet 10 mg  10 mg Oral TID Ramesh Johnson MD   10 mg at 10/14/19 1224   • ondansetron (ZOFRAN) injection 4 mg  4 mg Intravenous Q4H Taiwo Garrett MD   4 mg at 10/07/19 0845   • oxyCODONE-acetaminophen (PERCOCET)  MG per tablet 1 tablet  1 tablet Oral Q4H Taiwo Garrett MD  "  1 tablet at 10/14/19 1034   • pantoprazole (PROTONIX) EC tablet 40 mg  40 mg Oral Q AM Taiwo Shankar MD   40 mg at 10/14/19 0601   • Pharmacy to dose warfarin   Does not apply Continuous PRN Virgie Lawrence APRN       • polyethylene glycol 3350 powder (packet)  17 g Oral Daily Taiwo Shankar MD   17 g at 10/14/19 0957   • warfarin (COUMADIN) tablet 2.5 mg  2.5 mg Oral Once Virgie Lawrence APRN           Assessment/Plan   1. ESRD.  Volume status and electrolytes stable; last HD was 10/12/2019  2. 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 Palliative XRT.    3. DM2 bs better  on scheduled insulin.   4. CAD  5. AFib/flutter, treated per cardiology   6. Thrombocytopenia, resolved  7. Hypothyroidism. On Replacement.   8.  Anemia CKD  9.  Hypertension    Plan:  1.  Palliative care note reviewed, with comment that \"aggressive care\" desired.  I do not believe she has the cognitive ability to direct her care   2.  Will continue HD TTS for now  3.  Will decrease midodrine to just dialysis days only          Froy Sanchez MD  10/14/19  3:47 PM    "

## 2019-10-14 NOTE — PROGRESS NOTES
Malnutrition Severity Assessment    Patient Name:  Melia Almieda  YOB: 1965  MRN: [id number]  Admit Date:  9/15/2019    Patient meets criteria for : Moderate (non-severe) Malnutrition      Malnutrition Severity Assessment  Malnutrition Type: Chronic Disease - Related Malnutrition     Malnutrition Type (last 8 hours)      Malnutrition Severity Assessment     Row Name 10/14/19 1412       Malnutrition Severity Assessment    Malnutrition Type  Chronic Disease - Related Malnutrition    Row Name 10/14/19 1412       Insufficient Energy Intake     Insufficient Energy Intake   <75% of est. energy requirement for > or equal to 1 month    Row Name 10/14/19 1412       Muscle Loss    Loss of Muscle Mass Findings  Moderate    [place]  Moderate - slight depression    Row Name 10/14/19 1412       Criteria Met (Must meet criteria for severity in at least 2 of these categories: M Wasting, Fat Loss, Fluid, Secondary Signs, Wt. Status, Intake)    Patient meets criteria for   Moderate (non-severe) Malnutrition          Electronically signed by:  Glenna Lovett RD  10/14/19 2:20 PM

## 2019-10-14 NOTE — PROGRESS NOTES
Adult Nutrition  Assessment/PES    Patient Name:  Melia Almeida  YOB: 1965  MRN: [id number]  Admit Date:  9/15/2019    Assessment Date:  10/14/2019  Nutrition follow up. Palliative care has signed off per request to continue aggressive interventions.  Minimal po intake. Patient now malnourished-over 20# weight loss since admission, muscle wasting is present.  Provided nutrition therapy. Encouraged adequate po intake. Nutritional supplement ordered TID. RD to follow  Reason for Assessment     Row Name 10/14/19 1411          Reason for Assessment    Reason For Assessment  follow-up protocol         Nutrition/Diet History     Row Name 10/14/19 1411          Nutrition/Diet History    Typical Food/Fluid Intake  minimal po intake-0% of meals recorded          Anthropometrics     Row Name 10/14/19 0639          Anthropometrics    Weight  82.4 kg (181 lb 10.5 oz)         Labs/Tests/Procedures/Meds     Row Name 10/14/19 1411          Labs/Procedures/Meds    Lab Results Reviewed  reviewed, pertinent        Diagnostic Tests/Procedures    Diagnostic Test/Procedure Reviewed  reviewed, pertinent        Medications    Pertinent Medications Reviewed  reviewed, pertinent         Physical Findings     Row Name 10/14/19 1412          Physical Findings    Overall Physical Appearance  loss of muscle mass 20# in the past month since admission           Nutrition Prescription Ordered     Row Name 10/14/19 1412          Nutrition Prescription PO    Current PO Diet  Regular     Supplement  Novasource Renal (N[place]ro)     Supplement Frequency  3 times a day         Evaluation of Received Nutrient/Fluid Intake     Row Name 10/14/19 1412          PO Evaluation    % PO Intake  0           Malnutrition Severity Assessment     Row Name 10/14/19 1412          Malnutrition Severity Assessment    Malnutrition Type  Chronic Disease - Related Malnutrition        Insufficient Energy Intake     Insufficient Energy Intake   <75% of est.  energy requirement for > or equal to 1 month        Muscle Loss    Loss of Muscle Mass Findings  Moderate     [place]  Moderate - slight depression        Criteria Met (Must meet criteria for severity in at least 2 of these categories: M Wasting, Fat Loss, Fluid, Secondary Signs, Wt. Status, Intake)    Patient meets criteria for   Moderate (non-severe) Malnutrition           Problem/Interventions:  Problem 1     Row Name 10/14/19 1414          Nutrition Diagnoses Problem 1    Problem 1  Malnutrition     Etiology (related to)  MNT for Treatment/Condition     Signs/Symptoms (evidenced by)  Report of Mnimal PO Intake;Unintended Weight Change     Unintended Weight Change  Loss     Number of Pounds Lost  20#     Weight loss time period  1 month                 Intervention Goal     Row Name 10/14/19 1414          Intervention Goal    General  Maintain nutrition;Meet nutritional needs for age/condition     PO  Tolerate PO;Increase intake     Weight  Maintain weight         Nutrition Intervention     Row Name 10/14/19 1416          Nutrition Intervention    RD/Tech Action  Care plan reviewd;Follow Tx progress;Interview for preference         Nutrition Prescription     Row Name 10/14/19 1416          Nutrition Prescription PO    PO Prescription  Begin/change supplement     Supplement  Nova Renal     Supplement Frequency  3 times a day     New PO Prescription Ordered?  Yes         Education/Evaluation     Row Name 10/14/19 1417          Education    Education  Will Instruct as appropriate        Monitor/Evaluation    Monitor  Per protocol;I&O;PO intake;Supplement intake;Pertinent labs;Weight;Skin status           Electronically signed by:  Glenna Lovett RD  10/14/19 2:19 PM

## 2019-10-14 NOTE — PLAN OF CARE
Problem: Patient Care Overview  Goal: Plan of Care Review  Outcome: Ongoing (interventions implemented as appropriate)   10/14/19 0324   Coping/Psychosocial   Plan of Care Reviewed With patient   Plan of Care Review   Progress no change   OTHER   Outcome Summary Pt cont in NSR and INR therapeutic, heparin gtt off. Pt given pain meds x1 this shift. Pt did not want to be turned but did it. Cont to monitor,safety maintained.       10/14/19 0324   Coping/Psychosocial   Plan of Care Reviewed With patient   Plan of Care Review   Progress no change   OTHER   Outcome Summary Pt cont in NSR and INR therapeutic, heparin gtt off. Pt given pain meds x1 this shift. Pt did not want to be turned but did it. Cont to monitor,safety maintained.

## 2019-10-14 NOTE — PROGRESS NOTES
Pharmacy Consult: Warfarin Dosing    Melia Almeida is a 54 y.o. female, estimated creatinine clearance is 15.5 mL/min (A) (by C-G formula based on SCr of 4.35 mg/dL (H)). weighing 84.8 kg (187 lb).     has a past medical history of Acute renal failure on dialysis (CMS/[facility]), Adult failure to thrive, Chronic kidney disease (CKD), stage V (CMS/[facility]), Coronary artery disease, Diabetes mellitus (CMS/[facility]), Diabetes mellitus with chronic kidney disease (CMS/[facility]), Disease of thyroid gland, GERD (gastroesophageal reflux disease), History of anemia, History of UTI, Hyperlipidemia, Hypertension, Idiopathic neuropathy, Major depressive disorder, recurrent episode, severe, with psychotic behavior (CMS/[facility]), Metabolic encephalopathy, Obesity, Osteomyelitis of right foot (CMS/[facility]), Other symbolic dysfunctions, Peripheral vascular disease, unspecified (CMS/[facility]), and Personal history of diabetic foot ulcer.    Social History     Tobacco Use   • Smoking status: Current Every Day Smoker     Types: Cigarettes   • Smokeless tobacco: Never Used   • Tobacco comment: 4 CIG./PER DAY   Substance Use Topics   • Alcohol use: No   • Drug use: No       Results from last 7 days   Lab Units 10/13/19  0336 10/13/19  0335 10/12/19  1432 10/12/19  0408 10/11/19  0343 10/10/19  1201 10/10/19  0541 10/09/19  2052  10/09/19  1037 10/09/19  0351  10/08/19  0505   INR   --  2.56*  --  1.92* 1.26* 1.08  --   --   --   --   --   --   --    APTT seconds  --  64.2* 74.9* 134.5* 85.2* 88.0* 72.8* 62.6*   < >  --  56.9*   < > 65.2*   HEMOGLOBIN g/dL 7.7*  --   --  8.4* 8.5*  --  8.8*  --   --  9.0* 8.4*  --  11.0*   HEMATOCRIT % 25.8*  --   --  27.9* 27.9*  --  27.6*  --   --  29.2* 27.2*  --  34.5   PLATELETS 10*3/mm3 152  --   --  163 153  --  110*  --   --  153 148  --  165    < > = values in this interval not displayed.     Results from last 7 days   Lab Units 10/13/19  0336 10/12/19  0408 10/11/19  0343   SODIUM mmol/L 131* 132* 133*   POTASSIUM mmol/L 3.6  4.4 4.2   CHLORIDE mmol/L 88* 88* 87*   CO2 mmol/L 25.2 24.8 22.8   BUN mg/dL 32* 62* 35*   CREATININE mg/dL 2.25* 3.73* 2.66*   CALCIUM mg/dL 9.4 9.5 9.1   GLUCOSE mg/dL 85 189* 104*     Anticoagulation history:   Warfarin consult - on amiodarone (new start inpatient). Warfarin 5 mg PO daily was started on 10/8.     Hospital Anticoagulation:  Consulting provider: LAURA Bach  Start date: 10/10/19  Indication: Atrial fibrillation  Target INR: 2-3  Expected duration: chronic  Bridge Therapy: Yes    Date 10/8 10/9 10/10 10/11 10/12 10/13 10/14      INR   1.08 1.26 1.92 2.56 2.84      Warfarin dose 5 mg 5 mg 7.5 mg 7.5 mg 5 mg 2.5mg 2.5mg        Potential drug interactions:   Amiodarone - may result in increased INR and an increased risk of bleeding.  Aspirin - may result in increased risk of bleeding.  Dexamethasone - may result in increased risk of bleeding or diminished effects of warfarin.  Escitalopram - may result in an increased risk of bleeding.  Glucagon - may result in an increased risk of bleeding.  Heparin - may result in increased risk of bleeding.  Pantoprazole - may result in increased International Normalized Ratio (INR) and prothrombin time.      Relevant nutrition status: Nutrition supplements TID    Other:     Education complete: No  Date:     Assessment/Plan:  Dose: INR slightly increased from yesterday. Dose was decreased yesterday from 5 mg to 2.5 mg. Will continue today with a dose of warfarin 2.5 mg po x1.  Monitor: Daily PT/INR  Follow up: 10/15/19    Pharmacy will continue to follow until discharge or discontinuation of warfarin.     Clifford Poole, PharmD  10/14/2019  2:19 PM

## 2019-10-14 NOTE — CONSULTS
Per chart review, plan of care to continue dialysis and another aggressive interventions if needed.  The palliative care team will sign off for now. Please reconsult if needed.  Thank you.

## 2019-10-15 NOTE — PROGRESS NOTES
DAILY PROGRESS NOTE  [facility], [facility]    10/15/2019    Patient Identification:  Name: Melia Almeida  Age: 54 y.o.  Sex: female  :  1965  MRN: [id number]           Primary Care Physician: Taiwo Shankar MD    Subjective:    Interval History:    Patient is more awake today, she seems more oriented as well this afternoon.  Still awaiting management  Had dialysis today, tolerated very well  Midodrine decreased  On p.o. amiodarone as well as metoprolol, heart rate okay.   Blood sugar in the low side this morning   INR 3.85, holding Coumadin today.  Hemoglobin 8.2.    Still expressing that she wants to live and continue to do HD. She has been consistent with this desire.        ROS:     No nausea, vomiting, diarrhea, constipation, chest pain    Objective:    Scheduled Meds:    acetylcysteine 4 mL Nebulization Q8H - RT   amiodarone 400 mg Oral Q24H   aspirin 81 mg Oral Daily   castor oil-balsam peru  Topical Q12H   dexamethasone 4 mg Oral Q12H   divalproex 125 mg Oral Nightly   escitalopram 10 mg Oral Daily   insulin glargine 10 Units Subcutaneous Nightly   insulin lispro 0-7 Units Subcutaneous 4x Daily With Meals & Nightly   ipratropium-albuterol 3 mL Nebulization Q8H - RT   levothyroxine 75 mcg Oral Q AM   metoprolol tartrate 25 mg Oral Q12H   midodrine 10 mg Oral Once per day on  Sat   And      midodrine 10 mg Oral Once per day on Tue Thu Sat   And      midodrine 10 mg Oral Once per day on  Sat   pantoprazole 40 mg Oral Q AM   polyethylene glycol 17 g Oral Daily       Continuous Infusions:    Pharmacy to dose warfarin        PRN Meds:  •  acetaminophen  •  carboxymethylcellulose sod (PF)  •  cyclobenzaprine  •  dextrose  •  dextrose  •  glucagon (human recombinant)  •  HYDROmorphone  •  ondansetron  •  oxyCODONE-acetaminophen  •  Pharmacy to dose warfarin    Intake/Output:    Intake/Output Summary (Last 24 hours) at 10/15/2019 1641  Last data filed  "at 10/15/2019 1502  Gross per 24 hour   Intake 240 ml   Output 2000 ml   Net -1760 ml         Exam:    tMax 24 hrs: Temp (24hrs), Av °F (36.7 °C), Min:97.6 °F (36.4 °C), Max:99.2 °F (37.3 °C)    Vitals Ranges:   Temp:  [97.6 °F (36.4 °C)-99.2 °F (37.3 °C)] 99.2 °F (37.3 °C)  Heart Rate:  [60-69] 68  Resp:  [16-18] 18  BP: (128-166)/(58-69) 131/58    /58 (BP Location: Left arm, Patient Position: Lying)   Pulse 68   Temp 99.2 °F (37.3 °C) (Oral)   Resp 18   Ht 165.1 cm (65\")   Wt 84.6 kg (186 lb 6.4 oz)   LMP  (LMP Unknown)   SpO2 94%   BMI 31.02 kg/m²     General: Awake.  No respiratory distress while on oxygen.    Neck: Supple, symmetrical, trachea midline, no adenopathy;              thyroid:  no enlargement/tenderness/nodules;              no carotid bruit or JVD  Cardiovascular: Normal rate, regular rhythm and intact distal pulses. Exam reveals no gallop and no friction rub. No murmur heard  Chest wall: No tenderness or deformity  Pulmonary:  Diminished breath sounds bilaterally, rhonchi at bases bilaterally.    Abdominal: Soft. Soft, non-tender, bowel sounds active all four quadrants,     no masses, no hepatomegaly, no splenomegaly.   Extremities: Normal, atraumatic, no cyanosis.  No edema in RLE. S/p L BKA, tender to palpation as well as mild edema in the mid left femur.  Pulses: 2 + symmetric all extremities  Neurological: Patient is alert and oriented to person, place                 CNII-XII intact, normal strength, sensation intact throughout  Skin: Skin color, texture, normal. Turgor is decreased. No rashes or lesions            Data Review:    Results from last 7 days   Lab Units 10/15/19  0517 10/14/19  0450 10/13/19  0336   WBC 10*3/mm3 5.17 4.87 4.93   HEMOGLOBIN g/dL 8.2* 7.9* 7.7*   HEMATOCRIT % 26.7* 25.4* 25.8*   PLATELETS 10*3/mm3 167 153 152       Results from last 7 days   Lab Units 10/15/19  0517 10/14/19  0450 10/13/19  0336   SODIUM mmol/L 126* 131* 131*   POTASSIUM mmol/L " 4.6 4.1 3.6   CHLORIDE mmol/L 83* 87* 88*   CO2 mmol/L 26.5 25.4 25.2   BUN mg/dL 74* 54* 32*   CREATININE mg/dL 3.63* 3.14* 2.25*   CALCIUM mg/dL 9.5 9.4 9.4   GLUCOSE mg/dL 74 92 85     Results from last 7 days   Lab Units 10/15/19  0517 10/14/19  0450 10/13/19  0335   INR  3.95* 2.84* 2.56*               Assessment:        Squamous cell lung cancer (CMS/HCC)    End stage renal disease (CMS/HCC)    Atelectasis of right lung    CAD (coronary artery disease)  Metastatic lung cancer  Acute respiratory failure with hypoxia  DM  PVD  CAD  HTN  Anemia CKD  Hypothyroidism  Pathologic fracture of the left femur  Atrial fibrillation  Thrombocytopenia  Anticoagulated on Coumadin.    Plan:    She is more alert today, still not quite oriented.  Pain is controlled  Patient still desires to continue HD  A flutter with RVR: Rate controlled on current medications  Poor prognosis considering multiple comorbidities as well as metastatic lung cancer.  On coumadin, INR supratherapeutic, holding it now.    Continue Accu-Cheks and sliding scale insulin as well as Lantus.  Patient expressed desire to be alive and continue HD again today  Monitor and correct electrolytes  Monitor mental status  DVT/stress ulcer prophylaxis  She is been more stable during the last couple of days, she still want to have dialysis.  However she still has her left femoral fracture that will cause a lot of pain if not treated before discharge.  Very difficult situation.  Labs in         Taiwo Shankar MD  10/15/2019

## 2019-10-15 NOTE — PLAN OF CARE
Problem: Patient Care Overview  Goal: Plan of Care Review  Outcome: Ongoing (interventions implemented as appropriate)   10/15/19 0324   Coping/Psychosocial   Plan of Care Reviewed With patient   Plan of Care Review   Progress no change   OTHER   Outcome Summary VSS, NO ACUTE DISTRESS NOTED, LOTS OF PAIN WITH REPOSITIONING, SAFETY MAINTAINED, CONTINUE PLAN OF CARE     Goal: Individualization and Mutuality  Outcome: Ongoing (interventions implemented as appropriate)    Goal: Discharge Needs Assessment  Outcome: Ongoing (interventions implemented as appropriate)      Problem: Fall Risk (Adult)  Goal: Absence of Fall  Outcome: Ongoing (interventions implemented as appropriate)      Problem: Hemodialysis (Adult)  Goal: Signs and Symptoms of Listed Potential Problems Will be Absent, Minimized or Managed (Hemodialysis)  Outcome: Ongoing (interventions implemented as appropriate)      Problem: Pneumonia (Adult)  Goal: Signs and Symptoms of Listed Potential Problems Will be Absent, Minimized or Managed (Pneumonia)  Outcome: Ongoing (interventions implemented as appropriate)      Problem: Skin Injury Risk (Adult)  Goal: Skin Health and Integrity  Outcome: Ongoing (interventions implemented as appropriate)      Problem: Confusion, Acute (Adult)  Goal: Safety  Outcome: Ongoing (interventions implemented as appropriate)

## 2019-10-15 NOTE — PROGRESS NOTES
Patient: Melia Almeida  YOB: 1965    RADIATION THERAPY TREATMENT SUMMARY  Diagnosis: Squamous cell carcinoma of lung, unspecified laterality (CMS/HCC)    Patient Care Team:  Taiwo Shankar MD as PCP - General (Internal Medicine)  Paulette Torres MD as Consulting Physician (Radiation Oncology)    Melia Almeida has recently completed the course of radiation therapy prescribed for the above-mentioned diagnosis.  Below, please find the specifics of the course of treatment delivered:    Radiation Details:    Dates of treatment:  9/24/2019 - 10/3/2019, 9/24/2019 - 10/3/2019, 9/24/2019 - 10/3/2019 and 9/25/2019 - 10/3/2019.  Treatment Site - R LUNG MEDIASTINUM  Treatment Intent - Palliative  Total Dose in cGy - 2400  Number of Treatments - 8  Dose per fraction - 300 cGy per fraction  Fractions per day - 1 fx/day  Fractions per week - 5 fx/week  Treatment Type - AP/PA fields, 3D  Energy - 18 MVP  Normalization - Isocenter, See below  Imaging/Field Verification - Simulation before first treatment to verify field, blocking, placement and positioning, Simulation for all ports of change, Weekly port films of all ports  Additional comments: - PRESC 97%    Treatment Site - RT HIP  Treatment Intent - Palliative  Total Dose in cGy - 2400  Number of Treatments - 8  Dose per fraction - 300 cGy per fraction  Fractions per day - 1 fx/day  Fractions per week - 5 fx/week  Treatment Type - AP/PA fields, 3D  Energy - 18 MVP  Normalization - Isocenter, Prescribed at 100%  Imaging/Field Verification - Simulation before first treatment to verify field, blocking, placement and positioning, Simulation for all ports of change, Weekly port films of all ports    Treatment Site - LT FEMUR  Treatment Intent - Palliative  Total Dose in cGy - 2400  Number of Treatments - 8  Dose per fraction - 300 cGy per fraction  Fractions per day - 1 fx/day  Fractions per week - 5 fx/week  Treatment Type - AP/PA fields, 3D  Energy - 18  MVP  Normalization - Isocenter, Prescribed at 100%  Imaging/Field Verification - Simulation before first treatment to verify field, blocking, placement and positioning, Simulation for all ports of change, Weekly port films of all ports      Treatment Site - T2-L3  Treatment Intent - Palliative  Total Dose in cGy - 2100  Number of Treatments - 7  Dose per fraction - 300 cGy per fraction  Fractions per day - 1 fx/day  Fractions per week - 5 fx/week  Treatment Type - AP/PA fields  Energy - 18 MVP  Normalization - Isocenter, Prescribed at 100%  Imaging/Field Verification - Simulation before first treatment to verify field, blocking, placement and positioning, Simulation for all ports of change, Weekly port films of all ports      Tolerance and Toxicities: she tolerated the treatments fairly well, pain improved in back, denies n/v/abd pain, she required no treatment breaks however patient discontinued treatments after 8 of 10 and 7 of 10 planned treatments and 2400cGy of the 3000cGy and 2100cGy of the 3000cGy originally planned to enroll with hospice . she completed the treatments in 9 elapsed days.     Follow-up Plans:  Patient is going to hospice.

## 2019-10-15 NOTE — PROGRESS NOTES
"   LOS: 30 days    Patient Care Team:  Taiwo Shankar MD as PCP - General (Internal Medicine)  Paulette Torres MD as Consulting Physician (Radiation Oncology)    Chief Complaint:    Chief Complaint   Patient presents with   • Shortness of Breath     Follow-up end-stage renal disease  Subjective     Interval History:   Has pain with any movement of her legs; continues to think it is January; denies shortness of breath; eating breakfast; will have HD shortly    Review of Systems:   See above.     Objective     Vital Signs  Temp:  [97.6 °F (36.4 °C)-97.9 °F (36.6 °C)] 97.9 °F (36.6 °C)  Heart Rate:  [60-66] 60  Resp:  [16-18] 18  BP: (143-166)/(59-69) 143/59    Flowsheet Rows      First Filed Value   Admission Height  165.1 cm (65\") Documented at 09/15/2019 1603   Admission Weight  113 kg (250 lb) Documented at 09/15/2019 1603          No intake/output data recorded.  No intake/output data recorded.    Intake/Output Summary (Last 24 hours) at 10/15/2019 0839  Last data filed at 10/14/2019 1730  Gross per 24 hour   Intake 0 ml   Output --   Net 0 ml       Physical Exam:  General Appearance: Awake, not oriented, appears to be chronically ill, no acute distress    Skin: warm and dry  HEENT: Anicteric sclerae, oral mucosa is normal   Neck: supple, no JVD, trachea midline  Lungs: CTA, unlabored breathing effort  Heart: Irregularly irregular, no S3, no rub  Abdomen: soft, non-tender, present bowel sounds to auscultation; not distended  : no palpable bladder,  Extremities: No edema, left BKA, right upper extremity AV fistula with thrill and bruit           Results Review:    Results from last 7 days   Lab Units 10/15/19  0517 10/14/19  0450 10/13/19  0336   SODIUM mmol/L 126* 131* 131*   POTASSIUM mmol/L 4.6 4.1 3.6   CHLORIDE mmol/L 83* 87* 88*   CO2 mmol/L 26.5 25.4 25.2   BUN mg/dL 74* 54* 32*   CREATININE mg/dL 3.63* 3.14* 2.25*   CALCIUM mg/dL 9.5 9.4 9.4   GLUCOSE mg/dL 74 92 85       Estimated Creatinine " Clearance: 18.6 mL/min (A) (by C-G formula based on SCr of 3.63 mg/dL (H)).    Results from last 7 days   Lab Units 10/15/19  0517 10/14/19  0450 10/13/19  0336   PHOSPHORUS mg/dL 6.4* 4.9* 3.4             Results from last 7 days   Lab Units 10/15/19  0517 10/14/19  0450 10/13/19  0336 10/12/19  0408 10/11/19  0343   WBC 10*3/mm3 5.17 4.87 4.93 4.50 5.71   HEMOGLOBIN g/dL 8.2* 7.9* 7.7* 8.4* 8.5*   PLATELETS 10*3/mm3 167 153 152 163 153       Results from last 7 days   Lab Units 10/15/19  0517 10/14/19  0450 10/13/19  0335 10/12/19  0408 10/11/19  0343   INR  3.95* 2.84* 2.56* 1.92* 1.26*         Imaging Results (last 24 hours)     ** No results found for the last 24 hours. **          acetylcysteine 4 mL Nebulization Q8H - RT   amiodarone 400 mg Oral Q24H   aspirin 81 mg Oral Daily   castor oil-balsam peru  Topical Q12H   dexamethasone 4 mg Oral Q12H   divalproex 125 mg Oral Nightly   escitalopram 10 mg Oral Daily   insulin glargine 10 Units Subcutaneous Nightly   insulin lispro 0-7 Units Subcutaneous 4x Daily With Meals & Nightly   ipratropium-albuterol 3 mL Nebulization Q8H - RT   levothyroxine 75 mcg Oral Q AM   metoprolol tartrate 25 mg Oral Q12H   midodrine 10 mg Oral Once per day on Tue Thu Sat   And      midodrine 10 mg Oral Once per day on Tue Thu Sat   And      midodrine 10 mg Oral Once per day on Tue Thu Sat   pantoprazole 40 mg Oral Q AM   polyethylene glycol 17 g Oral Daily       Pharmacy to dose warfarin        Medication Review:   Current Facility-Administered Medications   Medication Dose Route Frequency Provider Last Rate Last Dose   • acetaminophen (TYLENOL) tablet 650 mg  650 mg Oral Q4H Taiwo Garrett MD   650 mg at 09/21/19 0918   • acetylcysteine (MUCOMYST) 20 % nebulizer solution 4 mL  4 mL Nebulization Q8H - RT Alexander Schaefer MD   4 mL at 10/15/19 0721   • amiodarone (PACERONE) tablet 400 mg  400 mg Oral Q24H Janell Jimenez MD   400 mg at 10/14/19 0957   • aspirin EC tablet 81 mg  81 mg  Oral Daily Taiwo Shankar MD   81 mg at 10/14/19 0959   • carboxymethylcellulose sod (PF) 1 % eye gel 1 drop  1 drop Both Eyes TID WATSONN Taiwo Shankar MD       • castor oil-balsam peru (VENELEX) ointment   Topical Q12H Taiwo Shankar MD   5 g at 10/15/19 0813   • cyclobenzaprine (FLEXERIL) tablet 5 mg  5 mg Oral TID PRN Alejandro Kramer MD   5 mg at 10/07/19 0015   • dexamethasone (DECADRON) tablet 4 mg  4 mg Oral Q12H Alexander Schaefer MD   4 mg at 10/15/19 0804   • dextrose (D50W) 25 g/ 50mL Intravenous Solution 25 g  25 g Intravenous Q15 Min Taiwo Garrett MD       • dextrose (GLUTOSE) oral gel 15 g  15 g Oral Q15 Min Taiwo Garrett MD       • divalproex (DEPAKOTE) DR tablet 125 mg  125 mg Oral Nightly Taiwo Shankar MD   125 mg at 10/14/19 2032   • escitalopram (LEXAPRO) tablet 10 mg  10 mg Oral Daily Taiwo Shankar MD   10 mg at 10/14/19 0957   • glucagon (human recombinant) (GLUCAGEN DIAGNOSTIC) injection 1 mg  1 mg Subcutaneous Q15 Min WATSONN Taiwo Shankar MD       • HYDROmorphone (DILAUDID) injection 0.5 mg  0.5 mg Intravenous Q2H PRN Taiwo Shankar MD   0.5 mg at 10/14/19 1651   • insulin glargine (LANTUS) injection 10 Units  10 Units Subcutaneous Nightly Taiwo Shankar MD   10 Units at 10/14/19 2107   • insulin lispro (humaLOG) injection 0-7 Units  0-7 Units Subcutaneous 4x Daily With Meals & Nightly Taiwo Shankar MD   2 Units at 10/14/19 1224   • ipratropium-albuterol (DUO-NEB) nebulizer solution 3 mL  3 mL Nebulization Q8H - RT Alexander Schaefer MD   3 mL at 10/15/19 0721   • levothyroxine (SYNTHROID, LEVOTHROID) tablet 75 mcg  75 mcg Oral Q AM Taiwo Shankar MD   75 mcg at 10/15/19 0519   • metoprolol tartrate (LOPRESSOR) tablet 25 mg  25 mg Oral Q12H Ev Mclean PA   25 mg at 10/14/19 2032   • midodrine (PROAMATINE) tablet 10 mg  10 mg Oral Once per day on Tue Thu Froy Avelar MD   10 mg at 10/15/19 0804    And   • midodrine (PROAMATINE) tablet 10 mg   "10 mg Oral Once per day on Tue Thu Froy Avelar MD        And   • midodrine (PROAMATINE) tablet 10 mg  10 mg Oral Once per day on Tue Thu Froy Avelar MD       • ondansetron (ZOFRAN) injection 4 mg  4 mg Intravenous Q4H PRN Taiwo Shankar MD   4 mg at 10/07/19 0845   • oxyCODONE-acetaminophen (PERCOCET)  MG per tablet 1 tablet  1 tablet Oral Q4H PRN Taiwo Shankar MD   1 tablet at 10/15/19 0816   • pantoprazole (PROTONIX) EC tablet 40 mg  40 mg Oral Q AM Taiwo Shankar MD   40 mg at 10/15/19 0519   • Pharmacy to dose warfarin   Does not apply Continuous PRN Virgie Lawrence APRN       • polyethylene glycol 3350 powder (packet)  17 g Oral Daily Taiwo Shankar MD   17 g at 10/14/19 0957       Assessment/Plan   1. ESRD.  Volume status and electrolytes stable other than hyponatremia due to water excess in the setting of ESRD; last HD was 10/12/2019  2. 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 Palliative XRT.    3. DM2 bs better on scheduled insulin.   4. CAD  5. AFib/flutter, treated per cardiology   6. Thrombocytopenia, resolved  7. Hypothyroidism. On Replacement.   8.  Anemia CKD  9.  Hypertension    Plan:  1.  Palliative care note reviewed, with comment that \"aggressive care\" desired.  I do not believe she has the cognitive ability to direct her care   2.  HD today (TTS schedule)  3.  Will reduce midodrine further if blood pressures allow      Froy Sanchez MD  10/15/19  8:39 AM    "

## 2019-10-15 NOTE — PROGRESS NOTES
LOS: 30 days   Patient Care Team:  Taiwo Shankar MD as PCP - General (Internal Medicine)  Paulette Torres MD as Consulting Physician (Radiation Oncology)    Chief Complaint: Follow-up atrial fibrillation, ischemic cardiomyopathy       Interval History: She is currently receiving dialysis, and I assessed her in the dialysis unit.  She denies any cardiac complaints.       Objective   Vital Signs  Temp:  [97.6 °F (36.4 °C)-97.9 °F (36.6 °C)] 97.9 °F (36.6 °C)  Heart Rate:  [60-66] 60  Resp:  [16-18] 18  BP: (143-166)/(59-69) 143/59    Intake/Output Summary (Last 24 hours) at 10/15/2019 0903  Last data filed at 10/14/2019 1730  Gross per 24 hour   Intake 0 ml   Output --   Net 0 ml           Physical Exam   Constitutional: She is oriented to person, place, and time. She appears well-developed and well-nourished. No distress.   HENT:   Head: Normocephalic and atraumatic.   Eyes: Pupils are equal, round, and reactive to light.   Neck: No JVD present. No thyromegaly present.   Cardiovascular: Normal rate, regular rhythm, normal heart sounds and intact distal pulses.   No murmur heard.  Pulmonary/Chest: Effort normal and breath sounds normal. No respiratory distress.   Abdominal: Soft. Bowel sounds are normal. She exhibits no distension. There is no splenomegaly or hepatomegaly. There is no tenderness.   Musculoskeletal: Normal range of motion. She exhibits no edema.   Left BKA   Neurological: She is alert and oriented to person, place, and time.   Skin: Skin is warm and dry. She is not diaphoretic. No erythema.   Psychiatric: She has a normal mood and affect. Her behavior is normal. Judgment normal.       Results Review:      Results from last 7 days   Lab Units 10/15/19  0517 10/14/19  0450 10/13/19  0336   SODIUM mmol/L 126* 131* 131*   POTASSIUM mmol/L 4.6 4.1 3.6   CHLORIDE mmol/L 83* 87* 88*   CO2 mmol/L 26.5 25.4 25.2   BUN mg/dL 74* 54* 32*   CREATININE mg/dL 3.63* 3.14* 2.25*   GLUCOSE mg/dL 74 92 85    CALCIUM mg/dL 9.5 9.4 9.4         Results from last 7 days   Lab Units 10/15/19  0517 10/14/19  0450 10/13/19  0336   WBC 10*3/mm3 5.17 4.87 4.93   HEMOGLOBIN g/dL 8.2* 7.9* 7.7*   HEMATOCRIT % 26.7* 25.4* 25.8*   PLATELETS 10*3/mm3 167 153 152     Results from last 7 days   Lab Units 10/15/19  0517 10/14/19  0450 10/13/19  1602 10/13/19  0335 10/12/19  1432   INR  3.95* 2.84*  --  2.56*  --    APTT seconds  --   --  33.2 64.2* 74.9*                   I reviewed the patient's new clinical results.  I personally viewed and interpreted the patient's EKG/Telemetry data        Medication Review:     acetylcysteine 4 mL Nebulization Q8H - RT   amiodarone 400 mg Oral Q24H   aspirin 81 mg Oral Daily   castor oil-balsam peru  Topical Q12H   dexamethasone 4 mg Oral Q12H   divalproex 125 mg Oral Nightly   escitalopram 10 mg Oral Daily   insulin glargine 10 Units Subcutaneous Nightly   insulin lispro 0-7 Units Subcutaneous 4x Daily With Meals & Nightly   ipratropium-albuterol 3 mL Nebulization Q8H - RT   levothyroxine 75 mcg Oral Q AM   metoprolol tartrate 25 mg Oral Q12H   midodrine 10 mg Oral Once per day on Tue Thu Sat   And      midodrine 10 mg Oral Once per day on Tue Thu Sat   And      midodrine 10 mg Oral Once per day on Tue Thu Sat   pantoprazole 40 mg Oral Q AM   polyethylene glycol 17 g Oral Daily         Pharmacy to dose warfarin        Assessment/Plan     She is a patient with a past medical history significant for end-stage renal disease on hemodialysis, diabetes, coronary artery disease, hypothyroidism, hypertension, major depressive disorder, and peripheral vascular disease with a history of a left BKA.  She is a [facility] of the [facility] and is a resident at a nursing home.  She was recently at [facility] for pneumonia and respiratory failure.      On 9/16/2019, she was sent back to the ER for shortness of breath and was found to have a new masslike opacity of the right lung base.  She is being followed by  oncology and pulmonary.  She was also evaluated by ortho for lytic bone lesion.  Recent echo at [facility] showed an EF of 45 to 50% with an akinetic inferior wall.  She underwent nuclear stress testing revealed a large inferior wall infarct with a very small area of mild ischemia in the mid anterior wall versus breast attenuation artifact.    She went into atrial fibrillation RVR during the hostpital stay.  She was started on amiodarone.  She was started on midodrine for hypotension.  Hospice was recommended but the patient expressed desire to continue treatment.      1.  Atrial fibrillation.  On amiodarone, metoprolol, and warfarin.  INR this morning 3.95.  Pharmacy is dosing warfarin.  Will check EKG today.  2. Coronary artery disease.  Recent stress test from 9/12/2019 without significant ischemia.  Continue aspirin, statin, and beta blocker. No indication for further testing.   3.  Cardiomyopathy.  Not a candidate for ACE/ARB due to ESRD.  Fluid management per nephrology.  4.  Metastatic lung cancer with endobronchial obstruction.  Will receive palliative radiation therapy.  5.  End-stage renal disease.  On dialysis.        Squamous cell lung cancer (CMS/HCC)    End stage renal disease (CMS/HCC)    Atelectasis of right lung    CAD (coronary artery disease)        Isabelle Berg, APRN  10/15/19  9:03 AM

## 2019-10-15 NOTE — PROGRESS NOTES
Pharmacy Consult: Warfarin Dosing/ Monitoring    Melia Almeida is a 54 y.o. female, estimated creatinine clearance is 18.6 mL/min (A) (by C-G formula based on SCr of 3.63 mg/dL (H)). weighing 84.6 kg (186 lb 6.4 oz).    She has a past medical history of Acute renal failure on dialysis (CMS/[facility]), Adult failure to thrive, Chronic kidney disease (CKD), stage V (CMS/[facility]), Coronary artery disease, Diabetes mellitus (CMS/[facility]), Diabetes mellitus with chronic kidney disease (CMS/[facility]), Disease of thyroid gland, GERD (gastroesophageal reflux disease), History of anemia, History of UTI, Hyperlipidemia, Hypertension, Idiopathic neuropathy, Major depressive disorder, recurrent episode, severe, with psychotic behavior (CMS/[facility]), Metabolic encephalopathy, Obesity, Osteomyelitis of right foot (CMS/[facility]), Other symbolic dysfunctions, Peripheral vascular disease, unspecified (CMS/[facility]), and Personal history of diabetic foot ulcer.    Social History     Tobacco Use   • Smoking status: Current Every Day Smoker     Types: Cigarettes   • Smokeless tobacco: Never Used   • Tobacco comment: 4 CIG./PER DAY   Substance Use Topics   • Alcohol use: No   • Drug use: No       Results from last 7 days   Lab Units 10/15/19  0517 10/14/19  0450 10/13/19  1602 10/13/19  0336 10/13/19  0335 10/12/19  1432 10/12/19  0408 10/11/19  0343 10/10/19  1201 10/10/19  0541  10/09/19  1037   INR  3.95* 2.84*  --   --  2.56*  --  1.92* 1.26* 1.08  --   --   --    APTT seconds  --   --  33.2  --  64.2* 74.9* 134.5* 85.2* 88.0* 72.8*   < >  --    HEMOGLOBIN g/dL 8.2* 7.9*  --  7.7*  --   --  8.4* 8.5*  --  8.8*  --  9.0*   HEMATOCRIT % 26.7* 25.4*  --  25.8*  --   --  27.9* 27.9*  --  27.6*  --  29.2*   PLATELETS 10*3/mm3 167 153  --  152  --   --  163 153  --  110*  --  153    < > = values in this interval not displayed.     Results from last 7 days   Lab Units 10/15/19  0517 10/14/19  0450 10/13/19  0336   SODIUM mmol/L 126* 131* 131*   POTASSIUM mmol/L 4.6 4.1 3.6    CHLORIDE mmol/L 83* 87* 88*   CO2 mmol/L 26.5 25.4 25.2   BUN mg/dL 74* 54* 32*   CREATININE mg/dL 3.63* 3.14* 2.25*   CALCIUM mg/dL 9.5 9.4 9.4   GLUCOSE mg/dL 74 92 85     Anticoagulation history:   Warfarin consult - on amiodarone (new start inpatient). Warfarin 5 mg PO daily was started on 10/8.      Hospital Anticoagulation:  Consulting provider: LAURA Bach  Start date: 10/10/19  Indication: Atrial fibrillation  Target INR: 2-3  Expected duration: chronic  Bridge Therapy: Yes     Date 10/8 10/9 10/10 10/11 10/12 10/13 10/14  10/15       INR     1.08 1.26 1.92 2.56 2.84  3.95       Warfarin dose 5 mg 5 mg 7.5 mg 7.5 mg 5 mg 2.5mg 2.5mg  HOLD          Potential drug interactions:   Amiodarone - may result in increased INR and an increased risk of bleeding.  Aspirin - may result in increased risk of bleeding.  Dexamethasone - may result in increased risk of bleeding or diminished effects of warfarin.  Escitalopram - may result in an increased risk of bleeding.  Pantoprazole - may result in increased International Normalized Ratio (INR) and prothrombin time.        Relevant nutrition status: Regular diet, nutrition supplements TID     Other:      Education complete: No  Date:      Assessment/Plan:  Dose: Significant interval increase in INR despite dose reduction to 2.5 mg on 10/13.  Hold warfarin tonight.  Baseline maintenance dose has not yet been established.  Plan to resume low-dose warfarin once INR plateaus and nears therapeutic range.  Monitor: Daily PT/INR  Follow up: 10/16/19    Pharmacy will continue to follow until discharge or discontinuation of warfarin.   Virgie Patton, PharmD, MPH, BCPS

## 2019-10-16 NOTE — PROGRESS NOTES
Pharmacy Consult: Warfarin Dosing/ Monitoring    Melia Almeida is a 54 y.o. female, estimated creatinine clearance is 18.6 mL/min (A) (by C-G formula based on SCr of 3.63 mg/dL (H)). weighing 84.6 kg (186 lb 6.4 oz).    She has a past medical history of Acute renal failure on dialysis (CMS/[facility]), Adult failure to thrive, Chronic kidney disease (CKD), stage V (CMS/[facility]), Coronary artery disease, Diabetes mellitus (CMS/[facility]), Diabetes mellitus with chronic kidney disease (CMS/[facility]), Disease of thyroid gland, GERD (gastroesophageal reflux disease), History of anemia, History of UTI, Hyperlipidemia, Hypertension, Idiopathic neuropathy, Major depressive disorder, recurrent episode, severe, with psychotic behavior (CMS/[facility]), Metabolic encephalopathy, Obesity, Osteomyelitis of right foot (CMS/[facility]), Other symbolic dysfunctions, Peripheral vascular disease, unspecified (CMS/[facility]), and Personal history of diabetic foot ulcer.    Social History     Tobacco Use   • Smoking status: Current Every Day Smoker     Types: Cigarettes   • Smokeless tobacco: Never Used   • Tobacco comment: 4 CIG./PER DAY   Substance Use Topics   • Alcohol use: No   • Drug use: No       Results from last 7 days   Lab Units 10/15/19  0517 10/14/19  0450 10/13/19  1602 10/13/19  0336 10/13/19  0335 10/12/19  1432 10/12/19  0408 10/11/19  0343 10/10/19  1201 10/10/19  0541  10/09/19  1037   INR  3.95* 2.84*  --   --  2.56*  --  1.92* 1.26* 1.08  --   --   --    APTT seconds  --   --  33.2  --  64.2* 74.9* 134.5* 85.2* 88.0* 72.8*   < >  --    HEMOGLOBIN g/dL 8.2* 7.9*  --  7.7*  --   --  8.4* 8.5*  --  8.8*  --  9.0*   HEMATOCRIT % 26.7* 25.4*  --  25.8*  --   --  27.9* 27.9*  --  27.6*  --  29.2*   PLATELETS 10*3/mm3 167 153  --  152  --   --  163 153  --  110*  --  153    < > = values in this interval not displayed.     Results from last 7 days   Lab Units 10/15/19  0517 10/14/19  0450 10/13/19  0336   SODIUM mmol/L 126* 131* 131*   POTASSIUM mmol/L 4.6 4.1 3.6    CHLORIDE mmol/L 83* 87* 88*   CO2 mmol/L 26.5 25.4 25.2   BUN mg/dL 74* 54* 32*   CREATININE mg/dL 3.63* 3.14* 2.25*   CALCIUM mg/dL 9.5 9.4 9.4   GLUCOSE mg/dL 74 92 85     Anticoagulation history:   Warfarin consult - on amiodarone (new start inpatient). Warfarin 5 mg PO daily was started on 10/8.      Hospital Anticoagulation:  Consulting provider: LAURA Bach  Start date: 10/10/19  Indication: Atrial fibrillation  Target INR: 2-3  Expected duration: chronic  Bridge Therapy: Now off     Date 10/8 10/9 10/10 10/11 10/12 10/13 10/14  10/15  10/16     INR     1.08 1.26 1.92 2.56 2.84  3.95  3.66     Warfarin dose 5 mg 5 mg 7.5 mg 7.5 mg 5 mg 2.5mg 2.5mg  HOLD  HOLD        Potential drug interactions:   Amiodarone - may result in increased INR and an increased risk of bleeding.  Dose reduced 10/16.  Aspirin - may result in increased risk of bleeding.  Dexamethasone - may result in increased risk of bleeding or diminished effects of warfarin.  Escitalopram - may result in an increased risk of bleeding.  Pantoprazole - may result in increased International Normalized Ratio (INR) and prothrombin time.        Relevant nutrition status: Regular diet, nutrition supplements TID     Other:      Education complete: No  Date:      Assessment/Plan:  1. INR supratherapeutic but trending down.  Anticipate further trend down in INR tomorrow.  Will continue to hold tonight.  Anticipate resuming warfarin tomorrow at approximately 4mg qday.  Baseline maintenance dose has not been established.  2. Amiodarone reduced from 400mg to 200mg qday.  Expect reduced interaction with INR.    3. New start warfarin.  Will plan to perform warfarin teaching with family if possible prior to discharge.  4. Monitor s/sx of bleeding.  H/H improved today.  5. Daily INR.  Follolwup 10/17.      Pharmacy will continue to follow until discharge or discontinuation of warfarin.   Virgie Patton, PharmD, MPH, BCPS

## 2019-10-16 NOTE — PLAN OF CARE
Problem: Patient Care Overview  Goal: Plan of Care Review  Outcome: Ongoing (interventions implemented as appropriate)   10/16/19 1650   Coping/Psychosocial   Plan of Care Reviewed With patient   Plan of Care Review   Progress no change   OTHER   Outcome Summary VSS. Consulted pain management. In exterme pain thorughout the day with turning and changing briefs--diluadid Q2 ordered. Safety maintained. Will continue to monitor.      Goal: Individualization and Mutuality  Outcome: Ongoing (interventions implemented as appropriate)    Goal: Discharge Needs Assessment  Outcome: Ongoing (interventions implemented as appropriate)    Goal: Interprofessional Rounds/Family Conf  Outcome: Ongoing (interventions implemented as appropriate)      Problem: Fall Risk (Adult)  Goal: Absence of Fall  Outcome: Ongoing (interventions implemented as appropriate)      Problem: Hemodialysis (Adult)  Goal: Signs and Symptoms of Listed Potential Problems Will be Absent, Minimized or Managed (Hemodialysis)  Outcome: Ongoing (interventions implemented as appropriate)      Problem: Pneumonia (Adult)  Goal: Signs and Symptoms of Listed Potential Problems Will be Absent, Minimized or Managed (Pneumonia)  Outcome: Ongoing (interventions implemented as appropriate)      Problem: Skin Injury Risk (Adult)  Goal: Skin Health and Integrity  Outcome: Ongoing (interventions implemented as appropriate)      Problem: Confusion, Acute (Adult)  Goal: Safety  Outcome: Ongoing (interventions implemented as appropriate)

## 2019-10-16 NOTE — PROGRESS NOTES
Continued Stay Note  [facility]     Patient Name: Melia Almeida  MRN: [id number]  Today's Date: 10/16/2019    Admit Date: 9/15/2019    Discharge Plan     Row Name 10/16/19 1642       Plan    Plan  [facility] vs [facility]    Plan Comments  Note to MD regarding possible transition to [facility] as level of care exceeding [facility].        Discharge Codes    No documentation.             Maura Sims RN

## 2019-10-16 NOTE — PROGRESS NOTES
"   LOS: 31 days    Patient Care Team:  Taiwo Shankar MD as PCP - General (Internal Medicine)  Paulette Torres MD as Consulting Physician (Radiation Oncology)    Chief Complaint:    Chief Complaint   Patient presents with   • Shortness of Breath     Follow-up end-stage renal disease  Subjective     Interval History:   Denies pain, but moans when moving in bed; not hungry at all; breathing is comfortable last HD was yesterday and was without problem    Review of Systems:   See above.     Objective     Vital Signs  Temp:  [97.6 °F (36.4 °C)-99.2 °F (37.3 °C)] 98.1 °F (36.7 °C)  Heart Rate:  [54-70] 61  Resp:  [18] 18  BP: (128-144)/(58-66) 139/62    Flowsheet Rows      First Filed Value   Admission Height  165.1 cm (65\") Documented at 09/15/2019 1603   Admission Weight  113 kg (250 lb) Documented at 09/15/2019 1603          No intake/output data recorded.  I/O last 3 completed shifts:  In: 440 [P.O.:440]  Out: 2000 [Other:2000]    Intake/Output Summary (Last 24 hours) at 10/16/2019 0844  Last data filed at 10/15/2019 2113  Gross per 24 hour   Intake 200 ml   Output 2000 ml   Net -1800 ml       Physical Exam:  General Appearance: Awake, not oriented, appears to be chronically ill, no acute distress    Skin: warm and dry  HEENT: Anicteric sclerae, oral mucosa is normal   Neck: supple, no JVD, trachea midline  Lungs: CTA, unlabored breathing effort  Heart: Irregularly irregular, tachycardic, no S3, no rub  Abdomen: soft, non-tender, present bowel sounds to auscultation; not distended  : no palpable bladder,  Extremities: No edema, left BKA, right upper extremity AV fistula with thrill and bruit  Psychiatric: Flat affect; depressed mood           Results Review:    Results from last 7 days   Lab Units 10/16/19  0518 10/15/19  0517 10/14/19  0450   SODIUM mmol/L 125* 126* 131*   POTASSIUM mmol/L 4.7 4.6 4.1   CHLORIDE mmol/L 83* 83* 87*   CO2 mmol/L 26.7 26.5 25.4   BUN mg/dL 55* 74* 54*   CREATININE mg/dL 3.01* " 3.63* 3.14*   CALCIUM mg/dL 9.6 9.5 9.4   GLUCOSE mg/dL 113* 74 92       Estimated Creatinine Clearance: 22.1 mL/min (A) (by C-G formula based on SCr of 3.01 mg/dL (H)).    Results from last 7 days   Lab Units 10/15/19  0517 10/14/19  0450 10/13/19  0336   PHOSPHORUS mg/dL 6.4* 4.9* 3.4             Results from last 7 days   Lab Units 10/16/19  0518 10/15/19  0517 10/14/19  0450 10/13/19  0336 10/12/19  0408   WBC 10*3/mm3 6.85 5.17 4.87 4.93 4.50   HEMOGLOBIN g/dL 9.4* 8.2* 7.9* 7.7* 8.4*   PLATELETS 10*3/mm3 183 167 153 152 163       Results from last 7 days   Lab Units 10/16/19  0518 10/15/19  0517 10/14/19  0450 10/13/19  0335 10/12/19  0408   INR  3.66* 3.95* 2.84* 2.56* 1.92*         Imaging Results (last 24 hours)     ** No results found for the last 24 hours. **          acetylcysteine 4 mL Nebulization Q8H - RT   amiodarone 400 mg Oral Q24H   aspirin 81 mg Oral Daily   castor oil-balsam peru  Topical Q12H   dexamethasone 4 mg Oral Q12H   divalproex 125 mg Oral Nightly   escitalopram 10 mg Oral Daily   insulin glargine 10 Units Subcutaneous Nightly   insulin lispro 0-7 Units Subcutaneous 4x Daily With Meals & Nightly   ipratropium-albuterol 3 mL Nebulization Q8H - RT   levothyroxine 75 mcg Oral Q AM   metoprolol tartrate 25 mg Oral Q12H   midodrine 10 mg Oral Once per day on Tue Thu Sat   And      midodrine 10 mg Oral Once per day on Tue Thu Sat   And      midodrine 10 mg Oral Once per day on Tue Thu Sat   pantoprazole 40 mg Oral Q AM   polyethylene glycol 17 g Oral Daily       Pharmacy to dose warfarin        Medication Review:   Current Facility-Administered Medications   Medication Dose Route Frequency Provider Last Rate Last Dose   • acetaminophen (TYLENOL) tablet 650 mg  650 mg Oral Q4H PRN Taiwo Shankar MD   650 mg at 09/21/19 0918   • acetylcysteine (MUCOMYST) 20 % nebulizer solution 4 mL  4 mL Nebulization Q8H - RT Alexander Schaefer MD   4 mL at 10/16/19 0701   • amiodarone (PACERONE) tablet 400 mg   400 mg Oral Q24H Janell Jimenez MD   400 mg at 10/16/19 0817   • aspirin EC tablet 81 mg  81 mg Oral Daily Taiwo Shankar MD   81 mg at 10/16/19 0817   • carboxymethylcellulose sod (PF) 1 % eye gel 1 drop  1 drop Both Eyes TID PRN Taiwo Shankar MD       • castor oil-balsam peru (VENELEX) ointment   Topical Q12H Taiwo Shankar MD   5 g at 10/16/19 0817   • cyclobenzaprine (FLEXERIL) tablet 5 mg  5 mg Oral TID PRN Alejandro Kramer MD   5 mg at 10/07/19 0015   • dexamethasone (DECADRON) tablet 4 mg  4 mg Oral Q12H Alexander Schaefer MD   4 mg at 10/16/19 0817   • dextrose (D50W) 25 g/ 50mL Intravenous Solution 25 g  25 g Intravenous Q15 Min [place]ATSONN Taiwo Shankar MD       • dextrose (GLUTOSE) oral gel 15 g  15 g Oral Q15 Min [place] Taiwo Shankar MD       • divalproex (DEPAKOTE) DR tablet 125 mg  125 mg Oral Nightly Taiwo Shankar MD   125 mg at 10/15/19 2113   • escitalopram (LEXAPRO) tablet 10 mg  10 mg Oral Daily Taiwo Shankar MD   10 mg at 10/16/19 0817   • glucagon (human recombinant) (GLUCAGEN DIAGNOSTIC) injection 1 mg  1 mg Subcutaneous Q15 Min [place] Taiwo Shankar MD       • insulin glargine (LANTUS) injection 10 Units  10 Units Subcutaneous Nightly Taiwo Shankar MD   10 Units at 10/15/19 2114   • insulin lispro (humaLOG) injection 0-7 Units  0-7 Units Subcutaneous 4x Daily With Meals & Nightly Taiwo Shankar MD   3 Units at 10/15/19 1534   • ipratropium-albuterol (DUO-NEB) nebulizer solution 3 mL  3 mL Nebulization Q8H - RT Alexander Schaefer MD   3 mL at 10/16/19 0701   • levothyroxine (SYNTHROID, LEVOTHROID) tablet 75 mcg  75 mcg Oral Q AM Taiwo Shankar MD   75 mcg at 10/16/19 0800   • metoprolol tartrate (LOPRESSOR) tablet 25 mg  25 mg Oral Q12H Ev Mclean PA   25 mg at 10/16/19 0817   • midodrine (PROAMATINE) tablet 10 mg  10 mg Oral Once per day on Tue Thu Froy Avelar MD   10 mg at 10/15/19 0804    And   • midodrine (PROAMATINE) tablet 10 mg  10 mg Oral Once per day on  "Tue Thu Froy Avelar MD        And   • midodrine (PROAMATINE) tablet 10 mg  10 mg Oral Once per day on Tue Thu Sat Froy Sanchez MD   10 mg at 10/15/19 1834   • ondansetron (ZOFRAN) injection 4 mg  4 mg Intravenous Q4H PRN Taiwo Shankar MD   4 mg at 10/07/19 0845   • oxyCODONE-acetaminophen (PERCOCET)  MG per tablet 1 tablet  1 tablet Oral Q4H PRN Taiwo Shankar MD   1 tablet at 10/15/19 0816   • pantoprazole (PROTONIX) EC tablet 40 mg  40 mg Oral Q AM Taiwo Shankar MD   40 mg at 10/16/19 0800   • Pharmacy to dose warfarin   Does not apply Continuous PRN Virgie Lawrence APRN       • polyethylene glycol 3350 powder (packet)  17 g Oral Daily Taiwo Shankar MD   17 g at 10/16/19 0817       Assessment/Plan   1. ESRD.  Volume status and electrolytes stable other than hyponatremia due to water excess in the setting of ESRD; last HD was 10/15/2019  2. 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 Palliative XRT.    3. DM2 bs better on scheduled insulin.   4. CAD  5. AFib/flutter, treated per cardiology   6. Thrombocytopenia, resolved  7. Hypothyroidism. On Replacement.   8.  Anemia CKD  9.  Hypertension    Plan:  1.  Palliative care note reviewed, with comment that \"aggressive care\" desired.  I do not believe she has the cognitive ability to direct her care   2.  HD TTS  3.  Nursing home placement soon      Froy Sanchez MD  10/16/19  8:44 AM    "

## 2019-10-16 NOTE — PROGRESS NOTES
DAILY PROGRESS NOTE  [facility], [facility]    10/16/2019    Patient Identification:  Name: Melia Almeida  Age: 54 y.o.  Sex: female  :  1965  MRN: [id number]           Primary Care Physician: Taiwo Shankar MD    Subjective:    Interval History:    Patient is sleeping this morning, she has not been eating or drinking much today  She does not want to take any pain medication, however she is in a lot of pain and does not want to be turned.  On p.o. amiodarone as well as metoprolol, heart rate is sinus rhythm, now bradycardic.  Blood sugar up-and-down, depending on what she is eating.  INR 3.66, hemoglobin 9.4.  Still expressing that she wants to live and continue to do HD.  However she has not been eating, or drinking, or allowing the staff  To turn her to prevent pressors ulcers.        ROS:     No nausea, vomiting, diarrhea, constipation, chest pain    Objective:    Scheduled Meds:    acetylcysteine 4 mL Nebulization Q8H - RT   [START ON 10/17/2019] amiodarone 200 mg Oral Q24H   aspirin 81 mg Oral Daily   castor oil-balsam peru  Topical Q12H   dexamethasone 4 mg Oral Q12H   escitalopram 10 mg Oral Daily   insulin glargine 10 Units Subcutaneous Nightly   insulin lispro 0-7 Units Subcutaneous 4x Daily With Meals & Nightly   ipratropium-albuterol 3 mL Nebulization Q8H - RT   levothyroxine 75 mcg Oral Q AM   metoprolol tartrate 12.5 mg Oral Q12H   midodrine 10 mg Oral Once per day on Tue Thu Sat   And      midodrine 10 mg Oral Once per day on Tue Thu Sat   And      midodrine 10 mg Oral Once per day on Tue Thu Sat   pantoprazole 40 mg Oral Q AM   polyethylene glycol 17 g Oral Daily       Continuous Infusions:    Pharmacy to dose warfarin        PRN Meds:  •  acetaminophen  •  carboxymethylcellulose sod (PF)  •  cyclobenzaprine  •  dextrose  •  dextrose  •  glucagon (human recombinant)  •  ondansetron  •  oxyCODONE-acetaminophen  •  Pharmacy to dose  "warfarin    Intake/Output:    Intake/Output Summary (Last 24 hours) at 10/16/2019 0908  Last data filed at 10/16/2019 0855  Gross per 24 hour   Intake 200 ml   Output 2000 ml   Net -1800 ml         Exam:    tMax 24 hrs: Temp (24hrs), Av.2 °F (36.8 °C), Min:97.6 °F (36.4 °C), Max:99.2 °F (37.3 °C)    Vitals Ranges:   Temp:  [97.6 °F (36.4 °C)-99.2 °F (37.3 °C)] 97.7 °F (36.5 °C)  Heart Rate:  [54-70] 58  Resp:  [18] 18  BP: (125-144)/(58-66) 125/61    /61 (BP Location: Left arm, Patient Position: Lying)   Pulse 58   Temp 97.7 °F (36.5 °C) (Oral)   Resp 18   Ht 165.1 cm (65\")   Wt 82 kg (180 lb 12.4 oz)   LMP  (LMP Unknown)   SpO2 97%   BMI 30.08 kg/m²     General: Sleepy, arousable.  No respiratory distress while on oxygen.    Neck: Supple, symmetrical, trachea midline, no adenopathy;              thyroid:  no enlargement/tenderness/nodules;              no carotid bruit or JVD  Cardiovascular: Normal rate, regular rhythm and intact distal pulses.   Bradycardic.  Exam reveals no gallop and no friction rub. No murmur heard  Chest wall: No tenderness or deformity  Pulmonary:  Diminished breath sounds bilaterally, rhonchi at bases bilaterally.    Abdominal: Soft. Soft, non-tender, bowel sounds active all four quadrants,     no masses, no hepatomegaly, no splenomegaly.   Extremities: Normal, atraumatic, no cyanosis.  No edema in RLE. S/p L BKA, tender to palpation as well as mild edema in the mid left femur.  Pulses: 2 + symmetric all extremities  Neurological: Patient is alert and oriented to person, place                 CNII-XII intact, normal strength, sensation intact throughout  Skin: Skin color, texture, normal. Turgor is decreased. No rashes or lesions            Data Review:    Results from last 7 days   Lab Units 10/16/19  0518 10/15/19  0517 10/14/19  0450   WBC 10*3/mm3 6.85 5.17 4.87   HEMOGLOBIN g/dL 9.4* 8.2* 7.9*   HEMATOCRIT % 30.2* 26.7* 25.4*   PLATELETS 10*3/mm3 183 167 153 "       Results from last 7 days   Lab Units 10/16/19  0518 10/15/19  0517 10/14/19  0450   SODIUM mmol/L 125* 126* 131*   POTASSIUM mmol/L 4.7 4.6 4.1   CHLORIDE mmol/L 83* 83* 87*   CO2 mmol/L 26.7 26.5 25.4   BUN mg/dL 55* 74* 54*   CREATININE mg/dL 3.01* 3.63* 3.14*   CALCIUM mg/dL 9.6 9.5 9.4   GLUCOSE mg/dL 113* 74 92     Results from last 7 days   Lab Units 10/16/19  0518 10/15/19  0517 10/14/19  0450   INR  3.66* 3.95* 2.84*               Assessment:        Squamous cell lung cancer (CMS/HCC)    End stage renal disease (CMS/HCC)    Atelectasis of right lung    CAD (coronary artery disease)  Metastatic lung cancer  Acute respiratory failure with hypoxia  DM  PVD  CAD  HTN  Anemia CKD  Hypothyroidism  Pathologic fracture of the left femur  Atrial fibrillation  Thrombocytopenia  Anticoagulated on Coumadin.    Plan:    She is sleepy today arousable, she has not been eating or drinking much  She does not allow the staff to turn her to prevent pressure ulcers  She is in a lot of pain but she is refusing to take pain medications even when it is time to turn her, she prefers not to be turned  Patient still desires to continue HD, she still expressed that she wants to live, but now her actions are not consistent with it.  Poor prognosis considering multiple comorbidities as well as metastatic lung cancer.  On coumadin, INR supratherapeutic, holding it now.    Continue Accu-Cheks and sliding scale insulin as well as Lantus.  Monitor and correct electrolytes  Monitor mental status  DVT/stress ulcer prophylaxis  Spoke with Dr. Zavala, patient is more stable now; and  if she does not want palliative care, left femur fracture will have to be fixed. Will ask cardiology for clearance if that is the case.  Labs in am        Taiwo Shankar MD  10/16/2019

## 2019-10-16 NOTE — PROGRESS NOTES
"[facility]    Patient Name: Melia Almeida  :1965  54 y.o.  LOS: 31  Encounter Provider: Neil Guerrero Jr, MD      Patient Care Team:  Taiwo Shankar MD as PCP - General (Internal Medicine)  Paulette Torres MD as Consulting Physician (Radiation Oncology)    Chief Complaint: Follow-up atrial fibrillation and coronary artery disease    Interval History: Telemetry reviewed with no significant arrhythmia overnight.       Objective   Vital Signs  Temp:  [97.6 °F (36.4 °C)-99.2 °F (37.3 °C)] 97.7 °F (36.5 °C)  Heart Rate:  [54-70] 58  Resp:  [18] 18  BP: (125-144)/(58-66) 125/61    Intake/Output Summary (Last 24 hours) at 10/16/2019 0900  Last data filed at 10/16/2019 0855  Gross per 24 hour   Intake 200 ml   Output 2000 ml   Net -1800 ml     Flowsheet Rows      First Filed Value   Admission Height  165.1 cm (65\") Documented at 09/15/2019 1603   Admission Weight  113 kg (250 lb) Documented at 09/15/2019 1603            Physical Exam   Constitutional: She is oriented to person, place, and time. She appears well-developed and well-nourished.   HENT:   Head: Normocephalic and atraumatic.   Eyes: Conjunctivae are normal. Pupils are equal, round, and reactive to light.   Neck: No JVD present. No thyromegaly present.   Cardiovascular: Exam reveals no gallop and no friction rub.   No murmur heard.  Pulmonary/Chest: No respiratory distress. She exhibits no tenderness.   Coarse breath sounds throughout   Abdominal: Bowel sounds are normal. She exhibits no distension.   Musculoskeletal: She exhibits edema. She exhibits no tenderness.   Neurological: She is alert and oriented to person, place, and time.   Skin: No rash noted. No erythema.   Psychiatric: She has a normal mood and affect. Judgment normal.   Vitals reviewed.      Results Review:    Results from last 7 days   Lab Units 10/16/19  0518   SODIUM mmol/L 125*   POTASSIUM mmol/L 4.7   CHLORIDE mmol/L 83*   CO2 mmol/L 26.7   BUN mg/dL 55* "   CREATININE mg/dL 3.01*   GLUCOSE mg/dL 113*   CALCIUM mg/dL 9.6         Results from last 7 days   Lab Units 10/16/19  0518   WBC 10*3/mm3 6.85   HEMOGLOBIN g/dL 9.4*   HEMATOCRIT % 30.2*   PLATELETS 10*3/mm3 183     Results from last 7 days   Lab Units 10/16/19  0518 10/15/19  0517 10/14/19  0450 10/13/19  1602 10/13/19  0335 10/12/19  1432   INR  3.66* 3.95* 2.84*  --  2.56*  --    APTT seconds  --   --   --  33.2 64.2* 74.9*                       Medication Review:     acetylcysteine 4 mL Nebulization Q8H - RT   [START ON 10/17/2019] amiodarone 200 mg Oral Q24H   aspirin 81 mg Oral Daily   castor oil-balsam peru  Topical Q12H   dexamethasone 4 mg Oral Q12H   divalproex 125 mg Oral Nightly   escitalopram 10 mg Oral Daily   insulin glargine 10 Units Subcutaneous Nightly   insulin lispro 0-7 Units Subcutaneous 4x Daily With Meals & Nightly   ipratropium-albuterol 3 mL Nebulization Q8H - RT   levothyroxine 75 mcg Oral Q AM   metoprolol tartrate 25 mg Oral Q12H   midodrine 10 mg Oral Once per day on Tue Thu Sat   And      midodrine 10 mg Oral Once per day on Tue Thu Sat   And      midodrine 10 mg Oral Once per day on Tue Thu Sat   pantoprazole 40 mg Oral Q AM   polyethylene glycol 17 g Oral Daily          Pharmacy to dose warfarin        Assessment/Plan   1.  Atrial fibrillation.  On amiodarone, metoprolol, and warfarin.  INR this morning 3.66.  Pharmacy is dosing warfarin.    EKG from yesterday morning reviewed sinus rhythm with normal QTc.  2. Coronary artery disease.  Recent stress test from 9/12/2019 without significant ischemia.  Continue aspirin, statin, and beta blocker. No indication for further testing.   3.  Cardiomyopathy.  Not a candidate for ACE/ARB due to ESRD.  Fluid management per nephrology.  4.  Metastatic lung cancer with endobronchial obstruction.  Will receive palliative radiation therapy.  5.  End-stage renal disease.  On dialysis.    We will follow peripherally.  Please call with  questions.    Neil Guerrero Jr, MD  [facility]  10/16/19  9:00 AM

## 2019-10-17 NOTE — PROGRESS NOTES
Continued Stay Note  [facility]     Patient Name: Melia Almeida  MRN: [id number]  Today's Date: 10/17/2019    Admit Date: 9/15/2019    Discharge Plan     Row Name 10/17/19 1518       Plan    Plan Comments  [facility] talked to Dr. Shankar and confirmed plan. Waiting on decision on surgery from ortho.  Called Christin from [facility] and  to check on bed availablity and acceptance to [facility].        Discharge Codes    No documentation.             Shannon Epley, RN

## 2019-10-17 NOTE — PLAN OF CARE
Problem: Patient Care Overview  Goal: Plan of Care Review  Outcome: Ongoing (interventions implemented as appropriate)   10/17/19 0843   Coping/Psychosocial   Plan of Care Reviewed With patient   Plan of Care Review   Progress no change   OTHER   Outcome Summary VSS, GAVE PAIN PILL ABOUT EVERY 4H TO KEEP BACK PAIN UNDER CONTROL. RESTED WELL IN BETWEEN CARE/TURNS.        Problem: Fall Risk (Adult)  Goal: Absence of Fall  Outcome: Ongoing (interventions implemented as appropriate)      Problem: Hemodialysis (Adult)  Goal: Signs and Symptoms of Listed Potential Problems Will be Absent, Minimized or Managed (Hemodialysis)  Outcome: Ongoing (interventions implemented as appropriate)      Problem: Skin Injury Risk (Adult)  Goal: Skin Health and Integrity  Outcome: Ongoing (interventions implemented as appropriate)      Problem: Confusion, Acute (Adult)  Goal: Safety  Outcome: Ongoing (interventions implemented as appropriate)

## 2019-10-17 NOTE — PROGRESS NOTES
DAILY PROGRESS NOTE  [facility], [facility]    10/17/2019    Patient Identification:  Name: Melia Almeida  Age: 54 y.o.  Sex: female  :  1965  MRN: [id number]           Primary Care Physician: Taiwo Shankar MD    Subjective:    Interval History:    Patient's pain has become worse, on Dilaudid IV.  Taking p.o. pain medications almost every 4 hours  No eating much  Vital signs as well as heart rate are okay  INR is worsened despite holding Coumadin for 2 days  Still expressing that she wants to live and continue to do HD.  However she has not been eating, or drinking, or allowing the staff to turn her to prevent pressors ulcers.        ROS:     No nausea, vomiting, diarrhea, constipation, chest pain    Objective:    Scheduled Meds:    acetylcysteine 4 mL Nebulization Q8H - RT   amiodarone 200 mg Oral Q24H   aspirin 81 mg Oral Daily   castor oil-balsam peru  Topical Q12H   dexamethasone 4 mg Oral Q12H   escitalopram 10 mg Oral Daily   insulin glargine 10 Units Subcutaneous Nightly   insulin lispro 0-7 Units Subcutaneous 4x Daily With Meals & Nightly   ipratropium-albuterol 3 mL Nebulization Q8H - RT   levothyroxine 75 mcg Oral Q AM   metoprolol tartrate 12.5 mg Oral Q12H   midodrine 10 mg Oral Once per day on  Sat   And      midodrine 10 mg Oral Once per day on  Sat   And      midodrine 10 mg Oral Once per day on  Sat   pantoprazole 40 mg Oral Q AM   polyethylene glycol 17 g Oral Daily       Continuous Infusions:    Pharmacy to dose warfarin        PRN Meds:  •  acetaminophen  •  carboxymethylcellulose sod (PF)  •  cyclobenzaprine  •  dextrose  •  dextrose  •  glucagon (human recombinant)  •  HYDROmorphone  •  ondansetron  •  oxyCODONE-acetaminophen  •  Pharmacy to dose warfarin    Intake/Output:    Intake/Output Summary (Last 24 hours) at 10/17/2019 1847  Last data filed at 10/17/2019 1520  Gross per 24 hour   Intake 300 ml   Output 1000 ml   Net  "-700 ml         Exam:    tMax 24 hrs: Temp (24hrs), Av.4 °F (36.9 °C), Min:98 °F (36.7 °C), Max:98.6 °F (37 °C)    Vitals Ranges:   Temp:  [98 °F (36.7 °C)-98.6 °F (37 °C)] 98.6 °F (37 °C)  Heart Rate:  [58-74] 68  Resp:  [16-18] 18  BP: (117-147)/(56-70) 119/62    /62 (BP Location: Left arm, Patient Position: Lying)   Pulse 68   Temp 98.6 °F (37 °C) (Oral)   Resp 18   Ht 165.1 cm (65\")   Wt 81.5 kg (179 lb 10.8 oz)   LMP  (LMP Unknown)   SpO2 96%   BMI 29.90 kg/m²     General: Sleepy, arousable.  No respiratory distress while on oxygen.    Neck: Supple, symmetrical, trachea midline, no adenopathy;              thyroid:  no enlargement/tenderness/nodules;              no carotid bruit or JVD  Cardiovascular: Normal rate, regular rhythm and intact distal pulses.   Bradycardic.  Exam reveals no gallop and no friction rub. No murmur heard  Chest wall: No tenderness or deformity  Pulmonary:  Diminished breath sounds bilaterally, rhonchi at bases bilaterally.    Abdominal: Soft. Soft, non-tender, bowel sounds active all four quadrants,     no masses, no hepatomegaly, no splenomegaly.   Extremities: Normal, atraumatic, no cyanosis.  No edema in RLE. S/p L BKA, tender to palpation as well as mild edema in the mid left femur.  Pulses: 2 + symmetric all extremities  Neurological: Patient is alert and oriented to person, place                 CNII-XII intact, normal strength, sensation intact throughout  Skin: Skin color, texture, normal. Turgor is decreased. No rashes or lesions            Data Review:    Results from last 7 days   Lab Units 10/17/19  0424 10/16/19  0518 10/15/19  0517   WBC 10*3/mm3 7.52 6.85 5.17   HEMOGLOBIN g/dL 8.5* 9.4* 8.2*   HEMATOCRIT % 28.4* 30.2* 26.7*   PLATELETS 10*3/mm3 177 183 167       Results from last 7 days   Lab Units 10/17/19  0424 10/16/19  0518 10/15/19  0517   SODIUM mmol/L 133* 125* 126*   POTASSIUM mmol/L 4.8 4.7 4.6   CHLORIDE mmol/L 89* 83* 83*   CO2 mmol/L 20.0* " "26.7 26.5   BUN mg/dL 85* 55* 74*   CREATININE mg/dL 3.66* 3.01* 3.63*   CALCIUM mg/dL 9.3 9.6 9.5   GLUCOSE mg/dL 181* 113* 74     Results from last 7 days   Lab Units 10/17/19  1434 10/17/19  0424 10/16/19  0518   INR  4.61* 4.54* 3.66*               Assessment:        Squamous cell lung cancer (CMS/HCC)    End stage renal disease (CMS/HCC)    Atelectasis of right lung    CAD (coronary artery disease)  Metastatic lung cancer  Acute respiratory failure with hypoxia  DM  PVD  CAD  HTN  Anemia CKD  Hypothyroidism  Pathologic fracture of the left femur  Atrial fibrillation  Thrombocytopenia  Anticoagulated on Coumadin.    Plan:    She is awake but appears to be more confused in the last few days.  Patient has become worse, with IV Dilaudid as needed as well as p.o. pain medications  INR is getting worse despite holding Coumadin  Her cancer is widespread, I believe her body is \"shutting down\"  She is high risk for surgery.  I have explained her that her pain will just get worse with or without surgery, however, she insisted that she wants to continue hemodialysis, however she sounds less convinced.  Continue Accu-Cheks and sliding scale insulin as well as Lantus.  Monitor and correct electrolytes  Monitor mental status  DVT/stress ulcer prophylaxis  Labs in am        Taiwo Shankar MD  10/17/2019          "

## 2019-10-17 NOTE — CONSULTS
Patient Name: Melia Almeida  :  1965  MRN:  [id number]  Date of Admission: 9/15/2019    Subjective     Patient is a 54 y.o. female presents with chief complaint of chronic, intermitent, severe low back and left thigh pain.  Onset of symptoms was gradual starting several days ago.  Symptoms are associated/aggravated by activity, sitting, standing or twisting. Symptoms improve with relaxation, pain medication and rest.  She has metastatic squamous cell lung cancer and a pathologic fracture in the midshaft of her left femur.  She has a very poor appetite and some difficulty taking oral pain medicines.  Her nurse reports that she is unable to move the patient due to her severe pain.    The following portions of the patients history were reviewed and updated as appropriate: current medications, allergies, past medical history, past surgical history, past family history, past social history and problem list                Objective     Past Medical History:   Past Medical History:   Diagnosis Date   • Acute renal failure on dialysis (CMS/HCC)     M-W-F   • Adult failure to thrive    • Chronic kidney disease (CKD), stage V (CMS/HCC)    • Coronary artery disease    • Diabetes mellitus (CMS/HCC)    • Diabetes mellitus with chronic kidney disease (CMS/HCC)    • Disease of thyroid gland     hypothyroidism   • GERD (gastroesophageal reflux disease)    • History of anemia    • History of UTI    • Hyperlipidemia    • Hypertension    • Idiopathic neuropathy    • Major depressive disorder, recurrent episode, severe, with psychotic behavior (CMS/HCC)    • Metabolic encephalopathy    • Obesity    • Osteomyelitis of right foot (CMS/HCC)     foot and ankle   • Other symbolic dysfunctions    • Peripheral vascular disease, unspecified (CMS/HCC)    • Personal history of diabetic foot ulcer      Past Surgical History:   Past Surgical History:   Procedure Laterality Date   • ARTERIOVENOUS FISTULA/SHUNT SURGERY Right 2018     "Procedure: UPPER EXTREMITY ARTERIOVENOUS SHUNT GRAFT;  Surgeon: Ambar Dobson Jr., MD;  Location: [facility] MAIN OR;  Service:    • BELOW KNEE LEG AMPUTATION Left 2008   • BRONCHOSCOPY Bilateral 9/17/2019    Procedure: BRONCHOSCOPY WITH ENDOBRONCHIAL ULTRASOUND, BX, BRUSHINGS, BAL, & TBNA;  Surgeon: Du Rogers MD;  Location: [facility] ENDOSCOPY;  Service: Pulmonary   • INSERTION HEMODIALYSIS CATHETER N/A 1/24/2018    Procedure: TUNNEL DIALYSIS CATHETER INSERTION;  Surgeon: Moncho Jackson MD;  Location: [facility] MAIN OR;  Service:    • INSERTION HEMODIALYSIS CATHETER N/A 3/22/2018    Procedure: Tunnel Catheter Insertion;  Surgeon: Juwan Phillips MD;  Location: [facility] HYBRID OR 18/19;  Service: Vascular     Family History: History reviewed. No pertinent family history.  Social History:   Social History     Tobacco Use   • Smoking status: Current Every Day Smoker     Types: Cigarettes   • Smokeless tobacco: Never Used   • Tobacco comment: 4 CIG./PER DAY   Substance Use Topics   • Alcohol use: No   • Drug use: No       Vital Signs Range for the last 24 hours  Temperature: Temp:  [36.7 °C (98 °F)-37 °C (98.6 °F)] 37 °C (98.6 °F)   Temp Source: Temp src: Oral   BP: BP: (117-147)/(56-70) 119/62   Pulse: Heart Rate:  [58-74] 68   Respirations: Resp:  [16-18] 18   SPO2: SpO2:  [96 %-98 %] 96 %   O2 Amount (l/min): Flow (L/min):  [2] 2   O2 Devices Device (Oxygen Therapy): nasal cannula   Weight: Weight:  [81.5 kg (179 lb 10.8 oz)-83 kg (182 lb 15.7 oz)] 81.5 kg (179 lb 10.8 oz)     Flowsheet Rows      First Filed Value   Admission Height  165.1 cm (65\") Documented at 09/15/2019 1603   Admission Weight  113 kg (250 lb) Documented at 09/15/2019 1603          --------------------------------------------------------------------------------    Current Facility-Administered Medications   Medication Dose Route Frequency Provider Last Rate Last Dose   • acetaminophen (TYLENOL) tablet 650 mg  650 mg Oral Q4H BEN Shankar, " Taiwo DE LA O MD   650 mg at 09/21/19 0918   • acetylcysteine (MUCOMYST) 20 % nebulizer solution 4 mL  4 mL Nebulization Q8H - RT Alexander Schaefer MD   4 mL at 10/17/19 1531   • amiodarone (PACERONE) tablet 200 mg  200 mg Oral Q24H Neil Guerrero Jr., MD   200 mg at 10/17/19 1236   • aspirin EC tablet 81 mg  81 mg Oral Daily Taiwo Shankar MD   81 mg at 10/17/19 1235   • carboxymethylcellulose sod (PF) 1 % eye gel 1 drop  1 drop Both Eyes TID PRN Taiwo Shankar MD       • castor oil-balsam peru (VENELEX) ointment   Topical Q12H Taiwo Shankar MD   5 g at 10/17/19 1236   • cyclobenzaprine (FLEXERIL) tablet 5 mg  5 mg Oral TID PRN Alejandro Kramer MD   5 mg at 10/07/19 0015   • dexamethasone (DECADRON) tablet 4 mg  4 mg Oral Q12H Alexander Schaefer MD   4 mg at 10/17/19 1235   • dextrose (D50W) 25 g/ 50mL Intravenous Solution 25 g  25 g Intravenous Q15 Min Taiwo Garrett MD       • dextrose (GLUTOSE) oral gel 15 g  15 g Oral Q15 Min Taiwo Garrett MD       • escitalopram (LEXAPRO) tablet 10 mg  10 mg Oral Daily Taiwo Shankar MD   10 mg at 10/17/19 1235   • glucagon (human recombinant) (GLUCAGEN DIAGNOSTIC) injection 1 mg  1 mg Subcutaneous Q15 Min Taiwo Garrett MD       • HYDROmorphone (DILAUDID) injection 0.5 mg  0.5 mg Intravenous Q2H Taiwo Garrett MD   0.5 mg at 10/17/19 1511   • insulin glargine (LANTUS) injection 10 Units  10 Units Subcutaneous Nightly Taiwo Shankar MD   10 Units at 10/16/19 2128   • insulin lispro (humaLOG) injection 0-7 Units  0-7 Units Subcutaneous 4x Daily With Meals & Nightly Taiwo Shankar MD   3 Units at 10/17/19 1749   • ipratropium-albuterol (DUO-NEB) nebulizer solution 3 mL  3 mL Nebulization Q8H - RT Alexander Schaefer MD   3 mL at 10/17/19 1531   • levothyroxine (SYNTHROID, LEVOTHROID) tablet 75 mcg  75 mcg Oral Q AM Taiwo Shankar MD   75 mcg at 10/17/19 0655   • metoprolol tartrate (LOPRESSOR) tablet 12.5 mg  12.5 mg Oral Q12H Taiwo Shankar MD    12.5 mg at 10/17/19 1241   • midodrine (PROAMATINE) tablet 10 mg  10 mg Oral Once per day on Tue Thu Sat Froy Sanchez MD   10 mg at 10/17/19 0854    And   • midodrine (PROAMATINE) tablet 10 mg  10 mg Oral Once per day on Tue Thu Sat Froy Sanchez MD   10 mg at 10/17/19 1241    And   • midodrine (PROAMATINE) tablet 10 mg  10 mg Oral Once per day on Tue Thu Sat Froy Sanchez MD   10 mg at 10/15/19 1834   • ondansetron (ZOFRAN) injection 4 mg  4 mg Intravenous Q4H Taiwo Garrett MD   4 mg at 10/07/19 0845   • oxyCODONE-acetaminophen (PERCOCET)  MG per tablet 1 tablet  1 tablet Oral Q4H PRN Taiwo Shankar MD   1 tablet at 10/17/19 1752   • pantoprazole (PROTONIX) EC tablet 40 mg  40 mg Oral Q AM Taiwo Shankar MD   40 mg at 10/17/19 0655   • Pharmacy to dose warfarin   Does not apply Continuous PRN Virgie Lawrence APRN       • polyethylene glycol 3350 powder (packet)  17 g Oral Daily Taiwo Shankar MD   17 g at 10/16/19 0817       --------------------------------------------------------------------------------  Assessment/Plan      Anesthesia Evaluation                  Airway   Mallampati: III  TM distance: >3 FB  Neck ROM: full  Possible difficult intubation  Dental - normal exam     Pulmonary    (+) pneumonia worsening , lung cancer, rhonchi, wheezes,   Cardiovascular - normal exam    Rhythm: regular  Rate: normal    (+) hypertension, CAD, PVD, hyperlipidemia,       Neuro/Psych  (+) psychiatric history Depression,     GI/Hepatic/Renal/Endo    (+) morbid obesity, GERD,  renal disease ESRD and dialysis, diabetes mellitus type 2,     Musculoskeletal     Abdominal  - normal exam    Abdomen: soft.  Bowel sounds: normal.   Substance History      OB/GYN          Other      history of cancer active               Diagnosis and Plan    Treatment Plan  ASA 4     Anesthetic plan and risks discussed with patient.        I would recommend a Duragesic patch at 50 mcg an hour  topically every 72 hours.  This course can be titrated upward for patient comfort.    Diagnosis     * Pneumonia of right lung due to infectious organism, unspecified part of lung [J18.9]      * Metastatic squamous cell carcinoma of the lung     * Pathologic fracture of the left femur midshaft

## 2019-10-17 NOTE — PROGRESS NOTES
Continued Stay Note  [facility]     Patient Name: Meila Almeida  MRN: [id number]  Today's Date: 10/17/2019    Admit Date: 9/15/2019    Discharge Plan     Row Name 10/17/19 3996       Plan    Plan Comments  Talked to Daniel from [facility] and there are beds available [facility] and he would just have to get a precert if she would be ready to go.  I will follow up with him tomorrow .    Row Name 10/17/19 8610       Plan    Plan Comments  CCP talked to Dr. Shankar and confirmed plan. Waiting on decision on surgery from ortho.  Called Daniel from [facility] and  to check on bed availablity and acceptance to [facility].        Discharge Codes    No documentation.         [facility] is scheduled for 9 pm on Saturday.    Shannon Epley, RN

## 2019-10-17 NOTE — CONSULTS
Full consult to follow. I recommend Duragesic Patch 50 mcg topically to be titrated upward as needed for patient comfort.

## 2019-10-17 NOTE — PROGRESS NOTES
"   LOS: 32 days    Patient Care Team:  Taiwo Shankar MD as PCP - General (Internal Medicine)  Paulette Torres MD as Consulting Physician (Radiation Oncology)    Chief Complaint:    Chief Complaint   Patient presents with   • Shortness of Breath     Follow-up end-stage renal disease  Subjective     Interval History:   Full and examined on HD; denies shortness of breath on supplemental O2.  Moans in pain with any movement in the bed, but she remains reluctant to take any pain medications.  Not eating.  Does not know month or year    Review of Systems:   See above.     Objective     Vital Signs  Temp:  [97.6 °F (36.4 °C)-98.5 °F (36.9 °C)] 98 °F (36.7 °C)  Heart Rate:  [53-74] 74  Resp:  [18] 18  BP: (122-147)/(56-70) 147/70    Flowsheet Rows      First Filed Value   Admission Height  165.1 cm (65\") Documented at 09/15/2019 1603   Admission Weight  113 kg (250 lb) Documented at 09/15/2019 1603          No intake/output data recorded.  I/O last 3 completed shifts:  In: 640 [P.O.:640]  Out: -     Intake/Output Summary (Last 24 hours) at 10/17/2019 0901  Last data filed at 10/16/2019 2130  Gross per 24 hour   Intake 440 ml   Output --   Net 440 ml       Physical Exam:  General Appearance: Awake, not oriented, appears to be chronically ill, moaning in pain  Skin: warm and dry  HEENT: Anicteric sclerae, oral mucosa is normal   Neck: supple, no JVD, trachea midline  Lungs: Worse with rhonchi bilaterally, unlabored breathing effort  Heart: Irregularly irregular, tachycardic, no S3, no rub  Abdomen: soft, non-tender, present bowel sounds to auscultation; not distended  : no palpable bladder  Extremities: No edema, left BKA, right upper extremity AV fistula with thrill and bruit  Psychiatric: Flat affect; depressed mood           Results Review:    Results from last 7 days   Lab Units 10/17/19  0424 10/16/19  0518 10/15/19  0517   SODIUM mmol/L 133* 125* 126*   POTASSIUM mmol/L 4.8 4.7 4.6   CHLORIDE mmol/L 89* 83* " 83*   CO2 mmol/L 20.0* 26.7 26.5   BUN mg/dL 85* 55* 74*   CREATININE mg/dL 3.66* 3.01* 3.63*   CALCIUM mg/dL 9.3 9.6 9.5   GLUCOSE mg/dL 181* 113* 74       Estimated Creatinine Clearance: 18.3 mL/min (A) (by C-G formula based on SCr of 3.66 mg/dL (H)).    Results from last 7 days   Lab Units 10/15/19  0517 10/14/19  0450 10/13/19  0336   PHOSPHORUS mg/dL 6.4* 4.9* 3.4             Results from last 7 days   Lab Units 10/17/19  0424 10/16/19  0518 10/15/19  0517 10/14/19  0450 10/13/19  0336   WBC 10*3/mm3 7.52 6.85 5.17 4.87 4.93   HEMOGLOBIN g/dL 8.5* 9.4* 8.2* 7.9* 7.7*   PLATELETS 10*3/mm3 177 183 167 153 152       Results from last 7 days   Lab Units 10/17/19  0424 10/16/19  0518 10/15/19  0517 10/14/19  0450 10/13/19  0335   INR  4.54* 3.66* 3.95* 2.84* 2.56*         Imaging Results (last 24 hours)     ** No results found for the last 24 hours. **          acetylcysteine 4 mL Nebulization Q8H - RT   amiodarone 200 mg Oral Q24H   aspirin 81 mg Oral Daily   castor oil-balsam peru  Topical Q12H   dexamethasone 4 mg Oral Q12H   escitalopram 10 mg Oral Daily   insulin glargine 10 Units Subcutaneous Nightly   insulin lispro 0-7 Units Subcutaneous 4x Daily With Meals & Nightly   ipratropium-albuterol 3 mL Nebulization Q8H - RT   levothyroxine 75 mcg Oral Q AM   metoprolol tartrate 12.5 mg Oral Q12H   midodrine 10 mg Oral Once per day on Tue Thu Sat   And      midodrine 10 mg Oral Once per day on Tue Thu Sat   And      midodrine 10 mg Oral Once per day on Tue Thu Sat   pantoprazole 40 mg Oral Q AM   polyethylene glycol 17 g Oral Daily       Pharmacy to dose warfarin        Medication Review:   Current Facility-Administered Medications   Medication Dose Route Frequency Provider Last Rate Last Dose   • acetaminophen (TYLENOL) tablet 650 mg  650 mg Oral Q4H PRN Taiwo Shankar MD   650 mg at 09/21/19 0918   • acetylcysteine (MUCOMYST) 20 % nebulizer solution 4 mL  4 mL Nebulization Q8H - RT Alexander Schaefer MD   4 mL at  10/17/19 0717   • amiodarone (PACERONE) tablet 200 mg  200 mg Oral Q24H Neil Guerrero Jr., MD       • aspirin EC tablet 81 mg  81 mg Oral Daily Taiwo Shankar MD   81 mg at 10/16/19 0817   • carboxymethylcellulose sod (PF) 1 % eye gel 1 drop  1 drop Both Eyes TID PRN Taiwo Shankar MD       • castor oil-balsam peru (VENELEX) ointment   Topical Q12H Taiwo Shankar MD   5 g at 10/16/19 2131   • cyclobenzaprine (FLEXERIL) tablet 5 mg  5 mg Oral TID PRN Alejandro Kramer MD   5 mg at 10/07/19 0015   • dexamethasone (DECADRON) tablet 4 mg  4 mg Oral Q12H Alexander Schaefer MD   4 mg at 10/16/19 2130   • dextrose (D50W) 25 g/ 50mL Intravenous Solution 25 g  25 g Intravenous Q15 Min [place] Taiwo Shankar MD       • dextrose (GLUTOSE) oral gel 15 g  15 g Oral Q15 Min [place] Taiwo Shankar MD       • escitalopram (LEXAPRO) tablet 10 mg  10 mg Oral Daily Taiwo Shankar MD   10 mg at 10/16/19 0817   • glucagon (human recombinant) (GLUCAGEN DIAGNOSTIC) injection 1 mg  1 mg Subcutaneous Q15 Min Taiwo Garrett MD       • HYDROmorphone (DILAUDID) injection 0.5 mg  0.5 mg Intravenous Q2H PRN Taiwo Shankar MD   0.5 mg at 10/16/19 1809   • insulin glargine (LANTUS) injection 10 Units  10 Units Subcutaneous Nightly Taiwo Shankar MD   10 Units at 10/16/19 2128   • insulin lispro (humaLOG) injection 0-7 Units  0-7 Units Subcutaneous 4x Daily With Meals & Nightly Taiwo Shankar MD   4 Units at 10/16/19 2130   • ipratropium-albuterol (DUO-NEB) nebulizer solution 3 mL  3 mL Nebulization Q8H - RT Alexander Schaefer MD   3 mL at 10/17/19 0716   • levothyroxine (SYNTHROID, LEVOTHROID) tablet 75 mcg  75 mcg Oral Q AM Taiwo Shankar MD   75 mcg at 10/17/19 0655   • metoprolol tartrate (LOPRESSOR) tablet 12.5 mg  12.5 mg Oral Q12H Taiwo Shankar MD   12.5 mg at 10/16/19 2130   • midodrine (PROAMATINE) tablet 10 mg  10 mg Oral Once per day on Tue Thu Sat Froy Sanchez MD   10 mg at 10/17/19 0854    And   •  midodrine (PROAMATINE) tablet 10 mg  10 mg Oral Once per day on Tue Thu Sat Froy Sanchez MD        And   • midodrine (PROAMATINE) tablet 10 mg  10 mg Oral Once per day on Tue Thu Sat Froy Sanchez MD   10 mg at 10/15/19 1834   • ondansetron (ZOFRAN) injection 4 mg  4 mg Intravenous Q4H PRN Taiwo Shankar MD   4 mg at 10/07/19 0845   • oxyCODONE-acetaminophen (PERCOCET)  MG per tablet 1 tablet  1 tablet Oral Q4H PRN Taiwo Shankar MD   1 tablet at 10/17/19 0655   • pantoprazole (PROTONIX) EC tablet 40 mg  40 mg Oral Q AM Taiwo Shankar MD   40 mg at 10/17/19 0655   • Pharmacy to dose warfarin   Does not apply Continuous PRN Virgie Lawrence APRN       • polyethylene glycol 3350 powder (packet)  17 g Oral Daily Taiwo Shankar MD   17 g at 10/16/19 0817       Assessment/Plan   1. ESRD.  Volume status and electrolytes stable other than hyponatremia due to water excess in the setting of ESRD  2. 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 Palliative XRT.    3. DM2 bs better on scheduled insulin.   4. CAD  5. AFib/flutter, treated per cardiology   6. Thrombocytopenia, resolved  7. Hypothyroidism. On Replacement.   8.  Anemia CKD  9.  Hypertension    Plan:  1.  Recommend palliative care; patient does not have deficient cognitive ability to direct her own care  2.  HD TTS for now  3.  Nursing home placement soon      Froy Sanchez MD  10/17/19  9:01 AM

## 2019-10-18 PROBLEM — Z89.512 S/P BKA (BELOW KNEE AMPUTATION), LEFT (HCC): Status: ACTIVE | Noted: 2019-01-01

## 2019-10-18 PROBLEM — C34.90 LUNG CANCER METASTATIC TO BONE (HCC): Status: ACTIVE | Noted: 2019-01-01

## 2019-10-18 PROBLEM — I27.20 PULMONARY HYPERTENSION (HCC): Status: ACTIVE | Noted: 2019-01-01

## 2019-10-18 PROBLEM — C79.51 LUNG CANCER METASTATIC TO BONE (HCC): Status: ACTIVE | Noted: 2019-01-01

## 2019-10-18 PROBLEM — M84.552D: Status: ACTIVE | Noted: 2019-01-01

## 2019-10-18 PROBLEM — I50.42 CHRONIC COMBINED SYSTOLIC AND DIASTOLIC CONGESTIVE HEART FAILURE (HCC): Status: ACTIVE | Noted: 2019-01-01

## 2019-10-18 PROBLEM — Z51.5 PALLIATIVE CARE BY SPECIALIST: Status: ACTIVE | Noted: 2019-01-01

## 2019-10-18 NOTE — PROGRESS NOTES
Continued Stay Note  [facility]     Patient Name: Melia Almeida  MRN: [id number]  Today's Date: 10/18/2019    Admit Date: 9/15/2019    Discharge Plan     Row Name 10/18/19 1604       Plan    Plan   Comfort/palliative care request being submitted for review.    Patient/Family in Agreement with Plan  yes    Plan Comments  Request received from pt's MDs for submission of comfort care/palliative care paperwork to [facility] nurse consultants.  Pt's guardian Becca notified via VM that the process was being initiated.  Call placed to Shania LAUREN nurse consultant, to discuss the submission and alert her that it will be faxed prior to end of the day.          Discharge Codes    No documentation.             France Andrea RN

## 2019-10-18 NOTE — PROGRESS NOTES
Pharmacy Consult: Warfarin Dosing/ Monitoring    Melia Almeida is a 54 y.o. female, estimated creatinine clearance is 18.3 mL/min (A) (by C-G formula based on SCr of 3.66 mg/dL (H)). weighing 83 kg (182 lb 15.7 oz).      Results from last 7 days   Lab Units 10/18/19  0611 10/17/19  1434 10/17/19  0424 10/16/19  0518 10/15/19  0517 10/14/19  0450 10/13/19  1602 10/13/19  0336 10/13/19  0335 10/12/19  1432 10/12/19  0408   INR  4.59* 4.61* 4.54* 3.66* 3.95* 2.84*  --   --  2.56*  --  1.92*   APTT seconds  --   --   --   --   --   --  33.2  --  64.2* 74.9* 134.5*   HEMOGLOBIN g/dL 8.6*  --  8.5* 9.4* 8.2* 7.9*  --  7.7*  --   --  8.4*   HEMATOCRIT % 27.9*  --  28.4* 30.2* 26.7* 25.4*  --  25.8*  --   --  27.9*   PLATELETS 10*3/mm3 169  --  177 183 167 153  --  152  --   --  163     Results from last 7 days   Lab Units 10/18/19  0611 10/17/19  0424 10/16/19  0518   SODIUM mmol/L 132* 133* 125*   POTASSIUM mmol/L 4.2 4.8 4.7   CHLORIDE mmol/L 89* 89* 83*   CO2 mmol/L 28.9 20.0* 26.7   BUN mg/dL 60* 85* 55*   CREATININE mg/dL 2.87* 3.66* 3.01*   CALCIUM mg/dL 9.6 9.3 9.6   GLUCOSE mg/dL 109* 181* 113*     Anticoagulation history:   Warfarin consult - on amiodarone (new start inpatient). Warfarin 5 mg PO daily was started on 10/8.      Hospital Anticoagulation:  Consulting provider: LAURA Bach  Start date: 10/10/19  Indication: Atrial fibrillation  Target INR: 2-3  Expected duration: chronic  Bridge Therapy: Now off     Date 10/8 10/9 10/10 10/11 10/12 10/13 10/14  10/15  10/16  10/17 10/18   INR     1.08 1.26 1.92 2.56 2.84  3.95  3.66  4.54 4.59   Warfarin dose 5 mg 5 mg 7.5 mg 7.5 mg 5 mg 2.5mg 2.5mg  HOLD  HOLD  HOLD HOLD      Potential drug interactions:   Amiodarone - may result in increased INR and an increased risk of bleeding.  Dose reduced 10/16 however half life average 40-55 days.  Acetaminophen (from Tylenol and Percocet) - May enhance the anticoagulant effect of warfarin. This appears most likely with  daily acetaminophen doses exceeding 1.3 or 2 g/day for multiple consecutive days.   Aspirin - may result in increased risk of bleeding.  Dexamethasone - may result in increased risk of bleeding or diminished effects of warfarin.  Escitalopram - may result in an increased risk of bleeding due to antiplatelet effects of SSRIs  Pantoprazole - may result in increased INR  Synthroid - may increase bleed risk     Relevant nutrition status: Regular diet, nutrition supplements TID     Other:      Education complete: No  Date:      Assessment/Plan:  1) Continue to hold warfarin today.   2) Continue to monitor INR with AM labs.      Pharmacy will continue to follow until discharge or discontinuation of warfarin.     Viviana ThompsonD, BCPS

## 2019-10-18 NOTE — PROGRESS NOTES
Continued Stay Note  [facility]     Patient Name: Melia Almeida  MRN: [id number]  Today's Date: 10/18/2019    Admit Date: 9/15/2019    Discharge Plan     Row Name 10/18/19 0941       Plan    Plan  [facility]    Patient/Family in Agreement with Plan  unable to assess          HR CCP placed a call to pt's guardian      Becca to update her about pt's condition.      She is in agreement with the [facility]      referral but requests frequent updates about      pt's condition.  If pt's physicians feel      that palliative care is warranted, she      requests notification so that she can      involve pt's family members.  CCP shared the      pt's current state of decline.  CCP on the pt's unit       continues to discuss with Dr. Shankar.  If comfort care/palliative care is       to be requested, please notify CCP and the paperwork will be submitted      to the [facility] nurse consultants.   Discharge Codes    No documentation.             France Andrea RN

## 2019-10-18 NOTE — PROGRESS NOTES
DAILY PROGRESS NOTE  [facility], [facility]    10/18/2019    Patient Identification:  Name: Melia Almeida  Age: 54 y.o.  Sex: female  :  1965  MRN: [id number]           Primary Care Physician: Taiwo Shankar MD    Subjective:    Interval History:    Patient pain is increasing every day.  Now on Dilaudid IV as well as p.o. medication.  No eating much  INR is worsened despite holding Coumadin for 2 days  Today she is expressed that she does not know whether she will continue dialysis or not.    Appreciate all consultant recommendation    ROS:     No nausea, vomiting, diarrhea, constipation, chest pain    Objective:    Scheduled Meds:    acetylcysteine 4 mL Nebulization Q8H - RT   amiodarone 200 mg Oral Q24H   aspirin 81 mg Oral Daily   castor oil-balsam peru  Topical Q12H   dexamethasone 4 mg Oral Q12H   escitalopram 10 mg Oral Daily   insulin glargine 10 Units Subcutaneous Nightly   insulin lispro 0-7 Units Subcutaneous 4x Daily With Meals & Nightly   ipratropium-albuterol 3 mL Nebulization Q8H - RT   levothyroxine 75 mcg Oral Q AM   metoprolol tartrate 12.5 mg Oral Q12H   midodrine 10 mg Oral Once per day on  Sat   And      midodrine 10 mg Oral Once per day on Tue Thu Sat   And      midodrine 10 mg Oral Once per day on  Sat   pantoprazole 40 mg Oral Q AM   polyethylene glycol 17 g Oral Daily       Continuous Infusions:    Pharmacy to dose warfarin        PRN Meds:  •  acetaminophen  •  carboxymethylcellulose sod (PF)  •  cyclobenzaprine  •  dextrose  •  dextrose  •  glucagon (human recombinant)  •  HYDROmorphone  •  ondansetron  •  oxyCODONE-acetaminophen  •  Pharmacy to dose warfarin    Intake/Output:    Intake/Output Summary (Last 24 hours) at 10/18/2019 1624  Last data filed at 10/18/2019 1558  Gross per 24 hour   Intake 150 ml   Output --   Net 150 ml         Exam:    tMax 24 hrs: Temp (24hrs), Av.9 °F (36.6 °C), Min:97.5 °F (36.4 °C), Max:98.3  "°F (36.8 °C)    Vitals Ranges:   Temp:  [97.5 °F (36.4 °C)-98.3 °F (36.8 °C)] 98.1 °F (36.7 °C)  Heart Rate:  [62-78] 64  Resp:  [18-26] 26  BP: (117-151)/(55-68) 117/55    /55 (BP Location: Left arm, Patient Position: Lying)   Pulse 64   Temp 98.1 °F (36.7 °C) (Oral)   Resp 26   Ht 165.1 cm (65\")   Wt 81.2 kg (179 lb)   LMP  (LMP Unknown)   SpO2 91%   BMI 29.79 kg/m²     General: Awake, confused.  No respiratory distress while on oxygen.    Neck: Supple, symmetrical, trachea midline, no adenopathy;              thyroid:  no enlargement/tenderness/nodules;              no carotid bruit or JVD  Cardiovascular: Normal rate, regular rhythm and intact distal pulses.   Bradycardic.  Exam reveals no gallop and no friction rub. No murmur heard  Chest wall: No tenderness or deformity  Pulmonary:  Diminished breath sounds bilaterally, rhonchi at bases bilaterally.    Abdominal: Soft. Soft, non-tender, bowel sounds active all four quadrants,     no masses, no hepatomegaly, no splenomegaly.   Extremities: Normal, atraumatic, no cyanosis.  No edema in RLE. S/p L BKA, tender to palpation as well as mild edema in the mid left femur.  Pulses: 2 + symmetric all extremities  Neurological: Patient is alert and oriented to person only                 CNII-XII intact, normal strength, sensation intact throughout  Skin: Skin color, texture, normal. Turgor is decreased. No rashes or lesions            Data Review:    Results from last 7 days   Lab Units 10/18/19  0611 10/17/19  0424 10/16/19  0518   WBC 10*3/mm3 6.99 7.52 6.85   HEMOGLOBIN g/dL 8.6* 8.5* 9.4*   HEMATOCRIT % 27.9* 28.4* 30.2*   PLATELETS 10*3/mm3 169 177 183       Results from last 7 days   Lab Units 10/18/19  0611 10/17/19  0424 10/16/19  0518   SODIUM mmol/L 132* 133* 125*   POTASSIUM mmol/L 4.2 4.8 4.7   CHLORIDE mmol/L 89* 89* 83*   CO2 mmol/L 28.9 20.0* 26.7   BUN mg/dL 60* 85* 55*   CREATININE mg/dL 2.87* 3.66* 3.01*   CALCIUM mg/dL 9.6 9.3 9.6 "   GLUCOSE mg/dL 109* 181* 113*     Results from last 7 days   Lab Units 10/18/19  0611 10/17/19  1434 10/17/19  0424   INR  4.59* 4.61* 4.54*               Assessment:        Squamous cell lung cancer (CMS/HCC)    End stage renal disease (CMS/HCC)    Atelectasis of right lung    CAD (coronary artery disease)  Metastatic lung cancer  Acute respiratory failure with hypoxia  DM  PVD  CAD  HTN  Anemia CKD  Hypothyroidism  Pathologic fracture of the left femur  Atrial fibrillation  Thrombocytopenia  Anticoagulated on Coumadin.    Plan:      Has been very clear until today that she wanted to continue dialysis,  Pain has increased, to the point that she has been getting IV Dilaudid very often to control her pain.  Patient explained that continuing hemodialysis will keep her alive and will keep her in pain and suffering  Today, patient is unsure whether she wanted to continue dialysis  INR is still high despite holding Coumadin  Continue Accu-Cheks and sliding scale insulin  Monitor and correct electrolytes  Monitor mental status  DVT/stress ulcer prophylaxis  Labs in         Taiwo Shankar MD  10/18/2019

## 2019-10-18 NOTE — PROGRESS NOTES
Continued Stay Note  [facility]     Patient Name: Melia Almeida  MRN: [id number]  Today's Date: 10/18/2019    Admit Date: 9/15/2019    Discharge Plan     Row Name 10/18/19 1639       Plan    Plan  comfort/pallative care request submitted for review    Patient/Family in Agreement with Plan  yes    Plan Comments  cancelled [facility] for saturday.    Row Name 10/18/19 4402       Plan    Plan   Comfort/palliative care request being submitted for review.    Patient/Family in Agreement with Plan  yes        Discharge Codes    No documentation.             Shannon Epley, RN

## 2019-10-18 NOTE — PLAN OF CARE
Problem: Patient Care Overview  Goal: Plan of Care Review  Outcome: Ongoing (interventions implemented as appropriate)   10/18/19 7215   Coping/Psychosocial   Plan of Care Reviewed With patient   Plan of Care Review   Progress no change   OTHER   Outcome Summary pa;;iative papers sent to guardian and state. probably wont hear back until monday. cont current treatment at this time. dialysis tomorrow.

## 2019-10-18 NOTE — PROGRESS NOTES
I was notified regarding possible transfer to [place] in am ambulance arranged.  I have examined the patient , D/W her nurse  Pain left thigh increasing , not letting nursing turn her in bed  Noted the recommendations of nephrology and cardiology  INR still high despite holding coumadin for 2 days  Poor surgical candidate , high risk for intraoperative and perioperative mortality  She is again a candidate for palliative care and not a candidate for long term care facility  I have discussed regarding the above with Dr Nguyen and he is going to review the chart.

## 2019-10-18 NOTE — PROGRESS NOTES
"   LOS: 33 days    Patient Care Team:  Taiwo Shankar MD as PCP - General (Internal Medicine)  Paulette Torres MD as Consulting Physician (Radiation Oncology)    Chief Complaint:    Chief Complaint   Patient presents with   • Shortness of Breath     Follow-up end-stage renal disease  Subjective     Interval History:   Had HD yesterday, though she does not remember; denies pain, but moans in bed whenever she is moved even slightly    Review of Systems:   See above.     Objective     Vital Signs  Temp:  [97.5 °F (36.4 °C)-98.3 °F (36.8 °C)] 98.1 °F (36.7 °C)  Heart Rate:  [62-78] 64  Resp:  [18-26] 26  BP: (117-151)/(55-68) 117/55    Flowsheet Rows      First Filed Value   Admission Height  165.1 cm (65\") Documented at 09/15/2019 1603   Admission Weight  113 kg (250 lb) Documented at 09/15/2019 1603          I/O this shift:  In: 150 [P.O.:150]  Out: -   I/O last 3 completed shifts:  In: 300 [P.O.:300]  Out: 1000 [Other:1000]    Intake/Output Summary (Last 24 hours) at 10/18/2019 1621  Last data filed at 10/18/2019 1558  Gross per 24 hour   Intake 150 ml   Output --   Net 150 ml       Physical Exam:  General Appearance: awake, not oriented, appears to be chronically ill  Skin: warm and dry  HEENT: anicteric sclerae, oral mucosa is normal   Neck: supple, no JVD, trachea midline  Lungs:  rhonchi bilaterally, unlabored breathing effort  Heart: Irregularly irregular, tachycardic, no S3, no rub  Abdomen: soft, non-tender, present bowel sounds to auscultation; not distended  : no palpable bladder  Extremities: no edema, left BKA, right upper extremity AV fistula with thrill and bruit  Psychiatric: flat affect; depressed mood           Results Review:    Results from last 7 days   Lab Units 10/18/19  0611 10/17/19  0424 10/16/19  0518   SODIUM mmol/L 132* 133* 125*   POTASSIUM mmol/L 4.2 4.8 4.7   CHLORIDE mmol/L 89* 89* 83*   CO2 mmol/L 28.9 20.0* 26.7   BUN mg/dL 60* 85* 55*   CREATININE mg/dL 2.87* 3.66* 3.01* "   CALCIUM mg/dL 9.6 9.3 9.6   GLUCOSE mg/dL 109* 181* 113*       Estimated Creatinine Clearance: 23 mL/min (A) (by C-G formula based on SCr of 2.87 mg/dL (H)).    Results from last 7 days   Lab Units 10/15/19  0517 10/14/19  0450 10/13/19  0336   PHOSPHORUS mg/dL 6.4* 4.9* 3.4             Results from last 7 days   Lab Units 10/18/19  0611 10/17/19  0424 10/16/19  0518 10/15/19  0517 10/14/19  0450   WBC 10*3/mm3 6.99 7.52 6.85 5.17 4.87   HEMOGLOBIN g/dL 8.6* 8.5* 9.4* 8.2* 7.9*   PLATELETS 10*3/mm3 169 177 183 167 153       Results from last 7 days   Lab Units 10/18/19  0611 10/17/19  1434 10/17/19  0424 10/16/19  0518 10/15/19  0517   INR  4.59* 4.61* 4.54* 3.66* 3.95*         Imaging Results (last 24 hours)     ** No results found for the last 24 hours. **          acetylcysteine 4 mL Nebulization Q8H - RT   amiodarone 200 mg Oral Q24H   aspirin 81 mg Oral Daily   castor oil-balsam peru  Topical Q12H   dexamethasone 4 mg Oral Q12H   escitalopram 10 mg Oral Daily   insulin glargine 10 Units Subcutaneous Nightly   insulin lispro 0-7 Units Subcutaneous 4x Daily With Meals & Nightly   ipratropium-albuterol 3 mL Nebulization Q8H - RT   levothyroxine 75 mcg Oral Q AM   metoprolol tartrate 12.5 mg Oral Q12H   midodrine 10 mg Oral Once per day on Tue Thu Sat   And      midodrine 10 mg Oral Once per day on Tue Thu Sat   And      midodrine 10 mg Oral Once per day on Tue Thu Sat   pantoprazole 40 mg Oral Q AM   polyethylene glycol 17 g Oral Daily       Pharmacy to dose warfarin        Medication Review:   Current Facility-Administered Medications   Medication Dose Route Frequency Provider Last Rate Last Dose   • acetaminophen (TYLENOL) tablet 650 mg  650 mg Oral Q4H Taiwo Garrett MD   650 mg at 09/21/19 0918   • acetylcysteine (MUCOMYST) 20 % nebulizer solution 4 mL  4 mL Nebulization Q8H - RT Alexander Schaefer MD   4 mL at 10/18/19 0747   • amiodarone (PACERONE) tablet 200 mg  200 mg Oral Q24H Neil Guerrero Jr., MD    200 mg at 10/18/19 1018   • aspirin EC tablet 81 mg  81 mg Oral Daily Taiwo Shankar MD   81 mg at 10/18/19 1017   • carboxymethylcellulose sod (PF) 1 % eye gel 1 drop  1 drop Both Eyes TID PRN Taiwo Shankar MD       • castor oil-balsam peru (VENELEX) ointment   Topical Q12H Taiwo Shankar MD   5 g at 10/18/19 1016   • cyclobenzaprine (FLEXERIL) tablet 5 mg  5 mg Oral TID PRN Alejandro Kramer MD   5 mg at 10/07/19 0015   • dexamethasone (DECADRON) tablet 4 mg  4 mg Oral Q12H Alexander Schaefer MD   4 mg at 10/18/19 1017   • dextrose (D50W) 25 g/ 50mL Intravenous Solution 25 g  25 g Intravenous Q15 Min Taiwo Garrett MD       • dextrose (GLUTOSE) oral gel 15 g  15 g Oral Q15 Min [place] Taiwo Shankar MD       • escitalopram (LEXAPRO) tablet 10 mg  10 mg Oral Daily Taiwo Shankar MD   10 mg at 10/18/19 1017   • glucagon (human recombinant) (GLUCAGEN DIAGNOSTIC) injection 1 mg  1 mg Subcutaneous Q15 Min Taiwo Garrett MD       • HYDROmorphone (DILAUDID) injection 0.5 mg  0.5 mg Intravenous Q2H PRN Taiwo Shankar MD   0.5 mg at 10/17/19 2242   • insulin glargine (LANTUS) injection 10 Units  10 Units Subcutaneous Nightly Taiwo Shankar MD   10 Units at 10/17/19 2054   • insulin lispro (humaLOG) injection 0-7 Units  0-7 Units Subcutaneous 4x Daily With Meals & Nightly Taiwo Shankar MD   2 Units at 10/17/19 2054   • ipratropium-albuterol (DUO-NEB) nebulizer solution 3 mL  3 mL Nebulization Q8H - RT Alexander Schaefer MD   3 mL at 10/18/19 0741   • levothyroxine (SYNTHROID, LEVOTHROID) tablet 75 mcg  75 mcg Oral Q AM Taiwo Shankar MD   75 mcg at 10/18/19 0713   • metoprolol tartrate (LOPRESSOR) tablet 12.5 mg  12.5 mg Oral Q12H Taiwo Shankar MD   12.5 mg at 10/18/19 1018   • midodrine (PROAMATINE) tablet 10 mg  10 mg Oral Once per day on Tue [facility] Froy Sanchez MD   10 mg at 10/17/19 0854    And   • midodrine (PROAMATINE) tablet 10 mg  10 mg Oral Once per day on Tue [facility] Daniel,  Froy Robins MD   10 mg at 10/17/19 1241    And   • midodrine (PROAMATINE) tablet 10 mg  10 mg Oral Once per day on Tue Thu Sat Froy Sanchez MD   10 mg at 10/17/19 2112   • ondansetron (ZOFRAN) injection 4 mg  4 mg Intravenous Q4H PRN Taiwo Shankar MD   4 mg at 10/07/19 0845   • oxyCODONE-acetaminophen (PERCOCET)  MG per tablet 1 tablet  1 tablet Oral Q4H PRN Taiwo Shankar MD   1 tablet at 10/18/19 1450   • pantoprazole (PROTONIX) EC tablet 40 mg  40 mg Oral Q AM Taiwo Shankar MD   40 mg at 10/18/19 0713   • Pharmacy to dose warfarin   Does not apply Continuous PRN Virgie Lawrence APRN       • polyethylene glycol 3350 powder (packet)  17 g Oral Daily Taiwo Shankar MD   17 g at 10/18/19 1017       Assessment/Plan   1. ESRD.  Volume status and electrolytes stable other than hyponatremia due to water excess in the setting of ESRD  2. 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 Palliative XRT.    3. DM2 on scheduled insulin.   4. CAD  5. AFib/flutter, treated per cardiology   6. Thrombocytopenia, resolved  7. Hypothyroidism. On Replacement.   8.  Anemia CKD  9.  Hypertension    Plan:  1.  Recommend strongly that HD be stopped as continuation of hemodialysis is contributing to her suffering  2.  Will not order HD tomorrow; State and her guardian being contacted regarding recommendation for palliative care        Froy Sanchez MD  10/18/19  4:21 PM

## 2019-10-19 NOTE — PLAN OF CARE
Continue to follow with mini neb therapy, aerobika for bilateral wheezing, and congested coughing.

## 2019-10-19 NOTE — H&P
Palliative Care/Hospice Admit/Consult Note       Referring Provider: Taiwo Shankar MD  Reason for Consultation: Hospice/Palliative Care  Date of Admission:  9/15/2019    Patient Care Team:  Taiwo Shankar MD as PCP - General (Internal Medicine)  Paulette Torres MD as Consulting Physician (Radiation Oncology)  Matheus Nguyen MD as Consulting Physician (Hospice and Palliative Medicine)  Froy Sanchez MD as Consulting Physician (Nephrology)  Chuy Zavala MD as Consulting Physician (Orthopedic Surgery)    Chief complaint:  Lung, Right Middle Lobe, Biopsy: non-small cell carcinoma consistent with squamous cell carcinoma 9/17/2019; Multifocal osteolytic metastatic disease involving ribs, lumbar spine, pelvis, both femora; ESRD    History of present illness:  The patient is a 54 y.o. female with end-stage renal disease and type 2 diabetes, hypertension, hyperlipidemia, peripheral vascular disease, who is a former smoker who was admitted on September 15, 2019 from the emergency room with right hip pain.  Her baseline mental status is poor.  It appears that her pain was ongoing for some time.  She was recently discharged from [facility] because of her right-sided pneumonia.  CT scan was done here which revealed significant airway obstruction.  CT scan showed narrowing of the intermedius with complete occlusion of the bronchus to the right side as well as plugging of the bronchus in the right lower lobe.  The results are of right middle lobe atelectasis.  Additional consolidation is seen in the right lower lobe.  There is mild left basilar atelectasis with small bilateral pleural effusions.  The main pulmonary arteries enlarged and could be in the setting of pulmonary hypertension.  There is cardiomegaly.  There is some enlarged mediastinal lymph nodes.  Lower right paratracheal node measures 1.8 cm in the subcarinal node measures 2.1 cm.  Patient has some intralobar septal thickening  which is due to vascular congestion.  She also has a right subclavian vascular stent in place.  There is a 1.7 cm lesion in the right hepatic lobe which is indeterminate.  There is a node measuring 2.9 x 2.4 cm in the gastrohepatic ligament.  Compression deformity is present at L1.     Nephrology follows the patient for end-stage renal disease on maintenance hemodialysis Monday Wednesday Friday.     Patient is status post left below-knee amputation.  Patient had shortness of breath in the ER with oxygen saturation of 77% on room air and hence placed on oxygen.     She had x-ray of the hip and there was no fracture identified.  The patient has a left mid shaft femur fracture.      Bronchoscopy was done on September 17, 2019. Pathology consistent with non-small cell carcinoma consistent with squamous cell carcinoma.  Predominant predominantly within lymphatics. Immune histochemical stains are positive for CK 5/6 strongly positive and P 40+ and focally weak TTF-1.  The tumor is negative for CK7, CK 20 and Napsin.     The patient has been in pain. The patient is a state guardian. She denied SOA. Pain present with any movement. She is not eating and she has complaints of nausea. She appears chronically ill.    Review of Systems  Pertinent items are noted in HPI    Palliative Performance Scale  Palliative Performance Scale Score: 50%  [place] Symptom Assessment System Revised  Pain Score: 6   ESAS Tiredness Score: 5  ESAS Nausea Score: No nausea  ESAS Depression Score: No depression  ESAS Anxiety Score: 2  ESAS Drowsiness Score: 3  ESAS Lack of Appetite Score: 2  ESAS Dyspnea Score: 2  ESAS Source of Information: patient  ESAS Intervention: other (see comment)(RN updated)    History  Past Medical History:   Diagnosis Date   • Acute renal failure on dialysis (CMS/HCC)     M-W-F   • Adult failure to thrive    • Chronic kidney disease (CKD), stage V (CMS/HCC)    • Coronary artery disease    • Diabetes mellitus (CMS/HCC)     • Diabetes mellitus with chronic kidney disease (CMS/[facility])    • Disease of thyroid gland     hypothyroidism   • GERD (gastroesophageal reflux disease)    • History of anemia    • History of UTI    • Hyperlipidemia    • Hypertension    • Idiopathic neuropathy    • Major depressive disorder, recurrent episode, severe, with psychotic behavior (CMS/[facility])    • Metabolic encephalopathy    • Obesity    • Osteomyelitis of right foot (CMS/[facility])     foot and ankle   • Other symbolic dysfunctions    • Peripheral vascular disease, unspecified (CMS/[facility])    • Personal history of diabetic foot ulcer    ,   Past Surgical History:   Procedure Laterality Date   • ARTERIOVENOUS FISTULA/SHUNT SURGERY Right 1/29/2018    Procedure: UPPER EXTREMITY ARTERIOVENOUS SHUNT GRAFT;  Surgeon: Ambar Dobson Jr., MD;  Location: [facility] MAIN OR;  Service:    • BELOW KNEE LEG AMPUTATION Left 2008   • BRONCHOSCOPY Bilateral 9/17/2019    Procedure: BRONCHOSCOPY WITH ENDOBRONCHIAL ULTRASOUND, BX, BRUSHINGS, BAL, & TBNA;  Surgeon: Du Rogers MD;  Location: [facility] ENDOSCOPY;  Service: Pulmonary   • INSERTION HEMODIALYSIS CATHETER N/A 1/24/2018    Procedure: TUNNEL DIALYSIS CATHETER INSERTION;  Surgeon: Moncho Jackson MD;  Location: [facility] MAIN OR;  Service:    • INSERTION HEMODIALYSIS CATHETER N/A 3/22/2018    Procedure: Tunnel Catheter Insertion;  Surgeon: Juwan Phillips MD;  Location: [facility] HYBRID OR 18/19;  Service: Vascular   , History reviewed. No pertinent family history. and   Social History     Tobacco Use   • Smoking status: Current Every Day Smoker     Types: Cigarettes   • Smokeless tobacco: Never Used   • Tobacco comment: 4 CIG./PER DAY   Substance Use Topics   • Alcohol use: No   • Drug use: No       Vital Signs   Temp:  [97.5 °F (36.4 °C)-98.1 °F (36.7 °C)] 97.9 °F (36.6 °C)  Heart Rate:  [62-78] 69  Resp:  [20-26] 26  BP: (117-151)/(55-68) 137/68  Device (Oxygen Therapy): nasal cannulaFlow (L/min):  [1-2] 1SpO2:  [88  %-96 %] 95 %    Physical Exam:  General Appearance:   Awakened and appears in no acute distress but chronically ill   Head:    Normocephalic, without obvious abnormality, atraumatic   Eyes:            Lids and lashes normal, conjunctivae and sclerae normal, no   icterus   Ears:    Ears appear intact with no abnormalities noted   Throat:   No oral lesions, oral mucosa moist   Neck:   No adenopathy, supple, trachea midline, no thyromegaly   Back:     No scoliosis present   Lungs:     Clear to auscultation, occasional scattered crackle, right greater than left, worse breath sounds right greater than left, respirations regular    Heart:    Regular rhythm and normal rate   Breast Exam:    Deferred   Abdomen:   Occasional bowel sounds, soft and non-tender, non-distended   Genitalia:    Deferred   Extremities:  No edema, left below-knee amputation, heel protector on the right, no cyanosis    Pulses:  Radial pulses palpable and equal bilaterally   Skin:   No bleeding         Neurologic:  Awakened and oriented to person, [facility], 2019, she did not know the month, she did know the president.     Results Review:   I reviewed the patient's new clinical results.  CT chest 9/15/2019:  IMPRESSION:   1. The patient is noted to have narrowing of the distal aspect of the  bronchus intermedius, as well as complete occlusion of the bronchus to  the right middle lobe, with partial obstruction of the bronchus to the  right lower lobe. This results in atelectasis of the right middle lobe,  with some additional atelectasis seen within the right lower lobe. Exact  etiology is uncertain. Full assessment of the right hilum is limited in  the absence of intravenous contrast material. Bronchoscopy could be  considered for additional evaluation. There are also some enlarged  mediastinal lymph nodes, although these certainly could be reactive.  2. Cardiomegaly and interlobular septal thickening may reflect vascular  congestion. There are trace  bilateral pleural effusions.  3. Low-attenuation lesion identified within the right hepatic lobe is  indeterminate on the basis of this study. Liver protocol CT or MRI would  be more sensitive for evaluation. Condition, this would also allow for  assessment of some apparent adenopathy within the upper abdomen.  4. Compression deformity involving the L1 vertebral body. This is age  indeterminate. MRI or bone scan would be more sensitive for evaluation  of acuity.    CT abdomen and pelvis 9/20/2019:  CONCLUSION:  1. There is now complete collapse of the right lower lobe as well as the  right middle lobe with occlusion of the right middle and lower lobe  bronchus. There is a small right-sided pleural effusion.  2. Gastrohepatic and retroperitoneal lymphadenopathy.  3. Indeterminant small hypodense nodular area within the liver again  noted. There is moderate size cyst within the left lobe.  4. Dense fecal material within the rectosigmoid, there is mild diffuse  rectal wall thickening and the appearance suggests fecal impaction.  5. Diffuse bladder wall thickening, cystitis not exclude    Nuclear medicine whole-body bone scan 9/20/2019:  IMPRESSION; Multifocal osteolytic metastatic disease involving ribs,  lumbar spine, pelvis, both femora. Pathologic compression fractures L1  and L2 more severely involving L1. Lytic lesion right femoral neck  crossing over half the diameter of the right femoral neck with cortical  loss inferomedially. Left mid shaft femoral uptake suspected to  represent a large metastasis.    Impression:      Squamous cell lung cancer right lung(CMS/HCC)    End stage renal disease (CMS/HCC)    Atelectasis of right middle and lower lung 2nd cancer    Palliative care by specialist    Lung cancer metastatic to bone (CMS/HCC)    Pathological fracture in neoplastic disease, left femur, subsequent encounter for fracture with routine healing    Type 2 diabetes mellitus (CMS/HCC)    CAD (coronary artery  disease)    Chronic combined systolic and diastolic congestive heart failure (CMS/HCC)    Pulmonary hypertension (CMS/HCC)    S/P BKA (below knee amputation), left (CMS/HCC)        Plan:  I reviewed the patient's chart and I examined the patient.  It is noted that the patient is a state guardian.  No attempts at CPR already ordered.  The patient has chronic diseases: Diabetes mellitus with coronary artery disease and peripheral vascular disease.  She has end-stage renal disease previously requiring hemodialysis.  The patient's condition has not allowed hemodialysis to be completed due to low blood pressure.  Due to her above medical problems, the patient has had a previous left below-knee amputation.    During this hospitalization, imaging demonstrated findings consistent with malignancy.  Bronchoscopy performed demonstrated metastatic squamous cell carcinoma.  The patient was found to have a left midshaft femur fracture.  Nuclear bone scan demonstrated diffuse skeletal metastasis from her right lower lung squamous cell lung cancer.  The patient's condition limits any attempts at cancer treatment.  Additionally, the patient's widespread metastatic cancer does not lend itself to any therapy.    Discussion concerning possible surgical correction of her left femur had.  Due to the patient's high risk of any surgical intervention, surgery has not been performed.    Despite all therapies provided, the patient's condition has deteriorated.  Her condition has deteriorated so that some therapies cannot be provided, e.g. Hemodialysis.    Therefore, due to the patient's deteriorating condition, palliative/hospice care recommended.  Continued do not attempt resuscitation recommended.  However, I would also recommend palliative/comfort measures/hospice care.  All therapy to be given should be for symptom management to control pain, anxiety, agitation, and oral or respiratory secretions.  P.o. nutrition will be provided as she  can tolerate.  No artificial feeding should be provided.  The patient's nausea will be treated.  Maintenance care such as oral care, bathing, and skin care will be continued.    Unfortunately, the patient's squamous cell lung cancer, stage IV, on top of her many chronic diseases, lenses patient to a life expectancy probably less than 1 month.    Pain control for this patient should be the utmost priority.    If you should have any questions, please feel free to contact me.      Matheus Nguyen MD  Hospice and Palliative Medicine  10/18/19  8:33 PM

## 2019-10-19 NOTE — PROGRESS NOTES
DAILY PROGRESS NOTE  [facility], [facility]    10/19/2019    Patient Identification:  Name: Melia Almeida  Age: 54 y.o.  Sex: female  :  1965  MRN: [id number]           Primary Care Physician: Taiwo Shankar MD    Subjective:    Interval History:    Patient is constant and severe pain at this time, receiving pain medication.  Not eating much  Still awake  I spent around 15 minutes speaking with her regarding her condition and that hemodialysis will no change her prognosis and that will only prolong her suffering, she understood, however she wanted to have hemodialysis today.  She appears to be more confused than the last few days.  INR down to 3.8.    ROS:     No nausea, vomiting, diarrhea, constipation, chest pain    Objective:    Scheduled Meds:    acetylcysteine 4 mL Nebulization Q8H - RT   amiodarone 200 mg Oral Q24H   aspirin 81 mg Oral Daily   castor oil-balsam peru  Topical Q12H   dexamethasone 4 mg Oral Q12H   escitalopram 10 mg Oral Daily   insulin glargine 10 Units Subcutaneous Nightly   insulin lispro 0-7 Units Subcutaneous 4x Daily With Meals & Nightly   ipratropium-albuterol 3 mL Nebulization Q8H - RT   levothyroxine 75 mcg Oral Q AM   metoprolol tartrate 12.5 mg Oral Q12H   midodrine 10 mg Oral Once per day on  Sat   And      midodrine 10 mg Oral Once per day on  Sat   And      midodrine 10 mg Oral Once per day on  Sat   pantoprazole 40 mg Oral Q AM   polyethylene glycol 17 g Oral Daily       Continuous Infusions:    Pharmacy to dose warfarin        PRN Meds:  •  acetaminophen  •  carboxymethylcellulose sod (PF)  •  cyclobenzaprine  •  dextrose  •  dextrose  •  glucagon (human recombinant)  •  HYDROmorphone  •  ondansetron  •  oxyCODONE-acetaminophen  •  Pharmacy to dose warfarin    Intake/Output:    Intake/Output Summary (Last 24 hours) at 10/19/2019 1255  Last data filed at 10/19/2019 0920  Gross per 24 hour   Intake 600 ml  "  Output --   Net 600 ml         Exam:    tMax 24 hrs: Temp (24hrs), Av.8 °F (36.6 °C), Min:97.4 °F (36.3 °C), Max:98.1 °F (36.7 °C)    Vitals Ranges:   Temp:  [97.4 °F (36.3 °C)-98.1 °F (36.7 °C)] 97.4 °F (36.3 °C)  Heart Rate:  [64-78] 66  Resp:  [20-26] 22  BP: (117-137)/(55-68) 129/63    /63 (BP Location: Left arm, Patient Position: Lying)   Pulse 66   Temp 97.4 °F (36.3 °C) (Oral)   Resp 22   Ht 165.1 cm (65\")   Wt 81.6 kg (180 lb)   LMP  (LMP Unknown)   SpO2 92%   BMI 29.95 kg/m²     General: Awake, confused.  No respiratory distress while on oxygen.    Neck: Supple, symmetrical, trachea midline, no adenopathy;              thyroid:  no enlargement/tenderness/nodules;              no carotid bruit or JVD  Cardiovascular: Normal rate, regular rhythm and intact distal pulses.   Bradycardic.  Exam reveals no gallop and no friction rub. No murmur heard  Chest wall: No tenderness or deformity  Pulmonary:  Diminished breath sounds bilaterally, rhonchi at bases bilaterally.    Abdominal: Soft. Soft, non-tender, bowel sounds active all four quadrants,     no masses, no hepatomegaly, no splenomegaly.   Extremities: Normal, atraumatic, no cyanosis.  No edema in RLE. S/p L BKA, very tender to palpation as well as mild edema in the mid left femur.  Pulses: 2 + symmetric all extremities  Neurological: Patient is alert and oriented to person only                 CNII-XII intact, normal strength, sensation intact throughout  Skin: Skin color, texture, normal. Turgor is decreased. No rashes or lesions            Data Review:    Results from last 7 days   Lab Units 10/18/19  0611 10/17/19  0424 10/16/19  0518   WBC 10*3/mm3 6.99 7.52 6.85   HEMOGLOBIN g/dL 8.6* 8.5* 9.4*   HEMATOCRIT % 27.9* 28.4* 30.2*   PLATELETS 10*3/mm3 169 177 183       Results from last 7 days   Lab Units 10/18/19  0611 10/17/19  0424 10/16/19  0518   SODIUM mmol/L 132* 133* 125*   POTASSIUM mmol/L 4.2 4.8 4.7   CHLORIDE mmol/L 89* 89* " 83*   CO2 mmol/L 28.9 20.0* 26.7   BUN mg/dL 60* 85* 55*   CREATININE mg/dL 2.87* 3.66* 3.01*   CALCIUM mg/dL 9.6 9.3 9.6   GLUCOSE mg/dL 109* 181* 113*     Results from last 7 days   Lab Units 10/19/19  1132 10/18/19  0611 10/17/19  1434   INR  3.88* 4.59* 4.61*               Assessment:        Squamous cell lung cancer right lung(CMS/HCC)    Type 2 diabetes mellitus (CMS/HCC)    End stage renal disease (CMS/HCC)    Atelectasis of right middle and lower lung 2nd cancer    CAD (coronary artery disease)    Palliative care by specialist    Lung cancer metastatic to bone (CMS/HCC)    Chronic combined systolic and diastolic congestive heart failure (CMS/HCC)    Pulmonary hypertension (CMS/HCC)    S/P BKA (below knee amputation), left (CMS/HCC)    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    Plan:      Despite explanation regarding dialysis will only continue her suffering and her pain will continue to get worse, patient still wanted to have dialysis today.  However she is more confused than the last few days.  Pain has increased, to the point that she has been getting IV Dilaudid/po percocet very often to control her pain.  We will add Duragesic patch  Patient is not eating much, not drinking much.  Becoming a little bit more confused.  Continue Accu-Cheks and sliding scale insulin  Monitor and correct electrolytes  Monitor mental status  DVT/stress ulcer prophylaxis  Labs in         Taiwo Shankar MD  10/19/2019

## 2019-10-19 NOTE — PROGRESS NOTES
Pharmacy Consult: Warfarin Dosing/ Monitoring    Melia Almeida is a 54 y.o. female, estimated creatinine clearance is 23.1 mL/min (A) (by C-G formula based on SCr of 2.87 mg/dL (H)). weighing 81.6 kg (180 lb).      Results from last 7 days   Lab Units 10/19/19  1132 10/18/19  0611 10/17/19  1434 10/17/19  0424 10/16/19  0518 10/15/19  0517 10/14/19  0450 10/13/19  1602 10/13/19  0336 10/13/19  0335  10/12/19  1432   INR  3.88* 4.59* 4.61* 4.54* 3.66* 3.95* 2.84*  --   --  2.56*   < >  --    APTT seconds  --   --   --   --   --   --   --  33.2  --  64.2*  --  74.9*   HEMOGLOBIN g/dL  --  8.6*  --  8.5* 9.4* 8.2* 7.9*  --  7.7*  --   --   --    HEMATOCRIT %  --  27.9*  --  28.4* 30.2* 26.7* 25.4*  --  25.8*  --   --   --    PLATELETS 10*3/mm3  --  169  --  177 183 167 153  --  152  --   --   --     < > = values in this interval not displayed.     Results from last 7 days   Lab Units 10/18/19  0611 10/17/19  0424 10/16/19  0518   SODIUM mmol/L 132* 133* 125*   POTASSIUM mmol/L 4.2 4.8 4.7   CHLORIDE mmol/L 89* 89* 83*   CO2 mmol/L 28.9 20.0* 26.7   BUN mg/dL 60* 85* 55*   CREATININE mg/dL 2.87* 3.66* 3.01*   CALCIUM mg/dL 9.6 9.3 9.6   GLUCOSE mg/dL 109* 181* 113*     Anticoagulation history:   Warfarin consult - on amiodarone (new start inpatient). Warfarin 5 mg PO daily was started on 10/8.      Hospital Anticoagulation:  Consulting provider: LAURA Bach  Start date: 10/10/19  Indication: Atrial fibrillation  Target INR: 2-3  Expected duration: chronic  Bridge Therapy: Now off     Date 10/8 10/9 10/10 10/11 10/12 10/13 10/14  10/15  10/16  10/17 10/18 10/19     INR     1.08 1.26 1.92 2.56 2.84  3.95  3.66  4.54 4.59 3.88     Warfarin dose 5 mg 5 mg 7.5 mg 7.5 mg 5 mg 2.5mg 2.5mg  HOLD  HOLD  HOLD HOLD hold        Potential drug interactions:   Amiodarone - may result in increased INR and an increased risk of bleeding.  Dose reduced 10/16 however half life average 40-55 days.  Acetaminophen (from Tylenol and  Percocet) - May enhance the anticoagulant effect of warfarin. This appears most likely with daily acetaminophen doses exceeding 1.3 or 2 g/day for multiple consecutive days.   Aspirin - may result in increased risk of bleeding.  Dexamethasone - may result in increased risk of bleeding or diminished effects of warfarin.  Escitalopram - may result in an increased risk of bleeding due to antiplatelet effects of SSRIs  Pantoprazole - may result in increased INR  Synthroid - may increase bleed risk     Relevant nutrition status: Regular diet, nutrition supplements TID     Other:      Education complete: No  Date:      Assessment/Plan:  1) Continue to hold warfarin today.   2) Continue to monitor INR with AM labs.    Pharmacy will continue to follow until discharge or discontinuation of warfarin.   Thanks, Sam Flores, PharmD, BCPS

## 2019-10-19 NOTE — PLAN OF CARE
Problem: Patient Care Overview  Goal: Plan of Care Review  Outcome: Ongoing (interventions implemented as appropriate)   10/19/19 8791   Coping/Psychosocial   Plan of Care Reviewed With patient   Plan of Care Review   Progress declining   OTHER   Outcome Summary VSS. Dailysis today. Pain pill given x3, pain with movement and turning. Patient was reluctant to turn, educated on the importane of turning and what will happen if patient does not turn. Safety maintained. Will continue to monitor.     Goal: Individualization and Mutuality  Outcome: Ongoing (interventions implemented as appropriate)    Goal: Discharge Needs Assessment  Outcome: Ongoing (interventions implemented as appropriate)    Goal: Interprofessional Rounds/Family Conf  Outcome: Ongoing (interventions implemented as appropriate)      Problem: Fall Risk (Adult)  Goal: Absence of Fall  Outcome: Ongoing (interventions implemented as appropriate)      Problem: Hemodialysis (Adult)  Goal: Signs and Symptoms of Listed Potential Problems Will be Absent, Minimized or Managed (Hemodialysis)  Outcome: Ongoing (interventions implemented as appropriate)      Problem: Skin Injury Risk (Adult)  Goal: Skin Health and Integrity  Outcome: Ongoing (interventions implemented as appropriate)      Problem: Confusion, Acute (Adult)  Goal: Safety  Outcome: Ongoing (interventions implemented as appropriate)

## 2019-10-19 NOTE — PLAN OF CARE
Problem: Patient Care Overview  Goal: Plan of Care Review  Outcome: Ongoing (interventions implemented as appropriate)   10/19/19 0639   Coping/Psychosocial   Plan of Care Reviewed With patient   Plan of Care Review   Progress no change   OTHER   Outcome Summary VSS. GIVEN PAIN PILL EVERY 6 H TO CONTROL HER BACK PAIN. RESTED WELL IN BETWEEN CARE/TURNS.       Problem: Fall Risk (Adult)  Goal: Absence of Fall  Outcome: Ongoing (interventions implemented as appropriate)      Problem: Hemodialysis (Adult)  Goal: Signs and Symptoms of Listed Potential Problems Will be Absent, Minimized or Managed (Hemodialysis)  Outcome: Ongoing (interventions implemented as appropriate)      Problem: Skin Injury Risk (Adult)  Goal: Skin Health and Integrity  Outcome: Ongoing (interventions implemented as appropriate)      Problem: Confusion, Acute (Adult)  Goal: Safety  Outcome: Ongoing (interventions implemented as appropriate)

## 2019-10-19 NOTE — PROGRESS NOTES
"   LOS: 34 days    Patient Care Team:  Taiwo Shankar MD as PCP - General (Internal Medicine)  Paulette Torres MD as Consulting Physician (Radiation Oncology)  Matheus Nguyen MD as Consulting Physician (Hospice and Palliative Medicine)  Froy Sanchez MD as Consulting Physician (Nephrology)  Chuy Zavala MD as Consulting Physician (Orthopedic Surgery)    Chief Complaint:    Chief Complaint   Patient presents with   • Shortness of Breath     Follow UP ESRD  Subjective     Interval History:   Somnolent but requests dialysis today when aroused.  Denies dyspnea.    Objective     Vital Signs  Temp:  [97.4 °F (36.3 °C)-98.1 °F (36.7 °C)] 97.4 °F (36.3 °C)  Heart Rate:  [64-78] 67  Resp:  [20-26] 22  BP: (117-137)/(55-68) 129/63    Flowsheet Rows      First Filed Value   Admission Height  165.1 cm (65\") Documented at 09/15/2019 1603   Admission Weight  113 kg (250 lb) Documented at 09/15/2019 1603          I/O this shift:  In: 150 [P.O.:150]  Out: -   I/O last 3 completed shifts:  In: 450 [P.O.:450]  Out: -     Intake/Output Summary (Last 24 hours) at 10/19/2019 1121  Last data filed at 10/19/2019 0920  Gross per 24 hour   Intake 600 ml   Output --   Net 600 ml       Physical Exam:  GEN frail drowsy WF with audible upper airway pierre  Neck supple no JVD  Lungs bilat rhonchi  CV RRR  abd soft NT/ND  vasc 1+ BLE pedal/ankle edema     Results Review:    Results from last 7 days   Lab Units 10/18/19  0611 10/17/19  0424 10/16/19  0518   SODIUM mmol/L 132* 133* 125*   POTASSIUM mmol/L 4.2 4.8 4.7   CHLORIDE mmol/L 89* 89* 83*   CO2 mmol/L 28.9 20.0* 26.7   BUN mg/dL 60* 85* 55*   CREATININE mg/dL 2.87* 3.66* 3.01*   CALCIUM mg/dL 9.6 9.3 9.6   GLUCOSE mg/dL 109* 181* 113*       Estimated Creatinine Clearance: 23.1 mL/min (A) (by C-G formula based on SCr of 2.87 mg/dL (H)).    Results from last 7 days   Lab Units 10/15/19  0517 10/14/19  0450 10/13/19  0336   PHOSPHORUS mg/dL 6.4* 4.9* 3.4 "             Results from last 7 days   Lab Units 10/18/19  0611 10/17/19  0424 10/16/19  0518 10/15/19  0517 10/14/19  0450   WBC 10*3/mm3 6.99 7.52 6.85 5.17 4.87   HEMOGLOBIN g/dL 8.6* 8.5* 9.4* 8.2* 7.9*   PLATELETS 10*3/mm3 169 177 183 167 153       Results from last 7 days   Lab Units 10/18/19  0611 10/17/19  1434 10/17/19  0424 10/16/19  0518 10/15/19  0517   INR  4.59* 4.61* 4.54* 3.66* 3.95*         Imaging Results (last 24 hours)     ** No results found for the last 24 hours. **          acetylcysteine 4 mL Nebulization Q8H - RT   amiodarone 200 mg Oral Q24H   aspirin 81 mg Oral Daily   castor oil-balsam peru  Topical Q12H   dexamethasone 4 mg Oral Q12H   escitalopram 10 mg Oral Daily   insulin glargine 10 Units Subcutaneous Nightly   insulin lispro 0-7 Units Subcutaneous 4x Daily With Meals & Nightly   ipratropium-albuterol 3 mL Nebulization Q8H - RT   levothyroxine 75 mcg Oral Q AM   metoprolol tartrate 12.5 mg Oral Q12H   midodrine 10 mg Oral Once per day on Tue Thu Sat   And      midodrine 10 mg Oral Once per day on Tue Thu Sat   And      midodrine 10 mg Oral Once per day on Tue Thu Sat   pantoprazole 40 mg Oral Q AM   polyethylene glycol 17 g Oral Daily       Pharmacy to dose warfarin        Medication Review:   Current Facility-Administered Medications   Medication Dose Route Frequency Provider Last Rate Last Dose   • acetaminophen (TYLENOL) tablet 650 mg  650 mg Oral Q4H PRN Taiwo Shankar MD   650 mg at 09/21/19 0918   • acetylcysteine (MUCOMYST) 20 % nebulizer solution 4 mL  4 mL Nebulization Q8H - RT Alexander Schaefer MD   4 mL at 10/19/19 0718   • amiodarone (PACERONE) tablet 200 mg  200 mg Oral Q24H Neil Guerrero Jr., MD   200 mg at 10/19/19 0819   • aspirin EC tablet 81 mg  81 mg Oral Daily Taiwo Shankar MD   81 mg at 10/19/19 0818   • carboxymethylcellulose sod (PF) 1 % eye gel 1 drop  1 drop Both Eyes TID Taiwo Garrett MD       • castor oil-balsam peru (VENELEX) ointment   Topical  Q12H Taiwo Shankar MD   5 g at 10/19/19 0819   • cyclobenzaprine (FLEXERIL) tablet 5 mg  5 mg Oral TID PRN Alejandro Kramer MD   5 mg at 10/07/19 0015   • dexamethasone (DECADRON) tablet 4 mg  4 mg Oral Q12H Alexander Schaefer MD   4 mg at 10/19/19 0819   • dextrose (D50W) 25 g/ 50mL Intravenous Solution 25 g  25 g Intravenous Q15 Min PRN Taiwo Shankar MD       • dextrose (GLUTOSE) oral gel 15 g  15 g Oral Q15 Min WATSONN Taiwo Shankar MD       • escitalopram (LEXAPRO) tablet 10 mg  10 mg Oral Daily Taiwo Shankar MD   10 mg at 10/19/19 0818   • glucagon (human recombinant) (GLUCAGEN DIAGNOSTIC) injection 1 mg  1 mg Subcutaneous Q15 Min Taiwo Garrett MD       • HYDROmorphone (DILAUDID) injection 0.5 mg  0.5 mg Intravenous Q2H PRN Taiwo Shankar MD   0.5 mg at 10/17/19 2242   • insulin glargine (LANTUS) injection 10 Units  10 Units Subcutaneous Nightly Taiwo Shankar MD   10 Units at 10/18/19 2110   • insulin lispro (humaLOG) injection 0-7 Units  0-7 Units Subcutaneous 4x Daily With Meals & Nightly Taiwo Shankar MD   4 Units at 10/19/19 0818   • ipratropium-albuterol (DUO-NEB) nebulizer solution 3 mL  3 mL Nebulization Q8H - RT Alexander Schaefer MD   3 mL at 10/19/19 0718   • levothyroxine (SYNTHROID, LEVOTHROID) tablet 75 mcg  75 mcg Oral Q AM Taiwo Shankar MD   75 mcg at 10/19/19 0634   • metoprolol tartrate (LOPRESSOR) tablet 12.5 mg  12.5 mg Oral Q12H Taiwo Shankar MD   12.5 mg at 10/19/19 0819   • midodrine (PROAMATINE) tablet 10 mg  10 mg Oral Once per day on Tue Thu Froy Avelar MD   10 mg at 10/19/19 0818    And   • midodrine (PROAMATINE) tablet 10 mg  10 mg Oral Once per day on Tue Thu Sat Froy Sanchez MD   10 mg at 10/17/19 1241    And   • midodrine (PROAMATINE) tablet 10 mg  10 mg Oral Once per day on Tue Thu Sat Froy Sanchez MD   10 mg at 10/17/19 2112   • ondansetron (ZOFRAN) injection 4 mg  4 mg Intravenous Q4H Taiwo Garrett MD   4 mg  at 10/07/19 0845   • oxyCODONE-acetaminophen (PERCOCET)  MG per tablet 1 tablet  1 tablet Oral Q4H PRN Taiwo Shankar MD   1 tablet at 10/19/19 0823   • pantoprazole (PROTONIX) EC tablet 40 mg  40 mg Oral Q AM Taiwo Shankar MD   40 mg at 10/19/19 0634   • Pharmacy to dose warfarin   Does not apply Continuous PRN Taiwo Shankar MD       • polyethylene glycol 3350 powder (packet)  17 g Oral Daily Taiwo Shankar MD   17 g at 10/19/19 0817       Assessment/Plan   1. ESRD.  Lytes ok yest apart from hyponatremia.  Vol excess present.  Poor candidate for ongoing HD with poor tolerance and dismal prognosis.  However, requests treatment today and we are still waiting on approval for transition to comfort measures ([place])  2. 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 Palliative XRT.    3. DM2 on scheduled insulin.   4. CAD  5. AFib/flutter, treated per cardiology   6. Thrombocytopenia, resolved  7. Hypothyroidism. On Replacement.   8.  Anemia CKD  9.  Hypertension    Plan  - HD today x3h, remove 3L as tolerated  - will check back on Monday, hopefully will have decision then re: appropriate transition to comfort measures      Squamous cell lung cancer right lung(CMS/HCC)    End stage renal disease (CMS/HCC)    Type 2 diabetes mellitus (CMS/HCC)    Atelectasis of right middle and lower lung 2nd cancer    CAD (coronary artery disease)    Palliative care by specialist    Lung cancer metastatic to bone (CMS/HCC)    Chronic combined systolic and diastolic congestive heart failure (CMS/HCC)    Pulmonary hypertension (CMS/HCC)    S/P BKA (below knee amputation), left (CMS/HCC)    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

## 2019-10-20 NOTE — PROGRESS NOTES
"   LOS: 35 days    Patient Care Team:  Taiwo Shankar MD as PCP - General (Internal Medicine)  Paulette Torres MD as Consulting Physician (Radiation Oncology)  Matheus Nguyen MD as Consulting Physician (Hospice and Palliative Medicine)  Froy Sanchez MD as Consulting Physician (Nephrology)  Chuy Zavala MD as Consulting Physician (Orthopedic Surgery)    Chief Complaint:    Chief Complaint   Patient presents with   • Shortness of Breath     Follow UP ESRD  Subjective     Interval History:   HD poorly tolerated yest with severe hypotension whenever we tried to remove fluid so scant UF performed; remains SOA    Objective     Vital Signs  Temp:  [97.1 °F (36.2 °C)-97.9 °F (36.6 °C)] 97.5 °F (36.4 °C)  Heart Rate:  [63-76] 67  Resp:  [18-24] 22  BP: (112-153)/(52-71) 140/61    Flowsheet Rows      First Filed Value   Admission Height  165.1 cm (65\") Documented at 09/15/2019 1603   Admission Weight  113 kg (250 lb) Documented at 09/15/2019 1603          I/O this shift:  In: 20 [P.O.:20]  Out: -   I/O last 3 completed shifts:  In: 650 [P.O.:650]  Out: 0     Intake/Output Summary (Last 24 hours) at 10/20/2019 1531  Last data filed at 10/20/2019 1140  Gross per 24 hour   Intake 220 ml   Output 0 ml   Net 220 ml       Physical Exam:  GEN frail, drowsy but arousable  Neck supple no jvd  Lungs bilat rhonchi  CV RRR  abd soft NT/ND  vasc 1+BLE pedal/ankle edema     Results Review:    Results from last 7 days   Lab Units 10/20/19  0613 10/18/19  0611 10/17/19  0424   SODIUM mmol/L 135* 132* 133*   POTASSIUM mmol/L 4.5 4.2 4.8   CHLORIDE mmol/L 95* 89* 89*   CO2 mmol/L 26.0 28.9 20.0*   BUN mg/dL 66* 60* 85*   CREATININE mg/dL 2.95* 2.87* 3.66*   CALCIUM mg/dL 9.5 9.6 9.3   GLUCOSE mg/dL 101* 109* 181*       Estimated Creatinine Clearance: 22.4 mL/min (A) (by C-G formula based on SCr of 2.95 mg/dL (H)).    Results from last 7 days   Lab Units 10/15/19  0517 10/14/19  0450   PHOSPHORUS mg/dL 6.4* " 4.9*             Results from last 7 days   Lab Units 10/20/19  0441 10/18/19  0611 10/17/19  0424 10/16/19  0518 10/15/19  0517   WBC 10*3/mm3 6.71 6.99 7.52 6.85 5.17   HEMOGLOBIN g/dL 8.8* 8.6* 8.5* 9.4* 8.2*   PLATELETS 10*3/mm3 164 169 177 183 167       Results from last 7 days   Lab Units 10/20/19  0613 10/19/19  1132 10/18/19  0611 10/17/19  1434 10/17/19  0424   INR  5.25* 3.88* 4.59* 4.61* 4.54*         Imaging Results (last 24 hours)     ** No results found for the last 24 hours. **          acetylcysteine 4 mL Nebulization Q8H - RT   amiodarone 200 mg Oral Q24H   aspirin 81 mg Oral Daily   castor oil-balsam peru  Topical Q12H   dexamethasone 4 mg Oral Q12H   escitalopram 10 mg Oral Daily   fentaNYL 1 patch Transdermal Q72H   insulin glargine 10 Units Subcutaneous Nightly   insulin lispro 0-7 Units Subcutaneous 4x Daily With Meals & Nightly   ipratropium-albuterol 3 mL Nebulization Q8H - RT   levothyroxine 75 mcg Oral Q AM   metoprolol tartrate 12.5 mg Oral Q12H   midodrine 10 mg Oral Once per day on Tue Thu Sat   And      midodrine 10 mg Oral Once per day on Tue Thu Sat   And      midodrine 10 mg Oral Once per day on Tue Thu Sat   pantoprazole 40 mg Oral Q AM   polyethylene glycol 17 g Oral Daily       Pharmacy to dose warfarin        Medication Review:   Current Facility-Administered Medications   Medication Dose Route Frequency Provider Last Rate Last Dose   • acetaminophen (TYLENOL) tablet 650 mg  650 mg Oral Q4H WATSONN Taiwo Shankar MD   650 mg at 09/21/19 0918   • acetylcysteine (MUCOMYST) 20 % nebulizer solution 4 mL  4 mL Nebulization Q8H - RT Alexander Schaefer MD   4 mL at 10/20/19 0842   • amiodarone (PACERONE) tablet 200 mg  200 mg Oral Q24H Neil Guerrero Jr., MD   200 mg at 10/20/19 0815   • aspirin EC tablet 81 mg  81 mg Oral Daily Taiwo Shankar MD   81 mg at 10/20/19 0815   • carboxymethylcellulose sod (PF) 1 % eye gel 1 drop  1 drop Both Eyes TID PRN Taiwo Shankar MD       • connie  oil-balsam peru (VENELEX) ointment   Topical Q12H Taiwo Shankar MD   5 g at 10/20/19 0815   • cyclobenzaprine (FLEXERIL) tablet 5 mg  5 mg Oral TID PRN Alejandro Kramer MD   5 mg at 10/07/19 0015   • dexamethasone (DECADRON) tablet 4 mg  4 mg Oral Q12H Alexander Schaefer MD   4 mg at 10/20/19 0815   • dextrose (D50W) 25 g/ 50mL Intravenous Solution 25 g  25 g Intravenous Q15 Min PRN Taiwo Shankar MD       • dextrose (GLUTOSE) oral gel 15 g  15 g Oral Q15 Min W[place]N Taiwo Shankar MD       • escitalopram (LEXAPRO) tablet 10 mg  10 mg Oral Daily Taiwo Shankar MD   10 mg at 10/20/19 0815   • fentaNYL (DURAGESIC) 50 MCG/HR patch 1 patch  1 patch Transdermal Q72H Taiwo Shankar MD   1 patch at 10/19/19 1817   • glucagon (human recombinant) (GLUCAGEN DIAGNOSTIC) injection 1 mg  1 mg Subcutaneous Q15 Min WAT[place]N Taiwo Shankar MD       • HYDROmorphone (DILAUDID) injection 0.5 mg  0.5 mg Intravenous Q2H PRN Taiwo Shankar MD   0.5 mg at 10/20/19 0736   • insulin glargine (LANTUS) injection 10 Units  10 Units Subcutaneous Nightly Taiwo Shankar MD   10 Units at 10/19/19 2147   • insulin lispro (humaLOG) injection 0-7 Units  0-7 Units Subcutaneous 4x Daily With Meals & Nightly Taiwo Shankar MD   4 Units at 10/19/19 2147   • ipratropium-albuterol (DUO-NEB) nebulizer solution 3 mL  3 mL Nebulization Q8H - RT Alexander Schaefer MD   3 mL at 10/20/19 0842   • levothyroxine (SYNTHROID, LEVOTHROID) tablet 75 mcg  75 mcg Oral Q AM Taiwo Shankar MD   75 mcg at 10/20/19 0559   • metoprolol tartrate (LOPRESSOR) tablet 12.5 mg  12.5 mg Oral Q12H Taiwo Shankar MD   12.5 mg at 10/20/19 0815   • midodrine (PROAMATINE) tablet 10 mg  10 mg Oral Once per day on Tue Thu Sat Froy Sanchez MD   10 mg at 10/19/19 0818    And   • midodrine (PROAMATINE) tablet 10 mg  10 mg Oral Once per day on Tue Thu Sat Froy Sanchez MD   10 mg at 10/19/19 1123    And   • midodrine (PROAMATINE) tablet 10 mg  10 mg Oral Once  per day on Tue Thu Sat Froy Sanchez MD   10 mg at 10/19/19 2152   • ondansetron (ZOFRAN) injection 4 mg  4 mg Intravenous Q4H PRN Taiwo Shankar MD   4 mg at 10/07/19 0845   • oxyCODONE-acetaminophen (PERCOCET)  MG per tablet 1 tablet  1 tablet Oral Q4H PRN Taiwo Shankar MD   1 tablet at 10/20/19 0840   • pantoprazole (PROTONIX) EC tablet 40 mg  40 mg Oral Q AM Taiwo Shankar MD   40 mg at 10/20/19 0559   • Pharmacy to dose warfarin   Does not apply Continuous PRN Taiwo Shankar MD       • polyethylene glycol 3350 powder (packet)  17 g Oral Daily Taiwo Shankar MD   17 g at 10/20/19 0815       Assessment/Plan   1. ESRD.  Lytes ok yest apart from mild hyponatremia.  Vol excess present and poorly tolerating HD in general -- severe hypotension whenever try to remove fluid.  Poor candidate for ongoing HD with poor tolerance with dismal prognosis.    2. 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 Palliative XRT.    3. DM2 on scheduled insulin.   4. CAD  5. AFib/flutter, treated per cardiology   6. Thrombocytopenia, resolved  7. Hypothyroidism. On Replacement.   8.  Anemia CKD  9.  Hypertension, with intradialytic hypotension (midodrine predialysis)    Plan  - await input from  re: request for transition to comfort measures, should hear back tomorrow per        Squamous cell lung cancer right lung(CMS/HCC)    End stage renal disease (CMS/HCC)    Type 2 diabetes mellitus (CMS/HCC)    Atelectasis of right middle and lower lung 2nd cancer    CAD (coronary artery disease)    Palliative care by specialist    Lung cancer metastatic to bone (CMS/HCC)    Chronic combined systolic and diastolic congestive heart failure (CMS/HCC)    Pulmonary hypertension (CMS/HCC)    S/P BKA (below knee amputation), left (CMS/HCC)    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

## 2019-10-20 NOTE — PLAN OF CARE
Problem: Patient Care Overview  Goal: Plan of Care Review  Outcome: Ongoing (interventions implemented as appropriate)    Goal: Individualization and Mutuality  Outcome: Ongoing (interventions implemented as appropriate)    Goal: Discharge Needs Assessment  Outcome: Ongoing (interventions implemented as appropriate)    Goal: Interprofessional Rounds/Family Conf  Outcome: Ongoing (interventions implemented as appropriate)      Problem: Fall Risk (Adult)  Goal: Absence of Fall  Outcome: Ongoing (interventions implemented as appropriate)      Problem: Hemodialysis (Adult)  Goal: Signs and Symptoms of Listed Potential Problems Will be Absent, Minimized or Managed (Hemodialysis)  Outcome: Ongoing (interventions implemented as appropriate)      Problem: Pneumonia (Adult)  Goal: Signs and Symptoms of Listed Potential Problems Will be Absent, Minimized or Managed (Pneumonia)  Outcome: Ongoing (interventions implemented as appropriate)      Problem: Skin Injury Risk (Adult)  Goal: Skin Health and Integrity  Outcome: Ongoing (interventions implemented as appropriate)      Problem: Confusion, Acute (Adult)  Goal: Safety  Outcome: Ongoing (interventions implemented as appropriate)

## 2019-10-20 NOTE — PROGRESS NOTES
DAILY PROGRESS NOTE  [facility], [facility]    10/20/2019    Patient Identification:  Name: Melia Almeida  Age: 54 y.o.  Sex: female  :  1965  MRN: [id number]           Primary Care Physician: Taiwo Shankar MD    Subjective:    Interval History:    Patient is in pain, receiving pain medication of.  Receiving mini nebs at this time  INR is even higher today 5.25  Had hemodialysis yesterday, however creatinine is higher today.    ROS:     No nausea, vomiting, diarrhea, constipation, chest pain    Objective:    Scheduled Meds:    acetylcysteine 4 mL Nebulization Q8H - RT   amiodarone 200 mg Oral Q24H   aspirin 81 mg Oral Daily   castor oil-balsam peru  Topical Q12H   dexamethasone 4 mg Oral Q12H   escitalopram 10 mg Oral Daily   fentaNYL 1 patch Transdermal Q72H   insulin glargine 10 Units Subcutaneous Nightly   insulin lispro 0-7 Units Subcutaneous 4x Daily With Meals & Nightly   ipratropium-albuterol 3 mL Nebulization Q8H - RT   levothyroxine 75 mcg Oral Q AM   metoprolol tartrate 12.5 mg Oral Q12H   midodrine 10 mg Oral Once per day on  Sat   And      midodrine 10 mg Oral Once per day on  Sat   And      midodrine 10 mg Oral Once per day on  Sat   pantoprazole 40 mg Oral Q AM   polyethylene glycol 17 g Oral Daily       Continuous Infusions:    Pharmacy to dose warfarin        PRN Meds:  •  acetaminophen  •  carboxymethylcellulose sod (PF)  •  cyclobenzaprine  •  dextrose  •  dextrose  •  glucagon (human recombinant)  •  HYDROmorphone  •  ondansetron  •  oxyCODONE-acetaminophen  •  Pharmacy to dose warfarin    Intake/Output:    Intake/Output Summary (Last 24 hours) at 10/20/2019 0925  Last data filed at 10/20/2019 0818  Gross per 24 hour   Intake 370 ml   Output 0 ml   Net 370 ml         Exam:    tMax 24 hrs: Temp (24hrs), Av.7 °F (36.5 °C), Min:97.1 °F (36.2 °C), Max:97.9 °F (36.6 °C)    Vitals Ranges:   Temp:  [97.1 °F (36.2 °C)-97.9 °F  "(36.6 °C)] 97.1 °F (36.2 °C)  Heart Rate:  [63-76] 68  Resp:  [18-24] 24  BP: (112-153)/(52-71) 131/58    /58 (BP Location: Left arm, Patient Position: Lying)   Pulse 68   Temp 97.1 °F (36.2 °C) (Oral)   Resp 24   Ht 165.1 cm (65\")   Wt 81.2 kg (179 lb)   LMP  (LMP Unknown)   SpO2 90%   BMI 29.79 kg/m²     General: Awake, confused.  No respiratory distress while on oxygen.    Neck: Supple, symmetrical, trachea midline, no adenopathy;              thyroid:  no enlargement/tenderness/nodules;              no carotid bruit or JVD  Cardiovascular: Normal rate, regular rhythm and intact distal pulses.   Bradycardic.  Exam reveals no gallop and no friction rub. No murmur heard  Chest wall: No tenderness or deformity  Pulmonary:  Diminished breath sounds bilaterally, rhonchi at bases bilaterally.    Abdominal: Soft. Soft, non-tender, bowel sounds active all four quadrants,     no masses, no hepatomegaly, no splenomegaly.   Extremities: Normal, atraumatic, no cyanosis.  No edema in RLE. S/p L BKA, very tender to palpation as well as mild edema in the mid left femur.  Pulses: 2 + symmetric all extremities  Neurological: Patient is alert and oriented to person only                 CNII-XII intact, normal strength, sensation intact throughout  Skin: Skin color, texture, normal. Turgor is decreased. No rashes or lesions            Data Review:    Results from last 7 days   Lab Units 10/20/19  0441 10/18/19  0611 10/17/19  0424   WBC 10*3/mm3 6.71 6.99 7.52   HEMOGLOBIN g/dL 8.8* 8.6* 8.5*   HEMATOCRIT % 28.4* 27.9* 28.4*   PLATELETS 10*3/mm3 164 169 177       Results from last 7 days   Lab Units 10/20/19  0613 10/18/19  0611 10/17/19  0424   SODIUM mmol/L 135* 132* 133*   POTASSIUM mmol/L 4.5 4.2 4.8   CHLORIDE mmol/L 95* 89* 89*   CO2 mmol/L 26.0 28.9 20.0*   BUN mg/dL 66* 60* 85*   CREATININE mg/dL 2.95* 2.87* 3.66*   CALCIUM mg/dL 9.5 9.6 9.3   GLUCOSE mg/dL 101* 109* 181*     Results from last 7 days   Lab " Units 10/20/19  0613 10/19/19  1132 10/18/19  0611   INR  5.25* 3.88* 4.59*               Assessment:        Squamous cell lung cancer right lung(CMS/HCC)    Type 2 diabetes mellitus (CMS/HCC)    End stage renal disease (CMS/HCC)    Atelectasis of right middle and lower lung 2nd cancer    CAD (coronary artery disease)    Palliative care by specialist    Lung cancer metastatic to bone (CMS/HCC)    Chronic combined systolic and diastolic congestive heart failure (CMS/HCC)    Pulmonary hypertension (CMS/HCC)    S/P BKA (below knee amputation), left (CMS/HCC)    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    Plan:      Pain continue to increase, patient does have IV Dilaudid and p.o. Percocet can be given.  Duragesic patch started yesterday, will see if it helps  INR and creatinine are worse today, I am afraid patient is going to organ failures.  Patient is a candidate for palliative care/comfort measures, she will benefit from it, however she was clear yesterday she wanted dialysis.  Today she is somewhat confused, I believe her body is going into multiorgan failure.  We will see how she is tomorrow morning.  Patient is not eating much, not drinking much.  Becoming a little bit more confused.  Continue Accu-Cheks and sliding scale insulin  Monitor and correct electrolytes  Monitor mental status  DVT/stress ulcer prophylaxis  Labs in am        Taiwo Shankar MD  10/20/2019

## 2019-10-20 NOTE — PLAN OF CARE
Problem: Patient Care Overview  Goal: Plan of Care Review   10/20/19 1708 10/20/19 1711   Coping/Psychosocial   Plan of Care Reviewed With --  patient   Plan of Care Review   Progress --  declining   OTHER   Outcome Summary VSS. Complaints of severe pain, pain meds given x3. Safety maintained. Will continue to monitor. Awating response from guardian regarding palliative care --

## 2019-10-20 NOTE — PROGRESS NOTES
Pharmacy Consult: Warfarin Dosing/ Monitoring    Melia Almeida is a 54 y.o. female, estimated creatinine clearance is 22.4 mL/min (A) (by C-G formula based on SCr of 2.95 mg/dL (H)). weighing 81.2 kg (179 lb).      Results from last 7 days   Lab Units 10/20/19  0613 10/20/19  0441 10/19/19  1132 10/18/19  0611 10/17/19  1434 10/17/19  0424 10/16/19  0518 10/15/19  0517 10/14/19  0450  10/13/19  1602   INR  5.25*  --  3.88* 4.59* 4.61* 4.54* 3.66* 3.95* 2.84*   < >  --    APTT seconds  --   --   --   --   --   --   --   --   --   --  33.2   HEMOGLOBIN g/dL  --  8.8*  --  8.6*  --  8.5* 9.4* 8.2* 7.9*  --   --    HEMATOCRIT %  --  28.4*  --  27.9*  --  28.4* 30.2* 26.7* 25.4*  --   --    PLATELETS 10*3/mm3  --  164  --  169  --  177 183 167 153  --   --     < > = values in this interval not displayed.     Results from last 7 days   Lab Units 10/20/19  0613 10/18/19  0611 10/17/19  0424   SODIUM mmol/L 135* 132* 133*   POTASSIUM mmol/L 4.5 4.2 4.8   CHLORIDE mmol/L 95* 89* 89*   CO2 mmol/L 26.0 28.9 20.0*   BUN mg/dL 66* 60* 85*   CREATININE mg/dL 2.95* 2.87* 3.66*   CALCIUM mg/dL 9.5 9.6 9.3   GLUCOSE mg/dL 101* 109* 181*     Anticoagulation history:   Warfarin consult - on amiodarone (new start inpatient). Warfarin 5 mg PO daily was started on 10/8.      Hospital Anticoagulation:  Consulting provider: LAURA Bach  Start date: 10/10/19  Indication: Atrial fibrillation  Target INR: 2-3  Expected duration: chronic  Bridge Therapy: Now off     Date 10/8 10/9 10/10 10/11 10/12 10/13 10/14  10/15  10/16  10/17 10/18 10/19 10/20    INR     1.08 1.26 1.92 2.56 2.84  3.95  3.66  4.54 4.59 3.88 5.25    Warfarin dose 5 mg 5 mg 7.5 mg 7.5 mg 5 mg 2.5mg 2.5mg  HOLD  HOLD  HOLD HOLD hold hold       Potential drug interactions:   Amiodarone - may result in increased INR and an increased risk of bleeding.  Dose reduced 10/16 however half life average 40-55 days.  Acetaminophen (from Tylenol and Percocet) - May enhance the  anticoagulant effect of warfarin. This appears most likely with daily acetaminophen doses exceeding 1.3 or 2 g/day for multiple consecutive days.   Aspirin - may result in increased risk of bleeding.  Dexamethasone - may result in increased risk of bleeding or diminished effects of warfarin.  Escitalopram - may result in an increased risk of bleeding due to antiplatelet effects of SSRIs  Pantoprazole - may result in increased INR  Synthroid - may increase bleed risk     Relevant nutrition status: Regular diet, nutrition supplements TID     Other:      Education complete: TBD     Assessment/Plan:  1) Continue to hold warfarin today.   2) Continue to monitor INR with AM labs.    Pharmacy will continue to follow until discharge or discontinuation of warfarin.   Thanks, Sam Flores, PharmD, BCPS

## 2019-10-21 NOTE — PLAN OF CARE
Problem: Patient Care Overview  Goal: Plan of Care Review  Outcome: Ongoing (interventions implemented as appropriate)   10/21/19 0146   Coping/Psychosocial   Plan of Care Reviewed With patient   Plan of Care Review   Progress declining   OTHER   Outcome Summary Dilaudid IV and Percocet given for severe pain in back bones. Pt more lathargic this shift than I ever seen. Pt answers question with yes no. On 2L of O2 this shift. Turned q 2 and kept medicated. Pt swallow good. Patient little confused at times, not sure if she wants to cont with dialysis or not. INR up and BP down. Cont to monitor, safety maintained.

## 2019-10-21 NOTE — PLAN OF CARE
Problem: Patient Care Overview  Goal: Plan of Care Review  Outcome: Ongoing (interventions implemented as appropriate)   10/21/19 4380   Coping/Psychosocial   Plan of Care Reviewed With patient   Plan of Care Review   Progress no change   OTHER   Outcome Summary iv dilaudid given and percocet for pain. lethargic today. not wanting to eat. Palliative ordered.

## 2019-10-21 NOTE — PROGRESS NOTES
Continued Stay Note  [facility]     Patient Name: Melia Almeida  MRN: [id number]  Today's Date: 10/21/2019    Admit Date: 9/15/2019    Discharge Plan     Row Name 10/21/19 0923       Plan    Plan  Comfort/palliative care request being evaluated    Patient/Family in Agreement with Plan  yes    Plan Comments  HR CCP placed a call to [facility] nurse consultant Shivani Pham.  She states that she is processing the request and has all the information she needs.  CCP will await decision and call back.        Discharge Codes    No documentation.             France Andrea RN

## 2019-10-21 NOTE — PROGRESS NOTES
"   LOS: 36 days    Patient Care Team:  Taiwo Shankar MD as PCP - General (Internal Medicine)  Paulette Torres MD as Consulting Physician (Radiation Oncology)  Matheus Nguyen MD as Consulting Physician (Hospice and Palliative Medicine)  Froy Sanchez MD as Consulting Physician (Nephrology)  Cuhy Zavala MD as Consulting Physician (Orthopedic Surgery)    Chief Complaint:    Chief Complaint   Patient presents with   • Shortness of Breath     Follow UP ESRD  Subjective     Interval History:   Seen and examined.  Remains SOA. Tired and wants to stop dialysis    Objective     Vital Signs  Temp:  [97.3 °F (36.3 °C)-98 °F (36.7 °C)] 98 °F (36.7 °C)  Heart Rate:  [62-71] 71  Resp:  [18-20] 20  BP: (116-124)/(52-60) 124/60    Flowsheet Rows      First Filed Value   Admission Height  165.1 cm (65\") Documented at 09/15/2019 1603   Admission Weight  113 kg (250 lb) Documented at 09/15/2019 1603          I/O this shift:  In: 560 [P.O.:560]  Out: -   I/O last 3 completed shifts:  In: 520 [P.O.:520]  Out: 0     Intake/Output Summary (Last 24 hours) at 10/21/2019 1542  Last data filed at 10/21/2019 1325  Gross per 24 hour   Intake 860 ml   Output 0 ml   Net 860 ml       Physical Exam:  GEN frail, drowsy but arousable  Neck supple no jvd  Lungs bilat rhonchi  CV RRR  abd soft NT/ND  vasc 1+BLE pedal/ankle edema     Results Review:    Results from last 7 days   Lab Units 10/21/19  0455 10/20/19  0613 10/18/19  0611   SODIUM mmol/L 140 135* 132*   POTASSIUM mmol/L 5.2 4.5 4.2   CHLORIDE mmol/L 98 95* 89*   CO2 mmol/L 24.2 26.0 28.9   BUN mg/dL 97* 66* 60*   CREATININE mg/dL 3.99* 2.95* 2.87*   CALCIUM mg/dL 9.3 9.5 9.6   BILIRUBIN mg/dL 0.4  --   --    ALK PHOS U/L 293*  --   --    ALT (SGPT) U/L 92*  --   --    AST (SGOT) U/L 57*  --   --    GLUCOSE mg/dL 157* 101* 109*       Estimated Creatinine Clearance: 16.6 mL/min (A) (by C-G formula based on SCr of 3.99 mg/dL (H)).    Results from last 7 days "   Lab Units 10/15/19  0517   PHOSPHORUS mg/dL 6.4*             Results from last 7 days   Lab Units 10/21/19  0455 10/20/19  0441 10/18/19  0611 10/17/19  0424 10/16/19  0518   WBC 10*3/mm3 6.05 6.71 6.99 7.52 6.85   HEMOGLOBIN g/dL 9.0* 8.8* 8.6* 8.5* 9.4*   PLATELETS 10*3/mm3 171 164 169 177 183       Results from last 7 days   Lab Units 10/21/19  0455 10/20/19  0613 10/19/19  1132 10/18/19  0611 10/17/19  1434   INR  4.70* 5.25* 3.88* 4.59* 4.61*         Imaging Results (last 24 hours)     ** No results found for the last 24 hours. **          acetylcysteine 4 mL Nebulization Q8H - RT   amiodarone 200 mg Oral Q24H   aspirin 81 mg Oral Daily   castor oil-balsam peru  Topical Q12H   dexamethasone 4 mg Oral Q12H   escitalopram 10 mg Oral Daily   fentaNYL 1 patch Transdermal Q72H   insulin glargine 10 Units Subcutaneous Nightly   insulin lispro 0-7 Units Subcutaneous 4x Daily With Meals & Nightly   ipratropium-albuterol 3 mL Nebulization Q8H - RT   levothyroxine 75 mcg Oral Q AM   metoprolol tartrate 12.5 mg Oral Q12H   midodrine 10 mg Oral Once per day on Tue Thu Sat   And      midodrine 10 mg Oral Once per day on Tue Thu Sat   And      midodrine 10 mg Oral Once per day on Tue Thu Sat   pantoprazole 40 mg Oral Q AM   polyethylene glycol 17 g Oral Daily       Pharmacy to dose warfarin        Medication Review:   Current Facility-Administered Medications   Medication Dose Route Frequency Provider Last Rate Last Dose   • acetaminophen (TYLENOL) tablet 650 mg  650 mg Oral Q4H Taiwo Garrett MD   650 mg at 09/21/19 0918   • acetylcysteine (MUCOMYST) 20 % nebulizer solution 4 mL  4 mL Nebulization Q8H - RT Alexander Schaefer MD   4 mL at 10/21/19 0808   • amiodarone (PACERONE) tablet 200 mg  200 mg Oral Q24H Neil Guerrero Jr., MD   200 mg at 10/21/19 0916   • aspirin EC tablet 81 mg  81 mg Oral Daily Taiwo Shankar MD   81 mg at 10/21/19 0916   • carboxymethylcellulose sod (PF) 1 % eye gel 1 drop  1 drop Both Eyes  TID PRN Taiwo Shankar MD       • castor oil-balsam peru (VENELEX) ointment   Topical Q12H Taiwo Shankar MD   5 g at 10/21/19 0916   • cyclobenzaprine (FLEXERIL) tablet 5 mg  5 mg Oral TID PRN Alejandro Kramer MD   5 mg at 10/07/19 0015   • dexamethasone (DECADRON) tablet 4 mg  4 mg Oral Q12H Alexander Schaefer MD   4 mg at 10/21/19 0916   • dextrose (D50W) 25 g/ 50mL Intravenous Solution 25 g  25 g Intravenous Q15 Min Taiwo Garrett MD       • dextrose (GLUTOSE) oral gel 15 g  15 g Oral Q15 Min Taiwo Garrett MD       • escitalopram (LEXAPRO) tablet 10 mg  10 mg Oral Daily Taiwo Shankar MD   10 mg at 10/21/19 0916   • fentaNYL (DURAGESIC) 50 MCG/HR patch 1 patch  1 patch Transdermal Q72H Taiwo Shankar MD   1 patch at 10/19/19 1817   • glucagon (human recombinant) (GLUCAGEN DIAGNOSTIC) injection 1 mg  1 mg Subcutaneous Q15 Min [place]ATSONN Taiwo Shankar MD       • HYDROmorphone (DILAUDID) injection 0.5 mg  0.5 mg Intravenous Q2H [place]AT[place] Taiwo Shankar MD   0.5 mg at 10/21/19 0434   • insulin glargine (LANTUS) injection 10 Units  10 Units Subcutaneous Nightly Taiwo Shankar MD   10 Units at 10/20/19 2208   • insulin lispro (humaLOG) injection 0-7 Units  0-7 Units Subcutaneous 4x Daily With Meals & Nightly Taiwo Shankar MD   4 Units at 10/19/19 2147   • ipratropium-albuterol (DUO-NEB) nebulizer solution 3 mL  3 mL Nebulization Q8H - RT Alexander Schaefer MD   3 mL at 10/21/19 0807   • levothyroxine (SYNTHROID, LEVOTHROID) tablet 75 mcg  75 mcg Oral Q AM Taiwo Shankar MD   75 mcg at 10/21/19 0554   • metoprolol tartrate (LOPRESSOR) tablet 12.5 mg  12.5 mg Oral Q12H Taiwo Shankar MD   12.5 mg at 10/21/19 0916   • midodrine (PROAMATINE) tablet 10 mg  10 mg Oral Once per day on Tue Thu Froy Avelar MD   10 mg at 10/19/19 0818    And   • midodrine (PROAMATINE) tablet 10 mg  10 mg Oral Once per day on Tue Thu Sat Froy Sanchez MD   10 mg at 10/19/19 1123    And   • midodrine  (PROAMATINE) tablet 10 mg  10 mg Oral Once per day on Tue Thu Sat Froy Sanchez MD   10 mg at 10/19/19 2152   • ondansetron (ZOFRAN) injection 4 mg  4 mg Intravenous Q4H Taiwo Garrett MD   4 mg at 10/07/19 0845   • oxyCODONE-acetaminophen (PERCOCET)  MG per tablet 1 tablet  1 tablet Oral Q4H PRN Taiwo Shankar MD   1 tablet at 10/21/19 0918   • pantoprazole (PROTONIX) EC tablet 40 mg  40 mg Oral Q AM Taiwo Shankar MD   40 mg at 10/21/19 0554   • Pharmacy to dose warfarin   Does not apply Continuous PRN Taiwo Shankar MD       • polyethylene glycol 3350 powder (packet)  17 g Oral Daily Taiwo Shankar MD   17 g at 10/21/19 0916       Assessment/Plan   1. ESRD.  Lytes ok yest apart from mild hyponatremia.  Vol excess present and poorly tolerating HD in general -- severe hypotension whenever try to remove fluid.  Poor candidate for ongoing HD with poor tolerance with dismal prognosis.      2. 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 Palliative XRT.    3. DM2 on scheduled insulin.   4. CAD  5. AFib/flutter, treated per cardiology   6. Thrombocytopenia, resolved  7. Hypothyroidism. On Replacement.   8.  Anemia CKD  9.  Hypertension, with intradialytic hypotension (midodrine predialysis)    Plan    For hospice and withdrawal of dialysis.  Will sign of.  Please call for questions.      Sandy Jackson MD  10/21/19  3:42 PM

## 2019-10-21 NOTE — PROGRESS NOTES
Pharmacy Consult: Warfarin Dosing/ Monitoring    Melia Almeida is a 54 y.o. female, estimated creatinine clearance is 16.6 mL/min (A) (by C-G formula based on SCr of 3.99 mg/dL (H)). weighing 81 kg (178 lb 9.2 oz).      Results from last 7 days   Lab Units 10/21/19  0455 10/20/19  0613 10/20/19  0441 10/19/19  1132 10/18/19  0611 10/17/19  1434 10/17/19  0424 10/16/19  0518 10/15/19  0517   INR  4.70* 5.25*  --  3.88* 4.59* 4.61* 4.54* 3.66* 3.95*   HEMOGLOBIN g/dL 9.0*  --  8.8*  --  8.6*  --  8.5* 9.4* 8.2*   HEMATOCRIT % 29.2*  --  28.4*  --  27.9*  --  28.4* 30.2* 26.7*   PLATELETS 10*3/mm3 171  --  164  --  169  --  177 183 167     Results from last 7 days   Lab Units 10/21/19  0455 10/20/19  0613 10/18/19  0611   SODIUM mmol/L 140 135* 132*   POTASSIUM mmol/L 5.2 4.5 4.2   CHLORIDE mmol/L 98 95* 89*   CO2 mmol/L 24.2 26.0 28.9   BUN mg/dL 97* 66* 60*   CREATININE mg/dL 3.99* 2.95* 2.87*   CALCIUM mg/dL 9.3 9.5 9.6   BILIRUBIN mg/dL 0.4  --   --    ALK PHOS U/L 293*  --   --    ALT (SGPT) U/L 92*  --   --    AST (SGOT) U/L 57*  --   --    GLUCOSE mg/dL 157* 101* 109*     Anticoagulation history:   Warfarin consult - on amiodarone (new start inpatient). Warfarin 5 mg PO daily was started on 10/8.      Hospital Anticoagulation:  Consulting provider: LAURA Bach  Start date: 10/10/19  Indication: Atrial fibrillation  Target INR: 2-3  Expected duration: chronic  Bridge Therapy: Now off     Date 10/8 10/9 10/10 10/11 10/12 10/13 10/14  10/15  10/16  10/17 10/18 10/19 10/20 10/21   INR     1.08 1.26 1.92 2.56 2.84  3.95  3.66  4.54 4.59 3.88 5.25 4.7   Warfarin dose 5 mg 5 mg 7.5 mg 7.5 mg 5 mg 2.5mg 2.5mg  HOLD  HOLD  HOLD HOLD hold hold Hold         Date                 INR                 Warfarin dose                      Potential drug interactions:   Amiodarone - may result in increased INR and an increased risk of bleeding.  Dose reduced 10/16 however half life average 40-55 days.  Acetaminophen (from  Tylenol and Percocet) - May enhance the anticoagulant effect of warfarin. This appears most likely with daily acetaminophen doses exceeding 1.3 or 2 g/day for multiple consecutive days.   Aspirin - may result in increased risk of bleeding.  Dexamethasone - may result in increased risk of bleeding or diminished effects of warfarin.  Escitalopram - may result in an increased risk of bleeding due to antiplatelet effects of SSRIs  Pantoprazole - may result in increased INR  Synthroid - may increase bleed risk     Relevant nutrition status: Regular diet, nutrition supplements TID     Other:      Education complete: TBD     Assessment/Plan:  1) Continue to hold warfarin today - last dose 10/14  2) Continue to monitor INR with AM labs.    Pharmacy will continue to follow until discharge or discontinuation of warfarin.   Thanks, Patricia Christensen, [facility]

## 2019-10-21 NOTE — PROGRESS NOTES
Continued Stay Note  [facility]     Patient Name: Melia Almeida  MRN: [id number]  Today's Date: 10/21/2019    Admit Date: 9/15/2019    Discharge Plan     Row Name 10/21/19 1404       Plan    Plan  Comfort/palliative care approved    Patient/Family in Agreement with Plan  yes    Plan Comments  Letter received from [facility] nurse consultant Shivani Pham.  The letter outlines their approval for hospice, comfort, and withdrawal of hemodialysis effective 10/21/19.  ALICIA Gilbert will reach out to MD for the appropriate orders.  Letter faxed to HIM on the stat line and copy placed in chart.        Discharge Codes    No documentation.             France Andrea RN

## 2019-10-21 NOTE — PROGRESS NOTES
DAILY PROGRESS NOTE  [facility], [facility]    10/21/2019    Patient Identification:  Name: Melia Almeida  Age: 54 y.o.  Sex: female  :  1965  MRN: [id number]           Primary Care Physician: Taiwo Shankar MD    Subjective:    Interval History:    Patient's  pain continued to increase.  She cannot even move due to pain  Very difficult when making turns  INR 4.70  More confused today    ROS:     No nausea, vomiting, diarrhea, constipation, chest pain    Objective:    Scheduled Meds:    acetylcysteine 4 mL Nebulization Q8H - RT   amiodarone 200 mg Oral Q24H   aspirin 81 mg Oral Daily   castor oil-balsam peru  Topical Q12H   dexamethasone 4 mg Oral Q12H   escitalopram 10 mg Oral Daily   fentaNYL 1 patch Transdermal Q72H   insulin glargine 10 Units Subcutaneous Nightly   insulin lispro 0-7 Units Subcutaneous 4x Daily With Meals & Nightly   ipratropium-albuterol 3 mL Nebulization Q8H - RT   levothyroxine 75 mcg Oral Q AM   metoprolol tartrate 12.5 mg Oral Q12H   midodrine 10 mg Oral Once per day on  Sat   And      midodrine 10 mg Oral Once per day on  Sat   And      midodrine 10 mg Oral Once per day on  Sat   pantoprazole 40 mg Oral Q AM   polyethylene glycol 17 g Oral Daily       Continuous Infusions:    Pharmacy to dose warfarin        PRN Meds:  •  acetaminophen  •  carboxymethylcellulose sod (PF)  •  cyclobenzaprine  •  dextrose  •  dextrose  •  glucagon (human recombinant)  •  HYDROmorphone  •  ondansetron  •  oxyCODONE-acetaminophen  •  Pharmacy to dose warfarin    Intake/Output:    Intake/Output Summary (Last 24 hours) at 10/21/2019 1727  Last data filed at 10/21/2019 1500  Gross per 24 hour   Intake 730 ml   Output 0 ml   Net 730 ml         Exam:    tMax 24 hrs: Temp (24hrs), Av.7 °F (36.5 °C), Min:97.3 °F (36.3 °C), Max:98 °F (36.7 °C)    Vitals Ranges:   Temp:  [97.3 °F (36.3 °C)-98 °F (36.7 °C)] 98 °F (36.7 °C)  Heart Rate:  [64-71]  "66  Resp:  [18-20] 20  BP: (116-124)/(52-60) 124/60    /60 (BP Location: Left arm, Patient Position: Lying)   Pulse 66   Temp 98 °F (36.7 °C) (Oral)   Resp 20   Ht 165.1 cm (65\")   Wt 81 kg (178 lb 9.2 oz)   LMP  (LMP Unknown)   SpO2 100%   BMI 29.72 kg/m²     General: Sleepy, confused.  On 2 L nasal cannula, coarse rhonchi bilaterally.    Neck: Supple, symmetrical, trachea midline, no adenopathy;              thyroid:  no enlargement/tenderness/nodules;              no carotid bruit or JVD  Cardiovascular: Normal rate, regular rhythm and intact distal pulses.   Bradycardic.  Exam reveals no gallop and no friction rub. No murmur heard  Pulmonary:  Coarse rhonchi bilaterally.  Diminished breath sounds bilaterally.    Abdominal: Soft. Soft, non-tender, bowel sounds active all four quadrants,     no masses, no hepatomegaly, no splenomegaly.   Extremities: Normal, atraumatic, no cyanosis.  No edema in RLE. S/p L BKA, very tender to palpation as well as mild edema in the mid left femur.  Pulses: 2 + symmetric all extremities  Neurological: Patient is sleepy, lethargic  Skin: Skin color, texture, normal. Turgor is decreased. No rashes or lesions            Data Review:    Results from last 7 days   Lab Units 10/21/19  0455 10/20/19  0441 10/18/19  0611   WBC 10*3/mm3 6.05 6.71 6.99   HEMOGLOBIN g/dL 9.0* 8.8* 8.6*   HEMATOCRIT % 29.2* 28.4* 27.9*   PLATELETS 10*3/mm3 171 164 169       Results from last 7 days   Lab Units 10/21/19  0455 10/20/19  0613 10/18/19  0611   SODIUM mmol/L 140 135* 132*   POTASSIUM mmol/L 5.2 4.5 4.2   CHLORIDE mmol/L 98 95* 89*   CO2 mmol/L 24.2 26.0 28.9   BUN mg/dL 97* 66* 60*   CREATININE mg/dL 3.99* 2.95* 2.87*   CALCIUM mg/dL 9.3 9.5 9.6   BILIRUBIN mg/dL 0.4  --   --    ALK PHOS U/L 293*  --   --    ALT (SGPT) U/L 92*  --   --    AST (SGOT) U/L 57*  --   --    GLUCOSE mg/dL 157* 101* 109*     Results from last 7 days   Lab Units 10/21/19  0455 10/20/19  0613 10/19/19  1132 "   INR  4.70* 5.25* 3.88*               Assessment:        Squamous cell lung cancer right lung(CMS/HCC)    Type 2 diabetes mellitus (CMS/HCC)    End stage renal disease (CMS/HCC)    Atelectasis of right middle and lower lung 2nd cancer    CAD (coronary artery disease)    Palliative care by specialist    Lung cancer metastatic to bone (CMS/HCC)    Chronic combined systolic and diastolic congestive heart failure (CMS/HCC)    Pulmonary hypertension (CMS/HCC)    S/P BKA (below knee amputation), left (CMS/HCC)    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    Plan:      After long conversation with patient, she understood dialysis will not help any longer.  She understood that her pain will not get better, she agreed to stop dialysis, she also answered yes when asked several times regarding stopping all treatment and just to continue pain medications, keeping her comfortable and let her go in peace.  We also have received the approval to change her care to palliative care/comfort measures.  This will go along with patient's wishes. Patient will be transferred to our palliative care unit and will be placed on room palliative care orders.          Taiwo Shankar MD  10/21/2019

## 2019-10-21 NOTE — PROGRESS NOTES
Adult Nutrition  Assessment/PES    Patient Name:  Melia Almeida  YOB: 1965  MRN: [id number]  Admit Date:  9/15/2019    Assessment Date:  10/21/2019  Nutrition follow up.  Reason for Assessment     Row Name 10/21/19 1018          Reason for Assessment    Reason For Assessment  follow-up protocol         Nutrition/Diet History     Row Name 10/21/19 1018          Nutrition/Diet History    Typical Food/Fluid Intake  continues with minimal po intake, RN stated she is drinking nutritional supplements-Novasourece Renal; malnourished-she has lost additional 3 lb in the past week         Anthropometrics     Row Name 10/21/19 0500          Anthropometrics    Weight  81 kg (178 lb 9.2 oz)         Labs/Tests/Procedures/Meds     Row Name 10/21/19 1018          Labs/Procedures/Meds    Lab Results Reviewed  reviewed, pertinent        Diagnostic Tests/Procedures    Diagnostic Test/Procedure Reviewed  reviewed, pertinent        Medications    Pertinent Medications Reviewed  reviewed, pertinent             Nutrition Prescription Ordered     Row Name 10/21/19 1018          Nutrition Prescription PO    Current PO Diet  Regular     Supplement  Novasource Renal (Nepro)     Supplement Frequency  3 times a day         Evaluation of Received Nutrient/Fluid Intake     Row Name 10/21/19 1018          PO Evaluation    % PO Intake  0         Problem/Interventions:  Intervention Goal     Row Name 10/21/19 1018          Intervention Goal    General  Maintain nutrition;Meet nutritional needs for age/condition     PO  Tolerate PO;Increase intake     Weight  Maintain weight         Nutrition Intervention     Row Name 10/21/19 1018          Nutrition Intervention    RD/Tech Action  Follow Tx progress;Care plan reviewd           Education/Evaluation     Row Name 10/21/19 1019          Education    Education  Will Instruct as appropriate        Monitor/Evaluation    Monitor  Per protocol;I&O;PO intake;Supplement intake;Pertinent  labs;Weight;Skin status;Symptoms           Electronically signed by:  Glenna Lovett RD  10/21/19 10:19 AM

## 2019-10-22 PROBLEM — Z51.5 ADMISSION FOR HOSPICE CARE: Status: ACTIVE | Noted: 2019-01-01

## 2019-10-22 NOTE — NURSING NOTE
Pt transferred to palliative unit. No family with her and seemed comfortable. Made pt palliative today. Pt unable to verbalize what she wants. Will keep her comfortable

## 2019-10-22 NOTE — PROGRESS NOTES
Continued Stay Note  [facility]     Patient Name: Melia Almeida  MRN: [id number]  Today's Date: 10/22/2019    Admit Date: 9/15/2019    Discharge Plan     Row Name 10/22/19 0817       Plan    Plan  Comfort/palliative care    Patient/Family in Agreement with Plan  yes    Plan Comments  HR CCP placed a call to pt's guardian to alert her of pt's new room number.  VM left with CCP contact information and pt's new room location.        Discharge Codes    No documentation.             France Andrea RN

## 2019-10-22 NOTE — PLAN OF CARE
Problem: Fall Risk (Adult)  Goal: Absence of Fall  Outcome: Ongoing (interventions implemented as appropriate)      Problem: Pain, Acute (Adult)  Goal: Acceptable Pain Control/Comfort Level  Outcome: Ongoing (interventions implemented as appropriate)      Problem: Palliative Care (Adult)  Goal: Maximized Comfort  Outcome: Ongoing (interventions implemented as appropriate)    Goal: Enhanced Quality of Life  Outcome: Ongoing (interventions implemented as appropriate)      Problem: Skin Injury Risk (Adult)  Goal: Skin Health and Integrity  Outcome: Ongoing (interventions implemented as appropriate)

## 2019-10-22 NOTE — PROGRESS NOTES
Discharge Planning Assessment  [facility]     Patient Name: Melia Almeida  MRN: [id number]  Today's Date: 10/22/2019    Admit Date: 9/15/2019    Discharge Needs Assessment    No documentation.       Discharge Plan     Row Name 10/22/19 1630       Plan    Plan Comments  The patient was transferred to [facility] from [address] [place] [address] on 10/21/19 @ 21:00. The patient is palliative. [facility] evaluated the patient and faxed consents to the guardian/Roberta Church and she signed consents for a [facility] scattered bed on 10/22/19. CCP will continue to follow for any needs that may arise. MANDI Bhardwaj Rn, CCP.     Final Discharge Disposition Code  51 - hospice medical facility    Final Note  Admitted to a [facility] scattered bed on 10/22/19. MANDI Bhardwaj Rn, CCP.        Destination - Selection Complete      Service Provider Request Status Selected Services Address Phone Number Fax Number    [facility] [address] [facility] [address] [phone]    [facility] Accepted N/A [address] [place] [address] [phone]    [facility] Pending - Request Sent N/A [address] [facility] [address] [phone] --      Durable Medical Equipment      No service coordination in this encounter.      Dialysis/Infusion      No service coordination in this encounter.      Home Medical Care      No service coordination in this encounter.      Therapy      No service coordination in this encounter.      Community Resources      No service coordination in this encounter.          Demographic Summary    No documentation.       Functional Status    No documentation.       Psychosocial    No documentation.       Abuse/Neglect    No documentation.       Legal    No documentation.       Substance Abuse    No documentation.       Patient Forms    No documentation.           Hermila Bhardwaj RN

## 2019-10-22 NOTE — CONSULTS
Consents received from guardian for scattered bed admission. Patients nurse [place] spoke with Dr. Shankar and he is agreeable to dc and readmit today. Patient admitted to a [facility] scattered bed. Please call with any questions or concerns. Thank you for this referral, Christie Reaves [phone].

## 2019-10-22 NOTE — PLAN OF CARE
Problem: Patient Care Overview  Goal: Plan of Care Review  Outcome: Ongoing (interventions implemented as appropriate)   10/22/19 6705   Coping/Psychosocial   Plan of Care Reviewed With patient;guardian   OTHER   Outcome Summary pt resting between care, premedicated prior to turns. pt appears very uncomfortable and restless around 1300 turn and 0.5mg ativan given and pt settled. pt also began to moan and have a grimmace later in shift. dilaudid increased to 1.5mg and pt appears to be resting much more comfortably at this time. will monitor and provide care.     Goal: Individualization and Mutuality  Outcome: Ongoing (interventions implemented as appropriate)

## 2019-10-22 NOTE — PLAN OF CARE
Problem: Patient Care Overview  Goal: Plan of Care Review  Outcome: Ongoing (interventions implemented as appropriate)   10/22/19 0403   Coping/Psychosocial   Plan of Care Reviewed With patient   Plan of Care Review   Progress no change   OTHER   Outcome Summary pt was transferred to palliative floor from [address] [place] [address]. Pt has two big bowel movements, which were running. called MD and got Fecal Management system ordered. Gave 0.5 mg of dilaudid x2 and 1 mg of dilaudid x1. tolerated well. Also gave 0.5 mg of ativan. Scopelamine and robinul given x1 for congested. no family at bedside, has a guardian. will continue to monitor and keep comfortable      Goal: Individualization and Mutuality  Outcome: Ongoing (interventions implemented as appropriate)   10/22/19 0403   Individualization   Patient Specific Preferences No BP sticks in right arm    Patient Specific Goals (Include Timeframe) to keep comfortable    Patient Specific Interventions premedicate prior to turns, FMS in place        Problem: Fall Risk (Adult)  Goal: Absence of Fall  Outcome: Ongoing (interventions implemented as appropriate)      Problem: Skin Injury Risk (Adult)  Goal: Skin Health and Integrity  Outcome: Ongoing (interventions implemented as appropriate)      Problem: Pain, Acute (Adult)  Goal: Identify Related Risk Factors and Signs and Symptoms  Outcome: Outcome(s) achieved Date Met: 10/22/19    Goal: Acceptable Pain Control/Comfort Level  Outcome: Ongoing (interventions implemented as appropriate)      Problem: Palliative Care (Adult)  Goal: Identify Related Risk Factors and Signs and Symptoms  Outcome: Outcome(s) achieved Date Met: 10/22/19    Goal: Maximized Comfort  Outcome: Ongoing (interventions implemented as appropriate)    Goal: Enhanced Quality of Life  Outcome: Ongoing (interventions implemented as appropriate)

## 2019-10-22 NOTE — PROGRESS NOTES
Case Management Discharge Note    Final Note: Admitted to a [facility] scattered bed on 10/22/19. MANDI Bhardwaj Rn, CCP.    Destination - Selection Complete      Service Provider Request Status Selected Services Address Phone Number Fax Number    [facility]patient [facility]ice [address] [facility] [id number] [phone]      Durable Medical Equipment      No service has been selected for the patient.      Dialysis/Infusion      No service has been selected for the patient.      Home Medical Care      No service has been selected for the patient.      Therapy      No service has been selected for the patient.      Community Resources      No service has been selected for the patient.             Final Discharge Disposition Code: 51 - hospice medical facility

## 2019-10-23 NOTE — H&P
HISTORY AND PHYSICAL   [facility], [facility]          Patient Identification:    Name: Melia Almeida  Age: 54 y.o.  Sex: female  :  1965  MRN: [id number]                       Primary Care Physician: Taiwo Shankar MD    Chief Complaint:      Hospice care      History of Present Illness:     Melia Almeida  is a 53 y/o female, resident at a NH. She has h/o ESRD on HD, DM, CAD, Hypothyroidism, HTN, MDD, PVD h/o left BKA. She was just dc from [facility] after been hospitalized for pneumonia and respiratory failure; already at NH, she became SOBa gain, with O2 sats of 77% in RA, she was pale, she was sent back to ER. In ER, she was found to have a new masslike opacity of the right lung base, O2 sats 82 % in RA. It was felt patient needs further evaluation, so she was admitted for further management.      Upon admission patient was placed on oxygen, breathing treatments.  Renal was consulted for hemodialysis, pulmonary was consulted for bronchoscopy.  Bronchoscopy showed endobronchial lesion in the right middle lobe and right lower lobe Atelectasis, Biopsy Came Back with squamous cell carcinoma, further studies reveal a large metastasis in the left femur, large metastasis in the right hip, metastasis to the spine, metastasis to liver and intra-abdominal lymphadenopathy.  It was felt that patient was a poor candidate for surgery and also poor candidate for continuation of dialysis.  Her guardian was contacted for changing her CODE STATUS to DNR, this was granted.  However patient wanted to continue dialysis.  Patient was explained multiple times that the combination of dialysis will not prolong her life, she has end-stage cancer, she has been complaining of worsening pain all over her body, but she still insisted on having dialysis.  Yesterday, after having a long conversation with patient, patient understood that dialysis will not improve her symptoms, will not prolong her life, her pain is  still very intense, she also understood that controlling her pain is probably the main objective at this time, keep her comfortable, and letter go in peace. Patient agreed with that.  Patient's guardian also approved to  change her care to palliative care/comfort measures.  Patient was transferred to our palliative care unit.  Was placed in palliative care orders.  Hospice has evaluated the patient and approved her to be under hospice scattered bed, guardian also approved this change as well.  Patient was discharged and then readmitted as hospice scattered bed.  Patient is to continue on palliative care orders/comfort measures.        Past Medical History:  Past Medical History:   Diagnosis Date   • Acute renal failure on dialysis (CMS/HCC)     M-W-F   • Adult failure to thrive    • Chronic kidney disease (CKD), stage V (CMS/HCC)    • Coronary artery disease    • Diabetes mellitus (CMS/HCC)    • Diabetes mellitus with chronic kidney disease (CMS/HCC)    • Disease of thyroid gland     hypothyroidism   • GERD (gastroesophageal reflux disease)    • History of anemia    • History of UTI    • Hyperlipidemia    • Hypertension    • Idiopathic neuropathy    • Major depressive disorder, recurrent episode, severe, with psychotic behavior (CMS/HCC)    • Metabolic encephalopathy    • Obesity    • Osteomyelitis of right foot (CMS/HCC)     foot and ankle   • Other symbolic dysfunctions    • Peripheral vascular disease, unspecified (CMS/HCC)    • Personal history of diabetic foot ulcer      Past Surgical History:  Past Surgical History:   Procedure Laterality Date   • ARTERIOVENOUS FISTULA/SHUNT SURGERY Right 1/29/2018    Procedure: UPPER EXTREMITY ARTERIOVENOUS SHUNT GRAFT;  Surgeon: Ambar Dobson Jr., MD;  Location: [facility];  Service:    • BELOW KNEE LEG AMPUTATION Left 2008   • BRONCHOSCOPY Bilateral 9/17/2019    Procedure: BRONCHOSCOPY WITH ENDOBRONCHIAL ULTRASOUND, BX, BRUSHINGS, BAL, & TBNA;  Surgeon: Ken  MD Du;  Location: [facility] ENDOSCOPY;  Service: Pulmonary   • INSERTION HEMODIALYSIS CATHETER N/A 2018    Procedure: TUNNEL DIALYSIS CATHETER INSERTION;  Surgeon: Moncho Jackson MD;  Location: [facility] MAIN OR;  Service:    • INSERTION HEMODIALYSIS CATHETER N/A 3/22/2018    Procedure: Tunnel Catheter Insertion;  Surgeon: Juwan Phillips MD;  Location: [facility] HYBRID OR ;  Service: Vascular      Home Meds:  No medications prior to admission.       Allergies:  Allergies   Allergen Reactions   • Morphine Swelling   • Ampicillin Cough     Immunizations:    There is no immunization history on file for this patient.  Social History:   Social History     Social History Narrative   • Not on file     Social History     Tobacco Use   • Smoking status: Current Every Day Smoker     Types: Cigarettes   • Smokeless tobacco: Never Used   • Tobacco comment: 4 CIG./PER DAY   Substance Use Topics   • Alcohol use: No     Family History:  No family history on file.     Review of Systems  See history of present illness and past medical history.    Hospice care    Objective:    Exam:    tMax 24 hrs: Temp (24hrs), Av °F (37.8 °C), Min:100 °F (37.8 °C), Max:100 °F (37.8 °C)    Vitals Ranges:   Temp:  [100 °F (37.8 °C)] 100 °F (37.8 °C)  Heart Rate:  [0-70] 0  Resp:  [0-16] 0    Pulse (!) 0   Temp 100 °F (37.8 °C) (Oral)   Resp (!) 0   LMP  (LMP Unknown)     General: Sleepy, confused.  On 2 L nasal cannula, coarse rhonchi bilaterally.    Neck: Supple, symmetrical, trachea midline, no adenopathy;              thyroid:  no enlargement/tenderness/nodules;              no carotid bruit or JVD  Cardiovascular: Normal rate, regular rhythm and intact distal pulses.   Bradycardic.  Exam reveals no gallop and no friction rub. No murmur heard  Pulmonary:  Coarse rhonchi bilaterally.  Diminished breath sounds bilaterally.    Abdominal: Soft. Soft, non-tender, bowel sounds active all four quadrants,     no masses, no  hepatomegaly, no splenomegaly.   Extremities: Normal, atraumatic, no cyanosis.  No edema in RLE. S/p L BKA, very tender to palpation as well as mild edema in the mid left femur.  Pulses: 2 + symmetric all extremities  Neurological: Patient is sleepy, lethargic  Skin: Skin color, texture, normal. Turgor is decreased. No rashes or lesions            Data Review:    Results from last 7 days   Lab Units 10/21/19  0455 10/20/19  0441 10/18/19  0611   WBC 10*3/mm3 6.05 6.71 6.99   HEMOGLOBIN g/dL 9.0* 8.8* 8.6*   HEMATOCRIT % 29.2* 28.4* 27.9*   PLATELETS 10*3/mm3 171 164 169       Results from last 7 days   Lab Units 10/21/19  0455 10/20/19  0613 10/18/19  0611   SODIUM mmol/L 140 135* 132*   POTASSIUM mmol/L 5.2 4.5 4.2   CHLORIDE mmol/L 98 95* 89*   CO2 mmol/L 24.2 26.0 28.9   BUN mg/dL 97* 66* 60*   CREATININE mg/dL 3.99* 2.95* 2.87*   CALCIUM mg/dL 9.3 9.5 9.6   BILIRUBIN mg/dL 0.4  --   --    ALK PHOS U/L 293*  --   --    ALT (SGPT) U/L 92*  --   --    AST (SGOT) U/L 57*  --   --    GLUCOSE mg/dL 157* 101* 109*     Results from last 7 days   Lab Units 10/21/19  0455 10/20/19  0613 10/19/19  1132   INR  4.70* 5.25* 3.88*               Assessment:      Admission for hospice care  Squamous cell lung cancer right lung(CMS/HCC)    Type 2 diabetes mellitus (CMS/HCC)    End stage renal disease (CMS/HCC)    Atelectasis of right middle and lower lung 2nd cancer    CAD (coronary artery disease)    Palliative care by specialist    Lung cancer metastatic to bone (CMS/HCC)    Chronic combined systolic and diastolic congestive heart failure (CMS/HCC)    Pulmonary hypertension (CMS/HCC)    S/P BKA (below knee amputation), left (CMS/HCC)    Pathological fracture in neoplastic disease, left femur, subsequent encounter for fracture with routine healing  Metastatic lung cancer  Acute respiratory failure with hypoxia  DM  PVD  CAD  HTN  Anemia CKD  Hypothyroidism  Pathologic fracture of the left femur  Atrial  fibrillation  Thrombocytopenia  Anticoagulated on Coumadin.           Plan:    Patient is comfortable  Pain is treated and controlled  Continue palliative care/comfort measures orders.        Taiwo Shankar MD

## 2019-10-23 NOTE — PROGRESS NOTES
Case Management/Social Work    Patient Name:  Melia Almeida  YOB: 1965  MRN: [id number]  Admit Date:  10/22/2019      HR [facility] placed a call to pt's guardian Becca Church, [phone], Ext [phone].  She has notified family members of pt's death.  She states that Ms. Almeida did not have a pre-arrangement for her /burial.  She requests that [facility] reach out to pt's niece Alicia Calderón, [phone] and her sister Génesis Musa [phone].  She requests that pt NOT be released to William Almeida.      Electronically signed by:  France Andrea RN  10/23/19 10:12 AM

## 2019-10-23 NOTE — NURSING NOTE
Patient  at 0445am, pt is a [facility] of the [facility], after hours number called and guardian notified.     RN attempted to notify patients sister, Willa Zendejas, in order to find out which  home has been chosen for the patient. Sister did not answer, voicemail left for sister to return call. Per  pt is being sent to [facility] while we wait for sister to call back.

## 2019-10-23 NOTE — PROGRESS NOTES
Case Management Discharge Note    Final Note: The patient  on 10/23/19 @ 04:45. BCatalino Bhardwaj Rn, CCP.     Destination - Selection Complete      Service Provider Request Status Selected Services Address Phone Number Fax Number    [facility] Selected Inpatient Hospice [address] [place] [address] [phone]      Durable Medical Equipment      No service has been selected for the patient.      Dialysis/Infusion      No service has been selected for the patient.      Home Medical Care      No service has been selected for the patient.      Therapy      No service has been selected for the patient.      Community Resources      No service has been selected for the patient.             Final Discharge Disposition Code: 41 -  in medical facility

## 2019-10-23 NOTE — DISCHARGE SUMMARY
PHYSICIAN DISCHARGE SUMMARY  [facility], [facility]    10/23/2019  Patient Identification:    Name: Melia Almeida  Age: 54 y.o.  Sex: female  :  1965  MRN: [id number]    Primary Care Physician: Taiwo Shankar MD    Admit date: 10/22/2019    Discharge date and time:10/23/2019    Discharged Condition:     Discharge Diagnoses:    Admission for hospice care  Squamous cell lung cancer right lung(CMS/HCC)    Type 2 diabetes mellitus (CMS/HCC)    End stage renal disease (CMS/HCC)    Atelectasis of right middle and lower lung 2nd cancer    CAD (coronary artery disease)    Palliative care by specialist    Lung cancer metastatic to bone (CMS/HCC)    Chronic combined systolic and diastolic congestive heart failure (CMS/HCC)    Pulmonary hypertension (CMS/HCC)    S/P BKA (below knee amputation), left (CMS/HCC)    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           Hospital Course: Melia Almeida  is a  55 y/o female, resident at a NH. She has h/o ESRD on HD, DM, CAD, Hypothyroidism, HTN, MDD, PVD h/o left BKA. She was just dc from [facility] after been hospitalized for pneumonia and respiratory failure; already at NH, she became SOBa gain, with O2 sats of 77% in RA, she was pale, she was sent back to ER. In ER, she was found to have a new masslike opacity of the right lung base, O2 sats 82 % in RA. It was felt patient needs further evaluation, so she was admitted for further management.      Upon admission patient was placed on oxygen, breathing treatments.  Renal was consulted for hemodialysis, pulmonary was consulted for bronchoscopy.  Bronchoscopy showed endobronchial lesion in the  right middle lobe and right lower lobe Atelectasis, Biopsy Came Back with squamous cell carcinoma, further studies reveal a large metastasis in the left femur, large metastasis in the right hip, metastasis to the spine, metastasis to liver and intra-abdominal lymphadenopathy.  It was felt that patient was a poor candidate for surgery and also poor candidate for continuation of dialysis.  Her guardian was contacted for changing her CODE STATUS to DNR, this was granted.  However patient wanted to continue dialysis.  Patient was explained multiple times that the combination of dialysis will not prolong her life, she has end-stage cancer, she has been complaining of worsening pain all over her body, but she still insisted on having dialysis.  Yesterday, after having a long conversation with patient, patient understood that dialysis will not improve her symptoms, will not prolong her life, her pain is still very intense, she also understood that controlling her pain is probably the main objective at this time, keep her comfortable, and letter go in peace. Patient agreed with that.  Patient's guardian also approved to  change her care to palliative care/comfort measures.  Patient was transferred to our palliative care unit.  Was placed in palliative care orders.  Hospice has evaluated the patient and approved her to be under hospice scattered bed, guardian also approved this change as well.  Patient was discharged and then readmitted as hospice scattered bed.  Patient is to continue on palliative care orders/comfort measures.  Patient continued to deteriorate, her pain was controlled and she was comfortable.  Patient eventually  today at 044 5 hours.      Signed:  Taiwo Shankar MD  10/23/2019

## 2019-10-23 NOTE — PLAN OF CARE
Problem: Fall Risk (Adult)  Goal: Absence of Fall  Outcome: Ongoing (interventions implemented as appropriate)      Problem: Pain, Acute (Adult)  Goal: Acceptable Pain Control/Comfort Level  Outcome: Ongoing (interventions implemented as appropriate)      Problem: Palliative Care (Adult)  Goal: Maximized Comfort  Outcome: Ongoing (interventions implemented as appropriate)    Goal: Enhanced Quality of Life  Outcome: Ongoing (interventions implemented as appropriate)      Problem: Skin Injury Risk (Adult)  Goal: Skin Health and Integrity  Outcome: Ongoing (interventions implemented as appropriate)      Problem: Patient Care Overview  Goal: Plan of Care Review  Outcome: Ongoing (interventions implemented as appropriate)   10/23/19 0402   Coping/Psychosocial   Plan of Care Reviewed With patient   OTHER   Outcome Summary Pt premedicated for turns to ensure comfort. Pt is minimally responsive at this time, pt appears to be resting comfortably throughout the shift. Will continue to monitor per comfort care.    Plan of Care Review   Progress declining     Goal: Individualization and Mutuality  Outcome: Ongoing (interventions implemented as appropriate)    Goal: Discharge Needs Assessment  Outcome: Ongoing (interventions implemented as appropriate)    Goal: Interprofessional Rounds/Family Conf  Outcome: Ongoing (interventions implemented as appropriate)

## 2019-10-29 LAB
MYCOBACTERIUM SPEC CULT: NORMAL
NIGHT BLUE STAIN TISS: NORMAL

## 2021-07-13 NOTE — ED TRIAGE NOTES
Patient achieved a 15% reduction in IOP from pretreatment levels or a plan is in place to achieve this goal. Pt to ED per [facility] from [facility] with reports of seizure like activity per nursing home staff.  Pt never lost consciousness, is aware of events and c/o nausea/vomiting.  Pt has hx of GERD and has no complaints.  Pt is currently awake, alert.

## 2021-09-14 NOTE — PROGRESS NOTES
DAILY PROGRESS NOTE  [facility], [place]    10/4/2019    Patient Identification:  Name: Melia Almeida  Age: 53 y.o.  Sex: female  :  1965  MRN: [id number]           Primary Care Physician: Taiwo Shankar MD    Subjective:    Interval History:    Patient developed with atrial fibrillation with rapid ventricular response as well as hypotension, since last night, placed on amiodarone per cardiology.  This morning she had  persistent hypotension, she was given 250 cc normal saline bolus.  Patient is feeling better, however she is somnolent.  Blood sugar fairly okay, creatinine 3.85, potassium 4.8, sodium 132, hemoglobin 11.7.    ROS:     No nausea, vomiting, diarrhea, constipation, chest pain    Objective:    Scheduled Meds:    acetylcysteine 4 mL Nebulization Q8H - RT   aspirin 81 mg Oral Daily   castor oil-balsam peru  Topical Q12H   dexamethasone 4 mg Oral Q12H   divalproex 125 mg Oral Nightly   escitalopram 10 mg Oral Daily   HYDROcodone-acetaminophen 1 tablet Oral BID   insulin glargine 10 Units Subcutaneous Nightly   insulin lispro 0-7 Units Subcutaneous 4x Daily With Meals & Nightly   ipratropium-albuterol 3 mL Nebulization Q8H - RT   levothyroxine 75 mcg Oral Q AM   midodrine 10 mg Oral TID AC   pantoprazole 40 mg Oral Q AM   polyethylene glycol 17 g Oral Daily   sodium chloride 250 mL Intravenous Once       Continuous Infusions:    amiodarone 1 mg/min Last Rate: 1 mg/min (10/04/19 0348)   Followed by     amiodarone 0.5 mg/min        PRN Meds:  •  acetaminophen  •  carboxymethylcellulose sod (PF)  •  dextrose  •  dextrose  •  glucagon (human recombinant)    Intake/Output:    Intake/Output Summary (Last 24 hours) at 10/4/2019 0850  Last data filed at 10/3/2019 2010  Gross per 24 hour   Intake 480 ml   Output 1500 ml   Net -1020 ml         Exam:    tMax 24 hrs: Temp (24hrs), Av.4 °F (36.9 °C), Min:97.7 °F (36.5 °C), Max:99.2 °F (37.3 °C)    Vitals Ranges:  Pt called requesting an order for a screening mammogram.           Health Maintenance   Topic Date Due    Hepatitis C screen  Never done    Pneumococcal 0-64 years Vaccine (1 of 2 - PPSV23) Never done    COVID-19 Vaccine (1) Never done    HIV screen  Never done    Shingles Vaccine (1 of 2) Never done    Flu vaccine (1) 09/01/2021    Breast cancer screen  09/14/2022    Lipid screen  09/08/2025    Colon cancer screen colonoscopy  01/22/2026    DTaP/Tdap/Td vaccine (2 - Td or Tdap) 06/19/2027    Hepatitis A vaccine  Aged Out    Hepatitis B vaccine  Aged Out    Hib vaccine  Aged Out    Meningococcal (ACWY) vaccine  Aged Out             (applicable per patient's age: Cancer Screenings, Depression Screening, Fall Risk Screening, Immunizations)    LDL Cholesterol (mg/dL)   Date Value   09/08/2020 150 (H)     AST (U/L)   Date Value   09/08/2020 22     ALT (U/L)   Date Value   09/08/2020 19     BUN (mg/dL)   Date Value   09/08/2020 12      (goal A1C is < 7)   (goal LDL is <100) need 30-50% reduction from baseline     BP Readings from Last 3 Encounters:   03/01/21 130/70   10/08/20 126/74   09/08/20 118/68    (goal /80)      All Future Testing planned in [facility]:  Lab Frequency Next Occurrence   Comprehensive Metabolic Panel Once 69/31/2375   Lipid Panel Once 03/01/2022   Hemoglobin A1C Once 03/01/2022       Next Visit Date:  Future Appointments   Date Time Provider Marilou Mcadams   3/1/2022  8:00 AM Agnes Jenkins MD [place] [id number] [facility]            Patient Active Problem List:     Intraductal papilloma of breast     Hot flashes due to surgical menopause     GERD (gastroesophageal reflux disease)     Hyperglycemia     Hypercholesterolemia     Smoker     H/O adenomatous polyp of colon "  Temp:  [97.7 °F (36.5 °C)-99.2 °F (37.3 °C)] 99.2 °F (37.3 °C)  Heart Rate:  [] 129  Resp:  [16-18] 16  BP: ()/(42-72) 78/42    BP (!) 78/42 (BP Location: Left arm, Patient Position: Lying)   Pulse (!) 129   Temp 99.2 °F (37.3 °C) (Oral)   Resp 16   Ht 165.1 cm (65\")   Wt 85.9 kg (189 lb 6 oz) Comment: without accumax pump  LMP  (LMP Unknown)   SpO2 96%   BMI 31.51 kg/m²     General: Sleepy this morning, arousable.  No respiratory distress while on oxygen.    Neck: Supple, symmetrical, trachea midline, no adenopathy;              thyroid:  no enlargement/tenderness/nodules;              no carotid bruit or JVD  Cardiovascular: Normal rate, regular rhythm and intact distal pulses.              Exam reveals no gallop and no friction rub. No murmur heard  Chest wall: No tenderness or deformity  Pulmonary:  Diminished breath sounds bilaterally, rhonchi at bases bilaterally.    Abdominal: Soft. Soft, non-tender, bowel sounds active all four quadrants,     no masses, no hepatomegaly, no splenomegaly.   Extremities: Normal, atraumatic, no cyanosis.  No edema in RLE. S/p L BKA  Pulses: 2 + symmetric all extremities  Neurological: Patient is alert and oriented to person, place                 CNII-XII intact, normal strength, sensation intact throughout  Skin: Skin color, texture, normal. Turgor is decreased. No rashes or lesions            Data Review:    Results from last 7 days   Lab Units 10/04/19  0601 10/03/19  0456 10/02/19  0520   WBC 10*3/mm3 8.67 6.45 5.00   HEMOGLOBIN g/dL 11.7* 12.1 11.3*   HEMATOCRIT % 38.2 38.9 36.6   PLATELETS 10*3/mm3 228 221 234       Results from last 7 days   Lab Units 10/04/19  0601 10/03/19  0456 10/02/19  1557   SODIUM mmol/L 132* 132* 132*   POTASSIUM mmol/L 4.8 5.7* 5.2   CHLORIDE mmol/L 86* 88* 88*   CO2 mmol/L 25.3 21.4* 21.8*   BUN mg/dL 51* 85* 68*   CREATININE mg/dL 3.85* 4.84* 4.51*   CALCIUM mg/dL 9.7 10.0 10.3   BILIRUBIN mg/dL  --  0.4  --    ALK PHOS " U/L  --  331*  --    ALT (SGPT) U/L  --  40*  --    AST (SGOT) U/L  --  24  --    GLUCOSE mg/dL 150* 183* 187*                 No results found for: TROPONINT    Microbiology Results (last 10 days)     ** No results found for the last 240 hours. **           Imaging Results (last 7 days)     Procedure Component Value Units Date/Time    XR Chest 1 View [[id number]] Collected:  10/01/19 0758     Updated:  10/01/19 1055    Narrative:       CLINICAL HISTORY: 53-year-old female for follow-up of pulmonary  infiltrates and atelectasis.     EXAM: Portable AP erect chest dated 10/01/2019 at 0602 hours.     FINDINGS: When compared to CT chest exam of 09/15/2019 and most recent  chest radiograph, portable AP erect projection of 09/26/2019 at 1138  hours, there is still opacification of the basilar right lung compatible  with previously demonstrated atelectasis and possible superimposed  infiltrates. Coarse markings in the perihilar and lower left lung and  some strandy opacity in the perihilar left mid lung periphery remain.  Metallic mesh vascular stents in the left upper extremity and right  subclavian distribution remain. Cardiac silhouette remains enlarged.  Sternal wire sutures and CABG markers are again demonstrated. No  pneumothorax or acute change in bony thorax is seen.     CONCLUSION: Allowing for minor technical differences, there is little if  any change from the exam of 09/26/2019 at 1138 hours, with cardiomegaly  and right basilar atelectasis or consolidative changes remaining.     This report was finalized on 10/1/2019 10:52 AM by Dr. Sam Magallanes M.D.               Assessment:        Squamous cell lung cancer (CMS/HCC)    End stage renal disease (CMS/HCC)    Atelectasis of right lung    CAD (coronary artery disease)  Acute respiratory failure with hypoxia  DM  PVD  CAD  HTN  Anemia CKD  Hypothyroidism       Plan:    Complete palliative radiation therapy  At this time patient wanted to continue  hemodialysis  Blood sugar better this morning after starting on Lantus  Monitor and correct electrolytes  Monitor mental status  Continue home medications  DVT/stress ulcer prophylaxis  Labs in am        Taiwo Shankar MD  10/4/2019  8:50 AM

## 2022-05-15 NOTE — PROGRESS NOTES
"   LOS: 18 days    Patient Care Team:  Taiwo Shankar MD as PCP - General (Internal Medicine)  Paulette Torres MD as Consulting Physician (Radiation Oncology)    Chief Complaint:    Chief Complaint   Patient presents with   • Shortness of Breath     Follow UP ESRD  Subjective     Interval History:   Slept well.  Right arm sore.  Eating. Bowels moving.   Not soa.  Occasional cough.   Review of Systems:   See above.     Objective     Vital Signs  Temp:  [97.3 °F (36.3 °C)-98.9 °F (37.2 °C)] 97.3 °F (36.3 °C)  Heart Rate:  [65-77] 69  Resp:  [12-18] 14  BP: (118-131)/(60-75) 131/75    Flowsheet Rows      First Filed Value   Admission Height  165.1 cm (65\") Documented at 09/15/2019 1603   Admission Weight  113 kg (250 lb) Documented at 09/15/2019 1603          No intake/output data recorded.  I/O last 3 completed shifts:  In: 1560 [P.O.:1560]  Out: -     Intake/Output Summary (Last 24 hours) at 10/3/2019 0900  Last data filed at 10/2/2019 2115  Gross per 24 hour   Intake 960 ml   Output --   Net 960 ml       Physical Exam:  Chronically ill.  Awakens easily.  Verbally answers questions .  Pupils unequal, Right pupil 4mm, left 2mm. Lateral deviation right eye. Addendum: 11:30 am  Patient on dialysis.  Tolerating 450 blood flow with uneventful AVF stick.   Neck no jvd  Heart RRR,  no s3 or rub  Lungs coarse bs, exp wheezes RML, RLL  Abd +bs soft, nontender. Obese  Ext Left BKA, No edema.  RUE AVF thrill, bruit.  Venous needle infiltration site soft, no ecchymosis .  Skin pale, no rash.  Chronic excoriations over RLE.     Results Review:    Results from last 7 days   Lab Units 10/03/19  0456 10/02/19  1557 10/02/19  0520   SODIUM mmol/L 132* 132* 132*   POTASSIUM mmol/L 5.7* 5.2 5.6*   CHLORIDE mmol/L 88* 88* 87*   CO2 mmol/L 21.4* 21.8* 20.9*   BUN mg/dL 85* 68* 91*   CREATININE mg/dL 4.84* 4.51* 5.51*   CALCIUM mg/dL 10.0 10.3 10.0   BILIRUBIN mg/dL 0.4  --   --    ALK PHOS U/L 331*  --   --    ALT (SGPT) U/L 40*  " --   --    AST (SGOT) U/L 24  --   --    GLUCOSE mg/dL 183* 187* 169*       Estimated Creatinine Clearance: 14.1 mL/min (A) (by C-G formula based on SCr of 4.84 mg/dL (H)).    Results from last 7 days   Lab Units 09/28/19  0333 09/27/19  0351   MAGNESIUM mg/dL 2.5  --    PHOSPHORUS mg/dL 5.6* 5.8*             Results from last 7 days   Lab Units 10/03/19  0456 10/02/19  0520 09/30/19  0329 09/29/19  0630 09/28/19  0333   WBC 10*3/mm3 6.45 5.00 11.22* 9.14 13.37*   HEMOGLOBIN g/dL 12.1 11.3* 11.6* 11.9* 12.3   PLATELETS 10*3/mm3 221 234 284 285 319               Imaging Results (last 24 hours)     ** No results found for the last 24 hours. **          acetylcysteine 4 mL Nebulization Q8H - RT   aspirin 81 mg Oral Daily   castor oil-balsam peru  Topical Q12H   dexamethasone 4 mg Oral Q12H   divalproex 125 mg Oral Nightly   escitalopram 10 mg Oral Daily   HYDROcodone-acetaminophen 1 tablet Oral BID   insulin lispro 0-7 Units Subcutaneous 4x Daily With Meals & Nightly   ipratropium-albuterol 3 mL Nebulization Q8H - RT   levothyroxine 75 mcg Oral Q AM   pantoprazole 40 mg Oral Q AM   polyethylene glycol 17 g Oral Daily          Medication Review:   Current Facility-Administered Medications   Medication Dose Route Frequency Provider Last Rate Last Dose   • acetaminophen (TYLENOL) tablet 650 mg  650 mg Oral Q4H PRN Taiwo Shankar MD   650 mg at 09/21/19 0918   • acetylcysteine (MUCOMYST) 20 % nebulizer solution 4 mL  4 mL Nebulization Q8H - RT Alexander Schaefer MD   4 mL at 10/03/19 0703   • aspirin EC tablet 81 mg  81 mg Oral Daily Taiwo Shankar MD   81 mg at 10/03/19 0824   • carboxymethylcellulose sod (PF) 1 % eye gel 1 drop  1 drop Both Eyes TID Taiwo Garrett MD       • castor oil-balsam peru (VENELEX) ointment   Topical Q12H Taiwo Shankar MD   5 g at 10/03/19 0824   • dexamethasone (DECADRON) tablet 4 mg  4 mg Oral Q12H Alexander Schaefer MD   4 mg at 10/03/19 0824   • dextrose (D50W) 25 g/ 50mL Intravenous  Solution 25 g  25 g Intravenous Q15 Min Taiwo Garrett MD       • dextrose (GLUTOSE) oral gel 15 g  15 g Oral Q15 Min Taiwo Garrett MD       • divalproex (DEPAKOTE) DR tablet 125 mg  125 mg Oral Nightly Taiwo Shankar MD   125 mg at 10/02/19 2118   • escitalopram (LEXAPRO) tablet 10 mg  10 mg Oral Daily Taiwo Shankar MD   10 mg at 10/03/19 0824   • glucagon (human recombinant) (GLUCAGEN DIAGNOSTIC) injection 1 mg  1 mg Subcutaneous Q15 Min Taiwo Garrett MD       • HYDROcodone-acetaminophen (NORCO) 5-325 MG per tablet 1 tablet  1 tablet Oral BID Taiwo Shankar MD   1 tablet at 10/03/19 0824   • insulin lispro (humaLOG) injection 0-7 Units  0-7 Units Subcutaneous 4x Daily With Meals & Nightly Taiwo Shankar MD   5 Units at 10/03/19 0823   • ipratropium-albuterol (DUO-NEB) nebulizer solution 3 mL  3 mL Nebulization Q8H - RT Alexander Schaefer MD   3 mL at 10/03/19 0703   • levothyroxine (SYNTHROID, LEVOTHROID) tablet 75 mcg  75 mcg Oral Q AM Taiwo Shankar MD   75 mcg at 10/03/19 0509   • pantoprazole (PROTONIX) EC tablet 40 mg  40 mg Oral Q AM Taiwo Shankar MD   40 mg at 10/03/19 0509   • polyethylene glycol 3350 powder (packet)  17 g Oral Daily Taiwo Shankar MD   17 g at 10/03/19 0824       Assessment/Plan   1. ESRD.  Dialysis yesterday stopped due to infiltration of venous needle and inability to cannulate again despite 2 sticks.  K today 5.7.  Needs dialysis.  Her arm looks ok to stick.  Will attempt this am.  If unable, will need shiley catheter.   2. 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 Palliative XRT.    3. DM2 bs high on steroid.  Suggest scheduled insulin.  Will Dw. Dr. Shankar .  4. CAD  5. PAD  6. HTN. BP had been too controlled. Now better after  DC metoprolol. Watching for rebound.   7. Hypothyroidism. On Replacement.   8. Anemia CKD.  No BASSEM due to malignancy  .        Shelia Burgess MD  10/03/19  9:00 AM     DC instructions

## (undated) DEVICE — NDL HYPO PRECISIONGLIDE REG 25G 1 1/2

## (undated) DEVICE — APPL CHLORAPREP W/TINT 10.5ML PERC STRL

## (undated) DEVICE — TRAP,MUCUS SPECIMEN, 80CC: Brand: MEDLINE

## (undated) DEVICE — GOWN,NON-REINFORCED,SIRUS,SET IN SLV,XL: Brand: MEDLINE

## (undated) DEVICE — MSK AIRWY LARYNG LMA PILOT SZ4

## (undated) DEVICE — SYR LUERLOK 5CC

## (undated) DEVICE — GLIDEPATH HEMODIALYSIS CATH, ST, DL, 14.5 FR. 23CM: Brand: GLIDEPATH LONG-TERM HEMODIALYSIS CATHETER WITH PRELOADED STYLET

## (undated) DEVICE — CVR PROB 96IN LF STRL

## (undated) DEVICE — BITEBLOCK OMNI BLOC

## (undated) DEVICE — GLV SURG BIOGEL LTX PF 7

## (undated) DEVICE — SYR LUERLOK 20CC

## (undated) DEVICE — DECANT BG O JET

## (undated) DEVICE — ANTIBACTERIAL UNDYED BRAIDED (POLYGLACTIN 910), SYNTHETIC ABSORBABLE SUTURE: Brand: COATED VICRYL

## (undated) DEVICE — SINGLE USE ASPIRATION NEEDLE: Brand: SINGLE USE ASPIRATION NEEDLE

## (undated) DEVICE — KT CATH TAL PALINDROME SAPPHIRE 14.5F23CM

## (undated) DEVICE — SUT SILK 3/0 SH CR5 18IN C0135

## (undated) DEVICE — GOWN,PREVENTION PLUS,XLONG/XLARGE,STRL: Brand: MEDLINE

## (undated) DEVICE — SUT SILK 2/0 TIES 18IN A185H

## (undated) DEVICE — SUT SILK 4/0 TIES 18IN A183H

## (undated) DEVICE — 3M™ STERI-DRAPE™ INSTRUMENT POUCH 1018: Brand: STERI-DRAPE™

## (undated) DEVICE — FRCP BX RADJAW4 PULM WO NDL STD1.8X2 100

## (undated) DEVICE — LN SMPL O2 NASL/ORL SMART/CAPNOLINE PLS A/

## (undated) DEVICE — LOU MINOR PROCEDURE: Brand: MEDLINE INDUSTRIES, INC.

## (undated) DEVICE — SUT PROLN SURGILENE 5.0 24IN BLU 9702H

## (undated) DEVICE — SUT PROLN 6/0 RB2 D/A 30IN 8711H

## (undated) DEVICE — SYR SLP TP 10ML DISP

## (undated) DEVICE — CATH IV INSYTE AUTOGARD 14G 1 1/2IN ORNG

## (undated) DEVICE — Device

## (undated) DEVICE — PK AV FISTL/SHNT 40

## (undated) DEVICE — LARGE BORE STOPCOCK WITH EXTENSION SET, MALE LUER LOCK ADAPTER WITH RETRACTABLE COLLAR

## (undated) DEVICE — INTENDED FOR TISSUE SEPARATION, AND OTHER PROCEDURES THAT REQUIRE A SHARP SURGICAL BLADE TO PUNCTURE OR CUT.: Brand: BARD-PARKER ® CARBON RIB-BACK BLADES

## (undated) DEVICE — DRSNG WND BORDR/ADHS NONADHR/GZ LF 4X4IN STRL

## (undated) DEVICE — SINGLE USE SUCTION VALVE MAJ-209: Brand: SINGLE USE SUCTION VALVE (STERILE)

## (undated) DEVICE — SUT SILK 3/0 TIES 18IN A184H

## (undated) DEVICE — ADHS SKIN DERMABOND TOP ADVANCED

## (undated) DEVICE — GLV SURG SENSICARE GREEN W/ALOE PF LF 8.5 STRL

## (undated) DEVICE — SOL NS 500ML

## (undated) DEVICE — DRAPE,REIN 53X77,STERILE: Brand: MEDLINE

## (undated) DEVICE — GLV SURG BIOGEL LTX PF 8 1/2

## (undated) DEVICE — SUT ETHLN 2/0 PS 18IN 585H

## (undated) DEVICE — Device: Brand: BALLOON

## (undated) DEVICE — BIOPATCH™ ANTIMICROBIAL DRESSING WITH CHLORHEXIDINE GLUCONATE IS A HYDROPHILLIC POLYURETHANE ABSORPTIVE FOAM WITH CHLORHEXIDINE GLUCONATE (CHG) WHICH INHIBITS BACTERIAL GROWTH UNDER THE DRESSING. THE DRESSING IS INTENDED TO BE USED TO ABSORB EXUDATE, COVER A WOUND CAUSED BY VASCULAR AND NONVASCULAR PERCUTANEOUS MEDICAL DEVICES DURING SURGERY, AS WELL AS REDUCE LOCAL INFECTION AND COLONIZATION OF MICROORGANISMS.: Brand: BIOPATCH

## (undated) DEVICE — SENSR O2 OXIMAX FNGR A/ 18IN NONSTR

## (undated) DEVICE — SINGLE USE BIOPSY VALVE MAJ-210: Brand: SINGLE USE BIOPSY VALVE (STERILE)

## (undated) DEVICE — ADAPT SWVL FIBROPTIC BRONCH

## (undated) DEVICE — UNDYED BRAIDED (POLYGLACTIN 910), SYNTHETIC ABSORBABLE SUTURE: Brand: COATED VICRYL

## (undated) DEVICE — CONMED DISPOSABLE BRONCHIAL CYTOLOGY BRUSH, STRAIGHT HANDLE, 3 MM X 120 CM: Brand: CONMED

## (undated) DEVICE — ADHS SKIN DERMABOND

## (undated) DEVICE — VITAL SIGNS™ JACKSON-REES CIRCUITS: Brand: VITAL SIGNS™

## (undated) DEVICE — SUT PROLN 6/0 BV1 D/A 30IN 8709H

## (undated) DEVICE — CANNULA,ADULT,SOFT-TOUCH,7'TUBE,UC: Brand: PENDING

## (undated) DEVICE — BANDAGE,GAUZE,BULKEE II,4.5"X4.1YD,STRL: Brand: MEDLINE

## (undated) DEVICE — SUT PROLN 6/0 C1 D/A 30IN 8706H

## (undated) DEVICE — 3M™ IOBAN™ 2 ANTIMICROBIAL INCISE DRAPE 6650EZ: Brand: IOBAN™ 2

## (undated) DEVICE — GLV SURG TRIUMPH CLASSIC PF LTX 8 STRL

## (undated) DEVICE — TUBING, SUCTION, 1/4" X 10', STRAIGHT: Brand: MEDLINE